# Patient Record
Sex: MALE | Race: WHITE | Employment: UNEMPLOYED | URBAN - METROPOLITAN AREA
[De-identification: names, ages, dates, MRNs, and addresses within clinical notes are randomized per-mention and may not be internally consistent; named-entity substitution may affect disease eponyms.]

---

## 2017-04-24 ENCOUNTER — APPOINTMENT (OUTPATIENT)
Dept: GENERAL RADIOLOGY | Age: 52
End: 2017-04-24
Attending: EMERGENCY MEDICINE
Payer: SELF-PAY

## 2017-04-24 ENCOUNTER — HOSPITAL ENCOUNTER (EMERGENCY)
Age: 52
Discharge: HOME | End: 2017-04-24
Attending: EMERGENCY MEDICINE
Payer: SELF-PAY

## 2017-04-24 VITALS
SYSTOLIC BLOOD PRESSURE: 131 MMHG | RESPIRATION RATE: 16 BRPM | HEIGHT: 70 IN | DIASTOLIC BLOOD PRESSURE: 87 MMHG | HEART RATE: 66 BPM | WEIGHT: 220 LBS | OXYGEN SATURATION: 99 % | TEMPERATURE: 98.3 F | BODY MASS INDEX: 31.5 KG/M2
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
## 2017-04-24 DIAGNOSIS — S43.004A SHOULDER DISLOCATION, RIGHT, INITIAL ENCOUNTER: ICD-10-CM

## 2017-04-24 PROCEDURE — 99282 EMERGENCY DEPT VISIT SF MDM: CPT

## 2017-04-24 PROCEDURE — 96372 THER/PROPH/DIAG INJ SC/IM: CPT

## 2017-04-24 PROCEDURE — 73030 X-RAY EXAM OF SHOULDER: CPT

## 2017-04-24 PROCEDURE — 74011250636 HC RX REV CODE- 250/636: Performed by: EMERGENCY MEDICINE

## 2017-04-24 RX ORDER — KETOROLAC TROMETHAMINE 30 MG/ML
60 INJECTION, SOLUTION INTRAMUSCULAR; INTRAVENOUS
Status: COMPLETED | OUTPATIENT
Start: 2017-04-24 | End: 2017-04-24

## 2017-04-24 RX ORDER — MELOXICAM 7.5 MG/1
7.5 TABLET ORAL DAILY
Qty: 14 TAB | Refills: 0 | Status: SHIPPED | OUTPATIENT
Start: 2017-04-24 | End: 2019-08-08

## 2017-04-24 RX ADMIN — KETOROLAC TROMETHAMINE 60 MG: 30 INJECTION, SOLUTION INTRAMUSCULAR at 10:28
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
## 2017-04-24 NOTE — ED NOTES
PT discharged per MD order, educated PT on discharge instructions, medication and follow up care, verbalized understanding, assisted PT to lobby via WC, PT stable at time of transport

## 2018-11-21 ENCOUNTER — OFFICE VISIT (OUTPATIENT)
Dept: NEUROLOGY | Age: 53
End: 2018-11-21

## 2018-11-21 ENCOUNTER — HOSPITAL ENCOUNTER (OUTPATIENT)
Dept: LAB | Age: 53
Discharge: HOME | End: 2018-11-21
Payer: SELF-PAY

## 2018-11-21 VITALS
WEIGHT: 219 LBS | OXYGEN SATURATION: 98 % | BODY MASS INDEX: 31.35 KG/M2 | HEART RATE: 78 BPM | RESPIRATION RATE: 18 BRPM | DIASTOLIC BLOOD PRESSURE: 80 MMHG | HEIGHT: 70 IN | TEMPERATURE: 98.1 F | SYSTOLIC BLOOD PRESSURE: 130 MMHG

## 2018-11-21 DIAGNOSIS — G57.02 COMMON PERONEAL NEUROPATHY OF LEFT LOWER EXTREMITY: Primary | ICD-10-CM

## 2018-11-21 DIAGNOSIS — G57.02 COMMON PERONEAL NEUROPATHY OF LEFT LOWER EXTREMITY: ICD-10-CM

## 2018-11-21 LAB
HBA1C MFR BLD: 5.3 % (ref 4.2–5.6)
RHEUMATOID FACT SER QL LA: NEGATIVE
T4 FREE SERPL-MCNC: 0.7 NG/DL (ref 0.7–1.5)
TSH SERPL DL<=0.05 MIU/L-ACNC: 0.68 UIU/ML (ref 0.36–3.74)
VIT B12 SERPL-MCNC: 220 PG/ML (ref 211–911)

## 2018-11-21 PROCEDURE — 86235 NUCLEAR ANTIGEN ANTIBODY: CPT

## 2018-11-21 PROCEDURE — 86430 RHEUMATOID FACTOR TEST QUAL: CPT

## 2018-11-21 PROCEDURE — 83520 IMMUNOASSAY QUANT NOS NONAB: CPT

## 2018-11-21 PROCEDURE — 82607 VITAMIN B-12: CPT

## 2018-11-21 PROCEDURE — 36415 COLL VENOUS BLD VENIPUNCTURE: CPT

## 2018-11-21 PROCEDURE — 84439 ASSAY OF FREE THYROXINE: CPT

## 2018-11-21 PROCEDURE — 82784 ASSAY IGA/IGD/IGG/IGM EACH: CPT

## 2018-11-21 PROCEDURE — 83036 HEMOGLOBIN GLYCOSYLATED A1C: CPT

## 2018-11-21 RX ORDER — METHOCARBAMOL 750 MG/1
TABLET, FILM COATED ORAL 4 TIMES DAILY
COMMUNITY
End: 2019-08-08

## 2018-11-21 NOTE — PATIENT INSTRUCTIONS
Thank you for choosing [facility] and [facility] for your  
 
care. You may receive a survey about your visit. We appreciate you taking time  
 
to complete this survey as we use your feedback to improve our services. We  
 
realize we are not perfect, but we strive to provide excellent care.

## 2018-11-21 NOTE — PROGRESS NOTES
Chief Complaint Patient presents with  Foot Pain C/O left foot drop PHQ over the last two weeks 11/21/2018 Little interest or pleasure in doing things Not at all Feeling down, depressed, irritable, or hopeless Not at all Total Score PHQ 2 0

## 2018-11-21 NOTE — PROGRESS NOTES
HPI:  46 y/o male referred by JOHN Merritt for a CC of left foot drop. About 6 weeks ago he woke up with numbness in the anterior tibial area of the left leg and inability to dorsiflex his left foot. This has not improved. He denies trauma or pain. Denies back pain or pain from the back radiating into the left lower extremity. He is not a diabetic. He does drink at least 4 beers a day. Denies any numbness, tingling, or weakness of the right lower extremity. He has been compensated by circling his left leg at the hip Social History Socioeconomic History  Marital status:  Spouse name: Not on file  Number of children: Not on file  Years of education: Not on file  Highest education level: Not on file Social Needs  Financial resource strain: Not on file  Food insecurity - worry: Not on file  Food insecurity - inability: Not on file  Transportation needs - medical: Not on file  Transportation needs - non-medical: Not on file Occupational History  Not on file Tobacco Use  Smoking status: Never Smoker  Smokeless tobacco: Current User  Tobacco comment: chew tobacco  
Substance and Sexual Activity  Alcohol use: Not on file  Drug use: Not on file  Sexual activity: Not on file Other Topics Concern  Not on file Social History Narrative  Not on file No family history on file. Current Outpatient Medications Medication Sig Dispense Refill  methocarbamol (ROBAXIN) 750 mg tablet Take  by mouth four (4) times daily.  meloxicam (MOBIC) 7.5 mg tablet Take 1 Tab by mouth daily. 14 Tab 0 History reviewed. No pertinent past medical history. Past Surgical History:  
Procedure Laterality Date  HX ORTHOPAEDIC Allergies Allergen Reactions  Motrin [Ibuprofen] Hives There is no problem list on file for this patient. Review of Systems:  
Constitutional: no fever or chills Skin denies rash or itching HEENT:  Denies tinnitus, hearing loss, or visual changes Respiratory: denies shortness of breath Cardiovascular: denies chest pain, dyspnea on exertion Gastrointestinal: does not report nausea or vomiting Genitourinary: does not report dysuria or incontinence Musculoskeletal: does not report joint pain or swelling Endocrine: denies weight change Hematology: denies easy bruising or bleeding Neurological: as above in HPI PHYSICAL EXAMINATION:   
 
VITAL SIGNS:   
Visit Vitals /80 Pulse 78 Temp 98.1 °F (36.7 °C) Resp 18 Ht 5' 10\" (1.778 m) Wt 99.3 kg (219 lb) SpO2 98% BMI 31.42 kg/m² GENERAL: Well developed, well nourished, in no apparent distress. HEART: RR, no murmurs heard, no carotid bruits EXTREMITIES: No clubbing, cyanosis, or edema is identified. Pulses 2+    and symmetrical.  No retropopliteal masses felt. HEAD:   Normocephalic, atraumatic. NEUROLOGIC EXAMINATION 
 
MENTAL STATUS: Awake, alert, and oriented x 4. Attention and STM are grossly normal. There is no aphasia. Fund of knowledge is adequate. Mood and affect are appropriate CRANIAL NERVES: Visual fields are full to confrontation. No fundus anomalies observed. Pupils are reactive to light and accommodation. Extraocular movements are intact and there is no nystagmus. Facial sensation is normal 
Face is symmetrical.  
Hearing is present. SCM/TPZ 5/5 Palate rises symmetrically. Tongue is in the midline. MOTOR:   0/5 left toe and foot dorsiflexion, 2/5 left foot eversion, 5 out of 5 everywhere else CEREBELLAR: No tremors or dysmetria SENSORY:  Normal PP, vibration, propioception. Romberg negative DTR's:   +2 throughout, no long tract signs GAIT:   Flapping gait with left circumduction at the hip Impression: Left foot drop, highest in the differential is a left peroneal neuropathy distal to the split of the sciatic into peroneal and tibial nerves, less likely this is an L4-L5 radiculopathy given pattern of weakness and lack of radicular symptoms. Systemic processes such as diabetes, vasculitis, autoimmune disorders resulting in a mononeuritis type of presentation, compression at the level of the knee to fibular area are possibilities. Plan: 1hemoglobin A1c, serum protein electrophoresis, immunoelectrophoresis, B12, TSH, DONTAE, rheumatoid factor, and antinucleocytoplasmic antibodies. 2-EMG and nerve conduction studies of the lower extremities. 3Discussed prognosis, too early to know about chances of recovery. 4-Will discuss on f/u PT, AFO fitting if not improving. PLEASE NOTE:  
Portions of this document may have been produced using voice recognition software. Unrecognized errors in transcription may be present. This note will not be viewable in [address]

## 2018-11-23 LAB
ALBUMIN SERPL ELPH-MCNC: 3.7 G/DL (ref 2.9–4.4)
ALBUMIN/GLOB SERPL: 1.3 {RATIO} (ref 0.7–1.7)
ALPHA1 GLOB SERPL ELPH-MCNC: 0.2 G/DL (ref 0–0.4)
ALPHA2 GLOB SERPL ELPH-MCNC: 0.6 G/DL (ref 0.4–1)
B-GLOBULIN SERPL ELPH-MCNC: 1.2 G/DL (ref 0.7–1.3)
CENTROMERE B AB SER-ACNC: <0.2 AI (ref 0–0.9)
CHROMATIN AB SERPL-ACNC: <0.2 AI (ref 0–0.9)
DSDNA AB SER-ACNC: 1 IU/ML (ref 0–9)
ENA JO1 AB SER-ACNC: <0.2 AI (ref 0–0.9)
ENA RNP AB SER-ACNC: <0.2 AI (ref 0–0.9)
ENA SCL70 AB SER-ACNC: <0.2 AI (ref 0–0.9)
ENA SM AB SER-ACNC: <0.2 AI (ref 0–0.9)
ENA SS-A AB SER-ACNC: <0.2 AI (ref 0–0.9)
ENA SS-B AB SER-ACNC: <0.2 AI (ref 0–0.9)
GAMMA GLOB SERPL ELPH-MCNC: 0.9 G/DL (ref 0.4–1.8)
GLOBULIN SER-MCNC: 2.9 G/DL (ref 2.2–3.9)
IGA SERPL-MCNC: 277 MG/DL (ref 90–386)
IGG SERPL-MCNC: 960 MG/DL (ref 700–1600)
IGM SERPL-MCNC: 120 MG/DL (ref 20–172)
INTERPRETATION SERPL IEP-IMP: NORMAL
M PROTEIN SERPL ELPH-MCNC: NORMAL G/DL
PROT SERPL-MCNC: 6.6 G/DL (ref 6–8.5)
SEE BELOW, 164869: NORMAL

## 2018-11-26 LAB
C-ANCA TITR SER IF: NORMAL TITER
MYELOPEROXIDASE AB SER IA-ACNC: <9 U/ML (ref 0–9)
P-ANCA ATYPICAL TITR SER IF: NORMAL TITER
P-ANCA TITR SER IF: NORMAL TITER
PROTEINASE3 AB SER IA-ACNC: <3.5 U/ML (ref 0–3.5)

## 2018-11-27 ENCOUNTER — OFFICE VISIT (OUTPATIENT)
Dept: NEUROLOGY | Age: 53
End: 2018-11-27

## 2018-11-27 VITALS
BODY MASS INDEX: 30.58 KG/M2 | OXYGEN SATURATION: 97 % | RESPIRATION RATE: 16 BRPM | DIASTOLIC BLOOD PRESSURE: 75 MMHG | HEIGHT: 70 IN | SYSTOLIC BLOOD PRESSURE: 120 MMHG | HEART RATE: 76 BPM | WEIGHT: 213.6 LBS | TEMPERATURE: 97.7 F

## 2018-11-27 DIAGNOSIS — G57.02 COMMON PERONEAL NEUROPATHY OF LEFT LOWER EXTREMITY: Primary | ICD-10-CM

## 2018-11-27 DIAGNOSIS — G62.9 POLYNEUROPATHY: ICD-10-CM

## 2018-11-27 DIAGNOSIS — F10.10 ALCOHOL ABUSE: ICD-10-CM

## 2018-11-27 NOTE — PROGRESS NOTES
11/27/2018 11:04 AM    SSN: [id number]    Subjective:   50-year-old male who comes for follow-up and electrodiagnostic testing of a foot drop of onset 6 to now 7 weeks ago. He reports little ability to dorsiflex the toes of the left foot and the left foot itself. He has numbness on the dorsal aspect of the left foot. He had laboratory works, showing normal hemoglobin A1c, TSH, serum protein electrophoresis, negative and DONTAE, negative antinucleocytoplasmic antibodies, and negative testing for rheumatoid arthritis. B12 level was low normal at 220. He does drink a minimum of 4 beers a day and sometimes would have some moonshine as well. Nerve conduction studies and EMG of the lower extremities showed a clear left peroneal neuropathy at the left fibular head with subpar right peroneal motor and bilateral sural sensory potential amplitudes that raise concern for more diffuse underlying polyneuropathy as well. EMG showed denervation in the left tibialis anterior which was not seen on the right. Social History     Socioeconomic History    Marital status:      Spouse name: Not on file    Number of children: Not on file    Years of education: Not on file    Highest education level: Not on file   Social Needs    Financial resource strain: Not on file    Food insecurity - worry: Not on file    Food insecurity - inability: Not on file    Transportation needs - medical: Not on file   [employer] needs - non-medical: Not on file   Occupational History    Not on file   Tobacco Use    Smoking status: Never Smoker    Smokeless tobacco: Current User    Tobacco comment: chew tobacco   Substance and Sexual Activity    Alcohol use: Not on file    Drug use: Not on file    Sexual activity: Not on file   Other Topics Concern    Not on file   Social History Narrative    Not on file       No family history on file.     Current Outpatient Medications   Medication Sig Dispense Refill    methocarbamol (ROBAXIN) 750 mg tablet Take  by mouth four (4) times daily.  meloxicam (MOBIC) 7.5 mg tablet Take 1 Tab by mouth daily. 14 Tab 0       No past medical history on file. Past Surgical History:   Procedure Laterality Date    HX ORTHOPAEDIC         Allergies   Allergen Reactions    Motrin [Ibuprofen] Hives       Vital signs:    Visit Vitals  /75 (BP 1 Location: Left arm, BP Patient Position: Sitting)   Pulse 76   Temp 97.7 °F (36.5 °C) (Oral)   Resp 16   Ht 5' 10\" (1.778 m)   Wt 96.9 kg (213 lb 9.6 oz)   SpO2 97%   BMI 30.65 kg/m²       Review of Systems:   GENERAL: Denies fever or fatigue  CARDIAC: No CP or SOB  PULMONARY: No cough of SOB  MUSCULOSKELETAL: No new joint pain  NEURO: SEE HPI      EXAM: Alert, in NAD. Heart is regular. Oriented x3, EOM's are full, PERRL, no facial asymmetries. Strength and tone are 0/5 left foot and toe dorsiflexion. .  Left foot drop, slapping gait left leg. 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 He has a low normal B12 and I advised him about oral replacement. I suspect that alcohol is a major reason why he is having a neuropathy. I cannot rule out that there is an entrapment of the left peroneal nerve at the fibular head or in the retropopliteal fossa. Discussed expectations. I will send him to physical therapy for evaluation and AFO while he hopefully heals, which she understands is not guaranteed. This may be permanent and he understands this. He will see me in follow-up in 3 months. PLEASE NOTE:   Portions of this document may have been produced using voice recognition software. Unrecognized errors in transcription may be present. This note will not be viewable in [address]

## 2018-11-27 NOTE — PROGRESS NOTES
[facility] Neuroscience  [facility] [address]  [facility] [phone]    Neurophysiology Report    Patient: Sarita Qureshi     ID: [id number] Physician: Jose Alvarado MD   Gender: Male Ref Phys: Jose Alvarado MD   Handedness: Sarah Williamson     Study Date: November 27, 2018         Patient History:  Left foot drop    Nerve Conduction Studies  Anti Sensory Summary Table     Stim Site NR Peak (ms) Norm Peak (ms) O-P Amp (µV) Norm O-P Amp Dist (cm) Gee (m/s)   Left Sup Peron Anti Sensory (Lower Leg)   Ankle    5.8 <4.4 6.1 >6.0 14.0 30.4   Site 2    6.7  4.1      Right Sup Peron Anti Sensory (Lower Leg)   Ankle    4.4 <4.4 7.7 >6.0 14.0 50.0   Left Sural Anti Sensory (Ankle)   Calf    10.7 <4.4 7.7 >6.0 14.0 26.4   Right Sural Anti Sensory (Ankle)   Calf    4.5 <4.4 11.6 >6.0 14.0 38.9   Site 2    4.4  13.0        Motor Summary Table     Stim Site NR Onset (ms) Norm Onset (ms) O-P Amp (mV) Norm O-P Amp Dist (cm) Gee (m/s) Norm Gee (m/s)   Left Peroneal Motor (EDB)   Ankle NR  <6.5  >2.0 34.0  >44   Fibula (Head) NR     10.0     Pop Fossa    15.2  1.0       Right Peroneal Motor (EDB)   Ankle    5.5 <6.5 3.1 >2.0 34.0 47.2 >44   Fibula (Head)    12.7  2.6  10.0 250.0    Pop Fossa    13.1  2.7       Left Tibial Motor (Abd Latham Brev)   Ankle    5.6 <5.8 4.9 >4.0 30.0 24.0 >41   Knee    18.1  3.3       Right Tibial Motor (Abd Latham Brev)   Ankle    4.8 <5.8 4.1 >4.0 44.0 35.8 >41   Knee    17.1  5.0             EMG     Side Muscle Nerve Root Ins Act Fibs Psw Fasc Amp Dur Poly Recrt Int Paresh Landsman Comment   Left VastusLat Femoral L2-4 Nml Nml Nml None Nml Nml 0 Nml Nml    Left AntTibialis Dp Br Fibular L4-5 Incr 2+ 2+ None Nml Nml 0 Reduced 50%    Left MedGastroc   Nml Nml Nml None Nml Nml 0 Nml Nml    Left GluteusMed SupGluteal L5-S1 Nml Nml Nml None Nml Nml 0 Nml Nml    Left BicepsFemS Sciatic L5-S1 Nml Nml Nml None Nml Nml 0 Nml Nml    Left Ext Dig Brev Dp Br Fibular L5, S1 Incr 1+ Nml None Nml Nml 0 Reduced 25%    Right AntTibialis Dp Br Fibular L4-5 Nml Nml Nml None Nml Nml 0 Nml Nml    Right MedGastroc   Nml Nml Nml None Nml Nml 0 Nml Nml    Right VastusLat Femoral L2-4 Nml Nml Nml None Nml Nml 0 Nml Nml    Right BicepsFemS Sciatic L5-S1 Nml Nml Nml None Nml Nml 0 Nml Nml    Right Ext Dig Brev Dp Br Fibular L5, S1 Nml Nml Nml None Nml Nml 0 Nml Nml      NCS/EMG FINDINGS:     Evaluation of the Left peroneal motor nerve showed no response (Ankle) and no response (Fibula (Head)).  The Right peroneal motor and the Right superficial peroneal sensory nerves were unremarkable.  The Left tibial motor and the Right tibial motor nerves showed decreased conduction velocity (Knee-Ankle, L24.0, R35.8 m/s).  The Left superficial peroneal sensory nerve showed prolonged distal peak latency (5.8 ms) and decreased conduction velocity (Ankle-Lower Leg, 30.4 m/s).  The Left sural sensory and the Right sural sensory nerves showed prolonged distal peak latency (L10.7, R4.5 ms) and decreased conduction velocity (Calf-Ankle, L26.4, R38.9 m/s). INTERPRETATION:   1-Absent left peroneal CMAP at and distal to the fibular head, present proximally. 2-Absent left sural SNAP. 3-Low normal right peroneal motor amplitude.    4-These findings are consistent with a possible mild multifocal polyneuropathy with predominant left peroneal nerve involvement and potential entrapment at the fibular head vs isolated left peroneal neuropathy at the fibular head.     ___________________________  Prince Jimenez MD          Waveforms:                    KADEEM Jesus 57[facility]  OFFICE PROCEDURE PROGRESS NOTE        Chart reviewed for the following:   Joe Vaz MD, have reviewed the History, Physical and updated the Allergic reactions for [facility] performed immediately prior to start of procedure:   Joe Vaz MD, have performed the following reviews on [employer] prior to the start of the procedure:            * Patient was identified by name and date of birth   * Agreement on procedure being performed was verified  * Risks and Benefits explained to the patient  * Procedure site verified and marked as necessary  * Patient was positioned for comfort  * Consent was signed and verified     Time: 11:04 AM    Date of procedure: 11/27/2018    Procedure performed by:  [address] [place] [address]    Provider assisted by: BERKLEY Magana      Patient assisted by: self    How tolerated by patient: tolerated the procedure well with no complications    Post Procedural Pain Scale: 0 - No Hurt    Comments: none

## 2018-11-28 ENCOUNTER — TELEPHONE (OUTPATIENT)
Dept: NEUROLOGY | Age: 53
End: 2018-11-28

## 2018-11-28 NOTE — TELEPHONE ENCOUNTER
Pt calling stating he was ordered for Physical Therapy. Requests to go to [facility].

## 2018-12-04 ENCOUNTER — HOSPITAL ENCOUNTER (OUTPATIENT)
Dept: PHYSICAL THERAPY | Age: 53
Discharge: HOME | End: 2018-12-04
Payer: SELF-PAY

## 2018-12-04 PROCEDURE — 97116 GAIT TRAINING THERAPY: CPT

## 2018-12-04 PROCEDURE — 97530 THERAPEUTIC ACTIVITIES: CPT

## 2018-12-04 PROCEDURE — 97162 PT EVAL MOD COMPLEX 30 MIN: CPT

## 2018-12-04 NOTE — PROGRESS NOTES
PT DAILY TREATMENT NOTE/FOOT AND ANKLE EVAL 10-18    Patient Name: Sanya Tyson  Date:2018  : 1965  [x]  Patient  Verified  Payor: SELF PAY / Plan: [facility] SELF PAY / Product Type: Self Pay /    In time:8:16  Out time:9:00  Total Treatment Time (min): 44  Visit #: 1 of     Treatment Area: Lesion of sciatic nerve, left lower limb [G57.02]  Polyneuropathy, unspecified [G62.9]    SUBJECTIVE  Pain Level (0-10 scale): 7-8/10  []constant []intermittent []improving []worsening []no change since onset    Any medication changes, allergies to medications, adverse drug reactions, diagnosis change, or new procedure performed?: [x] No    [] Yes (see summary sheet for update)  Subjective functional status/changes:       Pt is a 46 yo M who presents with numbness/left foot drop of insidious onset 8 weeks ago that began upon waking one morning after a night of drinking while sleeping with his legs crossed. He has noticed minor increase in ability to move toes, but no change in ability to lift foot since onset, and cause of left foot drop is unknown. Numbness extends from mid-shin distally to lateral and then medial aspect of left foot to great toe. Subjective reports of numbness aggravated with WB.     Barriers: []pain []financial []time []transportation []other  Motivation: high  Substance use: []Alcohol []Tobacco []other:   FABQ Score: []low []elevate  Cognition: A & O x 4    Other:    OBJECTIVE/EXAMINATION      21 min [x]Eval                  []Re-Eval       5 min Therapeutic Exercise:  [] See flow sheet : see HEP   Rationale: increase ROM and increase strength to improve the patients ability to improve ease of ambulation     10 min Therapeutic Activity:  []  See flow sheet :   Rationale: Educated patient regarding findings of evaluation, how alcohol and tobacco can decrease healing, signs/sx of DVT and call MD/go to ER if signs/sx, BP norms, importance of lowering BP, taking BP and keeping log 3-4 times per day, new therex technique and purpose, purpose of therapy, and therapy plan in order to ensure pt understanding/compliance with therapy    8 minutes Gait Training: with AFO  Rationale: to improve ease of household/community negotiation           With   [] TE   [] TA   [] neuro   [] other: Patient Education: [x] Review HEP    [] Progressed/Changed HEP based on:   [] positioning   [] body mechanics   [] transfers   [] heat/ice application    [] other:      Other Objective/Functional Measures:     /96 taken manually prior to therapy     Physical Therapy Evaluation  - Foot and Ankle    Gait: [] Normal    [x] Abnormal    [] Antalgic    [] NWB    Device:  Describe: increased hip/knee flexion on left to compensate for lack of DF, midfoot contact with initial contact on left     ROM/Strength  [] Unable to assess at this time      AROM        PROM            Strength (1-5)   Left Right Left Right Left  Right   Dorsiflexion unable 20 5  0 4+   Plantarflexion 36 44       Inversion 34 30   4 4   Eversion 6 18   4 4   Great Toe Ext         Great Toe Flex           Flexibility: [] Unable to assess at this time  Gastroc:    (L) Tightness [] WNL   [x] Min   [] Mod   [] Severe    (R) Tightness [] WNL   [] Min   [] Mod   [] Severe  Soleus:    (L) Tightness [] WNL   [x] Min   [] Mod   [] Severe    (R) Tightness [] WNL   [] Min   [] Mod   [] Severe  Other:      (L) Tightness [] WNL   [] Min   [] Mod   [] Severe    (R) Tightness [] WNL   [] Min   [] Mod   [] Severe    Swelling:   Left (cm) Right (cm)   7\" distal to patella  39.6 38.4       Other tests/ comments:    Lumbar % AROM:  Flexion 100% - no change in pain/numbness   Extension 50% - no change in pain/numbnmess  Left SB 75% - no change in pain/numbnmess  Right SB - 75% - no change in pain/numbnmess    Lumbar quadrant test (-) B     No redness, increased temp, or pitting edema B calves  Tenderness/allodynia throughout ankle/foot musculature and bony prominences  Well's Clinical Prediction Rules: low risk of DVT    Pt educated on ankle inversion/eversion to substitute for ankle pumps and promote blood flow to reduce swelling    AFO was sliding in slippers per subjective reports, pt instructed to wear supportive shoes     Sensation to light touch grossly intact BLE, monofilament testing not assessed d/t time constraint     Gave pt desensitization kit to address allodynia       Pain Level (0-10 scale) post treatment: 9.5/10    ASSESSMENT/Changes in Function: See POC    Mild swelling is likely d/t decreased muscle activation limiting assist with blood flow. No pitting edema, redness, or increased temp B calves and 1.2 cm girth difference is evident 7\" distal to patella (Well's Clinical Prediction rules indicate >3cm of swelling is significant when assessing for DVT). Signs/sx are not consistent with DVT. Will continue to monitor. Patient will continue to benefit from skilled PT services to modify and progress therapeutic interventions, address functional mobility deficits, address ROM deficits, address strength deficits, analyze and address soft tissue restrictions, analyze and cue movement patterns, assess and modify postural abnormalities and instruct in home and community integration to attain remaining goals.      [x]  See Plan of Care  []  See progress note/recertification  []  See Discharge Summary         Progress towards goals / Updated goals:  See POC    PLAN  [x]  Upgrade activities as tolerated     []  Continue plan of care  [x]  Update interventions per flow sheet       []  Discharge due to:_  []  Other:_      Ping Acevedo, PT 12/4/2018  8:21 AM

## 2018-12-04 NOTE — PROGRESS NOTES
[facility] [employer] [facility]  [address] [place] [address]  [phone] fax    Plan of Care/ Statement of Necessity for Physical Therapy Services    Patient name: Kinza Castle Start of Care: 2018   Referral source: Jerzy Jay MD : 1965    Medical Diagnosis: Lesion of sciatic nerve, left lower limb [G57.02]  Polyneuropathy, unspecified [G62.9]  Payor: SELF PAY / Plan: [facility] SELF PAY / Product Type: Self Pay /  Onset Date:8 weeks ago    Treatment Diagnosis: Left Foot Drop   Prior Hospitalization: see medical history Provider#: [id number]   Medications: Verified on Patient summary List    Comorbidities: alcohol use (12 pack per day), tobacco use (chewing tobacco, a couple cans per week)   Prior Level of Function: lives alone in a one story home, yard work, housework, metal work     [employer] of Care and following information is based on the information from the initial evaluation. Assessment/ key information: Pt is a 46 yo M who presents with numbness/left foot drop of insidious onset 8 weeks ago that began upon waking one morning after a night of drinking while sleeping with his legs crossed. He has noticed minor increase in ability to move toes, but no change in ability to lift foot since onset, and cause of left foot drop is unknown. Numbness extends from mid-shin distally to lateral and then medial aspect of left foot to great toe. Subjective reports of numbness aggravated with WB. EMG from 18 reveals: 1-Absent left peroneal CMAP at and distal to the fibular head, present proximally. 2-Absent left sural SNAP. 3-Low normal right peroneal motor amplitude. 4-These findings are consistent with a possible mild multifocal polyneuropathy with predominant left peroneal nerve involvement and potential entrapment at the fibular head vs isolated left peroneal neuropathy at the fibular head.   Left ankle inversion and PF motion are WFL, and decreased eversion AROM is evident (6 dgrs). Left ankle inversion and eversion strength are WFL. DF strength is absent on left. Findings consistent with peroneal nerve palsy. Pt ambulates with increased hip/knee flexion on left to compensate for decreased DF, and midfoot contact with initial contact on left. Pt was given trial of AFO and demonstrated normalized swing phase on left during ambulation. Pt may benefit from custom AFO to normalize gait pattern and improve ambulatory tolerance. MD please sign if you agree. Pt will benefit from skilled PT to attempt to regain DF strength with trial of e-stim and to address strength/ROM deficits to normalize gait pattern in order to improve ease of prolonged ambulation and QOL.      /96 taken manually prior to therapy. Pt educated on importance of lowering BP. Advised pt to keep a BP log and follow up with MD if BP remained elevated. Evaluation Complexity History MEDIUM  Complexity : 1-2 comorbidities / personal factors will impact the outcome/ POC ; Examination MEDIUM Complexity : 3 Standardized tests and measures addressing body structure, function, activity limitation and / or participation in recreation  ;Presentation MEDIUM Complexity : Evolving with changing characteristics  ; Clinical Decision Making MEDIUM Complexity : FOTO score of 26-74  Overall Complexity Rating: MEDIUM  Problem List: pain affecting function, decrease ROM, decrease strength, edema affecting function, impaired gait/ balance, decrease ADL/ functional abilitiies, decrease activity tolerance and decrease flexibility/ joint mobility   Treatment Plan may include any combination of the following: Therapeutic exercise, Therapeutic activities, Neuromuscular re-education, Physical agent/modality, Gait/balance training, Manual therapy, Patient education, Self Care training, Functional mobility training, Home safety training and Stair training  Patient / Family readiness to learn indicated by: asking questions, trying to perform skills and interest  Persons(s) to be included in education: patient (P)  Barriers to Learning/Limitations: None  Patient Goal (s):  my foot  Patient Self Reported Health Status: excellent  Rehabilitation Potential: fair    Short Term Goals: To be accomplished in 1 weeks:  1. Pt will be compliant with initial HEP to improve therapy outcomes   Long Term Goals: To be accomplished in 8 weeks:  1. Pt will improve FOTO by 29 points in order to demonstrate functional improvement   2. Pt will improve ambulatory tolerance to 2 blocks without difficulty in order to improve ease of community negotiation  3. Pt will improve DF strength to 1/5 in order to progress towards normalized gait pattern in order to improve ease of prolonged ambulation    Frequency / Duration: Patient to be seen 2-3 times per week for 6 weeks. Patient/ CarPatient/ Caregiver education and instruction: Diagnosis, prognosis, activity modification and exercises   [x]  Plan of care has been reviewed with CORBY Oliveros PT 12/4/2018 8:16 AM    ________________________________________________________________________    I certify that the above Therapy Services are being furnished while the patient is under my care. I agree with the treatment plan and certify that this therapy is necessary.     Physician's Signature:____________Date:_________TIME:________    ** Signature, Date and Time must be completed for valid certification **    Please sign and return to [facility] - [employer]  [address] [facility]  [phone] fax

## 2018-12-06 ENCOUNTER — TELEPHONE (OUTPATIENT)
Dept: NEUROLOGY | Age: 53
End: 2018-12-06

## 2018-12-06 NOTE — TELEPHONE ENCOUNTER
Notes received from [facility] [address] Giancarlo Erickson in Dr. Anabel Landry' folder @ [facility]

## 2018-12-07 ENCOUNTER — HOSPITAL ENCOUNTER (OUTPATIENT)
Dept: PHYSICAL THERAPY | Age: 53
Discharge: HOME | End: 2018-12-07
Payer: SELF-PAY

## 2018-12-07 PROCEDURE — 97110 THERAPEUTIC EXERCISES: CPT

## 2018-12-07 PROCEDURE — 97032 APPL MODALITY 1+ESTIM EA 15: CPT

## 2018-12-07 NOTE — PROGRESS NOTES
PT DAILY TREATMENT NOTE 10-18    Patient Name: Micky Granger  Date:2018  : 1965  [x]  Patient  Verified  Payor: SELF PAY / Plan: [facility] SELF PAY / Product Type: Self Pay /    In time:830  Out time:901  Total Treatment Time (min): 31  Visit #: 2 of     Medicare/BCBS Only   Total Timed Codes (min):  31 1:1 Treatment Time:  31       Treatment Area: Lesion of sciatic nerve, left lower limb [G57.02]  Polyneuropathy, unspecified [G62.9]    SUBJECTIVE  Pain Level (0-10 scale): 7/10  Any medication changes, allergies to medications, adverse drug reactions, diagnosis change, or new procedure performed?: [x] No    [] Yes (see summary sheet for update)  Subjective functional status/changes:   [] No changes reported  Pt stated that he is about the same as last visit    OBJECTIVE    Modality rationale: increase muscle contraction/control to improve the patients ability to improve walking   Min Type Additional Details    [] Estim:  []Unatt       []IFC  []Premod                        []Other:  []w/ice   []w/heat  Position:  Location:   8 [x] Estim: [x]Att    []TENS instruct  [x]NMES                    []Other:  []w/US   []w/ice   []w/heat  Position: supine  Location:right DF    []  Traction: [] Cervical       []Lumbar                       [] Prone          []Supine                       []Intermittent   []Continuous Lbs:  [] before manual  [] after manual    []  Ultrasound: []Continuous   [] Pulsed                           []1MHz   []3MHz W/cm2:  Location:    []  Iontophoresis with dexamethasone         Location: [] Take home patch   [] In clinic    []  Ice     []  heat  []  Ice massage  []  Laser   []  Anodyne Position:  Location:    []  Laser with stim  []  Other:  Position:  Location:    []  Vasopneumatic Device Pressure:       [] lo [] med [] hi   Temperature: [] lo [] med [] hi   [x] Skin assessment post-treatment:  [x]intact []redness- no adverse reaction    []redness - adverse reaction:     22 min Neuromuscular Re-education:  [x]  See flow sheet :   Rationale: improve coordination and improve balance  to improve the patients ability to decrease fall risk          With   [x] TE   [] TA   [] neuro   [] other: Patient Education: [x] Review HEP    [] Progressed/Changed HEP based on:   [] positioning   [] body mechanics   [] transfers   [] heat/ice application    [] other:      Other Objective/Functional Measures:   Trial of NMES was performed today. Pt had no contraction of ant tib, pads were repositioned, but cont to have no contraction. Will be discharged for now  No LOB with floor static balance  Was unable to maintain balance with SLS without UE support and had significant pain  Had some swaying with tandem stance  Romberg foam with EC was challenging     Pain Level (0-10 scale) post treatment: 7/10    ASSESSMENT/Changes in Function:   Initiated therex today per flow sheet. Pt reported compliance with HEP. Pt put forth good effort with all exercises    Patient will continue to benefit from skilled PT services to address functional mobility deficits, address ROM deficits, address strength deficits and address imbalance/dizziness to attain remaining goals. [x]  See Plan of Care  []  See progress note/recertification  []  See Discharge Summary         Progress towards goals / Updated goals:  Short Term Goals: To be accomplished in 1 weeks:  1. Pt will be compliant with initial HEP to improve therapy outcomes   Long Term Goals: To be accomplished in 8 weeks:  1. Pt will improve FOTO by 29 points in order to demonstrate functional improvement   2. Pt will improve ambulatory tolerance to 2 blocks without difficulty in order to improve ease of community negotiation  3.  Pt will improve DF strength to 1/5 in order to progress towards normalized gait pattern in order to improve ease of prolonged ambulation    PLAN  []  Upgrade activities as tolerated     [x]  Continue plan of care  []  Update interventions per flow sheet       []  Discharge due to:_  []  Other:_      Bereket Barnett, PTA 12/7/2018  12:10 PM    Future Appointments   Date Time Provider Abdullahi Justina   12/11/2018  7:30 AM Lilgabya Omi, PTA MMCPTPB [facility]   12/13/2018  7:30 AM Lillia Omi, PTA MMCPTPB [facility]   12/18/2018  7:30 AM Momo Curran, PT [id number] [facility]   12/20/2018  7:30 AM Lillia Omi, PTA MMCPTPB [facility]   12/24/2018  8:00 AM Lilgabya Omi, PTA MMCPTPB [facility]   12/27/2018  7:30 AM Momo Curran, PT [id number] [facility]   12/31/2018  8:00 AM Yessia Omi, PTA MMCPTPB [facility]   1/3/2019  7:30 AM Momo Curran, PT MMCPTPB [facility]   1/8/2019  7:30 AM Momo Curran, PT MMCPTPB [facility]   1/10/2019  7:30 AM Yessia Omi, PTA MMCPTPB [facility]   1/15/2019  7:30 AM Momo Curran, PT [id number] [facility]   1/17/2019  7:30 AM Lilgabya Omi, PTA MMCPTPB [facility]   1/28/2019  9:00 AM Rupinder Ortiz MD [id number]

## 2018-12-11 ENCOUNTER — HOSPITAL ENCOUNTER (OUTPATIENT)
Dept: PHYSICAL THERAPY | Age: 53
Discharge: HOME | End: 2018-12-11
Payer: SELF-PAY

## 2018-12-11 PROCEDURE — 97110 THERAPEUTIC EXERCISES: CPT

## 2018-12-11 PROCEDURE — 97112 NEUROMUSCULAR REEDUCATION: CPT

## 2018-12-11 NOTE — PROGRESS NOTES
PT DAILY TREATMENT NOTE 10-18    Patient Name: Jhonatan Hicks  Date:2018  : 1965  [x]  Patient  Verified  Payor: SELF PAY / Plan: [facility] SELF PAY / Product Type: Self Pay /    In time:730  Out time:802  Total Treatment Time (min): 32  Visit #: 3 of     Treatment Area: Lesion of sciatic nerve, left lower limb [G57.02]  Polyneuropathy, unspecified [G62.9]    SUBJECTIVE  Pain Level (0-10 scale): -6/10  Any medication changes, allergies to medications, adverse drug reactions, diagnosis change, or new procedure performed?: [x] No    [] Yes (see summary sheet for update)  Subjective functional status/changes:   [] No changes reported  Pt stated that his pain worsens as he moves throughout the day    OBJECTIVE    22 min Therapeutic Exercise:  [x] See flow sheet :   Rationale: increase ROM and increase strength to improve the patients ability to increase ease with ADLs    10 min Neuromuscular Re-education:  [x]  See flow sheet :   Rationale: improve coordination and improve balance  to improve the patients ability to decrease fall risk    With   [x] TE   [] TA   [] neuro   [] other: Patient Education: [x] Review HEP    [] Progressed/Changed HEP based on:   [] positioning   [] body mechanics   [] transfers   [] heat/ice application    [] other:      Other Objective/Functional Measures:   Had minimal difficulty with static balance exercises  Had some swaying with EC on foam  Was challenged with strengthening exercises     Pain Level (0-10 scale) post treatment: 6/10    ASSESSMENT/Changes in Function:   Pt is making slow progress toward goals. Pt cont with significant left foot drop. Is awaiting to hear from orthotics group for appt to get fitted for AFO. Static balance is improving.  Cont with decreased tolerance of standing on left LE    Patient will continue to benefit from skilled PT services to modify and progress therapeutic interventions, address functional mobility deficits, address ROM deficits and address strength deficits to attain remaining goals. [x]  See Plan of Care  []  See progress note/recertification  []  See Discharge Summary         Progress towards goals / Updated goals:  Short Term Goals: To be accomplished in 1 weeks:  1. Pt will be compliant with initial HEP to improve therapy outcomes   Long Term Goals: To be accomplished in 8 weeks:  1. Pt will improve FOTO by 29 points in order to demonstrate functional improvement   2. Pt will improve ambulatory tolerance to 2 blocks without difficulty in order to improve ease of community negotiation  3.  Pt will improve DF strength to 1/5 in order to progress towards normalized gait pattern in order to improve ease of prolonged ambulation    PLAN  []  Upgrade activities as tolerated     [x]  Continue plan of care  []  Update interventions per flow sheet       []  Discharge due to:_  []  Other:_      Howie Dunlap PTA 12/11/2018  7:36 AM    Future Appointments   Date Time Provider Abdullahi Horn   12/13/2018  7:30 AM Martha Retana PTA [id number] [facility]   12/18/2018  7:30 AM Paige Joseph, PT [id number] [facility]   12/20/2018  7:30 AM Martha Retana PTA [id number] [facility]   12/24/2018  8:00 AM Martha Retana PTA [id number] [facility]   12/27/2018  7:30 AM Paige Joseph, PT [id number] [facility]   12/31/2018  8:00 AM Martha Retana PTA MMCPTPB [facility]   1/3/2019  7:30 AM Paige Joseph, PT [id number] [facility]   1/8/2019  7:30 AM Paige Joseph, PT [id number] [facility]   1/10/2019  7:30 AM Martha Retana PTA [id number] [facility]   1/15/2019  7:30 AM Paige Joseph, PT [id number] [facility]   1/17/2019  7:30 AM Martha Retana PTA [id number] [facility]   1/28/2019  9:00 AM Dinora Ortiz MD [address]

## 2018-12-13 ENCOUNTER — HOSPITAL ENCOUNTER (OUTPATIENT)
Dept: PHYSICAL THERAPY | Age: 53
Discharge: HOME | End: 2018-12-13
Payer: SELF-PAY

## 2018-12-13 PROCEDURE — 97110 THERAPEUTIC EXERCISES: CPT

## 2018-12-13 PROCEDURE — 97112 NEUROMUSCULAR REEDUCATION: CPT

## 2018-12-13 NOTE — PROGRESS NOTES
PT DAILY TREATMENT NOTE 10-18    Patient Name: Rg Rodriges  Date:2018  : 1965  [x]  Patient  Verified  Payor: SELF PAY / Plan: [facility] SELF PAY / Product Type: Self Pay /    In time:735  Out time:800  Total Treatment Time (min): 25  Visit #: 4 of     Treatment Area: Lesion of sciatic nerve, left lower limb [G57.02]  Polyneuropathy, unspecified [G62.9]    SUBJECTIVE  Pain Level (0-10 scale): 0/10  Any medication changes, allergies to medications, adverse drug reactions, diagnosis change, or new procedure performed?: [x] No    [] Yes (see summary sheet for update)  Subjective functional status/changes:   [] No changes reported  Pt stated that he really doesn't have pain, just burning and numbness    OBJECTIVE    10 min Therapeutic Exercise:  [x] See flow sheet :   Rationale: increase ROM and increase strength to improve the patients ability to increase ease with ADls    15 min Neuromuscular Re-education:  [x]  See flow sheet :   Rationale: improve coordination and improve balance  to improve the patients ability to decrease fall risk    With   [x] TE   [] TA   [] neuro   [] other: Patient Education: [x] Review HEP    [] Progressed/Changed HEP based on:   [] positioning   [] body mechanics   [] transfers   [] heat/ice application    [] other:      Other Objective/Functional Measures:   Had no complaint of pain during session  Had slight difficulty with MSR, tandem and EC foam balance     Pain Level (0-10 scale) post treatment: 5/10    ASSESSMENT/Changes in Function:   Pt is making slow progress toward goals. Pt cont with slight decreased in static balance. Cont to have significant foot drop and cont to require AFO. Pt cont with  standing and walking tolerance    Patient will continue to benefit from skilled PT services to modify and progress therapeutic interventions, address functional mobility deficits, address ROM deficits and address strength deficits to attain remaining goals. [x]  See Plan of Care  []  See progress note/recertification  []  See Discharge Summary         Progress towards goals / Updated goals:  Short Term Goals: To be accomplished in 1 weeks:  1. Pt will be compliant with initial HEP to improve therapy outcomes   Long Term Goals: To be accomplished in 8 weeks:  1. Pt will improve FOTO by 29 points in order to demonstrate functional improvement   2. Pt will improve ambulatory tolerance to 2 blocks without difficulty in order to improve ease of community negotiation  3.  Pt will improve DF strength to 1/5 in order to progress towards normalized gait pattern in order to improve ease of prolonged ambulation    PLAN  []  Upgrade activities as tolerated     [x]  Continue plan of care  []  Update interventions per flow sheet       []  Discharge due to:_  []  Other:_      Earnest Manrique, PTA 12/13/2018  7:36 AM    Future Appointments   Date Time Provider Abdullahi Horn   12/18/2018  7:30 AM Maame Yareli, PT [id number] [facility]   12/20/2018  7:30 AM Annabel Riya, PTA [id number] [facility]   12/24/2018  8:00 AM Annabel Riya, PTA [id number] [facility]   12/27/2018  7:30 AM Maame Yareli, PT [id number] [facility]   12/31/2018  8:00 AM Annabel Riay, PTA [id number] [facility]   1/3/2019  7:30 AM Maame Yareli, PT AECAPED [facility]   1/8/2019  7:30 AM Maame Yareli, PT [id number] [facility]   1/10/2019  7:30 AM Annabel Riya, PTA MMCPTPB [facility]   1/15/2019  7:30 AM Maame Yareli, PT [id number] [facility]   1/17/2019  7:30 AM Annabel Riya, PTA MMCPTPB [facility]   1/28/2019  9:00 AM Pam Ortiz MD [address]

## 2018-12-18 ENCOUNTER — APPOINTMENT (OUTPATIENT)
Dept: PHYSICAL THERAPY | Age: 53
End: 2018-12-18
Payer: SELF-PAY

## 2018-12-20 ENCOUNTER — APPOINTMENT (OUTPATIENT)
Dept: PHYSICAL THERAPY | Age: 53
End: 2018-12-20
Payer: SELF-PAY

## 2018-12-27 ENCOUNTER — TELEPHONE (OUTPATIENT)
Dept: NEUROLOGY | Age: 53
End: 2018-12-27

## 2018-12-27 NOTE — TELEPHONE ENCOUNTER
After-hours call from Norma Clubs @ [employer] states: \"Please call - he has not seen attending therapy because of financial restrictions. He is waiting to hear back from financial assistance. I think he would benefit for a custom AFO. \"    Message scanned to chart.

## 2018-12-31 ENCOUNTER — APPOINTMENT (OUTPATIENT)
Dept: PHYSICAL THERAPY | Age: 53
End: 2018-12-31
Payer: SELF-PAY

## 2019-01-03 ENCOUNTER — APPOINTMENT (OUTPATIENT)
Dept: PHYSICAL THERAPY | Age: 54
End: 2019-01-03
Payer: MEDICAID

## 2019-01-08 ENCOUNTER — APPOINTMENT (OUTPATIENT)
Dept: PHYSICAL THERAPY | Age: 54
End: 2019-01-08
Payer: MEDICAID

## 2019-01-10 ENCOUNTER — APPOINTMENT (OUTPATIENT)
Dept: PHYSICAL THERAPY | Age: 54
End: 2019-01-10
Payer: MEDICAID

## 2019-01-15 ENCOUNTER — APPOINTMENT (OUTPATIENT)
Dept: PHYSICAL THERAPY | Age: 54
End: 2019-01-15
Payer: MEDICAID

## 2019-01-17 ENCOUNTER — APPOINTMENT (OUTPATIENT)
Dept: PHYSICAL THERAPY | Age: 54
End: 2019-01-17
Payer: MEDICAID

## 2019-01-28 ENCOUNTER — OFFICE VISIT (OUTPATIENT)
Dept: NEUROLOGY | Age: 54
End: 2019-01-28

## 2019-01-28 VITALS
BODY MASS INDEX: 31.35 KG/M2 | TEMPERATURE: 97.7 F | DIASTOLIC BLOOD PRESSURE: 90 MMHG | HEART RATE: 73 BPM | RESPIRATION RATE: 18 BRPM | WEIGHT: 219 LBS | OXYGEN SATURATION: 97 % | HEIGHT: 70 IN | SYSTOLIC BLOOD PRESSURE: 132 MMHG

## 2019-01-28 DIAGNOSIS — M54.81 BILATERAL OCCIPITAL NEURALGIA: ICD-10-CM

## 2019-01-28 DIAGNOSIS — G57.02 COMMON PERONEAL NEUROPATHY OF LEFT LOWER EXTREMITY: Primary | ICD-10-CM

## 2019-01-28 NOTE — PROGRESS NOTES
1/28/2019 10:55 AM 
 
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 He had onset of left foot weakness in October. Since last here she went to PT for estim, AFO eval. However he is doing much better, still weak, but he is able now to dorsiflex his foot and spread his toes and he could not do anything. He has had to change his gait, so his left hip is hurting. His boots helps keep give him support. Social History Socioeconomic History  Marital status:  Spouse name: Not on file  Number of children: Not on file  Years of education: Not on file  Highest education level: Not on file Social Needs  Financial resource strain: Not on file  Food insecurity - worry: Not on file  Food insecurity - inability: Not on file  Transportation needs - medical: Not on file  Transportation needs - non-medical: Not on file Occupational History  Not on file Tobacco Use  Smoking status: Never Smoker  Smokeless tobacco: Current User  Tobacco comment: chew tobacco  
Substance and Sexual Activity  Alcohol use: Not on file  Drug use: Not on file  Sexual activity: Not on file Other Topics Concern  Not on file Social History Narrative  Not on file No family history on file. Current Outpatient Medications Medication Sig Dispense Refill  methocarbamol (ROBAXIN) 750 mg tablet Take  by mouth four (4) times daily.  meloxicam (MOBIC) 7.5 mg tablet Take 1 Tab by mouth daily. 14 Tab 0 No past medical history on file. Past Surgical History:  
Procedure Laterality Date  HX ORTHOPAEDIC Allergies Allergen Reactions  Motrin [Ibuprofen] Hives Vital signs:   
Visit Vitals /90 (BP 1 Location: Right arm, BP Patient Position: Sitting) Pulse 73 Temp 97.7 °F (36.5 °C) (Oral) Resp 18 Ht 5' 10\" (1.778 m) Wt 99.3 kg (219 lb) SpO2 97% Comment: RA  
BMI 31.42 kg/m² Review of Systems:  
GENERAL: Denies fever or fatigue CARDIAC: No CP or SOB PULMONARY: No cough of SOB MUSCULOSKELETAL: No new joint pain NEURO: SEE HPI 
 
 
EXAM: Alert, in NAD. Heart is regular. Oriented x3, EOM's are full, PERRL, no facial asymmetries. Strength 2/5 left dorsiflexion and big toe flexion, unable to walk on heels, much improved slap on gait, occipital triggers Assessment/Plan: Left peroneal neuropathy at the fibular head, showing encouraging recovery of strength 3 months out. At this time he is significantly better I think an AFO will be much of an aggravation. Given his recovery of strength this is the time to step up with exercise. I will refer him again to PT for this and to help with the hip pain he has likely a secondary biomechanical adjustment issue. Will ask them for help with his occipital neuralgia with headaches. Gave him lit for neck exercises. I will see him in 3 months. 15/25 mins counseling. PLEASE NOTE:  
Portions of this document may have been produced using voice recognition software. Unrecognized errors in transcription may be present. This note will not be viewable in [address]

## 2019-01-28 NOTE — PATIENT INSTRUCTIONS
Stopping Smokeless Tobacco Use: Care Instructions Your Care Instructions Smokeless tobacco comes in many forms, such as snuff and chewing tobacco: · Snuff is finely ground tobacco sold in cans or pouches. Most of the time, snuff is used by putting a \"pinch\" or \"dip\" between the lower lip or cheek and the gum. · Chewing tobacco is sold as loose leaves, plugs, or twists. It is chewed or placed between the cheek and the gum or teeth. There are plenty of reasons to stop using smokeless tobacco. These products are harmful. They are not risk-free alternatives to smoking. Smokeless tobacco contains nicotine, which is addicting. Though using smokeless tobacco is less harmful than smoking cigarettes, it can cause serious health problems, such as: 
· White patches or red sores in your mouth that can turn into mouth cancer involving the lip, tongue, or cheek. · Tooth loss and other dental problems. · Gum disease. Your gums may pull away from your teeth and not grow back. People who use smokeless tobacco crave the nicotine in it. Giving up smokeless tobacco is much harder than simply changing a habit. Your body has to stop craving the nicotine. It is hard to quit, but you can do it. Many tools are available for people who want to quit using smokeless tobacco. You may find that combining tools works best for you. There are several steps to quitting. First you get ready to quit. Then you get support to help you. After that, you learn new skills and behaviors to quit. For many people, a necessary step is getting and using medicine. Your doctor will help you set up the plan that best meets your needs. You may want to attend a tobacco cessation program. When you choose a program, look for one that has proven success. Ask your doctor for ideas.  You will greatly increase your chances of success if you take medicine as well as get counseling or join a cessation program. 
 Some of the changes you feel when you first quit smokeless tobacco are uncomfortable. Your body will miss the nicotine at first, and you may feel short-tempered and grumpy. You may have trouble sleeping or concentrating. Medicine can help you deal with these symptoms. You may struggle with changing your habits and rituals. The last step is the tricky one: Be prepared for the urge to use smokeless tobacco to continue for a time. This is a lot to deal with, but keep at it. You will feel better. Follow-up care is a key part of your treatment and safety. Be sure to make and go to all appointments, and call your doctor if you are having problems. It's also a good idea to know your test results and keep a list of the medicines you take. How can you care for yourself at home? · Ask your family, friends, and coworkers for support. You have a better chance of quitting if you have help and support. · Join a support group for people who are trying to quit using smokeless tobacco. 
· Set a quit date. Pick your date carefully so that it is not right in the middle of a big deadline or stressful time. After you quit, do not use smokeless tobacco even once. Get rid of all spit cups, cans, and pouches after your last use. Clean your house and your clothes so that they do not smell of tobacco. 
· Learn how to be a non-user. Think about ways you can avoid those things that make you reach for tobacco. 
? Learn some ways to deal with cravings, like calling a friend or going for a walk. Cravings often pass. ? Avoid situations that put you at greatest risk for using smokeless tobacco. For some people, it is hard to spend time with friends without dipping or chewing. For others, they might skip a coffee break with coworkers who smoke or use smokeless tobacco. 
? Change your daily routine. Take a different route to work, or eat a meal in a different place. · Cut down on stress.  Calm yourself or release tension by doing an activity you enjoy, such as reading a book, taking a hot bath, or gardening. · Talk to your doctor or pharmacist about nicotine replacement therapy. You still get nicotine, but you do not use tobacco. Nicotine replacement products help you slowly reduce the amount of nicotine you need. Many of these products are available over the counter. They include nicotine patches, gum, lozenges, and inhalers. · Ask your doctor about bupropion (Wellbutrin) or varenicline (Chantix), which are prescription medicines. They do not contain nicotine. They help you by reducing withdrawal symptoms, such as stress and anxiety. · Get regular exercise. Having healthy habits will help your body move past its craving for nicotine. · Be prepared to keep trying. Most people are not successful the first few times they try to quit. Do not get mad at yourself if you use tobacco again. Make a list of things you learned, and think about when you want to try again, such as next week, next month, or next year. Where can you learn more? Go to http://reina-oumar.info/. Enter R856 in the search box to learn more about \"Stopping Smokeless Tobacco Use: Care Instructions. \" Current as of: September 26, 2018 Content Version: 11.9 © 2524-1841 [employer]. Care instructions adapted under license by [employer] (which disclaims liability or warranty for this information). If you have questions about a medical condition or this instruction, always ask your healthcare professional. [place] [address] any warranty or liability for your use of this information. Neck: Exercises Your Care Instructions Here are some examples of typical rehabilitation exercises for your condition. Start each exercise slowly. Ease off the exercise if you start to have pain. Your doctor or physical therapist will tell you when you can start these exercises and which ones will work best for you. How to do the exercises Neck stretch 1. This stretch works best if you keep your shoulder down as you lean away from it. To help you remember to do this, start by relaxing your shoulders and lightly holding on to your thighs or your chair. 2. Tilt your head toward your shoulder and hold for 15 to 30 seconds. Let the weight of your head stretch your muscles. 3. If you would like a little added stretch, use your hand to gently and steadily pull your head toward your shoulder. For example, keeping your right shoulder down, lean your head to the left. 4. Repeat 2 to 4 times toward each shoulder. Diagonal neck stretch 1. Turn your head slightly toward the direction you will be stretching, and tilt your head diagonally toward your chest and hold for 15 to 30 seconds. 2. If you would like a little added stretch, use your hand to gently and steadily pull your head forward on the diagonal. 
3. Repeat 2 to 4 times toward each side. Dorsal glide stretch 1. Sit or stand tall and look straight ahead. 2. Slowly tuck your chin as you glide your head backward over your body 3. Hold for a count of 6, and then relax for up to 10 seconds. 4. Repeat 8 to 12 times. Chest and shoulder stretch 1. Sit or stand tall and glide your head backward as in the dorsal glide stretch. 2. Raise both arms so that your hands are next to your ears. 3. Take a deep breath, and as you breathe out, lower your elbows down and behind your back. You will feel your shoulder blades slide down and together, and at the same time you will feel a stretch across your chest and the front of your shoulders. 4. Hold for about 6 seconds, and then relax for up to 10 seconds. 5. Repeat 8 to 12 times. Strengthening: Hands on head 1. Move your head backward, forward, and side to side against gentle pressure from your hands, holding each position for about 6 seconds. 2. Repeat 8 to 12 times. Follow-up care is a key part of your treatment and safety. Be sure to make and go to all appointments, and call your doctor if you are having problems. It's also a good idea to know your test results and keep a list of the medicines you take. Where can you learn more? Go to http://reina-oumar.info/. Enter P975 in the search box to learn more about \"Neck: Exercises. \" Current as of: September 20, 2018 Content Version: 11.9 © 3433-6767 [employer]. Care instructions adapted under license by [employer] (which disclaims liability or warranty for this information). If you have questions about a medical condition or this instruction, always ask your healthcare professional. [address] any warranty or liability for your use of this information.

## 2019-01-28 NOTE — PROGRESS NOTES
Nursing Notes Patient presents to the office today for follow up appt for his neuropathy. Patient rates his pain at 5/10 on the numerical pain scale. Comments: POC UDS was not performed in office today Any new labs or imaging since last appointment? NO Have you been to an emergency room (ER) or urgent care clinic since your last visit? NO Have you been hospitalized since your last visit? NO If yes, where, when, and reason for visit? Have you seen or consulted any other health care providers outside of the [address] [place] [address]  since your last visit? NO If yes, where, when, and reason for visit? Mr. Zane Jackson has a reminder for a \"due or due soon\" health maintenance. I have asked that he contact his primary care provider for follow-up on this health maintenance. Abuse Screening Questionnaire 1/28/2019 Do you ever feel afraid of your partner? Clearnce Beams Are you in a relationship with someone who physically or mentally threatens you? Clearnce Beams Is it safe for you to go home? Nick Barbosa Fall Risk Assessment, last 12 mths 1/28/2019 Able to walk? Yes Fall in past 12 months? No  
 
The pt does not have an advanced medical directive, POA, or living will. He is not interested in discussing this today.

## 2019-02-05 ENCOUNTER — HOSPITAL ENCOUNTER (OUTPATIENT)
Dept: PHYSICAL THERAPY | Age: 54
Discharge: HOME | End: 2019-02-05
Payer: MEDICAID

## 2019-02-05 PROCEDURE — 97162 PT EVAL MOD COMPLEX 30 MIN: CPT

## 2019-02-05 PROCEDURE — 97110 THERAPEUTIC EXERCISES: CPT

## 2019-02-05 PROCEDURE — 97530 THERAPEUTIC ACTIVITIES: CPT

## 2019-02-05 NOTE — PROGRESS NOTES
PT DAILY TREATMENT NOTE/NEURO EVAL 10-18    Patient Name: Ginger Larkin  Date:2019  : 1965  [x]  Patient  Verified  Payor: SELF PAY / Plan: [facility] SELF PAY / Product Type: Self Pay /    In time: 8:10  Out time: 8:55  Total Treatment Time (min): 45  Visit #: 1 of     Medicare/BCBS Only   Total Timed Codes (min):  23 1:1 Treatment Time:  45     Treatment Area: Occipital neuralgia [M54.81]  Lesion of sciatic nerve, left lower limb [G57.02]    SUBJECTIVE  Pain Level (0-10 scale): 5/10  []constant []intermittent []improving []worsening []no change since onset    Any medication changes, allergies to medications, adverse drug reactions, diagnosis change, or new procedure performed?: [x] No    [] Yes (see summary sheet for update)  Subjective functional status/changes:     PLOF: lives alone in a one story home, yard work, housework, metal work, 3 TAMMI          Limitations to PLOF:   Mechanism of Injury:   Current symptoms/Complaints: Left foot drop (about 4 months ago) and neck pain (about 1 year). Pt reports trying PT for multiple times last year. Pt notices a \"muscle node\" along the right side of neck. Pt reports numbness from elbow to hand, worst with Left. Pt also recalls injury with Left hand (\"cutting his fingers off\"). Pt reports persistent numbness of Left lower leg, from mid-shin distally to lateral and then medial aspect of left foot to great toe. Pt reports near fall due to his ankle.    Previous Treatment/Compliance:   PMHx/Surgical Hx:   Work Hx: Living Situation:   Pt Goals: \"foot motion\"  Barriers: []pain []financial []time []transportation []other  Motivation:   Substance use: []Alcohol []Tobacco []other:   FABQ Score: []low []elevate  Cognition: A & O x    Other:    OBJECTIVE/EXAMINATION  Domestic Life:   Activity/Recreational Limitations:   Mobility: Self Care:     22 min [x]Eval                  []Re-Eval       8 min Therapeutic Exercise:  [] See flow sheet :HEP   Rationale: increase ROM, increase strength, improve coordination, improve balance and increase proprioception to improve the patients ability to amb and perform ADLs with ease and safety    15 min Therapeutic Activity:  []  See flow sheet :Pt edu within scope of practice on prognosis, POC, c/s anatomy, modalities use, massage therapy   Rationale: increase ROM, increase strength, improve coordination, improve balance and increase proprioception  to improve the patients ability to amb and perform ADLs with ease        With   [] TE   [] TA   [] neuro   [] other: Patient Education: [x] Review HEP    [] Progressed/Changed HEP based on:   [] positioning   [] body mechanics   [] transfers   [] heat/ice application    [] other:      Other Objective/Functional Measures:     Physical Therapy Evaluation - Neurologic    Posture: [] Poor    [x] Fair    [] Good    Describe: Mod forwarded head    Gait: [] Normal    [x] Abnormal    Device:      Describe: decreased ankles rockers    ROM:                                WNL with PROM of ankle  Mod decrease (50%) with c/s in all d    Strength (MMT):  Shoulder L (1-5) R (1-5)   Shoulder Flexion 4 4   Shoulder Ext 4+ 4+   Shoulder ABD 4- 4-   Shoulder ADD     Shoulder IR 3+ 4+   Shoulder ER 3 4+                                             Hip L (1-5) R (1-5)   Hip Flexion 4 4   Hip Ext ~3+ ~3+   Hip ABD ~3 ~3   Hip ADD     Hip ER ~3+ ~3+   Hip IR       Knee L (1-5) R (1-5)   Knee Flexion ~4 ~4   Knee Extension 5 5   Ankle PF 5 4   Ankle DF 5 3-   Other       Tone:    Motor Control:    Sensation: decreased sensation/light touch of Left LE inferior of Left knee.     Reflexes: [] Not Tested   Left Right   Biceps (C5)     Brachioradiais (C6)     Triceps (C7)     Knee Jerk (L4)     Ankle Jerk (SI)       Balance/ Equilibrium:              Left            Right  Tracks Across Midline      Reaches Across Midline           Sitting Balance: Static:  [] Good    [] Fair    [] Poor     Dynamic:   [] Good    [] Fair [] Poor        Standing Balance: Static:   [] Good    [] Fair    [] Poor     Dynamic:   [] Good    [] Fair    [] Poor        Protective Extension:  [] Present    [] Delayed    [] Absent        Single Leg Stance:         Eyes Open  Eyes Closed   L  L    R  R        Functional Mobility      Bed Mobility:      Scooting:        Rolling:       Sit-Supine:      Transfers:       Sit-Stand:       Floor-Stand:       Gait:       Tandem:       Backwards:       Braiding:      Elevations:       Curbs:      Ramps:      Stairs:    Behavior: [] Cooperative    [] Impulsive    [] Agitated    [] Perseverative    [] Confused   Oriented x:    Cognition: [] One Step Commands   [] Multiple Commands   [] Displays Neglect [] R  [] L    Other:       Impaired Judgement: [] Y    [] N      Impaired Vision:  [] Y    [] N      Safety Awareness Deficits  [] Y    [] N      Impaired Hearing  [] Y    [] N      Able to Express Needs [] Y    [] N    Optional Tests:       Dynamic Gait Index (24pt scale): Functional Gait Assessment (30pt scale):       Leal Balance Scale (56pt scale): Other test /comments:   BP: 126/92 mmHg; HR: 96 bpm   Min tightness of wrist flexors and extensors   3-/5 with LT, 3+/5 with MT strength   Mod tightness of c/s paraspinal muscles   Mod decreased strength of c/s DNF and Left ER    Pain Level (0-10 scale) post treatment: 5/10    ASSESSMENT/Changes in Function: see POC    Patient will continue to benefit from skilled PT services to modify and progress therapeutic interventions, address functional mobility deficits, address ROM deficits, address strength deficits, analyze and address soft tissue restrictions, analyze and cue movement patterns, analyze and modify body mechanics/ergonomics, assess and modify postural abnormalities, address imbalance/dizziness and instruct in home and community integration to attain remaining goals.      [x]  See Plan of Care  []  See progress note/recertification  []  See Discharge Summary         Progress towards goals / Updated goals:  See POC    PLAN  [x]  Upgrade activities as tolerated     [x]  Continue plan of care  []  Update interventions per flow sheet       []  Discharge due to:_  []  Other:_    Dafne Caban, PT 2/5/2019  8:07 AM

## 2019-02-05 NOTE — PROGRESS NOTES
[facility] - Jacy Henderson  [address] [facility]  [phone] fax    Plan of Care/ Statement of Necessity for Physical Therapy Services    Patient name: Jessica Will Start of Care: 2019   Referral source: Sharron Villarreal MD : 1965    Medical Diagnosis: Occipital neuralgia [M54.81]  Lesion of sciatic nerve, left lower limb [G57.02]  Payor: SELF PAY / Plan: [facility] SELF PAY / Product Type: Self Pay /  Onset Date: 4 months with Left ankle, 1.5 year with neck    Treatment Diagnosis: Left foot drop, neck pain   Prior Hospitalization: see medical history Provider#: [id number]   Medications: Verified on Patient summary List    Comorbidities: alcohol and tobacco use   Prior Level of Function: lives alone in a one story home, yard work, housework, metal work, 3 step to enter house, Ind with all mobility       The Plan of Care and following information is based on the information from the initial evaluation. Assessment/ pacheco information: Mr. Jessica Will is a 45 y/o, M pt with CC of Left foot drop (about 4 months ago) and neck pain (about 1 year). Pt reports trying PT for multiple times last year. Pt notices a \"muscle node\" along the right side of neck. Pt reports numbness from elbow to hand, worst with Left. Pt also recalls injury with Left hand (\"cutting his fingers off\"). Pt reports persistent numbness of Left lower leg, from mid-shin distally to lateral and then medial aspect of left foot to great toe. Pt reports near fall due to his ankle. Prior EMG revealsmultifocal polyneuropathy with predominant left peroneal nerve involvement and potential entrapment at the fibular head vs isolated left peroneal neuropathy at the fibular head. Pt present with mod decreased AROM of c/s, fair strength of MT/LT and Left ER, poor strength of deep neck flexion, mod tightness of B UT, worst with Right and also worst with SCM, and mid-lower c/s paraspinal muscles.  No noticed deficit with dermatomes and myotomes screening for c/s. Pt demonstrates 3-/5 strength with Left ankle in all directions except 4/5 with PF, 1/5 with great toes extension; and mod decreased strength of deep hips. Ambulating with no AD/AFO, gait balance deemed good minus, gait pattern was characterized with wide BRENDA, decreased ankles motion. Pt would benefit from skilled PT to address these deficits above to improve the ability to amb comfortably and safely.     Evaluation Complexity History MEDIUM  Complexity : 1-2 comorbidities / personal factors will impact the outcome/ POC ; Examination MEDIUM Complexity : 3 Standardized tests and measures addressing body structure, function, activity limitation and / or participation in recreation  ;Presentation MEDIUM Complexity : Evolving with changing characteristics  ; Clinical Decision Making MEDIUM Complexity : FOTO score of 26-74  Overall Complexity Rating: MEDIUM  Problem List: pain affecting function, decrease ROM, decrease strength, edema affecting function, impaired gait/ balance, decrease ADL/ functional abilitiies, decrease activity tolerance, decrease flexibility/ joint mobility and decrease transfer abilities   Treatment Plan may include any combination of the following: Therapeutic exercise, Therapeutic activities, Neuromuscular re-education, Physical agent/modality, Gait/balance training, Manual therapy, Patient education, Self Care training, Functional mobility training, Home safety training and Stair training  Patient / Family readiness to learn indicated by: asking questions, trying to perform skills and interest  Persons(s) to be included in education: patient (P)  Barriers to Learning/Limitations: None  Patient Goal (s): foot motion  Patient Self Reported Health Status: good  Rehabilitation Potential: good    Short term goals: To be accomplished within 1 week  1. Pt will be independent with HEP to maintain progression. Long term goals:  To be accomplished within 8 weeks  1. Pt will improve FOTO score by TBD points to TBD/100 to show improvement with functional mobility performance. 2. Pt will report at least 50% improvement with overall neck pain so he can sleep and perform ADLs with ease. 3. Pt will demonstrate at least 4-/5 strength with Left ankle to improve ease an safety for amb. Frequency / Duration: Patient to be seen 2-3 times per week for 8 weeks. Patient/ CarPatient/ Caregiver education and instruction: Diagnosis, prognosis, self care, activity modification, brace/ splint application and exercises   [x]  Plan of care has been reviewed with CORBY Bates, PT 2/5/2019 8:08 AM    ________________________________________________________________________    I certify that the above Therapy Services are being furnished while the patient is under my care. I agree with the treatment plan and certify that this therapy is necessary.     Physician's Signature:____________Date:_________TIME:________    ** Signature, Date and Time must be completed for valid certification **    Please sign and return to [facility] [employer] [facility]AN  [address] [place] [address]  [phone] fax

## 2019-02-08 ENCOUNTER — HOSPITAL ENCOUNTER (OUTPATIENT)
Dept: PHYSICAL THERAPY | Age: 54
Discharge: HOME | End: 2019-02-08
Payer: MEDICAID

## 2019-02-08 PROCEDURE — 97110 THERAPEUTIC EXERCISES: CPT

## 2019-02-08 PROCEDURE — 97140 MANUAL THERAPY 1/> REGIONS: CPT

## 2019-02-08 PROCEDURE — 97032 APPL MODALITY 1+ESTIM EA 15: CPT

## 2019-02-08 NOTE — PROGRESS NOTES
PT DAILY TREATMENT NOTE 10-18    Patient Name: Otto Moise  Date:2019  : 1965  [x]  Patient  Verified  Payor: SELF PAY / Plan: [facility] SELF PAY / Product Type: Self Pay /    In time: 03:00  Out time: 03:52  Total Treatment Time (min): 52  Visit #: 2 of     Treatment Area: Occipital neuralgia [M54.81]  Lesion of sciatic nerve, left lower limb [G57.02]    SUBJECTIVE  Pain Level (0-10 scale): 0/10  Any medication changes, allergies to medications, adverse drug reactions, diagnosis change, or new procedure performed?: [x] No    [] Yes (see summary sheet for update)  Subjective functional status/changes:   [] No changes reported  Still trying to actively get foot up. OBJECTIVE    Modality rationale: increase muscle contraction/control to improve the patients ability to independently dorsiflex in ambulation for toe clearance. Min Type Additional Details   10 [] Estim: [x]Att    []TENS instruct  [x]NMES: [place]. 50/50duty. 5sec ramp. 10on/50off. 95mA intenstiy. []w/US   []w/ice   []w/heat  Position: short sitting   Location: right tib anterior   [x] Skin assessment post-treatment:  [x]intact [x]redness- no adverse reaction    []redness - adverse reaction:     27 min Therapeutic Exercise:  [x] See flow sheet :   Rationale: increase ROM and increase strength to improve the patients ability to clear toe in gait. 15 min Neuromuscular Re-education:  Romberg on floor + foam. Wide BRENDA, MSR & modified tandem  on foam EC. Rationale: improve coordination, improve balance and increase proprioception  to improve the patients ability to ambulate home & community with decreased risk for falls.            With   [x] TE   [] TA   [] neuro   [] other: Patient Education: [x] Review HEP    [x] Progressed/Changed HEP based on: passive achievement of available dorsiflexion range with towel & then to hold actively in this new available range & eccentrically lower to neutral.   [] positioning   [] body mechanics   [] transfers   [] heat/ice application    [x] other: purpose of [place]is[place] stimulation estim. Other Objective/Functional Measures:   - pt able to perform towel scrunch with increased time  - pt able to eccentrically control DF when full range achieved with active assist from SPT  - increased ROM achieved with utilization of NMES     Pain Level (0-10 scale) post treatment: 5/10 -fatigue of tib anterior following NMES     ASSESSMENT/Changes in Function:   Pt performed all TE well with good effort. Pt unable to isolate great toe extension in toe yoga. Pt performs eversion TE well with orange TB; theraband dispensed and added to HEP. Pt able to co-contract tib anterior for full 10 second hold in NMES; fatigue reported at session end. No increase in pain. Also added to HEP: passive achievement of available dorsiflexion range with towel & then to hold actively in this new available range & eccentrically lower to neutral. Patient will continue to benefit from skilled PT services to modify and progress therapeutic interventions, address functional mobility deficits, address ROM deficits, address strength deficits, analyze and address soft tissue restrictions, analyze and cue movement patterns and address imbalance/dizziness to attain remaining goals. Progress towards goals / Updated goals:  Short term goals: To be accomplished within 1 week  1. Pt will be independent with HEP to maintain progression. Met (2/8/19)     Long term goals: To be accomplished within 8 weeks  1. Pt will improve FOTO score by TBD points to TBD/100 to show improvement with functional mobility performance. 2. Pt will report at least 50% improvement with overall neck pain so he can sleep and perform ADLs with ease.   3. Pt will demonstrate at least 4-/5 strength with Left ankle to improve ease an safety for amb    PLAN  [x]  Upgrade activities as tolerated     [x]  Continue plan of care  []  Update interventions per flow sheet []  Discharge due to:_  []  Other:_      Gus Liao, SPT 2/8/2019  4:15 PM    Future Appointments   Date Time Provider Abdullahi Lovelli   2/12/2019  7:30 AM Georges Pennington, PT [facility]   2/14/2019  8:00 AM Orlena Eagletown, PTA [facility]   2/19/2019  7:30 AM Georges Pennington, PT [id number]   2/21/2019  8:00 AM Orlena Eagletown, PTA [facility]   2/25/2019 11:30 AM Orlena Eagletown, PTA [facility]   2/27/2019  8:00 AM Orlena Eagletown, PTA [facility]   3/1/2019  9:30 AM Joe Screen [facility]   3/4/2019  9:30 AM Orlena Eagletown, PTA [facility]   3/6/2019 10:00 AM Orlena E[place]letown, PTA [facility]   3/8/2019 10:30 AM Joe Screen [facility]   3/11/2019 10:30 AM Georges Pennington, PT [facility]   3/13/2019 10:00 AM Orlena E[place]let[place], PTA [facility]   3/15/2019 10:30 AM Joe Screen [facility]   4/29/2019  9:15 AM Migdalia Ortiz MD [id number]

## 2019-02-11 ENCOUNTER — APPOINTMENT (OUTPATIENT)
Dept: PHYSICAL THERAPY | Age: 54
End: 2019-02-11
Payer: MEDICAID

## 2019-02-12 ENCOUNTER — HOSPITAL ENCOUNTER (OUTPATIENT)
Dept: PHYSICAL THERAPY | Age: 54
Discharge: HOME | End: 2019-02-12
Payer: MEDICAID

## 2019-02-12 PROCEDURE — 97032 APPL MODALITY 1+ESTIM EA 15: CPT

## 2019-02-12 PROCEDURE — 97110 THERAPEUTIC EXERCISES: CPT

## 2019-02-12 NOTE — PROGRESS NOTES
Request for use of Dry Needling/Intramuscular Manual Therapy  Patient: Kimmy Hagan     Referral Source: Cheri Chavez MD  Diagnosis: Occipital neuralgia [M54.81]  Lesion of sciatic nerve, left lower limb [G57.02]      : 1965  Date of initial visit: 19   Attended visits: 3  Missed Visits: 0    Based on findings from the physical therapy examination and evaluation, the evaluating therapist believes the patient, Kimmy Hagan  would benefit from including Dry Needling as part of the plan of care. Dry needling is a treatment technique utilized in conjunction with other PT interventions to inactivate myofascial trigger points and the pain and dysfunction they cause. Dry Needling is an advanced procedure that requires additional training including greater than 54 hours of intensive course work. Physical Therapists at [address] [place] [address] are trained and/or certified through [employer] for their education. PROCEDURE:   Solid filament sterile needle (typically 0.3mm/30 gauge) inserted into a trigger point   Repeated movements inactivate the trigger points, taking 30-60 seconds per site   Typically consists of 1 dry needling session per week and a possible second treatment including muscle re-education, flexibility, strengthening and other manual techniques to facilitate the benefits of dry needling     BENEFITS:   Inactivation of trigger points   Decreased pain   Increased muscle length   Improved movement patterns   Restoration of function POTENTIAL RISKS:   Post-needling soreness   Infection   Bruising/bleeding   Penetration of a nerve   Pneumothorax   All treating PTs have been thoroughly educated in avoiding adverse reactions    If you agree with this recommendation, please sign this form and fax it to us at [phone].   If you have questions or concerns regarding dry needling or any other treatment we may be providing, please contact us at [phone]    Thank you for allowing us to assist in the care of your patient. Michael Mackay, PT    2/12/2019 8:07 AM     NOTE TO PHYSICIAN:  PLEASE COMPLETE THE ORDERS BELOW AND   FAX TO [facility]: [phone]  If you are unable to process this request in 24 hours please contact our office:   [phone]    I have read the above request and AGREE to the recommendation of including dry needling as part of the plan of care.     [de-identified] Signature:____________Date:_________TIME:________    [employer], Date and Time must be completed for valid certification **

## 2019-02-12 NOTE — PROGRESS NOTES
PT DAILY TREATMENT NOTE 10-18    Patient Name: Estiven Burton  Date:2019  : 1965  [x]  Patient  Verified  Payor: SELF PAY / Plan: [facility] SELF PAY / Product Type: Self Pay /    In time: 7:31  Out time:8:10  Total Treatment Time (min): 39  Visit #: 3 of       Treatment Area: Occipital neuralgia [M54.81]  Lesion of sciatic nerve, left lower limb [G57.02]    SUBJECTIVE  Pain Level (0-10 scale): 5/10 \"burning/numbness\" left foot, 0/10 neck  Any medication changes, allergies to medications, adverse drug reactions, diagnosis change, or new procedure performed?: [x] No    [] Yes (see summary sheet for update)  Subjective functional status/changes:   [] No changes reported  Pt reports that he was having a hard time driving initially and was driving by moving his knee and hip. He is now able to push down on the pedals with his left ankle. He has a manual car, so he drives with both feet. He uses the right foot for gas and the left foot for brake. He is not able to  his left ankle from the pedals, so he has to move the whole leg. His MD did not tell him not to drive. He is able to drive with his right foot only if needed. He has a knot intermittently on the right side of his neck. It was bothering him yesterday and he had a bit of a headache, but is subsided with stretches. His neck is not hurting him right now. He wants to do acupuncture for his neck.       OBJECTIVE    Modality rationale: increase muscle contraction/control to improve the patients ability to improve foot clearance with ambulation for increased safety/ease with prolonged ambulation    Min Type Additional Details    [] Estim:  []Unatt       []IFC  []Premod                        []Other:  []w/ice   []w/heat  Position:  Location:   10 [x] Estim: [x]Att    []TENS instruct  []NMES                    [x]Other: [place]  []w/US   []w/ice   []w/heat  Position: seated   Location: right tib anterior     []  Traction: [] Cervical []Lumbar                       [] Prone          []Supine                       []Intermittent   []Continuous Lbs:  [] before manual  [] after manual    []  Ultrasound: []Continuous   [] Pulsed                           []1MHz   []3MHz W/cm2:  Location:    []  Iontophoresis with dexamethasone         Location: [] Take home patch   [] In clinic    []  Ice     []  heat  []  Ice massage  [x]  Laser   []  Anodyne Position:  Location:    []  Laser with stim  []  Other:  Position:  Location:    []  Vasopneumatic Device Pressure:       [] lo [] med [] hi   Temperature: [] lo [] med [] hi   [x] Skin assessment post-treatment:  [x]intact []redness- no adverse reaction    []redness - adverse reaction:     29 min Therapeutic Exercise:  [x] See flow sheet :   Rationale: increase ROM, increase strength, improve coordination, improve balance and increase proprioception to improve the patients ability to improve ease of prolonged ambulation and daily tasks         With   [] TE   [] TA   [] neuro   [] other: Patient Education: [x] Review HEP    [] Progressed/Changed HEP based on:   [] positioning   [] body mechanics   [] transfers   [] heat/ice application    [x] other: advised pt that if he cannot safely remove foot from pedal, he should not be driving; discussed dry needling with pt and he really wants to try dry needling if his MD agrees. Other Objective/Functional Measures:     Increased DF ROM noted with [place] e-stim   Cues to relax shoulders and avoid trunk extension with c/s retraction, improved form with cuing    No palpable trigger point but hypertonicity noted over right cervical paraspinals where pt reports \"knot\" occurs (lateral to C4)    Pt very talkative during therapy    Initial FOTO: 34    Pain Level (0-10 scale) post treatment: 4/10 \"burning/numbness\" left foot    ASSESSMENT/Changes in Function:     Pt is making slow, steady progress towards initial goals in therapy. DF ROM is improving with e-stim. Pt presents with hypertonicity in cervical musculature and wishes to trial dry needling to address intermittent \"knots\" reported by pt. Pt may benefit from dry needling to address likely recurring trigger points in cervical musculature. Will continue to address strength, flexibility, balance, and postural deficits for improved ease/safety with ambulation and daily tasks. Patient will continue to benefit from skilled PT services to modify and progress therapeutic interventions, address functional mobility deficits, address ROM deficits, address strength deficits, analyze and address soft tissue restrictions, analyze and cue movement patterns, assess and modify postural abnormalities, address imbalance/dizziness and instruct in home and community integration to attain remaining goals. Progress towards goals / Updated goals:  Short term goals: To be accomplished within 1 week  1. Pt will be independent with HEP to maintain progression. Met (2/8/19)     Long term goals: To be accomplished within 8 weeks  1. Pt will improve FOTO score by TBD points to TBD/100 to show improvement with functional mobility performance. 2. Pt will report at least 50% improvement with overall neck pain so he can sleep and perform ADLs with ease.   3. Pt will demonstrate at least 4-/5 strength with Left ankle to improve ease an safety for amb     PLAN  []  Upgrade activities as tolerated     [x]  Continue plan of care  []  Update interventions per flow sheet       []  Discharge due to:_  []  Other:_      Malcom Ewing PT 2/12/2019  7:32 AM    Future Appointments   Date Time Provider Abdullahi Horn   2/14/2019  8:00 AM Verner Distad, PTA [facility]   2/19/2019  7:30 AM Ilean Baumgarten, PT [facility]   2/21/2019  8:00 AM Verner Distad, PTA [id number] [facility]   2/25/2019 11:30 AM Verner Distad, PTA VVZHEEE [facility]   2/27/2019  8:00 AM Verner Distad, PTA [facility]   3/1/2019  9:30 AM Vania Merrill [facility]   3/4/2019  9:30 AM Kaley Vargas, PTA [facility]   3/6/2019 10:00 AM Kaley Vargas, PTA [facility]   3/8/2019 10:30 AM Bryce Multani [id number] [facility]   3/11/2019 10:30 AM Antonia Daly, PT [facility]   3/13/2019 10:00 AM Kaley Vargas, PTA [facility]   3/15/2019 10:30 AM Bryce Multani [facility]   4/29/2019  9:15 AM Dorys Ortiz MD [address]

## 2019-02-14 ENCOUNTER — HOSPITAL ENCOUNTER (OUTPATIENT)
Dept: PHYSICAL THERAPY | Age: 54
Discharge: HOME | End: 2019-02-14
Payer: MEDICAID

## 2019-02-14 PROCEDURE — 97110 THERAPEUTIC EXERCISES: CPT

## 2019-02-14 PROCEDURE — 97032 APPL MODALITY 1+ESTIM EA 15: CPT

## 2019-02-14 NOTE — PROGRESS NOTES
PT DAILY TREATMENT NOTE 10-18    Patient Name: Calra Moy  Date:2019  : 1965  [x]  Patient  Verified  Payor: SELF PAY / Plan: [facility] SELF PAY / Product Type: Self Pay /    In time:800  Out time:843  Total Treatment Time (min): 43  Visit #: 4 of     Treatment Area: Occipital neuralgia [M54.81]  Lesion of sciatic nerve, left lower limb [G57.02]    SUBJECTIVE  Pain Level (0-10 scale): 4/10  Any medication changes, allergies to medications, adverse drug reactions, diagnosis change, or new procedure performed?: [x] No    [] Yes (see summary sheet for update)  Subjective functional status/changes:   [] No changes reported  Pt stated that he has numbness and pain today    OBJECTIVE    Modality rationale: increase muscle contraction/control to improve the patients ability to improve gait   Type Additional Details   [] Estim:  []Unatt       []IFC  []Premod                        []Other:  []w/ice   []w/heat  Position:  Location:   [] Estim: [x]Att    []TENS instruct  [x]NMES                    []Other:  []w/US   []w/ice   []w/heat  Position: seated  Location:right ant tib   []  Traction: [] Cervical       []Lumbar                       [] Prone          []Supine                       []Intermittent   []Continuous Lbs:  [] before manual  [] after manual   []  Ultrasound: []Continuous   [] Pulsed                           []1MHz   []3MHz W/cm2:  Location:   []  Iontophoresis with dexamethasone         Location: [] Take home patch   [] In clinic   []  Ice     []  heat  []  Ice massage  []  Laser   []  Anodyne Position:  Location:   []  Laser with stim  []  Other:  Position:  Location:   []  Vasopneumatic Device Pressure:       [] lo [] med [] hi   Temperature: [] lo [] med [] hi   [x] Skin assessment post-treatment:  [x]intact []redness- no adverse reaction    []redness - adverse reaction:     33 min Therapeutic Exercise:  [x] See flow sheet :   Rationale: increase ROM and increase strength to improve the patients ability to increase ease with ADLs    With   [x] TE   [] TA   [] neuro   [] other: Patient Education: [x] Review HEP    [] Progressed/Changed HEP based on:   [] positioning   [] body mechanics   [] transfers   [] heat/ice application    [] other:      Other Objective/Functional Measures:   Ant tib fatigued quickly with toe raises  Had increased neck pain with Kirke Marielos stretches  No difficulty with bridges or clams  Had good movement of all toes with toe curls    Pain Level (0-10 scale) post treatment: 4/10    ASSESSMENT/Changes in Function:   Pt is slowly progressing toward goals. Gait is slowly improving as pt get more activation in right ant tib. Cont with moderate pain    Patient will continue to benefit from skilled PT services to modify and progress therapeutic interventions, address functional mobility deficits, address ROM deficits and address strength deficits to attain remaining goals. [x]  See Plan of Care  []  See progress note/recertification  []  See Discharge Summary         Progress towards goals / Updated goals:  Short term goals: To be accomplished within 1 week  1. Pt will be independent with HEP to maintain progression. Met (2/8/19)     Long term goals: To be accomplished within 8 weeks  1. Pt will improve FOTO score by TBD points to TBD/100 to show improvement with functional mobility performance. 2. Pt will report at least 50% improvement with overall neck pain so he can sleep and perform ADLs with ease.   3. Pt will demonstrate at least 4-/5 strength with Left ankle to improve ease an safety for amb    PLAN  []  Upgrade activities as tolerated     [x]  Continue plan of care  []  Update interventions per flow sheet       []  Discharge due to:_  []  Other:_      Ronda Segovia PTA 2/14/2019  7:00 AM    Future Appointments   Date Time Provider Abdullahi Horn   2/14/2019  8:00 AM Alix Ramachandran, PTA [facility]   2/19/2019  7:30 AM Vianey Tripp, PT [facility] 2/21/2019  8:00 AM Verner Distad, PTA [facility]   2/25/2019 11:30 AM Verner Distad, PTA [facility]   2/27/2019  8:00 AM Verner Distad, PTA [facility]   3/1/2019  9:30 AM Vania Merrill [facility]   3/4/2019  9:30 AM Verner Distad, PTA [facility]   3/6/2019 10:00 AM Verner Distad, PTA [facility]   3/8/2019 10:30 AM Vania Merrill [facility]   3/11/2019 10:30 AM Ilean Baumgarten, PT [facility]   3/13/2019 10:00 AM Verner Distad, PTA [facility]   3/15/2019 10:30 AM Vania Merrill [facility]   4/29/2019  9:15 AM Emerson Ortiz MD [address]

## 2019-02-15 ENCOUNTER — APPOINTMENT (OUTPATIENT)
Dept: PHYSICAL THERAPY | Age: 54
End: 2019-02-15
Payer: MEDICAID

## 2019-02-18 ENCOUNTER — APPOINTMENT (OUTPATIENT)
Dept: PHYSICAL THERAPY | Age: 54
End: 2019-02-18
Payer: MEDICAID

## 2019-02-19 ENCOUNTER — HOSPITAL ENCOUNTER (OUTPATIENT)
Dept: PHYSICAL THERAPY | Age: 54
Discharge: HOME | End: 2019-02-19
Payer: MEDICAID

## 2019-02-19 PROCEDURE — 97032 APPL MODALITY 1+ESTIM EA 15: CPT

## 2019-02-19 PROCEDURE — 97110 THERAPEUTIC EXERCISES: CPT

## 2019-02-19 NOTE — PROGRESS NOTES
PT DAILY TREATMENT NOTE 10-18    Patient Name: Haydee Temple  Date:2019  : 1965  [x]  Patient  Verified  Payor: SELF PAY / Plan: [facility] SELF PAY / Product Type: Self Pay /    In time:7:30  Out time: 8:05  Total Treatment Time (min): 35  Visit #: 5 of       Treatment Area: Occipital neuralgia [M54.81]  Lesion of sciatic nerve, left lower limb [G57.02]    SUBJECTIVE  Pain Level (0-10 scale): 4/10  Any medication changes, allergies to medications, adverse drug reactions, diagnosis change, or new procedure performed?: [x] No    [] Yes (see summary sheet for update)  Subjective functional status/changes:   [] No changes reported  Pt reports that he has his normal numbness/buring pain in the front of his shin today. He has noticed that he has more pain in that area (points to tibialis anterior) when he's walking for a long time. Pt reports that his neck pain is getting better. He is doing the cervical retractions with his HEP ~a dozen times per day, and he believes that's really helping. He feels like his ankle is unstable and he could sprain it into inversion when he's walking.      OBJECTIVE    Modality rationale: increase muscle contraction/control to improve the patients ability to improve gait pattern and ambulatory tolerance    Min Type Additional Details    [] Estim:  []Unatt       []IFC  []Premod                        []Other:  []w/ice   []w/heat  Position:  Location:   10 [] Estim: [x]Att    []TENS instruct  []NMES                    [x]Other: [place]  []w/US   []w/ice   []w/heat  Position: seated   Location: right tib anterior     []  Traction: [] Cervical       []Lumbar                       [] Prone          []Supine                       []Intermittent   []Continuous Lbs:  [] before manual  [] after manual    []  Ultrasound: []Continuous   [] Pulsed                           []1MHz   []3MHz W/cm2:  Location:    []  Iontophoresis with dexamethasone         Location: [] Take home patch   [] In clinic    []  Ice     []  heat  []  Ice massage  []  Laser   []  Anodyne Position:  Location:    []  Laser with stim  []  Other:  Position:  Location:    []  Vasopneumatic Device Pressure:       [] lo [] med [] hi   Temperature: [] lo [] med [] hi   [x] Skin assessment post-treatment:  [x]intact []redness- no adverse reaction    []redness - adverse reaction:     25 min Therapeutic Exercise:  [x] See flow sheet :   Rationale: increase ROM and increase strength to improve the patients ability to improve ease of prolonged ambulation and daily tasks         With   [] TE   [] TA   [] neuro   [] other: Patient Education: [x] Review HEP    [] Progressed/Changed HEP based on:   [] positioning   [] body mechanics   [] transfers   [] heat/ice application    [x] other: sensation to light touch intact over extensor digitorum tendon, pt educated on ice use 10 minutes (monitoring skin closely) to address inflammation/sx consistent with tendonitis      Other Objective/Functional Measures:     Increased DF ROM noted with [place] e-stim    Added ankle iso x 4 to address ankle stability deficits  Challenged with seated TR  TTP left extensor digitorum longus/brevis     Slightly increased pain over left tibialis anterior following session     Pain Level (0-10 scale) post treatment: 5/10    ASSESSMENT/Changes in Function:     Pt is making slow, steady progress towards initial goals in therapy. He reports reducing cervical pain through HEP compliance, and left ankle DF AROM continues to improve. Will initiate ankle TB x 4 with HEP next session to continue to increase ankle stability for increased ability with prolonged ambulation. Pain over extensor digitorum longus/brevis is consistent with possible tendonitis, possibly d/t over-use with foot clearance during ambulation when compensating for decreased tibialis anterior activation.   Pt was educated on ice use 10 minutes over extensor digitorum longus/brevis while monitoring skin closely to address inflammation/pain consistent with tendonitis. Will continue to address strength, ROM, flexibility, and balance deficits for improved ease of prolonged ambulation and daily tasks. Patient will continue to benefit from skilled PT services to modify and progress therapeutic interventions, address functional mobility deficits, address ROM deficits, address strength deficits, analyze and address soft tissue restrictions, analyze and cue movement patterns, analyze and modify body mechanics/ergonomics, assess and modify postural abnormalities, address imbalance/dizziness and instruct in home and community integration to attain remaining goals. Progress towards goals / Updated goals:  Short term goals: To be accomplished within 1 week  1. Pt will be independent with HEP to maintain progression. Met (2/8/19)     Long term goals: To be accomplished within 8 weeks  1. Pt will improve FOTO score by TBD points to TBD/100 to show improvement with functional mobility performance. 2. Pt will report at least 50% improvement with overall neck pain so he can sleep and perform ADLs with ease. Goal met. Pt reports 50% improvement with neck pain 2/19/19   3.  Pt will demonstrate at least 4-/5 strength with Left ankle to improve ease an safety for amb     PLAN  []  Upgrade activities as tolerated     [x]  Continue plan of care  []  Update interventions per flow sheet       []  Discharge due to:_  []  Other:_      Rachel Mac, PT 2/19/2019  7:34 AM    Future Appointments   Date Time Provider Abdullahi Horn   2/21/2019  8:00 AM Dia Castillo PTA [facility]   2/25/2019 11:30 AM Ming Simmons PT PMOIKTY [facility]   2/27/2019  8:00 AM Dia Castillo PTA LZAEBIB [facility]   3/1/2019  9:30 AM Garrel Cables LDBTBXS [facility]   3/4/2019  9:30 AM Dia Castillo PTA [facility]   3/6/2019 10:00 AM Dia Castillo PTA [facility]   3/8/2019 10:30 AM Garrel Cables [facility]   3/11/2019 10:30 AM Antonia Daly, PT [facility]   3/13/2019 10:00 AM Kaley Vargas, [facility]   3/15/2019 10:30 AM Bryce Multani [facility]   4/29/2019  9:15 AM Dorys Ortiz MD [facility]

## 2019-02-21 ENCOUNTER — HOSPITAL ENCOUNTER (OUTPATIENT)
Dept: PHYSICAL THERAPY | Age: 54
Discharge: HOME | End: 2019-02-21
Payer: MEDICAID

## 2019-02-21 PROCEDURE — 97032 APPL MODALITY 1+ESTIM EA 15: CPT

## 2019-02-21 PROCEDURE — 97110 THERAPEUTIC EXERCISES: CPT

## 2019-02-21 NOTE — PROGRESS NOTES
PT DAILY TREATMENT NOTE 10-18    Patient Name: Mauricio Mata  Date:2019  : 1965  [x]  Patient  Verified  Payor: SELF PAY / Plan: [facility] SELF PAY / Product Type: Self Pay /    In time:800  Out time:840  Total Treatment Time (min): 40  Visit #: 6 of     Treatment Area: Occipital neuralgia [M54.81]  Lesion of sciatic nerve, left lower limb [G57.02]    SUBJECTIVE  Pain Level (0-10 scale): 10  Any medication changes, allergies to medications, adverse drug reactions, diagnosis change, or new procedure performed?: [x] No    [] Yes (see summary sheet for update)  Subjective functional status/changes:   [] No changes reported  Pt stated that he is getting better every day    OBJECTIVE    Modality rationale: increase muscle contraction/control to improve the patients ability to improve gait   Min Type Additional Details    [] Estim:  []Unatt       []IFC  []Premod                        []Other:  []w/ice   []w/heat  Position:  Location:   10 [x] Estim: [x]Att    []TENS instruct  [x]NMES                    []Other:  []w/US   []w/ice   []w/heat  Position: seated  Location:left ant tib    []  Traction: [] Cervical       []Lumbar                       [] Prone          []Supine                       []Intermittent   []Continuous Lbs:  [] before manual  [] after manual    []  Ultrasound: []Continuous   [] Pulsed                           []1MHz   []3MHz W/cm2:  Location:    []  Iontophoresis with dexamethasone         Location: [] Take home patch   [] In clinic    []  Ice     []  heat  []  Ice massage  []  Laser   []  Anodyne Position:  Location:    []  Laser with stim  []  Other:  Position:  Location:    []  Vasopneumatic Device Pressure:       [] lo [] med [] hi   Temperature: [] lo [] med [] hi   [x] Skin assessment post-treatment:  [x]intact []redness- no adverse reaction    []redness - adverse reaction:     30 min Therapeutic Exercise:  [x] See flow sheet :   Rationale: increase ROM and increase strength to improve the patients ability to increase ease with ADLs    With   [x] TE   [] TA   [] neuro   [] other: Patient Education: [x] Review HEP    [] Progressed/Changed HEP based on:   [] positioning   [] body mechanics   [] transfers   [] heat/ice application    [] other:      Other Objective/Functional Measures:   Had much better DF today  Was able to perform all 4 directions of isometrics with OTB  No complaint of increased pain during session     Pain Level (0-10 scale) post treatment: 4/10    ASSESSMENT/Changes in Function:   Pt is progressing well toward goals. DF has greatly improved. Cont with left ankle weakness, but is improving. Pt cont to report neck pain    Patient will continue to benefit from skilled PT services to modify and progress therapeutic interventions, address functional mobility deficits, address ROM deficits and address strength deficits to attain remaining goals. [x]  See Plan of Care  []  See progress note/recertification  []  See Discharge Summary         Progress towards goals / Updated goals:  Short term goals: To be accomplished within 1 week  1. Pt will be independent with HEP to maintain progression. Met (2/8/19)     Long term goals: To be accomplished within 8 weeks  1. Pt will improve FOTO score by TBD points to TBD/100 to show improvement with functional mobility performance. 2. Pt will report at least 50% improvement with overall neck pain so he can sleep and perform ADLs with ease. Goal met. Pt reports 50% improvement with neck pain 2/19/19   3.  Pt will demonstrate at least 4-/5 strength with Left ankle to improve ease an safety for amb    PLAN  []  Upgrade activities as tolerated     [x]  Continue plan of care  []  Update interventions per flow sheet       []  Discharge due to:_  []  Other:_      Valentino Miranda PTA 2/21/2019  12:25 PM    Future Appointments   Date Time Provider Abdullahi Horn   2/25/2019 11:30 AM Lamont Read, PT [place] [id number] [address]   2/27/2019 8:00 AM Pinky Benson, PTA [facility]   3/1/2019  9:30 AM Janice Christie [facility]   3/4/2019  9:30 AM Pinky Butlere, PTA [facility]   3/6/2019 10:00 AM Pinky Benson, PTA [facility]   3/8/2019 10:30 AM Pinky Benson, PTA [facility]   3/11/2019 10:30 AM Epi Louie, PT [facility]   3/13/2019 10:00 AM Pinky Benson, PTA [facility]   3/15/2019 10:30 AM Brion Spurling, PT [facility]   4/29/2019  9:15 AM Ruthie Denson MD [address]

## 2019-02-22 ENCOUNTER — APPOINTMENT (OUTPATIENT)
Dept: PHYSICAL THERAPY | Age: 54
End: 2019-02-22
Payer: MEDICAID

## 2019-02-25 ENCOUNTER — APPOINTMENT (OUTPATIENT)
Dept: PHYSICAL THERAPY | Age: 54
End: 2019-02-25
Payer: MEDICAID

## 2019-02-26 ENCOUNTER — HOSPITAL ENCOUNTER (OUTPATIENT)
Dept: PHYSICAL THERAPY | Age: 54
Discharge: HOME | End: 2019-02-26
Payer: MEDICAID

## 2019-02-26 PROCEDURE — 97110 THERAPEUTIC EXERCISES: CPT

## 2019-02-26 PROCEDURE — 97032 APPL MODALITY 1+ESTIM EA 15: CPT

## 2019-02-26 NOTE — PROGRESS NOTES
PT DAILY TREATMENT NOTE 10-18    Patient Name: Edi Renae  Date:2019  : 1965  [x]  Patient  Verified  Payor: SELF PAY / Plan: [facility] SELF PAY / Product Type: Self Pay /    In time:800  Out time:838  Total Treatment Time (min): 38  Visit #: 7 of     Treatment Area: Occipital neuralgia [M54.81]  Lesion of sciatic nerve, left lower limb [G57.02]    SUBJECTIVE  Pain Level (0-10 scale): 3/10  Any medication changes, allergies to medications, adverse drug reactions, diagnosis change, or new procedure performed?: [x] No    [] Yes (see summary sheet for update)  Subjective functional status/changes:   [] No changes reported  Pt stated that he is doing well today    OBJECTIVE    Modality rationale: increase muscle contraction/control to improve the patients ability to improve ambulation   Min Type Additional Details    [] Estim:  []Unatt       []IFC  []Premod                        []Other:  []w/ice   []w/heat  Position:  Location:   10 [x] Estim: [x]Att    []TENS instruct  [x]NMES                    []Other:  []w/US   []w/ice   []w/heat  Position: seated  Location:left ant tib    []  Traction: [] Cervical       []Lumbar                       [] Prone          []Supine                       []Intermittent   []Continuous Lbs:  [] before manual  [] after manual    []  Ultrasound: []Continuous   [] Pulsed                           []1MHz   []3MHz W/cm2:  Location:    []  Iontophoresis with dexamethasone         Location: [] Take home patch   [] In clinic    []  Ice     []  heat  []  Ice massage  []  Laser   []  Anodyne Position:  Location:    []  Laser with stim  []  Other:  Position:  Location:    []  Vasopneumatic Device Pressure:       [] lo [] med [] hi   Temperature: [] lo [] med [] hi   [x] Skin assessment post-treatment:  [x]intact []redness- no adverse reaction    []redness - adverse reaction:     28 min Therapeutic Exercise:  [x] See flow sheet :   Rationale: increase ROM and increase strength to improve the patients ability to increase ease with walking and performing ADLs    With   [x] TE   [] TA   [] neuro   [] other: Patient Education: [x] Review HEP    [] Progressed/Changed HEP based on:   [] positioning   [] body mechanics   [] transfers   [] heat/ice application    [] other:      Other Objective/Functional Measures:   Had much better range of motion for DF today  No difficulty with TB ankle x 4  Was issued OTB for home use  Left ankle was fatigued after session     Pain Level (0-10 scale) post treatment: 4/10    ASSESSMENT/Changes in Function:   Pt is slowly progressing toward goals. Pt cont to have mild pain in the neck. DF on left foot is greatly improving. Ambulation is improving as range of motion and strength improve in left ankle    Patient will continue to benefit from skilled PT services to modify and progress therapeutic interventions, address functional mobility deficits, address ROM deficits and address strength deficits to attain remaining goals. [x]  See Plan of Care  []  See progress note/recertification  []  See Discharge Summary         Progress towards goals / Updated goals:  Short term goals: To be accomplished within 1 week  1. Pt will be independent with HEP to maintain progression. Met (2/8/19)     Long term goals: To be accomplished within 8 weeks  1. Pt will improve FOTO score by TBD points to TBD/100 to show improvement with functional mobility performance. 2. Pt will report at least 50% improvement with overall neck pain so he can sleep and perform ADLs with ease.  Goal met.  Pt reports 50% improvement with neck pain 2/19/19   3.  Pt will demonstrate at least 4-/5 strength with Left ankle to improve ease an safety for amb    PLAN  []  Upgrade activities as tolerated     [x]  Continue plan of care  []  Update interventions per flow sheet       []  Discharge due to:_  []  Other:_      Valentino Crabtree, PTA 2/26/2019  7:50 AM    Future Appointments   Date Time Provider Department Center   2/26/2019  8:00 AM Patricia Reis, PTA [facility]   2/28/2019  8:00 AM Freedom Wood, PT [facility]   3/1/2019  9:30 AM Patricia Reis, PTA [facility]   3/4/2019  9:30 AM Patricia Reis, PTA [facility]   3/6/2019 10:00 AM Patricia Reis, PTA [facility]   3/8/2019 10:30 AM Patricia Reis, PTA [facility]   3/11/2019 10:30 AM Freedom Wood, PT [facility]   3/13/2019 10:00 AM Patricia Reis, PTA [facility]   3/15/2019 10:30 AM Aguila Torres, PT [facility]   4/29/2019  9:15 AM Rylie Trevizo MD [address]

## 2019-02-27 ENCOUNTER — APPOINTMENT (OUTPATIENT)
Dept: PHYSICAL THERAPY | Age: 54
End: 2019-02-27
Payer: MEDICAID

## 2019-02-28 ENCOUNTER — HOSPITAL ENCOUNTER (OUTPATIENT)
Dept: PHYSICAL THERAPY | Age: 54
Discharge: HOME | End: 2019-02-28
Payer: MEDICAID

## 2019-02-28 PROCEDURE — 97032 APPL MODALITY 1+ESTIM EA 15: CPT

## 2019-02-28 PROCEDURE — 97110 THERAPEUTIC EXERCISES: CPT

## 2019-02-28 NOTE — PROGRESS NOTES
PT DAILY TREATMENT NOTE 10-18    Patient Name: Kirstin Barth  Date:2019  : 1965  [x]  Patient  Verified  Payor: SELF PAY / Plan: [facility] SELF PAY / Product Type: Self Pay /    In time:800  Out time:838  Total Treatment Time (min): 38  Visit #: 8 of     Treatment Area: Occipital neuralgia [M54.81]  Lesion of sciatic nerve, left lower limb [G57.02]    SUBJECTIVE  Pain Level (0-10 scale): 3/10  Any medication changes, allergies to medications, adverse drug reactions, diagnosis change, or new procedure performed?: [x] No    [] Yes (see summary sheet for update)  Subjective functional status/changes:   [] No changes reported  Pt stated that his ankle is getting stronger every day    OBJECTIVE    Modality rationale: increase muscle contraction/control to improve the patients ability to improve walking   Min Type Additional Details    [] Estim:  []Unatt       []IFC  []Premod                        []Other:  []w/ice   []w/heat  Position:  Location:   10 [x] Estim: []Att    []TENS instruct  [x]NMES                    []Other:  []w/US   []w/ice   []w/heat  Position: seated  Location:left ant tib    []  Traction: [] Cervical       []Lumbar                       [] Prone          []Supine                       []Intermittent   []Continuous Lbs:  [] before manual  [] after manual    []  Ultrasound: []Continuous   [] Pulsed                           []1MHz   []3MHz W/cm2:  Location:    []  Iontophoresis with dexamethasone         Location: [] Take home patch   [] In clinic    []  Ice     []  heat  []  Ice massage  []  Laser   []  Anodyne Position:  Location:    []  Laser with stim  []  Other:  Position:  Location:    []  Vasopneumatic Device Pressure:       [] lo [] med [] hi   Temperature: [] lo [] med [] hi   [x] Skin assessment post-treatment:  [x]intact []redness- no adverse reaction    []redness - adverse reaction:     28 min Therapeutic Exercise:  [x] See flow sheet :   Rationale: increase ROM and increase strength to improve the patients ability to increase ease with ADLs    With   [x] TE   [] TA   [] neuro   [] other: Patient Education: [x] Review HEP    [] Progressed/Changed HEP based on:   [] positioning   [] body mechanics   [] transfers   [] heat/ice application    [] other:      Other Objective/Functional Measures:   Pt was given GTB for home use with ankle x 4  No difficulty with exercises  Pt had better results with NMES performing it before exercises  Had complaint of increased ankle pain with TB exercises  Cont to have pain in the neck     Pain Level (0-10 scale) post treatment: 4/10    ASSESSMENT/Changes in Function:   Pt is progressing well toward goals. DF is improving in the left ankle. Cont with pain in the neck area and has some difficulty with performing ADLs. Overall strength in the left ankle is improving. Patient will continue to benefit from skilled PT services to modify and progress therapeutic interventions, address functional mobility deficits, address ROM deficits and address strength deficits to attain remaining goals. [x]  See Plan of Care  []  See progress note/recertification  []  See Discharge Summary         Progress towards goals / Updated goals:  Short term goals: To be accomplished within 1 week  1. Pt will be independent with HEP to maintain progression. Met (2/8/19)     Long term goals: To be accomplished within 8 weeks  1. Pt will improve FOTO score by TBD points to TBD/100 to show improvement with functional mobility performance. 2. Pt will report at least 50% improvement with overall neck pain so he can sleep and perform ADLs with ease.  Goal met.  Pt reports 50% improvement with neck pain 2/19/19   3. Pt will demonstrate at least 4-/5 strength with Left ankle to improve ease an safety for amb  Progressing.  2/28/19    PLAN  []  Upgrade activities as tolerated     [x]  Continue plan of care  []  Update interventions per flow sheet       []  Discharge due to:_  []  Other:_      Jesús Monreal, PTA 2/28/2019  7:59 AM    Future Appointments   Date Time Provider Abdullahi Horn   2/28/2019  8:00 AM Yoselin Hemming, PTA [facility]   3/1/2019  9:30 AM Yoselin Hemming, PTA [facility]   3/4/2019  9:30 AM Yoselin Hemming, PTA [facility]   3/6/2019 10:00 AM Yoselin Hemming, PTA [facility]   3/8/2019 10:30 AM Yoselin Hemming, PTA [facility]   3/11/2019 10:30 AM Waldemar Gosselin, PT [facility]   3/13/2019 10:00 AM Yoselin Montalvo, PTA [facility]   3/15/2019 10:30 AM Iris Haynes, PT [facility]   4/29/2019  9:15 AM Babita Heredia MD [address]

## 2019-03-01 ENCOUNTER — APPOINTMENT (OUTPATIENT)
Dept: PHYSICAL THERAPY | Age: 54
End: 2019-03-01
Payer: MEDICAID

## 2019-03-01 NOTE — PROGRESS NOTES
[facility] [place] [employer]  [address]  [phone] fax    Physical Therapy Discharge Summary    Patient name: Catherine Fuentes Start of Care: 18   Referral source: Batsheva Segal MD : 1965   Medical/Treatment Diagnosis: Lesion of sciatic nerve, left lower limb [G57.02]  Polyneuropathy, unspecified [G62.9]  Payor: SELF PAY / Plan: [facility] SELF PAY / Product Type: Self Pay /  Onset Date: 8 weeks ago     Prior Hospitalization: see medical history Provider#: [id number]   Medications: Verified on Patient Summary List    Comorbidities: alcohol use (12 pack per day), tobacco use (chewing tobacco, a couple cans per week)   Prior Level of Function: lives alone in a one story home, yard work, housework, metal work         Visits from [facility] Care: 4    Missed Visits: 3    Reporting Period : 18 to 18    Summary of Care:  Short Term Goals: To be accomplished in 1 weeks:  1. Pt will be compliant with initial HEP to improve therapy outcomes. Goal met. Long Term Goals: To be accomplished in 8 weeks:  1. Pt will improve FOTO by 29 points in order to demonstrate functional improvement   2. Pt will improve ambulatory tolerance to 2 blocks without difficulty in order to improve ease of community negotiation  3. Pt will improve DF strength to 1/5 in order to progress towards normalized gait pattern in order to improve ease of prolonged ambulation       Goals not assessed d/t unplanned DC     ASSESSMENT/RECOMMENDATIONS:  Pt missed 3 appointments and then requested hold from therapy while waiting to hear if he qualified for financial assistance. Pt did return therapy calls and is DC at this time d/t noncompliance.      [x]Discontinue therapy: []Patient has reached or is progressing toward set goals      [x]Patient is non-compliant or has abdicated      []Due to lack of appreciable progress towards set goals    Sally Elizabeth, PT 3/1/2019 11:30 AM

## 2019-03-04 ENCOUNTER — APPOINTMENT (OUTPATIENT)
Dept: PHYSICAL THERAPY | Age: 54
End: 2019-03-04
Payer: MEDICAID

## 2019-03-06 ENCOUNTER — APPOINTMENT (OUTPATIENT)
Dept: PHYSICAL THERAPY | Age: 54
End: 2019-03-06
Payer: MEDICAID

## 2019-03-07 ENCOUNTER — APPOINTMENT (OUTPATIENT)
Dept: PHYSICAL THERAPY | Age: 54
End: 2019-03-07
Payer: MEDICAID

## 2019-03-08 ENCOUNTER — APPOINTMENT (OUTPATIENT)
Dept: PHYSICAL THERAPY | Age: 54
End: 2019-03-08
Payer: MEDICAID

## 2019-03-11 ENCOUNTER — APPOINTMENT (OUTPATIENT)
Dept: PHYSICAL THERAPY | Age: 54
End: 2019-03-11
Payer: MEDICAID

## 2019-03-12 ENCOUNTER — HOSPITAL ENCOUNTER (OUTPATIENT)
Dept: PHYSICAL THERAPY | Age: 54
Discharge: HOME | End: 2019-03-12
Payer: MEDICAID

## 2019-03-12 PROCEDURE — 97032 APPL MODALITY 1+ESTIM EA 15: CPT

## 2019-03-12 PROCEDURE — 97110 THERAPEUTIC EXERCISES: CPT

## 2019-03-12 NOTE — PROGRESS NOTES
PT DAILY TREATMENT NOTE 10-18    Patient Name: Ren Jiménez  Date:3/12/2019  : 1965  [x]  Patient  Verified  Payor: Sharron Guaman / Plan: Alan Pair / Product Type: Commerical /    In time:801  Out time:835  Total Treatment Time (min): 34  Visit #: 9 of     Medicare/BCBS Only   Total Timed Codes (min):  34 1:1 Treatment Time:  34       Treatment Area: Occipital neuralgia [M54.81]  Lesion of sciatic nerve, left lower limb [G57.02]    SUBJECTIVE  Pain Level (0-10 scale): 0/10  Any medication changes, allergies to medications, adverse drug reactions, diagnosis change, or new procedure performed?: [x] No    [] Yes (see summary sheet for update)  Subjective functional status/changes:   [] No changes reported  Pt stated that he has burning and numbness    OBJECTIVE    Modality rationale: increase muscle contraction/control to improve the patients ability to improve gait   Min Type Additional Details    [] Estim:  []Unatt       []IFC  []Premod                        []Other:  []w/ice   []w/heat  Position:  Location:   10 [x] Estim: []Att    []TENS instruct  [x]NMES                    []Other:  []w/US   []w/ice   []w/heat  Position:seated  Location:ant tib    []  Traction: [] Cervical       []Lumbar                       [] Prone          []Supine                       []Intermittent   []Continuous Lbs:  [] before manual  [] after manual    []  Ultrasound: []Continuous   [] Pulsed                           []1MHz   []3MHz W/cm2:  Location:    []  Iontophoresis with dexamethasone         Location: [] Take home patch   [] In clinic    []  Ice     []  heat  []  Ice massage  []  Laser   []  Anodyne Position:  Location:    []  Laser with stim  []  Other:  Position:  Location:    []  Vasopneumatic Device Pressure:       [] lo [] med [] hi   Temperature: [] lo [] med [] hi   [x] Skin assessment post-treatment:  [x]intact []redness- no adverse reaction    []redness  adverse reaction:     24 min Therapeutic Exercise:  [x] See flow sheet :   Rationale: increase ROM and increase strength to improve the patients ability to increase ease with ADLs    With   [x] TE   [] TA   [] neuro   [] other: Patient Education: [x] Review HEP    [] Progressed/Changed HEP based on:   [] positioning   [] body mechanics   [] transfers   [] heat/ice application    [] other:      Other Objective/Functional Measures:   DF cont to improve  Reported increased lateral left foot pain with short foots  No difficulty with TB exercises     Pain Level (0-10 scale) post treatment: 5/10 (neck)    ASSESSMENT/Changes in Function:   Pt is progressing well toward goals. Pt cont with neck pain. DF is improving in the left foot. Ambulation form and endurance is improving    Patient will continue to benefit from skilled PT services to modify and progress therapeutic interventions, address functional mobility deficits, address ROM deficits and address strength deficits to attain remaining goals. []  See Plan of Care  [x]  See progress note/recertification  []  See Discharge Summary         Progress towards goals / Updated goals:  Short term goals: To be accomplished within 1 week  1. Pt will be independent with HEP to maintain progression. Met (2/8/19)     Long term goals: To be accomplished within 8 weeks  1. Pt will improve FOTO score by TBD points to TBD/100 to show improvement with functional mobility performance. 2. Pt will report at least 50% improvement with overall neck pain so he can sleep and perform ADLs with ease.  Goal met.  Pt reports 50% improvement with neck pain 2/19/19   3. Pt will demonstrate at least 4-/5 strength with Left ankle to improve ease an safety for amb  Progressing.  2/28/19    PLAN  []  Upgrade activities as tolerated     [x]  Continue plan of care  []  Update interventions per flow sheet       []  Discharge due to:_  []  Other:_      Keerthi Burris PTA 3/12/2019  8:17 AM    Future Appointments   Date Time Provider Department Center   3/14/2019  9:30 AM Michael Denney, PT [facility]   3/18/2019  8:00 AM Lyndall Brunner, PTA [facility]   3/21/2019  9:30 AM Anjali Ugarte, PT [id number] [facility]   3/25/2019 10:30 AM Michael Denney, PT [id number] [facility]   3/28/2019  9:00 AM Anjali Ugarte, PT [id number] [facility]   4/29/2019  9:15 AM Ash Washington MD [address]

## 2019-03-13 ENCOUNTER — APPOINTMENT (OUTPATIENT)
Dept: PHYSICAL THERAPY | Age: 54
End: 2019-03-13
Payer: MEDICAID

## 2019-03-14 ENCOUNTER — HOSPITAL ENCOUNTER (OUTPATIENT)
Dept: PHYSICAL THERAPY | Age: 54
Discharge: HOME | End: 2019-03-14
Payer: MEDICAID

## 2019-03-14 PROCEDURE — 97110 THERAPEUTIC EXERCISES: CPT

## 2019-03-14 PROCEDURE — 97032 APPL MODALITY 1+ESTIM EA 15: CPT

## 2019-03-14 PROCEDURE — 97164 PT RE-EVAL EST PLAN CARE: CPT

## 2019-03-14 NOTE — PROGRESS NOTES
PT DAILY TREATMENT NOTE 10-18    Patient Name: Jessica Will  Date:3/14/2019  : 1965  [x]  Patient  Verified  Payor: Gabbie Castro / Plan: Manuel Robin / Product Type: Commerical /    In time: 9:33  Out time: 10:12  Total Treatment Time (min): 39  Visit #: 10 of     Treatment Area: Occipital neuralgia [M54.81]  Lesion of sciatic nerve, left lower limb [G57.02]    SUBJECTIVE  Pain Level (0-10 scale): 3/10 neck  Any medication changes, allergies to medications, adverse drug reactions, diagnosis change, or new procedure performed?: [x] No    [] Yes (see summary sheet for update)  Subjective functional status/changes:   [] No changes reported  Pt reports doing good with his ankle; he would like to make it stronger. He has more concern with his neck today; reports not much pain but mod-max after lifting heavy objects.     OBJECTIVE  increase muscle contraction/control to improve the patients ability to improve gait    Min Type Additional Details     [] Estim:  []Unatt       []IFC  []Premod                        []Other:  []w/ice   []w/heat  Position:  Location:   10 [x] Estim: []Att    []TENS instruct  [x]NMES                    []Other:  []w/US   []w/ice   []w/heat  Position:seated  Location:ant tib     []  Traction: [] Cervical       []Lumbar                       [] Prone          []Supine                       []Intermittent   []Continuous Lbs:  [] before manual  [] after manual     []  Ultrasound: []Continuous   [] Pulsed                           []1MHz   []3MHz W/cm2:  Location:     []  Iontophoresis with dexamethasone         Location: [] Take home patch   [] In clinic     []  Ice     []  heat  []  Ice massage  []  Laser   []  Anodyne Position:  Location:     []  Laser with stim  []  Other:  Position:  Location:     []  Vasopneumatic Device Pressure:       [] lo [] med [] hi   Temperature: [] lo [] med [] hi   [x] Skin assessment post-treatment:  [x]intact []redness- no adverse reaction    []redness  adverse reaction:    21 min Re-assessment including FOTO    8 min Therapeutic Exercise:  [x] See flow sheet :   Rationale: increase ROM and increase strength to improve the patients ability to increase ease with ADLs        With   [] TE   [] TA   [] neuro   [] other: Patient Education: [x] Review HEP    [] Progressed/Changed HEP based on:   [] positioning   [] body mechanics   [] transfers   [] heat/ice application    [] other:      Other Objective/Functional Measures: FOTO: 60   No pain with new MT/LT strengthening therex   Cont to be challenged with c/s retraction   Good activation of ankle DF    Pain Level (0-10 scale) post treatment: 2/10 c/s and foot    ASSESSMENT/Changes in Function: See progress note/recertification     Patient will continue to benefit from skilled PT services to modify and progress therapeutic interventions, address functional mobility deficits, address ROM deficits and address strength deficits to attain remaining goals. []  See Plan of Care  [x]  See progress note/recertification  []  See Discharge Summary         Progress towards goals / Updated goals:  Short term goals: To be accomplished within 1 week  1. Pt will be independent with HEP to maintain progression. Met (2/8/19)     Long term goals: To be accomplished within 8 weeks  1. Pt will improve FOTO score by 18 points to show improvement with functional mobility performance. Met, increased 26 points 3-14-19  2. Pt will report at least 50% improvement with overall neck pain so he can sleep and perform ADLs with ease.  Goal met.  Pt reports 50% improvement with neck pain 2/19/19   3. Pt will demonstrate at least 4-/5 strength with Left ankle to improve ease an safety for amb. Progressing.  2/28/19; 3+/5 3-14-19     PLAN  []  Upgrade activities as tolerated     [x]  Continue plan of care  []  Update interventions per flow sheet       []  Discharge due to:_  []  Other:_      Sylvain Schultz PT 3/14/2019  7:58 AM    Future Appointments   Date Time Provider Abdullahi Lovelli   3/14/2019  9:30 AM Griffin Walkers [id number]   3/18/2019  8:00 AM Misty Nair, PTA [id number]   3/21/2019  9:30 AM Vinnie Zhao, PT [id number] [facility]   3/25/2019 10:30 AM Lisseth Warner, PT [id number] [facility]   3/28/2019  9:00 AM Vinnie Zhao, PT [id number] [facility]   4/29/2019  9:15 AM Pablo Monique MD [address]

## 2019-03-14 NOTE — PROGRESS NOTES
[facility] [address]  [address] [facility]  [phone] fax    Physical Therapy Progress Note  Patient name: Haydee Cabral Start of Care: 2019   Referral source: Dina Major MD : 1965                Medical Diagnosis: Occipital neuralgia [M54.81]  Lesion of sciatic nerve, left lower limb [G57.02]  Payor: SELF PAY / Plan: [facility] SELF PAY / Product Type: Self Pay /  Onset Date: 4 months with Left ankle, 1.5 year with neck                Treatment Diagnosis: Left foot drop, neck pain   Prior Hospitalization: see medical history Provider#: [id number]   Medications: Verified on Patient summary List    Comorbidities: alcohol and tobacco use   Prior Level of Function: lives alone in a one story home, yard work, housework, metal work, 3 step to enter house, Ind with all mobility     Visits from [facility]: 10    Missed Visits: 2    Established Goals:         Excellent           Good         Limited           None  [] Increased ROM   []  []  []  []  [x] Increased Strength  []  [x]  []  []  [x] Increased Mobility  []  [x]  []  []   [x] Decreased Pain   []  [x]  []  []  [] Decreased Swelling  []  []  []  []    Key Functional Changes: improving pain, improving strength of Left ankle, improving ease and safety with amb    Updated Goals: to be achieved in 5 weeks:  1. Pt will report no more than 1-2/10 pain overall so he can sleep and perform ADLs with ease. 2. Pt will demonstrate at least 4-/5 strength with Left ankle to improve ease an safety for amb  3. Pt will demonstrate good lifting body mechanics to avoid compensation with UT and c/s paraspinal muscles so he can perform heavy house chores with ease and safety. 4. Pt will be independent with HEP to maintain pain free and safety with ADLs and amb. ASSESSMENT/RECOMMENDATIONS: pt making great progress with improving Left ankle strength and ease with amb.  Pt however fluctuates with neck pain due to poor body mechanics, fair flexibility/strength with c/s and parascapular muscles. Pt also present with positive trigger points, worst with right UTs/Lev. Will initiate Dry Needling next visit and progress therex for c/s and shoulder strength/stability for comfortable ADLs. Pt would cont to benefit from PT to address unmet goals and transition to self management for balance/gait/ankle strengthening to focus on c/s pain/mobility. [x]Continue therapy per initial plan/protocol at a frequency of  2 x per week for 5 weeks  []Continue therapy with the following recommended changes:_____________________      _____________________________________________________________________  []Discontinue therapy progressing towards or have reached established goals  []Discontinue therapy due to lack of appreciable progress towards goals  []Discontinue therapy due to lack of attendance or compliance  []Await Physician's recommendations/decisions regarding therapy  []Other:________________________________________________________________    Thank you for this referral.   Bianca Gibbs, PT 3/14/2019 1:07 PM    NOTE TO PHYSICIAN:  [address]   FAX TO [facility]: [phone]  If you are unable to process this request in 24 hours please contact our office: [phone] I have read the above report and request that my patient continue as recommended. ? I have read the above report and request that my patient continue therapy with the following changes/special instructions:____________________________________  ? I have read the above report and request that my patient be discharged from therapy.     Physicians signature: ______________________________Date: ______Time:______

## 2019-03-15 ENCOUNTER — APPOINTMENT (OUTPATIENT)
Dept: PHYSICAL THERAPY | Age: 54
End: 2019-03-15
Payer: MEDICAID

## 2019-03-25 ENCOUNTER — APPOINTMENT (OUTPATIENT)
Dept: PHYSICAL THERAPY | Age: 54
End: 2019-03-25
Payer: MEDICAID

## 2019-03-26 ENCOUNTER — APPOINTMENT (OUTPATIENT)
Dept: PHYSICAL THERAPY | Age: 54
End: 2019-03-26
Payer: MEDICAID

## 2019-04-02 ENCOUNTER — HOSPITAL ENCOUNTER (OUTPATIENT)
Dept: PHYSICAL THERAPY | Age: 54
Discharge: HOME | End: 2019-04-02
Payer: MEDICAID

## 2019-04-02 PROCEDURE — 97110 THERAPEUTIC EXERCISES: CPT

## 2019-04-02 PROCEDURE — 97032 APPL MODALITY 1+ESTIM EA 15: CPT

## 2019-04-02 NOTE — PROGRESS NOTES
PT DAILY TREATMENT NOTE 10-18    Patient Name: Rose Jimenez  Date:2019  : 1965  [x]  Patient  Verified  Payor: SELF PAY / Plan: [facility] SELF PAY / Product Type: Self Pay /    In time:8:53  Out time:9:32  Total Treatment Time (min): 39  Visit #: 11 of     Treatment Area: Occipital neuralgia [M54.81]  Lesion of sciatic nerve, left lower limb [G57.02]    SUBJECTIVE  Pain Level (0-10 scale): 2/10 neck, 4/10 foot   Any medication changes, allergies to medications, adverse drug reactions, diagnosis change, or new procedure performed?: [x] No    [] Yes (see summary sheet for update)  Subjective functional status/changes:   [] No changes reported  Pt reports that therapy is helping. His neck has been feeling a little better. He is able to lift his ankle more. He gets pain on the top of his foot as well as the bottom of his foot with prolonged ambulation. The pain gets worse as he continues to walk. If he leans back to too far, the muscles in the front of his left foot aren't strong enough to catch him. He has to step back to catch himself, and that hurts his foot.       OBJECTIVE    Modality rationale: increase muscle contraction/control to improve the patients ability to improve ability with prolonged ambulation    Min Type Additional Details    [] Estim:  []Unatt       []IFC  []Premod                        []Other:  []w/ice   []w/heat  Position:  Location:   10 [x] Estim: []Att    []TENS instruct  []NMES                    []Other: [place]   []w/US   []w/ice   []w/heat  Position: seated  Location: anterior tibialis     []  Traction: [] Cervical       []Lumbar                       [] Prone          []Supine                       []Intermittent   []Continuous Lbs:  [] before manual  [] after manual    []  Ultrasound: []Continuous   [] Pulsed                           []1MHz   []3MHz W/cm2:  Location:    []  Iontophoresis with dexamethasone         Location: [] Take home patch   [] In clinic []  Ice     []  heat  []  Ice massage  []  Laser   []  Anodyne Position:  Location:    []  Laser with stim  []  Other:  Position:  Location:    []  Vasopneumatic Device Pressure:       [] lo [] med [] hi   Temperature: [] lo [] med [] hi   [x] Skin assessment post-treatment:  [x]intact []redness- no adverse reaction    []redness - adverse reaction:       29 min Therapeutic Exercise:  [x] See flow sheet :   Rationale: increase strength, improve coordination, improve balance and increase proprioception to improve the patients ability to improve ease of prolonged ambulation         With   [] TE   [] TA   [] neuro   [] other: Patient Education: [x] Review HEP    [] Progressed/Changed HEP based on:   [] positioning   [] body mechanics   [] transfers   [] heat/ice application    [x] other: instructed pt to take rest breaks as needed with ambulation to avoid muscle fatigue       Other Objective/Functional Measures:     Improving DF range noted with e-stim  Challenged with Romberg Foam EO, patient declined Romberg Foam EC, reporting \"Oh no, I can't do that\". Educated patient regarding standing wall gastroc/soleus stretch with HEP     Pain Level (0-10 scale) post treatment: 4/10 ankle, 2/10 neck     ASSESSMENT/Changes in Function:     Pt is making steady progress in therapy. He reports reducing cervical pain levels and DF AROM continues to improve. Pain over dorsal surface of foot is consistent with tibialis anterior fatigue, and pt was educated on utilization of rest breaks to avoid muscular fatigue/pain with prolonged ambulation. Will continue to address strength, ROM, flexibility, posture, and static/dynamic balance deficits in order to improve ease/ability with prolonged ambulation and daily tasks.        Patient will continue to benefit from skilled PT services to modify and progress therapeutic interventions, address functional mobility deficits, address ROM deficits, address strength deficits, analyze and address soft tissue restrictions, analyze and cue movement patterns, assess and modify postural abnormalities and instruct in home and community integration to attain remaining goals. Progress towards goals / Updated goals:  1. Pt will report no more than 1-2/10 pain overall so he can sleep and perform ADLs with ease. Progressing. Pt reports reducing cervical pain 4/2/19   2. Pt will demonstrate at least 4-/5 strength with Left ankle to improve ease an safety for amb  3. Pt will demonstrate good lifting body mechanics to avoid compensation with UT and c/s paraspinal muscles so he can perform heavy house chores with ease and safety.   4. Pt will be independent with HEP to maintain pain free and safety with ADLs and amb.      PLAN  []  Upgrade activities as tolerated     [x]  Continue plan of care  []  Update interventions per flow sheet       []  Discharge due to:_  []  Other:_      Mariah Stewart, PT 4/2/2019  9:05 AM    Future Appointments   Date Time Provider Abdullahi Horn   4/2/2019  9:30 AM Pattie Stevens PT [facility]   4/4/2019  7:30 AM Raymond Bain PTA [facility]   4/8/2019  8:30 AM Pattie Stevens PT [facility]   4/11/2019  9:00 AM Raymond Bain PTA [id number]   4/29/2019  9:15 AM Yung Hill MD [address]

## 2019-04-04 ENCOUNTER — HOSPITAL ENCOUNTER (OUTPATIENT)
Dept: PHYSICAL THERAPY | Age: 54
Discharge: HOME | End: 2019-04-04
Payer: MEDICAID

## 2019-04-04 PROCEDURE — 97110 THERAPEUTIC EXERCISES: CPT

## 2019-04-04 PROCEDURE — 97032 APPL MODALITY 1+ESTIM EA 15: CPT

## 2019-04-04 NOTE — PROGRESS NOTES
PT DAILY TREATMENT NOTE 10-18    Patient Name: Jason Los  Date:2019  : 1965  [x]  Patient  Verified  Payor: SELF PAY / [facility] / Product Type: Self Pay /    In time:730  Out time:801  Total Treatment Time (min): 31  Visit #: 12 of     Treatment Area: Occipital neuralgia [M54.81]  Lesion of sciatic nerve, left lower limb [G57.02]    SUBJECTIVE  Pain Level (0-10 scale): 210  Any medication changes, allergies to medications, adverse drug reactions, diagnosis change, or new procedure performed?: [x] No    [] Yes (see summary sheet for update)  Subjective functional status/changes:   [] No changes reported  Pt stated that he walked on some rock yesterday and had increased pain afterward    OBJECTIVE    Modality rationale: increase muscle contraction/control to improve the patients ability to improve gait   Min Type Additional Details    [] Estim:  []Unatt       []IFC  []Premod                        []Other:  []w/ice   []w/heat  Position:  Location:   10 [x] Estim: []Att    []TENS instruct  [x]NMES                    []Other:  []w/US   []w/ice   []w/heat  Position: seated  Location:left ant tib    []  Traction: [] Cervical       []Lumbar                       [] Prone          []Supine                       []Intermittent   []Continuous Lbs:  [] before manual  [] after manual    []  Ultrasound: []Continuous   [] Pulsed                           []1MHz   []3MHz W/cm2:  Location:    []  Iontophoresis with dexamethasone         Location: [] Take home patch   [] In clinic    []  Ice     []  heat  []  Ice massage  []  Laser   []  Anodyne Position:  Location:    []  Laser with stim  []  Other:  Position:  Location:    []  Vasopneumatic Device Pressure:       [] lo [] med [] hi   Temperature: [] lo [] med [] hi   [x] Skin assessment post-treatment:  [x]intact []redness- no adverse reaction    []redness - adverse reaction:     21 min Therapeutic Exercise:  [x] See flow sheet : Rationale: increase ROM and increase strength to improve the patients ability to increase ease with ADLs    With   [x] TE   [] TA   [] neuro   [] other: Patient Education: [x] Review HEP    [] Progressed/Changed HEP based on:   [] positioning   [] body mechanics   [] transfers   [] heat/ice application    [] other:      Other Objective/Functional Measures:   Had no LOB with static balance   Wall gastroc and soleus stretch was challenging  No difficulty with KZ exercises     Pain Level (0-10 scale) post treatment: 2/10    ASSESSMENT/Changes in Function:   Pt is slowly progressing toward goals. DF cont to improve. Pt was able to perform standing TR today. Pt cont with poor balance and decreased walking tolerance    Patient will continue to benefit from skilled PT services to modify and progress therapeutic interventions, address functional mobility deficits, address ROM deficits and address strength deficits to attain remaining goals. []  See Plan of Care  [x]  See progress note/recertification  []  See Discharge Summary         Progress towards goals / Updated goals:  1. Pt will report no more than 1-2/10 pain overall so he can sleep and perform ADLs with ease. Progressing. Pt reports reducing cervical pain 4/2/19   2. Pt will demonstrate at least 4-/5 strength with Left ankle to improve ease an safety for amb  3. Pt will demonstrate good lifting body mechanics to avoid compensation with UT and c/s paraspinal muscles so he can perform heavy house chores with ease and safety. 4. Pt will be independent with HEP to maintain pain free and safety with ADLs and amb.     PLAN  []  Upgrade activities as tolerated     [x]  Continue plan of care  []  Update interventions per flow sheet       []  Discharge due to:_  []  Other:_      Dalia No PTA 4/4/2019  7:40 AM    Future Appointments   Date Time Provider Abdullahi Horn   4/8/2019  8:30 AM Rony Rocha PTA [facility]   4/11/2019  9:00 AM Tyron Nikos Reeves [facility]   4/29/2019  9:15 AM Musa Dolan MD [address]

## 2019-04-08 ENCOUNTER — HOSPITAL ENCOUNTER (OUTPATIENT)
Dept: PHYSICAL THERAPY | Age: 54
Discharge: HOME | End: 2019-04-08
Payer: MEDICAID

## 2019-04-08 PROCEDURE — 97032 APPL MODALITY 1+ESTIM EA 15: CPT

## 2019-04-08 PROCEDURE — 97110 THERAPEUTIC EXERCISES: CPT

## 2019-04-08 NOTE — PROGRESS NOTES
PT DAILY TREATMENT NOTE 10-18    Patient Name: Phoebe Hernandez  Date:2019  : 1965  [x]  Patient  Verified  Payor: SELF PAY / Plan: [facility] SELF PAY / Product Type: Self Pay /    In time:812  Out time:850  Total Treatment Time (min): 38  Visit #: 13 of     Treatment Area: Occipital neuralgia [M54.81]  Lesion of sciatic nerve, left lower limb [G57.02]    SUBJECTIVE  Pain Level (0-10 scale): 0/10  Any medication changes, allergies to medications, adverse drug reactions, diagnosis change, or new procedure performed?: [x] No    [] Yes (see summary sheet for update)  Subjective functional status/changes:   [] No changes reported  Pt stated that he just has numbness    OBJECTIVE    Modality rationale: increase muscle contraction/control to improve the patients ability to improve walking tolerance   Min Type Additional Details    [] Estim:  []Unatt       []IFC  []Premod                        []Other:  []w/ice   []w/heat  Position:  Location:   10 [x] Estim: [x]Att    []TENS instruct  [x]NMES                    []Other:  []w/US   []w/ice   []w/heat  Position: seated  Location:left ant tib    []  Traction: [] Cervical       []Lumbar                       [] Prone          []Supine                       []Intermittent   []Continuous Lbs:  [] before manual  [] after manual    []  Ultrasound: []Continuous   [] Pulsed                           []1MHz   []3MHz W/cm2:  Location:    []  Iontophoresis with dexamethasone         Location: [] Take home patch   [] In clinic    []  Ice     []  heat  []  Ice massage  []  Laser   []  Anodyne Position:  Location:    []  Laser with stim  []  Other:  Position:  Location:    []  Vasopneumatic Device Pressure:       [] lo [] med [] hi   Temperature: [] lo [] med [] hi   [x] Skin assessment post-treatment:  [x]intact []redness- no adverse reaction    []redness - adverse reaction:      28 min Therapeutic Exercise:  [x] See flow sheet :   Rationale: increase ROM and increase strength to improve the patients ability to increase ease with ADLs    With   [x] TE   [] TA   [] neuro   [] other: Patient Education: [x] Review HEP    [] Progressed/Changed HEP based on:   [] positioning   [] body mechanics   [] transfers   [] heat/ice application    [] other:      Other Objective/Functional Measures:   Had no difficulty with exercises  Standing DF is slowly improving  No LOB with static balance on foam     Pain Level (0-10 scale) post treatment: 0/10    ASSESSMENT/Changes in Function:   Pt is progressing fairly well toward goals. DF cont to improve. Pt reports having less difficulty with walking. Gait has much improved. Pt has much more normal gait pattern. Cont with decreased walking tolerance    Patient will continue to benefit from skilled PT services to modify and progress therapeutic interventions, address functional mobility deficits, address ROM deficits and address strength deficits to attain remaining goals. []  See Plan of Care  [x]  See progress note/recertification  []  See Discharge Summary         Progress towards goals / Updated goals:  1. Pt will report no more than 1-2/10 pain overall so he can sleep and perform ADLs with ease. Progressing.  Pt reports reducing cervical pain 4/2/19   2. Pt will demonstrate at least 4-/5 strength with Left ankle to improve ease an safety for amb  3. Pt will demonstrate good lifting body mechanics to avoid compensation with UT and c/s paraspinal muscles so he can perform heavy house chores with ease and safety. 4. Pt will be independent with HEP to maintain pain free and safety with ADLs and amb.     PLAN  []  Upgrade activities as tolerated     [x]  Continue plan of care  []  Update interventions per flow sheet       []  Discharge due to:_  []  Other:_      Cammy Bower PTA 4/8/2019  8:14 AM    Future Appointments   Date Time Provider Abdullahi Horn   4/8/2019  8:30 AM Myesha Mcadams PTA [facility]   4/11/2019  9:00 AM [facility]   4/29/2019  9:15 AM Oren Gallardo MD [address]

## 2019-04-11 ENCOUNTER — APPOINTMENT (OUTPATIENT)
Dept: PHYSICAL THERAPY | Age: 54
End: 2019-04-11
Payer: MEDICAID

## 2019-04-12 ENCOUNTER — HOSPITAL ENCOUNTER (OUTPATIENT)
Dept: PHYSICAL THERAPY | Age: 54
Discharge: HOME | End: 2019-04-12
Payer: MEDICAID

## 2019-04-12 PROCEDURE — 97032 APPL MODALITY 1+ESTIM EA 15: CPT

## 2019-04-12 PROCEDURE — 97110 THERAPEUTIC EXERCISES: CPT

## 2019-04-12 NOTE — PROGRESS NOTES
PT DAILY TREATMENT NOTE 10-18    Patient Name: Luca Sharp  Date:2019  : 1965  [x]  Patient  Verified  Payor: SELF PAY / Plan: [facility] SELF PAY / Product Type: Self Pay /    In time:100  Out time:134  Total Treatment Time (min): 34  Visit #: 14 of     Treatment Area: Occipital neuralgia [M54.81]  Lesion of sciatic nerve, left lower limb [G57.02]    SUBJECTIVE  Pain Level (0-10 scale): 5/10  Any medication changes, allergies to medications, adverse drug reactions, diagnosis change, or new procedure performed?: [x] No    [] Yes (see summary sheet for update)  Subjective functional status/changes:   [] No changes reported  Pt stated that he wore a boot and it really hurt his foot.      OBJECTIVE    Modality rationale: increase muscle contraction/control to improve the patients ability to improve walking   Min Type Additional Details    [] Estim:  []Unatt       []IFC  []Premod                        []Other:  []w/ice   []w/heat  Position:  Location:   10 [x] Estim: [x]Att    []TENS instruct  [x]NMES                    []Other:  []w/US   []w/ice   []w/heat  Position: seated  Location:left ant tib    []  Traction: [] Cervical       []Lumbar                       [] Prone          []Supine                       []Intermittent   []Continuous Lbs:  [] before manual  [] after manual    []  Ultrasound: []Continuous   [] Pulsed                           []1MHz   []3MHz W/cm2:  Location:    []  Iontophoresis with dexamethasone         Location: [] Take home patch   [] In clinic    []  Ice     []  heat  []  Ice massage  []  Laser   []  Anodyne Position:  Location:    []  Laser with stim  []  Other:  Position:  Location:    []  Vasopneumatic Device Pressure:       [] lo [] med [] hi   Temperature: [] lo [] med [] hi   [x] Skin assessment post-treatment:  [x]intact []redness- no adverse reaction    []redness - adverse reaction:     24 min Therapeutic Exercise:  [x] See flow sheet :   Rationale: increase ROM and increase strength to improve the patients ability to increase ease with ADLs    With   [x] TE   [] TA   [] neuro   [] other: Patient Education: [x] Review HEP    [] Progressed/Changed HEP based on:   [] positioning   [] body mechanics   [] transfers   [] heat/ice application    [] other:      Other Objective/Functional Measures:   No difficulty with exercises  Had some pitted edema today, was instructed to elevate his leg over the weekend to reduce edema  No LOB with static balance     Pain Level (0-10 scale) post treatment: 0/10    ASSESSMENT/Changes in Function:   Pt is making good progress toward goals. DF cont to improve. Pt's gait has much improved with increased heel strike and toe off. Strength in left ankle is improving. Patient will continue to benefit from skilled PT services to modify and progress therapeutic interventions, address functional mobility deficits, address ROM deficits and address strength deficits to attain remaining goals. []  See Plan of Care  [x]  See progress note/recertification  []  See Discharge Summary         Progress towards goals / Updated goals:  1. Pt will report no more than 1-2/10 pain overall so he can sleep and perform ADLs with ease. Progressing.  Pt reports reducing cervical pain 4/2/19   2. Pt will demonstrate at least 4-/5 strength with Left ankle to improve ease an safety for amb  3. Pt will demonstrate good lifting body mechanics to avoid compensation with UT and c/s paraspinal muscles so he can perform heavy house chores with ease and safety. 4. Pt will be independent with HEP to maintain pain free and safety with ADLs and amb.     PLAN  []  Upgrade activities as tolerated     [x]  Continue plan of care  []  Update interventions per flow sheet       []  Discharge due to:_  []  Other:_      Eva Peterson PTA 4/12/2019  12:54 PM    Future Appointments   Date Time Provider Abdullahi Horn   4/12/2019  1:00 PM Aicha Perea PTA [facility] 4/29/2019  9:15 AM Ramya Ortiz MD [address]

## 2019-04-16 ENCOUNTER — HOSPITAL ENCOUNTER (OUTPATIENT)
Dept: PHYSICAL THERAPY | Age: 54
Discharge: HOME | End: 2019-04-16
Payer: MEDICAID

## 2019-04-16 PROCEDURE — 97014 ELECTRIC STIMULATION THERAPY: CPT

## 2019-04-16 PROCEDURE — 97110 THERAPEUTIC EXERCISES: CPT

## 2019-04-16 NOTE — PROGRESS NOTES
PT DAILY TREATMENT NOTE     Patient Name: Arpit Couch  Date:2019  : 1965  [x]  Patient  Verified  Payor: SELF PAY / Plan: [facility] SELF PAY / Product Type: Self Pay /    In time: 8:28  Out time:9:26 AM  Total Treatment Time (min): 58  Visit #: 15 of     Treatment Area: Occipital neuralgia [M54.81]  Lesion of sciatic nerve, left lower limb [G57.02]    SUBJECTIVE  Pain Level (0-10 scale): 2  Any medication changes, allergies to medications, adverse drug reactions, diagnosis change, or new procedure performed?: [x] No    [] Yes (see summary sheet for update)  Subjective functional status/changes:   [] No changes reported  Pt states he notices increasing L foot strength. Still notices foot drop towards end of day as leg gets tired. Reports increased numbness to L dorsum foot/ant shin after therapy usually.      OBJECTIVE    Modality rationale: increase muscle contraction/control to improve the patients ability to improve walking   Min Type Additional Details    [] Estim:  []Unatt       []IFC  []Premod                        []Other:  []w/ice   []w/heat  Position:  Location:   10 [x] Estim: []Att    []TENS instruct  [x]NMES                    []Other:  []w/US   []w/ice   []w/heat  Position: seated  Location: L ant tib    []  Traction: [] Cervical       []Lumbar                       [] Prone          []Supine                       []Intermittent   []Continuous Lbs:  [] before manual  [] after manual    []  Ultrasound: []Continuous   [] Pulsed                           []1MHz   []3MHz W/cm2:  Location:    []  Iontophoresis with dexamethasone         Location: [] Take home patch   [] In clinic    []  Ice     []  heat  []  Ice massage  []  Laser   []  Anodyne Position:  Location:    []  Laser with stim  []  Other:  Position:  Location:    []  Vasopneumatic Device Pressure:       [] lo [] med [] hi   Temperature: [] lo [] med [] hi   [] Skin assessment post-treatment:  []intact []redness- no adverse reaction    []redness - adverse reaction:     48 min Therapeutic Exercise:  [x] See flow sheet :   Rationale: increase ROM and increase strength to improve the patients ability to increase ease with ADLs                With   [x] TE   [] TA   [] neuro   [] other: Patient Education: [x] Review HEP    [] Progressed/Changed HEP based on:   [] positioning   [] body mechanics   [] transfers   [] heat/ice application    [] other:      Other Objective/Functional Measures:   -exercises as per flow sheet   -progressed H/T raises to standing; pt able to perform L DF through 25-50% AROM (decreasing with fatigue). Pain Level (0-10 scale) post treatment: 2    ASSESSMENT/Changes in Function: Pt with normal ankle strategy noted EO, foam romberg balance; increased ankle strategy with EC. Pt reporting fatigue towards end of reps with ankle exercises indicating appropriate intensity. Patient will continue to benefit from skilled PT services to modify and progress therapeutic interventions, address functional mobility deficits, address ROM deficits and address strength deficits to attain remaining goals. []  See Plan of Care  [x]  See progress note/recertification  []  See Discharge Summary         Progress towards goals / Updated goals:    1. Pt will report no more than 1-2/10 pain overall so he can sleep and perform ADLs with ease. Progressing.  Pt reports reducing cervical pain 4/2/19   2. Pt will demonstrate at least 4-/5 strength with Left ankle to improve ease an safety for amb  3. Pt will demonstrate good lifting body mechanics to avoid compensation with UT and c/s paraspinal muscles so he can perform heavy house chores with ease and safety.   4. Pt will be independent with HEP to maintain pain free and safety with ADLs and amb.           PLAN  [x]  Upgrade activities as tolerated     [x]  Continue plan of care  []  Update interventions per flow sheet       []  Discharge due to:_  []  Other:_      Talat CHINCHILLA Rashi, PT 4/16/2019  8:03 AM    Future Appointments   Date Time Provider Abdullahi Lovelli   4/16/2019  8:30 AM [facility] PT [address] [facility]   4/18/2019  8:30 AM Raymond Bain PTA [facility]   4/29/2019  9:15 AM Yung Hill MD [address]

## 2019-04-18 ENCOUNTER — HOSPITAL ENCOUNTER (OUTPATIENT)
Dept: PHYSICAL THERAPY | Age: 54
Discharge: HOME | End: 2019-04-18
Payer: MEDICAID

## 2019-04-18 PROCEDURE — 97110 THERAPEUTIC EXERCISES: CPT

## 2019-04-18 PROCEDURE — 97164 PT RE-EVAL EST PLAN CARE: CPT

## 2019-04-18 NOTE — PROGRESS NOTES
PT DAILY TREATMENT NOTE 10-18    Patient Name: Bethany Aguilera  Date:2019  : 1965  [x]  Patient  Verified  Payor: SELF PAY / Plan: [facility] SELF PAY / Product Type: Self Pay /    In time:8:30  Out time:9:04  Total Treatment Time (min): 34  Visit #: 16 of     Treatment Area: Occipital neuralgia [M54.81]  Lesion of sciatic nerve, left lower limb [G57.02]    SUBJECTIVE  Pain Level (0-10 scale): 2/10  Any medication changes, allergies to medications, adverse drug reactions, diagnosis change, or new procedure performed?: [x] No    [] Yes (see summary sheet for update)  Subjective functional status/changes:   [] No changes reported  Pt reports that his foot was really bothering him yesterday. He was stepping on a step ladder. Pt reports 70% improvement with his foot and 60% improvement with his neck. The \"knot\" in his neck went away with the combo e-stim in mid-March. The \"knot\" has returned slightly and really only bothers him with lifting. The pain from the \"knot\" is a lot better. He is able to walk for 1-2 hours before his foot really starts to bother him and he starts to limp. He is considering finishing up with therapy within the next 2 weeks before he leaves for vacation. His left calf/ankle was really swollen yesterday after a lot of walking. The swelling is better now. Denies pain in his calf.       OBJECTIVE    Modality rationale: increase muscle contraction/control to improve the patients ability to improve foot clearance with ambulation    Min Type Additional Details    [] Estim:  []Unatt       []IFC  []Premod                        []Other:  []w/ice   []w/heat  Position:  Location:   10 [x] Estim: [x]Att    []TENS instruct  []NMES                    [x]Other:  [place]  []w/US   []w/ice   []w/heat  Position: seated   Location: Left tibialis anterior     []  Traction: [] Cervical       []Lumbar                       [] Prone          []Supine                       []Intermittent []Continuous Lbs:  [] before manual  [] after manual    []  Ultrasound: []Continuous   [] Pulsed                           []1MHz   []3MHz W/cm2:  Location:    []  Iontophoresis with dexamethasone         Location: [] Take home patch   [] In clinic    []  Ice     []  heat  []  Ice massage  []  Laser   []  Anodyne Position:  Location:    []  Laser with stim  []  Other:  Position:  Location:    []  Vasopneumatic Device Pressure:       [] lo [] med [] hi   Temperature: [] lo [] med [] hi   [x] Skin assessment post-treatment:  [x]intact []redness- no adverse reaction    []redness - adverse reaction:     8 min []Eval                  [x]Re-Eval       26 min Therapeutic Exercise:  [x] See flow sheet :   Rationale: increase ROM and increase strength to improve the patients ability to improve ease of ambulation and daily tasks          With   [] TE   [] TA   [] neuro   [] other: Patient Education: [x] Review HEP    [] Progressed/Changed HEP based on:   [] positioning   [] body mechanics   [] transfers   [] heat/ice application    [] other:      Other Objective/Functional Measures:     Girth 5\" distal to patella: Left 39.4cm, Right 40.0cm   No redness, tenderness, increased temp, or pitting edema B calves  Educated pt regarding signs/sx of DVT, instructed pt to go to emergency room immediately if signs/sx     DF AROM: Left 11 dgrs, Right 18 dgrs    Right Ankle MMT  DF 4/5 (in available range)  Inversion 4/5  Eversion 4-/5      Pain Level (0-10 scale) post treatment: 1-2/10    ASSESSMENT/Changes in Function: See Progress Note    Patient will continue to benefit from skilled PT services to modify and progress therapeutic interventions, address functional mobility deficits, address ROM deficits, address strength deficits, analyze and address soft tissue restrictions, analyze and cue movement patterns, analyze and modify body mechanics/ergonomics, assess and modify postural abnormalities, address imbalance/dizziness and instruct in home and community integration to attain remaining goals. []  See Plan of Care  [x]  See progress note/recertification  []  See Discharge Summary         Progress towards goals / Updated goals:  1. Pt will report no more than 1-2/10 pain overall so he can sleep and perform ADLs with ease. Not met. Max pain 5/10, average pain 2-3/10, least 2/10 in left ankle. Max pain 6/10, average pain 3/10, least pain 2/10 in neck over the past week 4/18/19   2. Pt will demonstrate at least 4-/5 strength with Left ankle to improve ease an safety for amb Goal met. With DF/Inversion/Eversion 4/18/19. 3. Pt will demonstrate good lifting body mechanics to avoid compensation with UT and c/s paraspinal muscles so he can perform heavy house chores with ease and safety. Not assessed d/t time constraint 4/18/19   4. Pt will be independent with HEP to maintain pain free and safety with ADLs and amb.  Not initiated 4/18/19       PLAN  []  Upgrade activities as tolerated     [x]  Continue plan of care  []  Update interventions per flow sheet       []  Discharge due to:_  []  Other:_      Edie Gutiérrez, PT 4/18/2019  8:44 AM    Future Appointments   Date Time Provider Abdullahi Horn   4/29/2019  9:15 AM Jossie Bledsoe MD [address]

## 2019-04-18 NOTE — PROGRESS NOTES
[facility] - [address]  [phone] fax    Physical Therapy Progress Note  Patient name: Jason Marte Start of Care: 19    Referral source: Audie Murcia MD : 1965   Medical/Treatment Diagnosis: Occipital neuralgia [M54.81]  Lesion of sciatic nerve, left lower limb [G57.02]  Payor: SELF PAY / Plan: [facility] SELF PAY / Product Type: Self Pay /  Onset Date:4 months with left ankle, 1.5 year with neck      Prior Hospitalization: see medical history Provider#: [id number]   Medications: Verified on Patient Summary List     Comorbidities: alcohol and tobacco use   Prior Level of Function: lives alone in a one story home, yard work, housework, metal work, 3 step to enter house, Ind with all mobility      Visits from [facility]: 16    Missed Visits: 7     Established Goals:         Excellent           Good         Limited           None  [x] Increased ROM   []  [x]  []  []  [x] Increased Strength  []  [x]  []  []  [] Increased Mobility  []  []  []  []   [] Decreased Pain   []  []  []  []  [] Decreased Swelling  []  []  []  []    Key Functional Changes: improving strength, improving ROM, decreasing pain levels     Updated Goals: to be achieved in 4 weeks:  1. Pt will improve FOTO by 18 points in order to demonstrate functional improvement. 2. Pt will demonstrate good lifting body mechanics to avoid compensation with UT and c/s paraspinal muscles so he can perform heavy house chores with ease and safety. 3. Pt will report 70% improvement in cervicalgia in order to improve QOL. 4. Pt will be independent with HEP to maintain pain free and safety with ADLs and amb. ASSESSMENT/RECOMMENDATIONS:  Pt is making slow, steady progress in therapy, meeting 2/4 updated goals and progressing towards pain goal.  Cervicalgia goal was not met as therapy focus has been on left foot impairments rather than cervicalgia.   Further progress has been limited d/t inconsistent therapy attendance; pt has missed 7 appointments since start of care. Left ankle strength has steadily improved, and left ankle DF AROM is now [facility] (Left 11 dgrs, Right 18 dgrs). Pt will be given updated HEP to address remaining ankle deficits independently. Therapy focus will now address cervicalgia during sessions. He reported significant pain reduction with combo e-stim. Pain is consistent with scapular dyskinesis and postural deficits. Pt will benefit from continued skilled PT to address strength, ROM, flexibility, and postural deficits in order to reduce cervicalgia and improve ease of daily tasks. [x]Continue therapy per initial plan/protocol at a frequency of  2-3 x per week for 4 weeks  []Continue therapy with the following recommended changes:_____________________      _____________________________________________________________________  []Discontinue therapy progressing towards or have reached established goals  []Discontinue therapy due to lack of appreciable progress towards goals  []Discontinue therapy due to lack of attendance or compliance  []Await Physician's recommendations/decisions regarding therapy  []Other:________________________________________________________________    Thank you for this referral.   Malinda Antonio, PT 4/18/2019 8:55 AM    NOTE TO PHYSICIAN:  [address]   [facility] [phone]  If you are unable to process this request in 24 hours please contact our office: [phone] I have read the above report and request that my patient continue as recommended. ? I have read the above report and request that my patient continue therapy with the following changes/special instructions:____________________________________  ? I have read the above report and request that my patient be discharged from therapy.     Physicians signature: ______________________________Date: ______Time:______

## 2019-04-23 ENCOUNTER — HOSPITAL ENCOUNTER (OUTPATIENT)
Dept: PHYSICAL THERAPY | Age: 54
Discharge: HOME | End: 2019-04-23
Payer: MEDICAID

## 2019-04-23 PROCEDURE — 97110 THERAPEUTIC EXERCISES: CPT

## 2019-04-23 PROCEDURE — 97014 ELECTRIC STIMULATION THERAPY: CPT

## 2019-04-23 NOTE — PROGRESS NOTES
PT DAILY TREATMENT NOTE 10-18    Patient Name: Natacha Pacheco  Date:2019  : 1965  [x]  Patient  Verified  Payor: SELF PAY / Plan: [facility] SELF PAY / Product Type: Self Pay /    In time:830  Out time:920  Total Treatment Time (min): 40  Visit #: 17 of     Treatment Area: Occipital neuralgia [M54.81]  Lesion of sciatic nerve, left lower limb [G57.02]    SUBJECTIVE  Pain Level (0-10 scale): 310  Any medication changes, allergies to medications, adverse drug reactions, diagnosis change, or new procedure performed?: [x] No    [] Yes (see summary sheet for update)  Subjective functional status/changes:   [] No changes reported  Pt stated that he is having some pain today    OBJECTIVE    Modality rationale: decrease pain and increase tissue extensibility to improve the patients ability to increase ease with ADLs   Min Type Additional Details   15 [x] Estim:  [x]Unatt       [x]IFC  []Premod                        []Other:  []w/ice   [x]w/heat  Position:seated  Location:neck    [] Estim: []Att    []TENS instruct  []NMES                    []Other:  []w/US   []w/ice   []w/heat  Position:  Location:    []  Traction: [] Cervical       []Lumbar                       [] Prone          []Supine                       []Intermittent   []Continuous Lbs:  [] before manual  [] after manual    []  Ultrasound: []Continuous   [] Pulsed                           []1MHz   []3MHz W/cm2:  Location:    []  Iontophoresis with dexamethasone         Location: [] Take home patch   [] In clinic    []  Ice     []  heat  []  Ice massage  []  Laser   []  Anodyne Position:  Location:    []  Laser with stim  []  Other:  Position:  Location:    []  Vasopneumatic Device Pressure:       [] lo [] med [] hi   Temperature: [] lo [] med [] hi   [x] Skin assessment post-treatment:  [x]intact []redness- no adverse reaction    []redness - adverse reaction:     25 min Therapeutic Exercise:  [x] See flow sheet :   Rationale: increase ROM and increase strength to improve the patients ability to increase ease with ADLs    With   [x] TE   [] TA   [] neuro   [] other: Patient Education: [x] Review HEP    [] Progressed/Changed HEP based on:   [] positioning   [] body mechanics   [] transfers   [] heat/ice application    [] other:      Other Objective/Functional Measures:   Had no difficulty with new program  Had increased pain with DNF  Was pleased with estim     Pain Level (0-10 scale) post treatment: 0/10    ASSESSMENT/Changes in Function:   Pt is making slow progress toward goals. Pt cont with neck pain. Foot drop is improving. Pt cont to report difficulty with walking and performing ADLs    Patient will continue to benefit from skilled PT services to modify and progress therapeutic interventions, address functional mobility deficits, address ROM deficits and address strength deficits to attain remaining goals. []  See Plan of Care  [x]  See progress note/recertification  []  See Discharge Summary         Progress towards goals / Updated goals:  1. Pt will improve FOTO by 18 points in order to demonstrate functional improvement. 2. Pt will demonstrate good lifting body mechanics to avoid compensation with UT and c/s paraspinal muscles so he can perform heavy house chores with ease and safety.   3. Pt will report 70% improvement in cervicalgia in order to improve QOL.    4. Pt will be independent with HEP to maintain pain free and safety with ADLs and amb.     PLAN  []  Upgrade activities as tolerated     [x]  Continue plan of care  []  Update interventions per flow sheet       []  Discharge due to:_  []  Other:_      Criselda Arias PTA 4/23/2019  8:21 AM    Future Appointments   Date Time Provider Abdullahi Horn   4/23/2019  8:30 AM Rossi Carson PTA [facility]   4/25/2019  8:00 AM Isrrael Andrews PT [facility]   4/29/2019  9:15 AM Mark Higginbotham MD [address]   4/30/2019  8:00 AM Rossi Carson PTA [facility] 5/14/2019  7:30 AM Buddy Gutierrez, PTA [facility]

## 2019-04-25 ENCOUNTER — HOSPITAL ENCOUNTER (OUTPATIENT)
Dept: PHYSICAL THERAPY | Age: 54
Discharge: HOME | End: 2019-04-25
Payer: MEDICAID

## 2019-04-25 PROCEDURE — 97110 THERAPEUTIC EXERCISES: CPT

## 2019-04-25 PROCEDURE — 97014 ELECTRIC STIMULATION THERAPY: CPT

## 2019-04-25 NOTE — PROGRESS NOTES
PT DAILY TREATMENT NOTE 10-18    Patient Name: Vincent Hayward  Date:2019  : 1965  [x]  Patient  Verified  Payor: SELF PAY / Plan: [facility] SELF PAY / Product Type: Self Pay /    In time:800  Out time:  Total Treatment Time (min): 40  Visit #: 2 of     Treatment Area: Occipital neuralgia [M54.81]  Lesion of sciatic nerve, left lower limb [G57.02]    SUBJECTIVE  Pain Level (0-10 scale): 3/10  Any medication changes, allergies to medications, adverse drug reactions, diagnosis change, or new procedure performed?: [x] No    [] Yes (see summary sheet for update)  Subjective functional status/changes:   [] No changes reported  The neck is burning. He helped an elderly gentleman lift sand bag.      OBJECTIVE    Modality rationale: decrease pain and increase tissue extensibility to improve the patients ability to increase ease with ADLs   Min Type Additional Details   15 [x] Estim:  [x]Unatt       [x]IFC  []Premod                        []Other:  []w/ice   [x]w/heat  Position:seated  Location:neck    [] Estim: []Att    []TENS instruct  []NMES                    []Other:  []w/US   []w/ice   []w/heat  Position:  Location:    []  Traction: [] Cervical       []Lumbar                       [] Prone          []Supine                       []Intermittent   []Continuous Lbs:  [] before manual  [] after manual    []  Ultrasound: []Continuous   [] Pulsed                           []1MHz   []3MHz W/cm2:  Location:    []  Iontophoresis with dexamethasone         Location: [] Take home patch   [] In clinic    []  Ice     []  heat  []  Ice massage  []  Laser   []  Anodyne Position:  Location:    []  Laser with stim  []  Other:  Position:  Location:    []  Vasopneumatic Device Pressure:       [] lo [] med [] hi   Temperature: [] lo [] med [] hi   [x] Skin assessment post-treatment:  [x]intact []redness- no adverse reaction    []redness - adverse reaction:     25 min Therapeutic Exercise:  [x] See flow sheet : Rationale: increase ROM and increase strength to improve the patients ability to increase ease with ADLs    With   [x] TE   [] TA   [] neuro   [] other: Patient Education: [x] Review HEP    [] Progressed/Changed HEP based on:   [] positioning   [] body mechanics   [] transfers   [] heat/ice application    [] other:      Other Objective/Functional Measures:   Pain with left SA punch due to hx of rotator cuff  50% improvement in neck symptoms  Has MD follow up on 29th     Pain Level (0-10 scale) post treatment: 0/10    ASSESSMENT/Changes in Function: Pt progressing towards goals. Pt reports 50% improvement in neck symptoms and 100% improvement in foot symptoms. Will continue to address ROM and strength to restore PLOF. Patient will continue to benefit from skilled PT services to modify and progress therapeutic interventions, address functional mobility deficits, address ROM deficits and address strength deficits to attain remaining goals. []  See Plan of Care  [x]  See progress note/recertification  []  See Discharge Summary         Progress towards goals / Updated goals:  1. Pt will improve FOTO by 18 points in order to demonstrate functional improvement. 2. Pt will demonstrate good lifting body mechanics to avoid compensation with UT and c/s paraspinal muscles so he can perform heavy house chores with ease and safety.   3. Pt will report 70% improvement in cervicalgia in order to improve QOL. 50% improvement 4/25   4.  Pt will be independent with HEP to maintain pain free and safety with ADLs and amb.     PLAN  []  Upgrade activities as tolerated     [x]  Continue plan of care  []  Update interventions per flow sheet       []  Discharge due to:_  []  Other:_      Lazaro Wilson, PT 4/25/2019  8:21 AM    Future Appointments   Date Time Provider Abdullahi Horn   4/25/2019  8:00 AM Haris Hernandez, PT [facility]   4/29/2019  9:15 AM Elaine Horton  [address] [place]    4/30/2019  8:00 AM CORBY Rodríguez [facility]   5/14/2019  7:30 AM CORBY Rodríguez [facility]

## 2019-04-29 ENCOUNTER — OFFICE VISIT (OUTPATIENT)
Dept: NEUROLOGY | Age: 54
End: 2019-04-29

## 2019-04-29 VITALS
SYSTOLIC BLOOD PRESSURE: 122 MMHG | RESPIRATION RATE: 16 BRPM | TEMPERATURE: 98 F | OXYGEN SATURATION: 97 % | WEIGHT: 215 LBS | HEIGHT: 70 IN | HEART RATE: 66 BPM | BODY MASS INDEX: 30.78 KG/M2 | DIASTOLIC BLOOD PRESSURE: 80 MMHG

## 2019-04-29 DIAGNOSIS — G62.9 POLYNEUROPATHY: ICD-10-CM

## 2019-04-29 DIAGNOSIS — G57.02 COMMON PERONEAL NEUROPATHY OF LEFT LOWER EXTREMITY: Primary | ICD-10-CM

## 2019-04-29 DIAGNOSIS — F10.10 ALCOHOL ABUSE: ICD-10-CM

## 2019-04-29 NOTE — PROGRESS NOTES
4/29/2019 9:38 AM    SSN: [id number]    Subjective:   27-year-old male who I initially saw in November for a left foot drop. Evaluation revealed the presence of a left peroneal neuropathy at the fibular head corroborated by electrodiagnostic testing which suggested the possibility of a very early, more diffuse sensory polyneuropathy. He had normal work-up including hemoglobin A1c, TSH, serum electrophoresis, negative DONTAE, antinuclear cytoplasmic antibodies, and testing for rheumatoid arthritis. B12 was in the low normal range of 220. We also discussed the fact that at the time of presentation he reported drinking a minimum of 4 beers every day, sometimes would have moonshine with this as well. He reports he continues to improve, he has recovered 80% of the strength. He has minimal pain. HE does admit he may sometimes have 30 beers in a 24 hr period.      Social History     Socioeconomic History    Marital status:      Spouse name: Not on file    Number of children: Not on file    Years of education: Not on file    Highest education level: Not on file   Occupational History    Not on file   Social Needs    Financial resource strain: Not on file    Food insecurity:     Worry: Not on file     Inability: Not on file    Transportation needs:     Medical: Not on file     Non-medical: Not on file   Tobacco Use    Smoking status: Never Smoker    Smokeless tobacco: Current User    Tobacco comment: chew tobacco   Substance and Sexual Activity    Alcohol use: Not on file    Drug use: Not on file    Sexual activity: Not on file   Lifestyle    Physical activity:     Days per week: Not on file     Minutes per session: Not on file    Stress: Not on file   Relationships    Social connections:     Talks on phone: Not on file     Gets together: Not on file     Attends Protestant service: Not on file     Active member of club or organization: Not on file     Attends meetings of clubs or organizations: Not on file     Relationship status: Not on file    Intimate partner violence:     Fear of current or ex partner: Not on file     Emotionally abused: Not on file     Physically abused: Not on file     Forced sexual activity: Not on file   Other Topics Concern    Not on file   Social History Narrative    Not on file       History reviewed. No pertinent family history. Current Outpatient Medications   Medication Sig Dispense Refill    methocarbamol (ROBAXIN) 750 mg tablet Take  by mouth four (4) times daily.  meloxicam (MOBIC) 7.5 mg tablet Take 1 Tab by mouth daily. 14 Tab 0       History reviewed. No pertinent past medical history. Past Surgical History:   Procedure Laterality Date    HX ORTHOPAEDIC         Allergies   Allergen Reactions    Motrin [Ibuprofen] Hives       Vital signs:    Visit Vitals  /80 (BP 1 Location: Left arm, BP Patient Position: Sitting)   Pulse 66   Temp 98 °F (36.7 °C) (Oral)   Resp 16   Ht 5' 10\" (1.778 m)   Wt 97.5 kg (215 lb)   SpO2 97%   BMI 30.85 kg/m²       Review of Systems:   GENERAL: Denies fever or fatigue  CARDIAC: No CP or SOB  PULMONARY: No cough of SOB  MUSCULOSKELETAL: No new joint pain  NEURO: SEE HPI      EXAM: Alert, in NAD. Heart is regular. Oriented x3, EOM's are full, PERRL, no facial asymmetries. Strength 5-/5 foot dorsiflexionl. 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 We had an honest discussion about his massive alcohol abuse, some days reporting 5 6-packs every 24 hrs. Discussed neurological consequences of ETOH  Abuse Recommended complete abstinence. I recommended oral B12 supplementation continuation. He will return prn for neuro needs. 15/25 mins counseling. PLEASE NOTE:   Portions of this document may have been produced using voice recognition software. Unrecognized errors in transcription may be present. This note will not be viewable in [address]

## 2019-04-29 NOTE — PROGRESS NOTES
ROOM # 4    Lisseth Ortiz presents today for   Chief Complaint   Patient presents with    Follow-up    Other     Lower extremity pain        Lisseth rOtiz preferred language for health care discussion is english/other. Depression Screening:  3 most recent PHQ Screens 11/21/2018   Little interest or pleasure in doing things Not at all   Feeling down, depressed, irritable, or hopeless Not at all   Total Score PHQ 2 0       Learning Assessment:  No flowsheet data found. Abuse Screening:  Abuse Screening Questionnaire 1/28/2019   Do you ever feel afraid of your partner? N   Are you in a relationship with someone who physically or mentally threatens you? N   Is it safe for you to go home? Y       Fall Risk  Fall Risk Assessment, last 12 mths 1/28/2019   Able to walk? Yes   Fall in past 12 months? No       Visit Vitals  /80 (BP 1 Location: Left arm, BP Patient Position: Sitting)   Pulse 66   Temp 98 °F (36.7 °C) (Oral)   Resp 16   Ht 5' 10\" (1.778 m)   Wt 215 lb (97.5 kg)   SpO2 97%   BMI 30.85 kg/m²       Health Maintenance reviewed and discussed per provider. Yes    Lisseth Ortiz is due for   Health Maintenance Due   Topic Date Due    Hepatitis C Screening  1965    Pneumococcal 0-64 years (1 of 1 - PPSV23) 12/06/1970    Shingrix Vaccine Age 50> (1 of 2) 12/06/2014    FOBT Q 1 YEAR AGE 50-75  12/06/2014     Please order/place referral if appropriate. Advance Directive:  1. Do you have an advance directive in place? Patient Reply: No    2. If not, would you like material regarding how to put one in place? Patient Reply: NO    Coordination of Care:  1. Have you been to the ER, urgent care clinic since your last visit? Hospitalized since your last visit?  No      ]

## 2019-04-30 ENCOUNTER — APPOINTMENT (OUTPATIENT)
Dept: PHYSICAL THERAPY | Age: 54
End: 2019-04-30
Payer: MEDICAID

## 2019-05-14 ENCOUNTER — APPOINTMENT (OUTPATIENT)
Dept: PHYSICAL THERAPY | Age: 54
End: 2019-05-14

## 2019-05-16 ENCOUNTER — APPOINTMENT (OUTPATIENT)
Dept: PHYSICAL THERAPY | Age: 54
End: 2019-05-16

## 2019-05-17 NOTE — PROGRESS NOTES
[facility] - [facility]  [address] [facility]  [phone] fax    Physical Therapy Discharge Summary    Patient name: Raheel Khoury Start of Care: 19   Referral source: Jovita Cintron MD : 1965   Medical/Treatment Diagnosis: Occipital neuralgia [M54.81]  Lesion of sciatic nerve, left lower limb [G57.02]  Payor: MEDICAID [facility] / Plan: [employer] / Product Type: Medicaid /  Onset Date:4 months with left ankle, 1.5 years with neck      Prior Hospitalization: see medical history Provider#: [id number]   Medications: Verified on Patient Summary List    Comorbidities: alcohol and tobacco use   Prior Level of Function: lives alone in a one story home, yard work, housework, metal work, 3 step to enter house, Ind with all mobility      Visits from [place] of Care: 18    Missed Visits: 8    Reporting Period : 19 to 19    Summary of Care:  1. Pt will improve FOTO by 18 points in order to demonstrate functional improvement.             Not assessed   2. Pt will demonstrate good lifting body mechanics to avoid compensation with UT and c/s paraspinal muscles so he can perform heavy house chores with ease and safety.             Not assessed   3. Pt will report 70% improvement in cervicalgia in order to improve QOL. Progressing. Reports 50% improvement   4. Pt will be independent with HEP to maintain pain free and safety with ADLs and amb.             Not initiated      Majority of updated goals not assessed d/t unplanned DC    ASSESSMENT/RECOMMENDATIONS:  Pt was making steady progress in therapy. DF AROM was steadily improving. Pt reported 50% improvement in cervical sx and 100% improvement in foot sx. He has missed 8 appointments and is DC at this time d/t noncompliance and clinic's CX/NS policy.     [x]Discontinue therapy: []Patient has reached or is progressing toward set goals      [x]Patient is non-compliant or has abdicated      []Due to lack of appreciable progress towards set goals    Malinda Antonio, PT 5/17/2019 2:33 PM

## 2019-06-26 ENCOUNTER — DOCUMENTATION ONLY (OUTPATIENT)
Dept: FAMILY MEDICINE CLINIC | Facility: CLINIC | Age: 54
End: 2019-06-26

## 2019-06-26 NOTE — PROGRESS NOTES
Pharmacist Note: Immunization Update    Patient: Jovita Whitehead (17 y.o., 1965)     Patient's immunization history was updated to reflect information contained in the [place] and/or outside immunization/pharmacy records were reconciled within [facility]. Health Maintenance schedule updated when appropriate.     Current immunizations now reflect:       Immunization History   Administered Date(s) Administered    Tdap 08/27/2016       Rashad Rose, PharmD, BCACP

## 2019-06-27 ENCOUNTER — HOSPITAL ENCOUNTER (OUTPATIENT)
Dept: GENERAL RADIOLOGY | Age: 54
Discharge: HOME | End: 2019-06-27
Payer: MEDICAID

## 2019-06-27 ENCOUNTER — OFFICE VISIT (OUTPATIENT)
Dept: FAMILY MEDICINE CLINIC | Facility: CLINIC | Age: 54
End: 2019-06-27

## 2019-06-27 VITALS
BODY MASS INDEX: 30.35 KG/M2 | HEIGHT: 70 IN | SYSTOLIC BLOOD PRESSURE: 118 MMHG | TEMPERATURE: 97.2 F | WEIGHT: 212 LBS | HEART RATE: 80 BPM | DIASTOLIC BLOOD PRESSURE: 80 MMHG | OXYGEN SATURATION: 97 %

## 2019-06-27 DIAGNOSIS — Z13.21 ENCOUNTER FOR VITAMIN DEFICIENCY SCREENING: ICD-10-CM

## 2019-06-27 DIAGNOSIS — M79.642 BILATERAL HAND PAIN: Primary | ICD-10-CM

## 2019-06-27 DIAGNOSIS — M54.2 CERVICAL PAIN (NECK): ICD-10-CM

## 2019-06-27 DIAGNOSIS — F41.8 DEPRESSION WITH ANXIETY: ICD-10-CM

## 2019-06-27 DIAGNOSIS — Z13.29 SCREENING FOR THYROID DISORDER: ICD-10-CM

## 2019-06-27 DIAGNOSIS — Z13.6 ENCOUNTER FOR LIPID SCREENING FOR CARDIOVASCULAR DISEASE: ICD-10-CM

## 2019-06-27 DIAGNOSIS — M79.641 BILATERAL HAND PAIN: Primary | ICD-10-CM

## 2019-06-27 DIAGNOSIS — Z12.5 SCREENING PSA (PROSTATE SPECIFIC ANTIGEN): ICD-10-CM

## 2019-06-27 DIAGNOSIS — Z13.220 ENCOUNTER FOR LIPID SCREENING FOR CARDIOVASCULAR DISEASE: ICD-10-CM

## 2019-06-27 PROCEDURE — 72050 X-RAY EXAM NECK SPINE 4/5VWS: CPT

## 2019-06-27 RX ORDER — ESCITALOPRAM OXALATE 10 MG/1
10 TABLET ORAL DAILY
Qty: 30 TAB | Refills: 0 | Status: SHIPPED | OUTPATIENT
Start: 2019-06-27 | End: 2019-08-08

## 2019-06-27 NOTE — PROGRESS NOTES
aNtacha Pacheco presents today for   Chief Complaint   Patient presents with    New Patient    Hand Swelling     no  going on for  about a week now       Natacha Pacheco preferred language for health care discussion is [address] Is someone accompanying this pt? no    Is the patient using any DME equipment during [address]? no    Depression Screening:  3 most recent PHQ Screens 6/27/2019   Little interest or pleasure in doing things Not at all   Feeling down, depressed, irritable, or hopeless Nearly every day   Total Score PHQ 2 3   Trouble falling or staying asleep, or sleeping too much Nearly every day   Feeling tired or having little energy Nearly every day   Poor appetite, weight loss, or overeating Nearly every day   Feeling bad about yourself - or that you are a failure or have let yourself or your family down More than half the days   Trouble concentrating on things such as school, work, reading, or watching TV Nearly every day   Moving or speaking so slowly that other people could have noticed; or the opposite being so fidgety that others notice Not at all   Thoughts of being better off dead, or hurting yourself in some way Nearly every day   PHQ 9 Score 20   How difficult have these problems made it for you to do your work, take care of your home and get along with others Extremely difficult       Learning Assessment:  No flowsheet data found. Abuse Screening:  Abuse Screening Questionnaire 1/28/2019   Do you ever feel afraid of your partner? N   Are you in a relationship with someone who physically or mentally threatens you? N   Is it safe for you to go home? Y       Health Maintenance reviewed and discussed and ordered per Provider. Health Maintenance Due   Topic Date Due    Hepatitis C Screening  1965    Pneumococcal 0-64 years (1 of 1 - PPSV23) 12/06/1970    Shingrix Vaccine Age 50> (1 of 2) 12/06/2014    FOBT Q 1 YEAR AGE 50-75  12/06/2014   .       Natacha Pacheco is updated on all     Pt currently taking Antiplatelet therapy? no    Coordination of Care:  1. Have you been to the ER, urgent care clinic since your last visit? no  Hospitalized since your last visit? no    2. Have you seen or consulted any other health care providers outside of the [address] [place] [address] since your last visit? no Include any pap smears or colon screening.  no
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
## 2019-07-02 DIAGNOSIS — M54.50 LUMBAR BACK PAIN: Primary | ICD-10-CM

## 2019-07-02 NOTE — PROGRESS NOTES
Please let patient know the xray results showed degenerative changes and decreased height. Referral placed for Ortho.

## 2019-08-08 ENCOUNTER — OFFICE VISIT (OUTPATIENT)
Dept: FAMILY MEDICINE CLINIC | Facility: CLINIC | Age: 54
End: 2019-08-08

## 2019-08-08 VITALS
HEART RATE: 83 BPM | SYSTOLIC BLOOD PRESSURE: 120 MMHG | HEIGHT: 70 IN | BODY MASS INDEX: 30.06 KG/M2 | DIASTOLIC BLOOD PRESSURE: 84 MMHG | OXYGEN SATURATION: 97 % | RESPIRATION RATE: 22 BRPM | WEIGHT: 210 LBS

## 2019-08-08 DIAGNOSIS — F41.8 DEPRESSION WITH ANXIETY: ICD-10-CM

## 2019-08-08 DIAGNOSIS — F10.10 ALCOHOL ABUSE: ICD-10-CM

## 2019-08-08 DIAGNOSIS — M54.2 CERVICAL PAIN (NECK): Primary | ICD-10-CM

## 2019-08-08 RX ORDER — NALTREXONE HYDROCHLORIDE 50 MG/1
50 TABLET, FILM COATED ORAL DAILY
COMMUNITY
End: 2019-10-24

## 2019-08-08 RX ORDER — ACAMPROSATE CALCIUM 333 MG/1
666 TABLET, DELAYED RELEASE ORAL 3 TIMES DAILY
COMMUNITY
End: 2019-10-24

## 2019-08-08 RX ORDER — SERTRALINE HYDROCHLORIDE 50 MG/1
TABLET, FILM COATED ORAL DAILY
COMMUNITY
End: 2019-10-24

## 2019-08-08 NOTE — PROGRESS NOTES
Kylah Rowland is a 46 y.o. male presenting today for Neck Pain (x-ray results)    HPI:  Kylah Rowland presents to the office today for cervical neck pain. Patient notes over the last several weeks any position he lays in his hands goes numb. Patient was previously seen for cervical neck pain and had a x-ray of his cervical spine. Per the cervical spine result patient has degenerative changes of the cervical spine with decreased disc height, endplate osteophytes, anterior vertebral osteophytes and neuroforaminal narrowing at multiple levels. Patient x-ray was completed on June 27, 2019 and a referral was placed for orthopedic services. Patient presents today requesting something for pain. Patient notes that he continues to drink alcohol daily and his last alcohol beverage was this morning at 1 AM.  He notes he drank, [place] couple of beers to go to sleep\". Patient notes he is unable to sleep without drinking alcohol. Patient has been seen by his psychiatrist  Who he disclosed was given him Zoloft, naltrexone, and and Campral to help with his alcohol abuse. Review of Systems   Respiratory: Negative for cough. Cardiovascular: Negative for chest pain and palpitations. Musculoskeletal: Positive for myalgias and neck pain. Neurological: Positive for tingling (bilateral hands) and sensory change (bilateral hand). Allergies   Allergen Reactions    Motrin [Ibuprofen] Hives       Current Outpatient Medications   Medication Sig Dispense Refill    sertraline (ZOLOFT) 50 mg tablet Take  by mouth daily.  naltrexone (DEPADE) 50 mg tablet Take 50 mg by mouth daily.  acamprosate (CAMPRAL) 333 mg tablet Take 666 mg by mouth three (3) times daily. Past Medical History:   Diagnosis Date    Depression     Foot drop, left foot     October, 2018.   Seen by Podiatry in 2019    Headache     Psychotic disorder ([facility] [address]     anxiety       Past Surgical History:   Procedure Laterality Date    HX ORTHOPAEDIC  2017    lacerations to fingers and had pins       Social History     Socioeconomic History    Marital status:      Spouse name: Not on file    Number of children: Not on file    Years of education: Not on file    Highest education level: Not on file   Occupational History    Not on file   Social Needs    Financial resource strain: Not on file    Food insecurity:     Worry: Not on file     Inability: Not on file    Transportation needs:     Medical: Not on file     Non-medical: Not on file   Tobacco Use    Smoking status: Never Smoker    Smokeless tobacco: Current User    Tobacco comment: chew tobacco   Substance and Sexual Activity    Alcohol use: Yes     Comment: 30 packs x 2 a day    Drug use: Yes     Types: Marijuana    Sexual activity: Yes   Lifestyle    Physical activity:     Days per week: Not on file     Minutes per session: Not on file    Stress: Not on file   Relationships    Social connections:     Talks on phone: Not on file     Gets together: Not on file     Attends Confucianism service: Not on file     Active member of club or organization: Not on file     Attends meetings of clubs or organizations: Not on file     Relationship status: Not on file    Intimate partner violence:     Fear of current or ex partner: Not on file     Emotionally abused: Not on file     Physically abused: Not on file     Forced sexual activity: Not on file   Other Topics Concern    Not on file   Social History Narrative    Not on file       Patient does not have an advanced directive on file    Vitals:    08/08/19 1056   BP: 120/84   Pulse: 83   Resp: 22   SpO2: 97%   Weight: 210 lb (95.3 kg)   Height: 5' 10\" (1.778 m)   PainSc:   6   PainLoc: Neck       Physical Exam   Cardiovascular: Normal rate, regular rhythm and normal heart sounds. Pulmonary/Chest: Effort normal and breath sounds normal.   Musculoskeletal: He exhibits tenderness. He exhibits no edema. Cervical back: He exhibits decreased range of motion and pain. He exhibits no swelling and no spasm. Neurological: He is alert. Nursing note and vitals reviewed. No visits with results within 3 Month(s) from this visit. Latest known visit with results is:   Hospital Outpatient Visit on 11/21/2018   Component Date Value Ref Range Status    Hemoglobin A1c 11/21/2018 5.3  4.2 - 5.6 % Final    Comment: (NOTE)  HbA1C Interpretive Ranges  <5.7              Normal  5.7 - 6.4         Consider Prediabetes  >6.5              Consider Diabetes      Vitamin B12 11/21/2018 220  211 - 911 pg/mL Final    Immunoglobulin G, Qt. 11/21/2018 960  700 - 1,600 mg/dL Final    Immunoglobulin A, Qt. 11/21/2018 277  90 - 386 mg/dL Final    Immunoglobulin M, Qt. 11/21/2018 120  20 - 172 mg/dL Final    Protein, total 11/21/2018 6.6  6.0 - 8.5 g/dL Final    Albumin 11/21/2018 3.7  2.9 - 4.4 g/dL Final    Alpha-1-Globulin 11/21/2018 0.2  0.0 - 0.4 g/dL Final    Alpha-2-Globulin 11/21/2018 0.6  0.4 - 1.0 g/dL Final    Beta Globulin 11/21/2018 1.2  0.7 - 1.3 g/dL Final    Gamma Globulin 11/21/2018 0.9  0.4 - 1.8 g/dL Final    M-Isac 11/21/2018 Not Observed  Not Observed g/dL Final    Globulin, total 11/21/2018 2.9  2.2 - 3.9 g/dL Final    A/G ratio 11/21/2018 1.3  0.7 - 1.7   Final    Immunofixation Result 11/21/2018 Comment    Final    Comment: (NOTE)  An apparent normal immunofixation pattern.       Anti-DNA (DS) Ab, QT 11/21/2018 1  0 - 9 IU/mL Final    Comment: (NOTE)                                    Negative      <5                                    Equivocal  5 - 9                                    Positive      >9      RNP Abs 11/21/2018 <0.2  0.0 - 0.9 AI Final    Smith Abs 11/21/2018 <0.2  0.0 - 0.9 AI Final    Scleroderma-70 Ab 11/21/2018 <0.2  0.0 - 0.9 AI Final    Sjogren's Anti-SS-A 11/21/2018 <0.2  0.0 - 0.9 AI Final    Sjogren's Anti-SS-B 11/21/2018 <0.2  0.0 - 0.9 AI Final    Antichromatin Ab 2018 <0.2  0.0 - 0.9 AI Final    Anti-Farzana-1 2018 <0.2  0.0 - 0.9 AI Final    Centromere B Ab 2018 <0.2  0.0 - 0.9 AI Final    Comment: (NOTE)  Performed At: [address] [id number]  Ronaldo Mendieta MD [id number]:[id number]      See below 2018 Comment    Final    Comment: (NOTE)  Autoantibody                       Disease Association  ------------------------------------------------------------                         Condition                  Frequency  ---------------------   ------------------------   ---------  Antinuclear Antibody,    SLE, mixed connective  Direct (DONTAE-D)           tissue diseases  ---------------------   ------------------------   ---------  dsDNA                    SLE                        40 - 60%  ---------------------   ------------------------   ---------  Chromatin                Drug induced SLE                90%                          SLE                        48 - 97%  ---------------------   ------------------------   ---------  SSA (Ro)                 SLE                        25 - 35%                          Sjogren's Syndrome         40 - 70%                           Lupus                 100%  ---------------------   ------------------------   ---------  SSB (La)                 SLE                                                        10%                          Sjogren's Syndrome              30%  ---------------------   -----------------------    ---------  Sm (anti-Smith)          SLE                        15 - 30%  ---------------------   -----------------------    ---------  RNP                      Mixed Connective Tissue                          Disease                         95%  (U1 nRNP,                SLE                        30 - 50%  anti-ribonucleoprotein)  Polymyositis and/or                          Dermatomyositis                 20%  --------------------- ------------------------   ---------  Scl-70 (antiDNA          Scleroderma (diffuse)      20 - 35%  topoisomerase)           Crest                           13%  ---------------------   ------------------------   ---------  Farzana-1                     Polymyositis and/or                          Dermatomyositis            20 - 40%  ---------------------   ------------------------   ---------  Centromere B             Scleroderma -                            Crest                          variant                         80%  Performed At: [address] [id number]  Mika Bailey MD       Myeloperoxidase (MPO) Ab 2018 <9.0  0.0 - 9.0 U/mL Final    Proteinase 3 (NH-3) Ab 2018 <3.5  0.0 - 3.5 U/mL Final    Comment: (NOTE)  Performed At: [address] [id number]  Mika Bailey MD [id number]:[id number]      Cytoplasmic (C-ANCA) Ab 2018 <1:20  Neg:<1:20 titer Final    Perinuclear (P-ANCA) 2018 <1:20  Neg:<1:20 titer Final    Comment: (NOTE)  The presence of positive fluorescence exhibiting P-ANCA or C-ANCA  patterns alone is not specific for the diagnosis of Wegener's  Granulomatosis (WG) or microscopic polyangiitis. Decisions about  treatment should not be based solely on ANCA IFA results. The  International ANCA Group Consensus recommends follow up testing of  positive sera with both NH-3 and MPO-ANCA enzyme immunoassays. As  many as 5% serum samples are positive only by EIA. Ref. AM J Clin Pathol 1999;111:507-513.  Atypical pANCA 2018 <1:20  Neg:<1:20 titer Final    Comment: (NOTE)  The atypical pANCA pattern has been observed in a significant  percentage of patients with ulcerative colitis, primary sclerosing  cholangitis and autoimmune hepatitis.   Performed At: [address] [id number]  Mika Bailey MD [id number]      TSH 2018 0.68  0.36 - 3.74 uIU/mL Final    T4, Free 11/21/2018 0.7  0.7 - 1.5 NG/DL Final    Rheumatoid factor, QL 11/21/2018 NEGATIVE   NEG   Final       .No results found for any visits on 08/08/19. Assessment / Plan:      ICD-10-CM ICD-9-CM    1. Cervical pain (neck) M54.2 723.1    2. Depression with anxiety F41.8 300.4    3. Alcohol abuse F10.10 305.00      Office visit in 6 weeks  Fasting labs prior to visit  Keep scheduled appointment with orthopedic physician      Follow-up and Dispositions    · Return in about 6 weeks (around 9/19/2019). I asked the patient if he  had any questions and answered his  questions. The patient stated that he understands the treatment plan and agrees with the treatment plan    This document was created with a voice activated dictation system and may contain transcription errors.

## 2019-08-21 ENCOUNTER — OFFICE VISIT (OUTPATIENT)
Dept: ORTHOPEDIC SURGERY | Facility: CLINIC | Age: 54
End: 2019-08-21

## 2019-08-21 VITALS
RESPIRATION RATE: 16 BRPM | DIASTOLIC BLOOD PRESSURE: 96 MMHG | WEIGHT: 212 LBS | SYSTOLIC BLOOD PRESSURE: 145 MMHG | TEMPERATURE: 98.3 F | BODY MASS INDEX: 30.35 KG/M2 | HEART RATE: 71 BPM | OXYGEN SATURATION: 97 % | HEIGHT: 70 IN

## 2019-08-21 DIAGNOSIS — G56.03 BILATERAL CARPAL TUNNEL SYNDROME: ICD-10-CM

## 2019-08-21 DIAGNOSIS — M67.431 GANGLION CYST OF VOLAR ASPECT OF RIGHT WRIST: ICD-10-CM

## 2019-08-21 DIAGNOSIS — M25.531 BILATERAL WRIST PAIN: Primary | ICD-10-CM

## 2019-08-21 DIAGNOSIS — M54.12 CERVICAL RADICULOPATHY: ICD-10-CM

## 2019-08-21 DIAGNOSIS — M25.532 BILATERAL WRIST PAIN: Primary | ICD-10-CM

## 2019-08-21 NOTE — PROGRESS NOTES
1. Have you been to the ER, urgent care clinic since your last visit? Hospitalized since your last visit? NO    2. Have you seen or consulted any other health care providers outside of the [address] [place] [address] since your last visit? Include any pap smears or colon screening.  NO

## 2019-08-21 NOTE — PROGRESS NOTES
Meri Talley is a 46 y.o. male right handed unknown employment. Worker's Compensation and legal considerations: none filed. Vitals:    08/21/19 0922   BP: (!) 145/96   Pulse: 71   Resp: 16   Temp: 98.3 °F (36.8 °C)   TempSrc: Oral   SpO2: 97%   Weight: 212 lb (96.2 kg)   Height: 5' 10\" (1.778 m)   PainSc:   6           Chief Complaint   Patient presents with    Hand Pain     artur hand pain         HPI: Patient comes in today with complaints of bilateral hand numbness and tingling. He reports that he had a left carpal tunnel diagnosis after an EMG several years ago. He has not had any treatment for it. He also has a cyst on the right wrist that was injected several months ago but did not help. He also reports a history of having digits amputated that were placed back on many years ago on the left hand. He has had chronic pain from scar tissue in his hand. Date of onset: Indeterminate    Injury: Yes: Comment: Old injury to left hand pain involving digital amputations    Prior Treatment:  No    Numbness/ Tingling: Yes: Comment: Bilateral hands left worse than right    ROS: Review of Systems - General ROS: negative  Respiratory ROS: no cough, shortness of breath, or wheezing  Cardiovascular ROS: no chest pain or dyspnea on exertion  Musculoskeletal ROS: positive for - pain in hand - bilateral  Neurological ROS: positive for - numbness/tingling  Dermatological ROS: negative    Past Medical History:   Diagnosis Date    Depression     Foot drop, left foot     October, 2018. Seen by Podiatry in 2019    Headache     Psychotic disorder ([facility] [address]     anxiety       Past Surgical History:   Procedure Laterality Date    HX ORTHOPAEDIC  2017    lacerations to fingers and had pins       Current Outpatient Medications   Medication Sig Dispense Refill    triamcinolone acetonide (KENALOG) 10 mg/mL injection 1 mL by Intra artICUlar route once for 1 dose.  1 Vial 0    sertraline (ZOLOFT) 50 mg tablet Take  by mouth daily.  naltrexone (DEPADE) 50 mg tablet Take 50 mg by mouth daily.  acamprosate (CAMPRAL) 333 mg tablet Take 666 mg by mouth three (3) times daily. Allergies   Allergen Reactions    Motrin [Ibuprofen] Hives         PE:   There is decreased range of motion in bilateral hands. There is some scar tissue noted in the left hand in the palm as well as multiple digits. Sensation is diminished in all digits bilaterally. There is also a cystic mass noted about the right volar wrist.    NEUROVASCULAR    Examination L R Examination L R   Carpal Comp. + + Pronator Comp. - -   Carpal Tinel + + Pronator Tinel - -   Phalen's + + Pronator Stress - -   Cubital Comp. - - Guyon Comp. - -   Cubital Tinel - - Guyon Tinel - -   Elbow Hyperflexion - - Adson's - -   Spurling's - - SC Comp. - -   PCB Median abn - - SC Tinel - -   Radial Tinel - - IC Comp. - -   Digital Tinel - - IC Tinel - -   Radial 2-Pt WNL WNL Ulnar 2-Pt WNL WNL     Radial Pulse: 2+  Capillary Refill: < 2 sec  Angelito: Not Performed  Digital Angelito: Not Performed      Imaging:    Fracture dislocation or any other osseous abnormalities. Plain films of bilateral wrist does not show substantial degenerative changes        ICD-10-CM ICD-9-CM    1. Bilateral wrist pain M25.531 719.43 AMB POC XRAY, WRIST; COMPLETE, 3+ VIE    M25.532  AMB POC XRAY, WRIST; COMPLETE, 3+ VIE   2.  Bilateral carpal tunnel syndrome G56.03 354.0 EMG TWO EXTREMITIES UPPER      NCV/LAT MOTOR PER NERVE UP/LT      NCV/LAT MOTOR PER NERVE UP/RT      AMB SUPPLY ORDER      TRIAMCINOLONE ACETONIDE INJ      triamcinolone acetonide (KENALOG) 10 mg/mL injection      INJECT CARPAL TUNNEL   3. Cervical radiculopathy M54.12 723.4 REFERRAL TO SPINE SURGERY   4. Ganglion cyst of volar aspect of right wrist M67.431 727.41        Plan:     Bilateral upper extremity EMG and nerve conduction studies    Bilateral carpal tunnel braces for nighttime wear    Bilateral carpal tunnel injections    Referral to spine for neck pain and history of cervical radiculopathy referral to Dr. Cornelio Ocampo for left shoulder pain    Follow-up after EMG    Plan was reviewed with patient, who verbalized agreement and understanding of the plan    [address] [place] [address] NOTE        Chart reviewed for the following:   Fuentes BAIRD DO, have reviewed the History, Physical and updated the Allergic reactions for [address] [facility] performed immediately prior to start of procedure:   Fuentes BAIRD DO, have performed the following reviews on Yue [place] prior to the start of the procedure:            * Patient was identified by name and date of birth   * Agreement on procedure being performed was verified  * Risks and Benefits explained to the patient  * Procedure site verified and marked as necessary  * Patient was positioned for comfort  * Consent was signed and verified     Time: 09:55 AM      Date of procedure: 8/21/2019    Procedure performed by: Fuentes Pineda DO    Provider assisted by: Lucia Ac LPN    Patient assisted by: self    How tolerated by patient: tolerated the procedure well with no complications    Post Procedural Pain Scale: 0 - No Hurt    Comments: none    Procedure:  After consent was obtained, using sterile technique the carpal tunnel was prepped. Local anesthetic used: 1% lidocaine. Kenalog 5 mg X2 and was then injected and the needle withdrawn. The procedure was well tolerated. The patient is asked to continue to rest the area for a few more days before resuming regular activities. It may be more painful for the first 1-2 days. Watch for fever, or increased swelling or persistent pain in the joint. Call or return to clinic prn if such symptoms occur or there is failure to improve as anticipated.

## 2019-08-28 ENCOUNTER — OFFICE VISIT (OUTPATIENT)
Dept: ORTHOPEDIC SURGERY | Age: 54
End: 2019-08-28

## 2019-08-28 VITALS
TEMPERATURE: 98.9 F | HEIGHT: 70 IN | HEART RATE: 80 BPM | WEIGHT: 212 LBS | RESPIRATION RATE: 20 BRPM | DIASTOLIC BLOOD PRESSURE: 96 MMHG | BODY MASS INDEX: 30.35 KG/M2 | SYSTOLIC BLOOD PRESSURE: 139 MMHG

## 2019-08-28 DIAGNOSIS — M25.512 CHRONIC LEFT SHOULDER PAIN: ICD-10-CM

## 2019-08-28 DIAGNOSIS — M54.2 CERVICAL PAIN: Primary | ICD-10-CM

## 2019-08-28 DIAGNOSIS — M54.12 CERVICAL RADICULOPATHY: ICD-10-CM

## 2019-08-28 DIAGNOSIS — G89.29 CHRONIC LEFT SHOULDER PAIN: ICD-10-CM

## 2019-08-28 NOTE — PATIENT INSTRUCTIONS
Neck Arthritis: Exercises  Introduction  Here are some examples of exercises for you to try. The exercises may be suggested for a condition or for rehabilitation. Start each exercise slowly. Ease off the exercises if you start to have pain. You will be told when to start these exercises and which ones will work best for you. How to do the exercises  Neck stretches to the side    1. This stretch works best if you keep your shoulder down as you lean away from it. To help you remember to do this, start by relaxing your shoulders and lightly holding on to your thighs or your chair. 2. Tilt your head toward your shoulder and hold for 15 to 30 seconds. Let the weight of your head stretch your muscles. 3. Repeat 2 to 4 times toward each shoulder. Chin tuck    1. Lie on the floor with a rolled-up towel under your neck. Your head should be touching the floor. 2. Slowly bring your chin toward your chest.  3. Hold for a count of 6, and then relax for up to 10 seconds. 4. Repeat 8 to 12 times. Active cervical rotation    1. Sit in a firm chair, or stand up straight. 2. Keeping your chin level, turn your head to the right, and hold for 15 to 30 seconds. 3. Turn your head to the left and hold for 15 to 30 seconds. 4. Repeat 2 to 4 times to each side. Shoulder blade squeeze    1. While standing, squeeze your shoulder blades together. 2. Do not raise your shoulders up as you are squeezing. 3. Hold for 6 seconds. 4. Repeat 8 to 12 times. Shoulder rolls    1. Sit comfortably with your feet shoulder-width apart. You can also do this exercise standing up. 2. Roll your shoulders up, then back, and then down in a smooth, circular motion. 3. Repeat 2 to 4 times. Follow-up care is a key part of your treatment and safety. Be sure to make and go to all appointments, and call your doctor if you are having problems.  It's also a good idea to know your test results and keep a list of the medicines you take.  Where can you learn more? Go to http://reina-oumar.info/. Enter Y873 in the search box to learn more about \"Neck Arthritis: Exercises. \"  Current as of: September 20, 2018  Content Version: 12.1  © 0399-6718 [employer]. Care instructions adapted under license by [employer] (which disclaims liability or warranty for this information). If you have questions about a medical condition or this instruction, always ask your healthcare professional. [address] any warranty or liability for your use of this information. Shoulder Pain: Care Instructions  Your Care Instructions    You can hurt your shoulder by using it too much during an activity, such as fishing or baseball. It can also happen as part of the everyday wear and tear of getting older. Shoulder injuries can be slow to heal, but your shoulder should get better with time. Your doctor may recommend a sling to rest your shoulder. If you have injured your shoulder, you may need testing and treatment. Follow-up care is a key part of your treatment and safety. Be sure to make and go to all appointments, and call your doctor if you are having problems. It's also a good idea to know your test results and keep a list of the medicines you take. How can you care for yourself at home? · Take pain medicines exactly as directed. ? If the doctor gave you a prescription medicine for pain, take it as prescribed. ? If you are not taking a prescription pain medicine, ask your doctor if you can take an over-the-counter medicine. ? Do not take two or more pain medicines at the same time unless the doctor told you to. Many pain medicines contain acetaminophen, which is Tylenol. Too much acetaminophen (Tylenol) can be harmful. · If your doctor recommends that you wear a sling, use it as directed. Do not take it off before your doctor tells you to.   · Put ice or a cold pack on the sore area for 10 to 20 minutes at a time. Put a thin cloth between the ice and your skin. · If there is no swelling, you can put moist heat, a heating pad, or a warm cloth on your shoulder. Some doctors suggest alternating between hot and cold. · Rest your shoulder for a few days. If your doctor recommends it, you can then begin gentle exercise of the shoulder, but do not lift anything heavy. When should you call for help? Call 911 anytime you think you may need emergency care. For example, call if:    · You have chest pain or pressure. This may occur with:  ? Sweating. ? Shortness of breath. ? Nausea or vomiting. ? Pain that spreads from the chest to the neck, jaw, or one or both shoulders or arms. ? Dizziness or lightheadedness. ? A fast or uneven pulse. After calling 911, chew 1 adult-strength aspirin. Wait for an ambulance. Do not try to drive yourself.     · Your arm or hand is cool or pale or changes color.    Call your doctor now or seek immediate medical care if:    · You have signs of infection, such as:  ? Increased pain, swelling, warmth, or redness in your shoulder. ? Red streaks leading from a place on your shoulder. ? Pus draining from an area of your shoulder. ? Swollen lymph nodes in your neck, armpits, or groin. ? A fever.    Watch closely for changes in your health, and be sure to contact your doctor if:    · You cannot use your shoulder.     · Your shoulder does not get better as expected. Where can you learn more? Go to http://reina-oumar.info/. Enter W122 in the search box to learn more about \"Shoulder Pain: Care Instructions. \"  Current as of: September 20, 2018  Content Version: 12.1  © 7189-7182 [employer]. Care instructions adapted under license by [employer] (which disclaims liability or warranty for this information).  If you have questions about a medical condition or this instruction, always ask your healthcare professional. [employer] disclaims any warranty or liability for your use of this information.

## 2019-09-03 ENCOUNTER — OFFICE VISIT (OUTPATIENT)
Dept: ORTHOPEDIC SURGERY | Facility: CLINIC | Age: 54
End: 2019-09-03

## 2019-09-03 VITALS
RESPIRATION RATE: 18 BRPM | SYSTOLIC BLOOD PRESSURE: 133 MMHG | DIASTOLIC BLOOD PRESSURE: 85 MMHG | WEIGHT: 209 LBS | HEART RATE: 67 BPM | TEMPERATURE: 98.1 F | HEIGHT: 70 IN | BODY MASS INDEX: 29.92 KG/M2 | OXYGEN SATURATION: 98 %

## 2019-09-03 DIAGNOSIS — G89.29 CHRONIC LEFT SHOULDER PAIN: ICD-10-CM

## 2019-09-03 DIAGNOSIS — M25.512 CHRONIC LEFT SHOULDER PAIN: ICD-10-CM

## 2019-09-03 DIAGNOSIS — M75.102 ROTATOR CUFF SYNDROME, LEFT: Primary | ICD-10-CM

## 2019-09-03 NOTE — PROGRESS NOTES
Patient: Monae Damon                MRN: [id number]       SSN: [id number]  YOB: 1965        AGE: 46 y.o. SEX: male  Body mass index is 29.99 kg/m². PCP: Farhana Pond NP  09/03/19    Chief Complaint: Left shoulder pain    HISTORY OF PRESENT ILLNESS:  Bill Cheatham is a 46year-old male who comes in the office today with left shoulder pain. He has had pain for several years. He has pain at night. Initially, the pain began after a dog pulled on his arm. He had difficulty with raising his arm for about six months. He continues to have difficulty with raising his arm and pain in his shoulders, especially when sleeping or using it for overhead activities. He has not had any imaging. Past Medical History:   Diagnosis Date    Depression     Foot drop, left foot     October, 2018. Seen by Podiatry in 2019    Headache     Psychotic disorder ([address]     anxiety       Family History   Problem Relation Age of Onset    COPD Mother        Current Outpatient Medications   Medication Sig Dispense Refill    sertraline (ZOLOFT) 50 mg tablet Take  by mouth daily.  naltrexone (DEPADE) 50 mg tablet Take 50 mg by mouth daily.  acamprosate (CAMPRAL) 333 mg tablet Take 666 mg by mouth three (3) times daily.          Allergies   Allergen Reactions    Motrin [Ibuprofen] Hives       Past Surgical History:   Procedure Laterality Date    HX ORTHOPAEDIC  2017    lacerations to fingers and had pins       Social History     Socioeconomic History    Marital status:      Spouse name: Not on file    Number of children: Not on file    Years of education: Not on file    Highest education level: Not on file   Occupational History    Not on file   Social Needs    Financial resource strain: Not on file    Food insecurity:     Worry: Not on file     Inability: Not on file    Transportation needs:     Medical: Not on file     Non-medical: Not on file   Tobacco Use    Smoking status: Never Smoker    Smokeless tobacco: Current User    Tobacco comment: chew tobacco   Substance and Sexual Activity    Alcohol use: Yes     Comment: 30 packs x 2 a day    Drug use: Yes     Types: Marijuana    Sexual activity: Yes   Lifestyle    Physical activity:     Days per week: Not on file     Minutes per session: Not on file    Stress: Not on file   Relationships    Social connections:     Talks on phone: Not on file     Gets together: Not on file     Attends [employer] service: Not on file     Active member of club or organization: Not on file     Attends meetings of clubs or organizations: Not on file     Relationship status: Not on file    Intimate partner violence:     Fear of current or ex partner: Not on file     Emotionally abused: Not on file     Physically abused: Not on file     Forced sexual activity: Not on file   Other Topics Concern    Not on file   Social History Narrative    Not on file       REVIEW OF SYSTEMS:      CON: negative for recent weight loss/gain, fever, or chills  EYE: negative for double or blurry vision  ENT: negative for hoarseness  RS:   negative for cough, URI, SOB  CV:  negative for chest pain, palpitations  GI:    negative for blood in stool, nausea/vomiting  :  negative for blood in urine  MS: As per HPI  Other systems reviewed and noted below. PHYSICAL EXAMINATION:  Visit Vitals  /85   Pulse 67   Temp 98.1 °F (36.7 °C) (Oral)   Resp 18   Ht 5' 10\" (1.778 m)   Wt 209 lb (94.8 kg)   SpO2 98%   BMI 29.99 kg/m²     Body mass index is 29.99 kg/m². GENERAL: Alert and oriented x3, in no acute distress, well-developed, well-nourished. HEENT: Normocephalic, atraumatic. RESP: Non labored breathing with equal chest rise on inspiration. CV: Well perfused extremities. No cyanosis or clubbing noted. ABDOMEN: Soft, non-tender, non-distended. PHYSICAL EXAM:  Physical exam of the left shoulder with decreased range of motion due to pain. Pain and weakness with supraspinatus and infraspinatus testing. No pain with impingement testing. No pain or weakness with belly press testing. Neurovascularly intact otherwise. IMAGING:  X-rays of the left shoulder were taken in the office today. This shows sclerosis of the undersurface of the acromion, but I do not appreciate any significant arthrosis. ASSESSMENT AND PLAN:   Stu Gupta is a 46year-old male with what I believe to be a left rotator cuff injury. We will get an MRI to further evaluate it. We will see him back after that is done.               Electronically signed by: Darius Hoff MD

## 2019-09-13 ENCOUNTER — HOSPITAL ENCOUNTER (OUTPATIENT)
Dept: MRI IMAGING | Age: 54
Discharge: HOME | End: 2019-09-13
Attending: ORTHOPAEDIC SURGERY
Payer: MEDICAID

## 2019-09-13 ENCOUNTER — HOSPITAL ENCOUNTER (OUTPATIENT)
Dept: GENERAL RADIOLOGY | Age: 54
Discharge: HOME | End: 2019-09-13
Attending: ORTHOPAEDIC SURGERY
Payer: MEDICAID

## 2019-09-13 DIAGNOSIS — M75.102 ROTATOR CUFF SYNDROME, LEFT: ICD-10-CM

## 2019-09-13 PROCEDURE — 73221 MRI JOINT UPR EXTREM W/O DYE: CPT

## 2019-09-13 PROCEDURE — 73030 X-RAY EXAM OF SHOULDER: CPT

## 2019-09-19 ENCOUNTER — OFFICE VISIT (OUTPATIENT)
Dept: FAMILY MEDICINE CLINIC | Facility: CLINIC | Age: 54
End: 2019-09-19

## 2019-09-19 VITALS
OXYGEN SATURATION: 98 % | DIASTOLIC BLOOD PRESSURE: 96 MMHG | RESPIRATION RATE: 12 BRPM | TEMPERATURE: 98.1 F | HEART RATE: 61 BPM | HEIGHT: 70 IN | BODY MASS INDEX: 30.18 KG/M2 | WEIGHT: 210.8 LBS | SYSTOLIC BLOOD PRESSURE: 134 MMHG

## 2019-09-19 DIAGNOSIS — R74.8 ELEVATED LIVER ENZYMES: ICD-10-CM

## 2019-09-19 DIAGNOSIS — R10.11 RIGHT UPPER QUADRANT PAIN: ICD-10-CM

## 2019-09-19 DIAGNOSIS — R10.9 RIGHT FLANK PAIN: ICD-10-CM

## 2019-09-19 DIAGNOSIS — L29.8 PRURITIC ERYTHEMATOUS RASH: ICD-10-CM

## 2019-09-19 DIAGNOSIS — R74.8 ELEVATED LIVER ENZYMES: Primary | ICD-10-CM

## 2019-09-19 LAB
25(OH)D3 SERPL-MCNC: 23.5 NG/ML (ref 32–100)
A-G RATIO,AGRAT: 1.7 RATIO (ref 1.1–2.6)
ABSOLUTE LYMPHOCYTE COUNT, 10803: 1.5 K/UL (ref 1–4.8)
ALBUMIN SERPL-MCNC: 4.5 G/DL (ref 3.5–5)
ALP SERPL-CCNC: 76 U/L (ref 25–115)
ALT SERPL-CCNC: 111 U/L (ref 5–40)
ANION GAP SERPL CALC-SCNC: 18 MMOL/L
AST SERPL W P-5'-P-CCNC: 100 U/L (ref 10–37)
BASOPHILS # BLD: 0 K/UL (ref 0–0.2)
BASOPHILS NFR BLD: 1 % (ref 0–2)
BILIRUB SERPL-MCNC: 0.4 MG/DL (ref 0.2–1.2)
BILIRUB UR QL STRIP: NEGATIVE
BUN SERPL-MCNC: 11 MG/DL (ref 6–22)
CALCIUM SERPL-MCNC: 9.2 MG/DL (ref 8.4–10.4)
CHLORIDE SERPL-SCNC: 102 MMOL/L (ref 98–110)
CHOLEST SERPL-MCNC: 205 MG/DL (ref 110–200)
CO2 SERPL-SCNC: 21 MMOL/L (ref 20–32)
CREAT SERPL-MCNC: 0.8 MG/DL (ref 0.5–1.2)
EOSINOPHIL # BLD: 0.1 K/UL (ref 0–0.5)
EOSINOPHIL NFR BLD: 1 % (ref 0–6)
ERYTHROCYTE [DISTWIDTH] IN BLOOD BY AUTOMATED COUNT: 12 % (ref 10–15.5)
GFRAA, 66117: >60
GFRNA, 66118: >60
GLOBULIN,GLOB: 2.6 G/DL (ref 2–4)
GLUCOSE SERPL-MCNC: 98 MG/DL (ref 70–99)
GLUCOSE UR-MCNC: NEGATIVE MG/DL
GRANULOCYTES,GRANS: 64 % (ref 40–75)
HCT VFR BLD AUTO: 45.9 % (ref 39.3–51.6)
HDLC SERPL-MCNC: 1.8 MG/DL (ref 0–5)
HDLC SERPL-MCNC: 115 MG/DL (ref 40–59)
HGB BLD-MCNC: 15.2 G/DL (ref 13.1–17.2)
KETONES P FAST UR STRIP-MCNC: NEGATIVE MG/DL
LDLC SERPL CALC-MCNC: 76 MG/DL (ref 50–99)
LYMPHOCYTES, LYMLT: 23 % (ref 20–45)
MCH RBC QN AUTO: 33 PG (ref 26–34)
MCHC RBC AUTO-ENTMCNC: 33 G/DL (ref 31–36)
MCV RBC AUTO: 98 FL (ref 80–95)
MONOCYTES # BLD: 0.8 K/UL (ref 0.1–1)
MONOCYTES NFR BLD: 12 % (ref 3–12)
NEUTROPHILS # BLD AUTO: 4.2 K/UL (ref 1.8–7.7)
PH UR STRIP: 6 [PH] (ref 4.6–8)
PLATELET # BLD AUTO: 204 K/UL (ref 140–440)
PMV BLD AUTO: 10.5 FL (ref 9–13)
POTASSIUM SERPL-SCNC: 4.5 MMOL/L (ref 3.5–5.5)
PROT SERPL-MCNC: 7.1 G/DL (ref 6.4–8.3)
PROT UR QL STRIP: NEGATIVE
PSA SERPL-MCNC: 0.69 NG/ML
RBC # BLD AUTO: 4.67 M/UL (ref 3.8–5.8)
SODIUM SERPL-SCNC: 141 MMOL/L (ref 133–145)
SP GR UR STRIP: 1.02 (ref 1–1.03)
TRIGL SERPL-MCNC: 71 MG/DL (ref 40–149)
TSH SERPL DL<=0.005 MIU/L-ACNC: 0.88 MCU/ML (ref 0.27–4.2)
UA UROBILINOGEN AMB POC: NORMAL (ref 0.2–1)
URINALYSIS CLARITY POC: CLEAR
URINALYSIS COLOR POC: YELLOW
URINE BLOOD POC: NORMAL
URINE LEUKOCYTES POC: NEGATIVE
URINE NITRITES POC: NEGATIVE
VLDLC SERPL CALC-MCNC: 14 MG/DL (ref 8–30)
WBC # BLD AUTO: 6.6 K/UL (ref 4–11)

## 2019-09-19 RX ORDER — ERGOCALCIFEROL 1.25 MG/1
50000 CAPSULE ORAL
COMMUNITY
Start: 2017-02-07 | End: 2020-07-23 | Stop reason: ALTCHOICE

## 2019-09-19 RX ORDER — ESCITALOPRAM OXALATE 10 MG/1
10 TABLET ORAL DAILY
Refills: 0 | COMMUNITY
Start: 2019-06-27 | End: 2020-09-24

## 2019-09-19 RX ORDER — PREDNISONE 20 MG/1
TABLET ORAL
Qty: 8 TAB | Refills: 0 | Status: SHIPPED | OUTPATIENT
Start: 2019-09-19 | End: 2019-10-01 | Stop reason: ALTCHOICE

## 2019-09-19 NOTE — PROGRESS NOTES
ROOM # 2    Kimberly Fajardo presents today for   Chief Complaint   Patient presents with    Results     lab    Rash     Pt complains of itchy rash on chest that occured last week Friday        Kimberly Fajardo preferred language for health care discussion is english/other. Is someone accompanying this pt? no    Is the patient using any DME equipment during [address]? no    Depression Screening:  3 most recent [facility] Screens 9/3/2019 8/21/2019 8/8/2019 6/27/2019 11/21/2018   PHQ Not Done Active Diagnosis of Depression or Bipolar Disorder - Active Diagnosis of Depression or Bipolar Disorder - -   Little interest or pleasure in doing things Not at all Nearly every day Not at all Not at all Not at all   Feeling down, depressed, irritable, or hopeless Not at all Nearly every day Not at all Nearly every day Not at all   Total Score PHQ 2 0 6 0 3 0   Trouble falling or staying asleep, or sleeping too much - Nearly every day - Nearly every day -   Feeling tired or having little energy - Nearly every day - Nearly every day -   Poor appetite, weight loss, or overeating - Not at all - Nearly every day -   Feeling bad about yourself - or that you are a failure or have let yourself or your family down - Several days - More than half the days -   Trouble concentrating on things such as school, work, reading, or watching TV - Nearly every day - Nearly every day -   Moving or speaking so slowly that other people could have noticed; or the opposite being so fidgety that others notice - Not at all - Not at all -   Thoughts of being better off dead, or hurting yourself in some way - More than half the days - Nearly every day -   PHQ 9 Score - 18 - 20 -   How difficult have these problems made it for you to do your work, take care of your home and get along with others - Very difficult - Extremely difficult -       Learning Assessment:  No flowsheet data found.     Abuse Screening:  Abuse Screening Questionnaire 1/28/2019 Do you ever feel afraid of your partner? N   Are you in a relationship with someone who physically or mentally threatens you? N   Is it safe for you to go home? Y       Fall Risk  Fall Risk Assessment, last 12 mths 1/28/2019   Able to walk? Yes   Fall in past 12 months? No       Health Maintenance reviewed and discussed per provider. Yes    Satish Real is due for   Health Maintenance Due   Topic Date Due    Hepatitis C Screening  1965    Pneumococcal 0-64 years (1 of 1 - PPSV23) 12/06/1970    Shingrix Vaccine Age 50> (1 of 2) 12/06/2014    FOBT Q 1 YEAR AGE 50-75  12/06/2014    Influenza Age 9 to Adult  08/01/2019         Please order/place referral if appropriate. Advance Directive:  1. Do you have an advance directive in place? Patient Reply: no    2. If not, would you like material regarding how to put one in place? Patient Reply: no    Coordination of Care:  1. Have you been to the ER, urgent care clinic since your last visit? Hospitalized since your last visit? no    2. Have you seen or consulted any other health care providers outside of the [address] [place] [address] since your last visit? Include any pap smears or colon screening.  no

## 2019-09-19 NOTE — PROGRESS NOTES
Silvio Murphy is a 46 y.o. male presenting today for Results (lab) and Rash (Pt complains of itchy rash on chest that occured last week Friday )    HPI:  Silvio Murphy presents to the office today for pruritic anterior chest rash x 6 days. Patient notes he was at a race in [address] [place] [address] when he noticed a pruritic, erythematous, maculopapular rash. He has been using over-the-counter medication but the medications have been ineffective. He has been taking   Patient also presents to the office complaining of right flank and right lower quadrant pain x 2 weeks. Associated symptoms include nausea and vomiting. Patient had fasting labs prior to today's office visit and lab results reviewed with patient. Per patient lab results patient has elevated liver enzymes. Patient admits to drinking alcohol daily. He notes he wakes up in the morning and drinks alcohol. Patient instructed to sustain from alcohol and will follow-up in 3 weeks for repeat liver enzymes. Review of Systems   Respiratory: Negative for cough and sputum production. Cardiovascular: Negative for chest pain and palpitations. Skin: Positive for itching and rash. Allergies   Allergen Reactions    Motrin [Ibuprofen] Hives       Current Outpatient Medications   Medication Sig Dispense Refill    predniSONE (DELTASONE) 20 mg tablet 2 tabs daily x 2 days, 1 tab daily x 2 days, 1/2 tab daily x 4 days 8 Tab 0    sertraline (ZOLOFT) 50 mg tablet Take  by mouth daily.  naltrexone (DEPADE) 50 mg tablet Take 50 mg by mouth daily.  acamprosate (CAMPRAL) 333 mg tablet Take 666 mg by mouth three (3) times daily.  ergocalciferol (ERGOCALCIFEROL) 50,000 unit capsule TK 1 C PO WEEKLY      escitalopram oxalate (LEXAPRO) 10 mg tablet Indications: not taking  0       Past Medical History:   Diagnosis Date    Depression     Foot drop, left foot     October, 2018.   Seen by Podiatry in 2019    Headache     [facility]     anxiety       Past Surgical History:   Procedure Laterality Date    HX ORTHOPAEDIC  2017    lacerations to fingers and had pins       Social History     Socioeconomic History    Marital status:      Spouse name: Not on file    Number of children: Not on file    Years of education: Not on file    Highest education level: Not on file   Occupational History    Not on file   Social Needs    Financial resource strain: Not on file    Food insecurity:     Worry: Not on file     Inability: Not on file    Transportation needs:     Medical: Not on file     Non-medical: Not on file   Tobacco Use    Smoking status: Never Smoker    Smokeless tobacco: Current User    Tobacco comment: chew tobacco   Substance and Sexual Activity    Alcohol use: Yes     Comment: 30 packs x 2 a day    Drug use: Yes     Types: Marijuana    Sexual activity: Yes   Lifestyle    Physical activity:     Days per week: Not on file     Minutes per session: Not on file    Stress: Not on file   Relationships    Social connections:     Talks on phone: Not on file     Gets together: Not on file     Attends Zoroastrianism service: Not on file     Active member of club or organization: Not on file     Attends meetings of clubs or organizations: Not on file     Relationship status: Not on file    Intimate partner violence:     Fear of current or ex partner: Not on file     Emotionally abused: Not on file     Physically abused: Not on file     Forced sexual activity: Not on file   Other Topics Concern    Not on file   Social History Narrative    Not on file       Patient does not have an advanced directive on file    Vitals:    09/19/19 1028   BP: (!) 134/96   Pulse: 61   Resp: 12   Temp: 98.1 °F (36.7 °C)   TempSrc: Oral   SpO2: 98%   Weight: 210 lb 12.8 oz (95.6 kg)   Height: 5' 10\" (1.778 m)   PainSc:   8   PainLoc: Generalized       Physical Exam   Cardiovascular: Normal rate, regular rhythm and normal heart sounds. Pulmonary/Chest: Effort normal and breath sounds normal.   Skin: Skin is warm. Rash noted. There is erythema. Nursing note and vitals reviewed. Office Visit on 09/19/2019   Component Date Value Ref Range Status    Color (UA POC) 09/19/2019 Yellow   Final    Clarity (UA POC) 09/19/2019 Clear   Final    Glucose (UA POC) 09/19/2019 Negative  Negative Final    Bilirubin (UA POC) 09/19/2019 Negative  Negative Final    Ketones (UA POC) 09/19/2019 Negative  Negative Final    Specific gravity (UA POC) 09/19/2019 1.020  1.001 - 1.035 Final    Blood (UA POC) 09/19/2019 Trace  Negative Final    pH (UA POC) 09/19/2019 6.0  4.6 - 8.0 Final    Protein (UA POC) 09/19/2019 Negative  Negative Final    Urobilinogen (UA POC) 09/19/2019 0.2 mg/dL  0.2 - 1 Final    Nitrites (UA POC) 09/19/2019 Negative  Negative Final    Leukocyte esterase (UA POC) 09/19/2019 Negative  Negative Final   Orders Only on 09/18/2019   Component Date Value Ref Range Status    WBC 09/18/2019 6.6  4.0 - 11.0 K/uL Final    RBC 09/18/2019 4.67  3.80 - 5.80 M/uL Final    HGB 09/18/2019 15.2  13.1 - 17.2 g/dL Final    HCT 09/18/2019 45.9  39.3 - 51.6 % Final    MCV 09/18/2019 98* 80 - 95 fL Final    MCH 09/18/2019 33  26 - 34 pg Final    MCHC 09/18/2019 33  31 - 36 g/dL Final    RDW 09/18/2019 12.0  10.0 - 15.5 % Final    PLATELET 84/62/0343 540  140 - 440 K/uL Final    MPV 09/18/2019 10.5  9.0 - 13.0 fL Final    NEUTROPHILS 09/18/2019 64  40 - 75 % Final    Lymphocytes 09/18/2019 23  20 - 45 % Final    MONOCYTES 09/18/2019 12  3 - 12 % Final    EOSINOPHILS 09/18/2019 1  0 - 6 % Final    BASOPHILS 09/18/2019 1  0 - 2 % Final    ABS. NEUTROPHILS 09/18/2019 4.2  1.8 - 7.7 K/uL Final    ABSOLUTE LYMPHOCYTE COUNT 09/18/2019 1.5  1.0 - 4.8 K/uL Final    ABS. MONOCYTES 09/18/2019 0.8  0.1 - 1.0 K/uL Final    ABS. EOSINOPHILS 09/18/2019 0.1  0.0 - 0.5 K/uL Final    ABS.  BASOPHILS 09/18/2019 0.0  0.0 - 0.2 K/uL Final    TSH 09/18/2019 0.88  0.27 - 4.20 mcU/mL Final    Prostate Specific Ag 09/18/2019 0.686  <=3.100 ng/mL Final    VITAMIN D, 25-HYDROXY 09/18/2019 23.5* 32.0 - 100.0 ng/mL Final    Glucose 09/18/2019 98  70 - 99 mg/dL Final    BUN 09/18/2019 11  6 - 22 mg/dL Final    Creatinine 09/18/2019 0.8  0.5 - 1.2 mg/dL Final    Sodium 09/18/2019 141  133 - 145 mmol/L Final    Potassium 09/18/2019 4.5  3.5 - 5.5 mmol/L Final    Chloride 09/18/2019 102  98 - 110 mmol/L Final    CO2 09/18/2019 21  20 - 32 mmol/L Final    AST (SGOT) 09/18/2019 100* 10 - 37 U/L Final    ALT (SGPT) 09/18/2019 111* 5 - 40 U/L Final    Alk. phosphatase 09/18/2019 76  25 - 115 U/L Final    Bilirubin, total 09/18/2019 0.4  0.2 - 1.2 mg/dL Final    Calcium 09/18/2019 9.2  8.4 - 10.4 mg/dL Final    Protein, total 09/18/2019 7.1  6.4 - 8.3 g/dL Final    Albumin 09/18/2019 4.5  3.5 - 5.0 g/dL Final    A-G Ratio 09/18/2019 1.7  1.1 - 2.6 ratio Final    Globulin 09/18/2019 2.6  2.0 - 4.0 g/dL Final    Anion gap 09/18/2019 18.0  mmol/L Final    Comment: Estimated GFR results are reported in mL/min/1.73 sq.m. by the MDRD equation. This eGFR is validated for stable chronic renal failure patients. This   equation  is unreliable in acute illness or patients with normal renal function.  GFRAA 09/18/2019 >60.0  >60.0 Final    GFRNA 09/18/2019 >60.0  >60.0 Final    Triglyceride 09/18/2019 71  40 - 149 mg/dL Final    HDL Cholesterol 09/18/2019 115* 40 - 59 mg/dL Final    Cholesterol, total 09/18/2019 205* 110 - 200 mg/dL Final    CHOLESTEROL/HDL 09/18/2019 1.8  0.0 - 5.0 Final    LDL, calculated 09/18/2019 76  50 - 99 mg/dL Final    VLDL, calculated 09/18/2019 14  8 - 30 mg/dL Final    Comment: Cholesterol Recommended NCEP guidelines in mg/dL:  Less than 200            Desirable  200 - 239                Borderline High  Greater than or  = 240   High  Please Note:  Total Chol/HDL Ratio                   Men     Women  1/2 Avg.  Risk    3.4 3.3      Avg. Risk    5.0     4.4  2X  Avg. Risk    9.6     7.1  3X  Avg. Risk   23.4    11.0         . Results for orders placed or performed in visit on 09/19/19   AMB POC URINALYSIS DIP STICK AUTO W/O MICRO   Result Value Ref Range    Color (UA POC) Yellow     Clarity (UA POC) Clear     Glucose (UA POC) Negative Negative    Bilirubin (UA POC) Negative Negative    Ketones (UA POC) Negative Negative    Specific gravity (UA POC) 1.020 1.001 - 1.035    Blood (UA POC) Trace Negative    pH (UA POC) 6.0 4.6 - 8.0    Protein (UA POC) Negative Negative    Urobilinogen (UA POC) 0.2 mg/dL 0.2 - 1    Nitrites (UA POC) Negative Negative    Leukocyte esterase (UA POC) Negative Negative       Assessment / Plan:      ICD-10-CM ICD-9-CM    1. Elevated liver enzymes T83.5 077.6 METABOLIC PANEL, COMPREHENSIVE      US ABD COMP   2. Right flank pain R10.9 789.09 AMB POC URINALYSIS DIP STICK AUTO W/O MICRO   3. Pruritic erythematous rash L29.8 698.8 predniSONE (DELTASONE) 20 mg tablet   4. Right upper quadrant pain R10.11 789.01 US ABD COMP     We repeat metabolic panel in 3 weeks  Patient is complaining of right flank pain. Negative urine analysis exam.  Ultrasound of the abdomen ordered. Pruritic rash-prednisone taper dose given  Follow-up in 3 weeks  Follow-up and Dispositions    · Return in about 4 weeks (around 10/17/2019). I asked the patient if he  had any questions and answered his  questions. The patient stated that he understands the treatment plan and agrees with the treatment plan    This document was created with a voice activated dictation system and may contain transcription errors.

## 2019-09-27 ENCOUNTER — HOSPITAL ENCOUNTER (OUTPATIENT)
Dept: MRI IMAGING | Age: 54
Discharge: HOME | End: 2019-09-27
Attending: NURSE PRACTITIONER
Payer: MEDICAID

## 2019-09-27 DIAGNOSIS — M54.2 CERVICAL PAIN: ICD-10-CM

## 2019-09-27 DIAGNOSIS — M54.12 CERVICAL RADICULOPATHY: ICD-10-CM

## 2019-09-27 PROCEDURE — 72141 MRI NECK SPINE W/O DYE: CPT

## 2019-09-30 ENCOUNTER — HOSPITAL ENCOUNTER (OUTPATIENT)
Dept: ULTRASOUND IMAGING | Age: 54
Discharge: HOME | End: 2019-09-30
Attending: NURSE PRACTITIONER
Payer: MEDICAID

## 2019-09-30 DIAGNOSIS — R74.8 ELEVATED LIVER ENZYMES: ICD-10-CM

## 2019-09-30 DIAGNOSIS — R10.11 RIGHT UPPER QUADRANT PAIN: ICD-10-CM

## 2019-09-30 PROCEDURE — 76700 US EXAM ABDOM COMPLETE: CPT

## 2019-10-01 ENCOUNTER — OFFICE VISIT (OUTPATIENT)
Dept: ORTHOPEDIC SURGERY | Facility: CLINIC | Age: 54
End: 2019-10-01

## 2019-10-01 VITALS
HEIGHT: 70 IN | HEART RATE: 78 BPM | BODY MASS INDEX: 29.58 KG/M2 | OXYGEN SATURATION: 99 % | TEMPERATURE: 97 F | SYSTOLIC BLOOD PRESSURE: 113 MMHG | RESPIRATION RATE: 16 BRPM | DIASTOLIC BLOOD PRESSURE: 76 MMHG | WEIGHT: 206.6 LBS

## 2019-10-01 DIAGNOSIS — S40.252A: ICD-10-CM

## 2019-10-01 DIAGNOSIS — S46.012A TRAUMATIC COMPLETE TEAR OF LEFT ROTATOR CUFF, INITIAL ENCOUNTER: Primary | ICD-10-CM

## 2019-10-01 DIAGNOSIS — M75.42 IMPINGEMENT SYNDROME OF LEFT SHOULDER: ICD-10-CM

## 2019-10-01 NOTE — PROGRESS NOTES
Patient: Nhung Ross                MRN: [id number]       SSN: [id number]  YOB: 1965        AGE: 46 y.o. SEX: male  Body mass index is 29.64 kg/m². PCP: Tonja Ravi NP  10/01/19    Chief Complaint: Left shoulder follow up    Pain 6/10    HISTORY OF PRESENT ILLNESS:  Susan Davenport returns to the office today for his left shoulder. He had his MRI done. He continues to complain of daily shoulder pain and difficulty with range of motion and use of the arm. He has not had any surgeries. He is here today to discuss further treatment. Past Medical History:   Diagnosis Date    Depression     Foot drop, left foot     October, 2018. Seen by Podiatry in 2019    Headache     Psychotic disorder ([address]     anxiety       Family History   Problem Relation Age of Onset    COPD Mother        Current Outpatient Medications   Medication Sig Dispense Refill    ergocalciferol (ERGOCALCIFEROL) 50,000 unit capsule TK 1 C PO WEEKLY      escitalopram oxalate (LEXAPRO) 10 mg tablet Indications: not taking  0    sertraline (ZOLOFT) 50 mg tablet Take  by mouth daily.  naltrexone (DEPADE) 50 mg tablet Take 50 mg by mouth daily.  acamprosate (CAMPRAL) 333 mg tablet Take 666 mg by mouth three (3) times daily.          Allergies   Allergen Reactions    Motrin [Ibuprofen] Hives       Past Surgical History:   Procedure Laterality Date    HX ORTHOPAEDIC  2017    lacerations to fingers and had pins       Social History     Socioeconomic History    Marital status:      Spouse name: Not on file    Number of children: Not on file    Years of education: Not on file    Highest education level: Not on file   Occupational History    Not on file   Social Needs    Financial resource strain: Not on file    Food insecurity:     Worry: Not on file     Inability: Not on file    Transportation needs:     Medical: Not on file     Non-medical: Not on file   Tobacco Use    Smoking status: Never Smoker    Smokeless tobacco: Current User    Tobacco comment: chew tobacco   Substance and Sexual Activity    Alcohol use: Yes     Comment: 30 packs x 2 a day    Drug use: Yes     Types: Marijuana    Sexual activity: Yes   Lifestyle    Physical activity:     Days per week: Not on file     Minutes per session: Not on file    Stress: Not on file   Relationships    Social connections:     Talks on phone: Not on file     Gets together: Not on file     Attends Scientology service: Not on file     Active member of club or organization: Not on file     Attends meetings of clubs or organizations: Not on file     Relationship status: Not on file    Intimate partner violence:     Fear of current or ex partner: Not on file     Emotionally abused: Not on file     Physically abused: Not on file     Forced sexual activity: Not on file   Other Topics Concern    Not on file   Social History Narrative    Not on file       REVIEW OF SYSTEMS:      No changes from previous review of systems unless noted. PHYSICAL EXAMINATION:  Visit Vitals  /76 (BP 1 Location: Left arm, BP Patient Position: Sitting)   Pulse 78   Temp 97 °F (36.1 °C) (Oral)   Resp 16   Ht 5' 10\" (1.778 m)   Wt 206 lb 9.6 oz (93.7 kg)   SpO2 99% Comment: RA   BMI 29.64 kg/m²     Body mass index is 29.64 kg/m². GENERAL: Alert and oriented x3, in no acute distress. HEENT: Normocephalic, atraumatic. RESP: Non labored breathing. SKIN: No rashes or lesions noted. PHYSICAL EXAM:  Physical exam of the left shoulder with decreased range of motion due to pain. Pain and weakness with supraspinatus and infraspinatus testing. No obvious pain with belly press testing. Pain with impingement. He is tender to palpation over the posterolateral aspect of the shoulder. IMAGING:  An MRI was reviewed of the left shoulder. This shows a full thickness rotator cuff tear of the supraspinatus.   He also has a foreign body in the proximal deltoid. ASSESSMENT AND PLAN:   Puma Maldonado is a 46year-old male with a left shoulder rotator cuff tear and left shoulder retained foreign body. After discussing the treatment options at length, he would like to move forward with surgery in the form of an arthroscopic rotator cuff repair, as well as foreign body removal.  I did discuss with him the possibility that this may be a chronic tear, although I do not appreciate much atrophy and it may be irreparable, but he would like to give rotator cuff repair a shot. We will start the process of setting that up.               Electronically signed by: Kenrick West MD

## 2019-10-01 NOTE — PROGRESS NOTES
1. Have you been to the ER, urgent care clinic since your last visit? Hospitalized since your last visit? No    2. Have you seen or consulted any other health care providers outside of the [address] [place] [address] since your last visit? Include any pap smears or colon screening.  No

## 2019-10-01 NOTE — H&P (VIEW-ONLY)
Patient: Vijaya Cabrales                MRN: [id number]       SSN: [id number] YOB: 1965        AGE: 46 y.o. SEX: male Body mass index is 29.64 kg/m². PCP: Olivia Canchola NP 
10/01/19 Chief Complaint: Left shoulder follow up Pain 6/10 HISTORY OF PRESENT ILLNESS:  Jessica Wynne returns to the office today for his left shoulder. He had his MRI done. He continues to complain of daily shoulder pain and difficulty with range of motion and use of the arm. He has not had any surgeries. He is here today to discuss further treatment. Past Medical History:  
Diagnosis Date  Depression  Foot drop, left foot October, 2018. Seen by Podiatry in 2019  
 Headache  Psychotic disorder ([facility] [address]   
 anxiety Family History Problem Relation Age of Onset  COPD Mother Current Outpatient Medications Medication Sig Dispense Refill  ergocalciferol (ERGOCALCIFEROL) 50,000 unit capsule TK 1 C PO WEEKLY  escitalopram oxalate (LEXAPRO) 10 mg tablet Indications: not taking  0  
 sertraline (ZOLOFT) 50 mg tablet Take  by mouth daily.  naltrexone (DEPADE) 50 mg tablet Take 50 mg by mouth daily.  acamprosate (CAMPRAL) 333 mg tablet Take 666 mg by mouth three (3) times daily. Allergies Allergen Reactions  Motrin [Ibuprofen] Hives Past Surgical History:  
Procedure Laterality Date  HX ORTHOPAEDIC  2017  
 lacerations to fingers and had pins Social History Socioeconomic History  Marital status:  Spouse name: Not on file  Number of children: Not on file  Years of education: Not on file  Highest education level: Not on file Occupational History  Not on file Social Needs  Financial resource strain: Not on file  Food insecurity:  
  Worry: Not on file Inability: Not on file  Transportation needs:  
  Medical: Not on file Non-medical: Not on file Tobacco Use  Smoking status: Never Smoker  Smokeless tobacco: Current User  Tobacco comment: chew tobacco  
Substance and Sexual Activity  Alcohol use: Yes Comment: 30 packs x 2 a day  Drug use: Yes Types: Marijuana  Sexual activity: Yes Lifestyle  Physical activity:  
  Days per week: Not on file Minutes per session: Not on file  Stress: Not on file Relationships  Social connections:  
  Talks on phone: Not on file Gets together: Not on file Attends Restorationist service: Not on file Active member of club or organization: Not on file Attends meetings of clubs or organizations: Not on file Relationship status: Not on file  Intimate partner violence:  
  Fear of current or ex partner: Not on file Emotionally abused: Not on file Physically abused: Not on file Forced sexual activity: Not on file Other Topics Concern  Not on file Social History Narrative  Not on file REVIEW OF SYSTEMS:   
 
No changes from previous review of systems unless noted. PHYSICAL EXAMINATION: 
Visit Vitals /76 (BP 1 Location: Left arm, BP Patient Position: Sitting) Pulse 78 Temp 97 °F (36.1 °C) (Oral) Resp 16 Ht 5' 10\" (1.778 m) Wt 206 lb 9.6 oz (93.7 kg) SpO2 99% Comment: RA  
BMI 29.64 kg/m² Body mass index is 29.64 kg/m². GENERAL: Alert and oriented x3, in no acute distress. HEENT: Normocephalic, atraumatic. RESP: Non labored breathing. SKIN: No rashes or lesions noted. PHYSICAL EXAM:  Physical exam of the left shoulder with decreased range of motion due to pain. Pain and weakness with supraspinatus and infraspinatus testing. No obvious pain with belly press testing. Pain with impingement. He is tender to palpation over the posterolateral aspect of the shoulder. IMAGING:  An MRI was reviewed of the left shoulder. This shows a full thickness rotator cuff tear of the supraspinatus.   He also has a foreign body in the proximal deltoid. ASSESSMENT AND PLAN:   Marta Diaz is a 46year-old male with a left shoulder rotator cuff tear and left shoulder retained foreign body. After discussing the treatment options at length, he would like to move forward with surgery in the form of an arthroscopic rotator cuff repair, as well as foreign body removal.  I did discuss with him the possibility that this may be a chronic tear, although I do not appreciate much atrophy and it may be irreparable, but he would like to give rotator cuff repair a shot. We will start the process of setting that up.    
 
 
 
 
 
Electronically signed by: Manuelita Krabbe, MD

## 2019-10-07 DIAGNOSIS — M75.42 IMPINGEMENT SYNDROME OF LEFT SHOULDER: ICD-10-CM

## 2019-10-07 DIAGNOSIS — Z01.812 PRE-OPERATIVE LABORATORY EXAMINATION: ICD-10-CM

## 2019-10-07 DIAGNOSIS — Z01.810 PREOP CARDIOVASCULAR EXAM: ICD-10-CM

## 2019-10-07 DIAGNOSIS — S40.252A: ICD-10-CM

## 2019-10-07 DIAGNOSIS — S46.012A TRAUMATIC COMPLETE TEAR OF LEFT ROTATOR CUFF, INITIAL ENCOUNTER: Primary | ICD-10-CM

## 2019-10-09 LAB
A-G RATIO,AGRAT: 2 RATIO (ref 1.1–2.6)
ALBUMIN SERPL-MCNC: 4.5 G/DL (ref 3.5–5)
ALP SERPL-CCNC: 67 U/L (ref 25–115)
ALT SERPL-CCNC: 41 U/L (ref 5–40)
ANION GAP SERPL CALC-SCNC: 13 MMOL/L
AST SERPL W P-5'-P-CCNC: 29 U/L (ref 10–37)
BILIRUB SERPL-MCNC: 0.4 MG/DL (ref 0.2–1.2)
BUN SERPL-MCNC: 8 MG/DL (ref 6–22)
CALCIUM SERPL-MCNC: 9.2 MG/DL (ref 8.4–10.4)
CHLORIDE SERPL-SCNC: 103 MMOL/L (ref 98–110)
CO2 SERPL-SCNC: 27 MMOL/L (ref 20–32)
CREAT SERPL-MCNC: 0.8 MG/DL (ref 0.5–1.2)
GFRAA, 66117: >60
GFRNA, 66118: >60
GLOBULIN,GLOB: 2.2 G/DL (ref 2–4)
GLUCOSE SERPL-MCNC: 82 MG/DL (ref 70–99)
POTASSIUM SERPL-SCNC: 5 MMOL/L (ref 3.5–5.5)
PROT SERPL-MCNC: 6.7 G/DL (ref 6.4–8.3)
SODIUM SERPL-SCNC: 143 MMOL/L (ref 133–145)

## 2019-10-10 ENCOUNTER — OFFICE VISIT (OUTPATIENT)
Dept: FAMILY MEDICINE CLINIC | Facility: CLINIC | Age: 54
End: 2019-10-10

## 2019-10-10 VITALS
HEIGHT: 70 IN | DIASTOLIC BLOOD PRESSURE: 84 MMHG | TEMPERATURE: 98 F | RESPIRATION RATE: 12 BRPM | SYSTOLIC BLOOD PRESSURE: 131 MMHG | BODY MASS INDEX: 29.35 KG/M2 | OXYGEN SATURATION: 98 % | WEIGHT: 205 LBS | HEART RATE: 53 BPM

## 2019-10-10 DIAGNOSIS — R35.0 URINARY FREQUENCY: ICD-10-CM

## 2019-10-10 DIAGNOSIS — M48.02 FORAMINAL STENOSIS OF CERVICAL REGION: ICD-10-CM

## 2019-10-10 DIAGNOSIS — K76.0 FATTY LIVER: Primary | ICD-10-CM

## 2019-10-10 DIAGNOSIS — R31.9 HEMATURIA, UNSPECIFIED TYPE: ICD-10-CM

## 2019-10-10 LAB
BILIRUB UR QL STRIP: NEGATIVE
GLUCOSE UR-MCNC: NEGATIVE MG/DL
KETONES P FAST UR STRIP-MCNC: NEGATIVE MG/DL
PH UR STRIP: 7.5 [PH] (ref 4.6–8)
PROT UR QL STRIP: NEGATIVE
SP GR UR STRIP: 1.01 (ref 1–1.03)
UA UROBILINOGEN AMB POC: NORMAL (ref 0.2–1)
URINALYSIS CLARITY POC: CLEAR
URINALYSIS COLOR POC: YELLOW
URINE BLOOD POC: NORMAL
URINE LEUKOCYTES POC: NEGATIVE
URINE NITRITES POC: NEGATIVE

## 2019-10-10 NOTE — PROGRESS NOTES
Ivy Regan is a 46 y.o. male presenting today for Rash (follow-up)    HPI:  Ivy Regan presents to the office today for rash and elevated liver enzymes follow-up care. Patient presents today noting his rash has resolved. He also notes he had repeated labs done for his previous elevated liver enzymes. Patient notes since his last office visit he has had only 1 beer. His liver enzymes were  and . He was instructed to stop drinking alcohol and tylenol products. He is also complaining of left shoulder pain. He notes he his planning to have left shoulder surgery with Dr. Mery Dickson. He is also complaining of cervical neck pain. He had an MRI which showed, No significant central spinal canal stenosis however there are multilevel degenerative foraminal stenoses some of which appear severe. .    Review of Systems   Respiratory: Negative for cough, sputum production and shortness of breath. Cardiovascular: Negative for chest pain and palpitations. Musculoskeletal: Positive for joint pain and neck pain. Neurological: Negative for dizziness and headaches. Allergies   Allergen Reactions    Motrin [Ibuprofen] Hives       Current Outpatient Medications   Medication Sig Dispense Refill    oxyCODONE-acetaminophen (PERCOCET) 5-325 mg per tablet Take 1-2 Tabs by mouth every four (4) hours as needed for Pain for up to 14 days. Max Daily Amount: 12 Tabs. 40 Tab 0    gabapentin (NEURONTIN) 100 mg capsule Take 2 Tab at night and 1 Tab in the morning 90 Cap 1    ergocalciferol (ERGOCALCIFEROL) 50,000 unit capsule TK 1 C PO WEEKLY      escitalopram oxalate (LEXAPRO) 10 mg tablet Indications: not taking  0    sertraline (ZOLOFT) 50 mg tablet Take  by mouth daily.  naltrexone (DEPADE) 50 mg tablet Take 50 mg by mouth daily.  acamprosate (CAMPRAL) 333 mg tablet Take 666 mg by mouth three (3) times daily.          Past Medical History:   Diagnosis Date    Depression  Foot drop, left foot     October, 2018.   Seen by Podiatry in 2019    Headache     Psychotic disorder ([facility] [address]     anxiety       Past Surgical History:   Procedure Laterality Date    HX HERNIA REPAIR      HX KNEE ARTHROSCOPY Right     HX ORTHOPAEDIC Left 2017    lacerations to fingers and had pins, left thumb    HX ROTATOR CUFF REPAIR Right        Social History     Socioeconomic History    Marital status:      Spouse name: Not on file    Number of children: Not on file    Years of education: Not on file    Highest education level: Not on file   Occupational History    Not on file   Social Needs    Financial resource strain: Not on file    Food insecurity:     Worry: Not on file     Inability: Not on file    Transportation needs:     Medical: Not on file     Non-medical: Not on file   Tobacco Use    Smoking status: Never Smoker    Smokeless tobacco: Current User    Tobacco comment: chew tobacco   Substance and Sexual Activity    Alcohol use: Yes     Comment: 30 packs x 2 a day    Drug use: Yes     Types: Marijuana    Sexual activity: Yes   Lifestyle    Physical activity:     Days per week: Not on file     Minutes per session: Not on file    Stress: Not on file   Relationships    Social connections:     Talks on phone: Not on file     Gets together: Not on file     Attends Orthodoxy service: Not on file     Active member of club or organization: Not on file     Attends meetings of clubs or organizations: Not on file     Relationship status: Not on file    Intimate partner violence:     Fear of current or ex partner: Not on file     Emotionally abused: Not on file     Physically abused: Not on file     Forced sexual activity: Not on file   Other Topics Concern    Not on file   Social History Narrative    Not on file       Patient does not have an advanced directive on file    Vitals:    10/10/19 0821   BP: 131/84   Pulse: (!) 53   Resp: 12   Temp: 98 °F (36.7 °C)   TempSrc: Oral SpO2: 98%   Weight: 205 lb (93 kg)   Height: 5' 10\" (1.778 m)   PainSc:   6       Physical Exam   Constitutional: He is oriented to person, place, and time. No distress. Cardiovascular: Normal rate, regular rhythm and normal heart sounds. Pulmonary/Chest: Effort normal and breath sounds normal.   Musculoskeletal:        Left shoulder: He exhibits decreased range of motion, tenderness and pain. He exhibits no swelling and no deformity. Cervical back: He exhibits decreased range of motion, tenderness and pain. Neurological: He is alert and oriented to person, place, and time. Skin: Skin is warm and dry. Nursing note and vitals reviewed.       Office Visit on 10/10/2019   Component Date Value Ref Range Status    Color (UA POC) 10/10/2019 Yellow   Final    Clarity (UA POC) 10/10/2019 Clear   Final    Glucose (UA POC) 10/10/2019 Negative  Negative Final    Bilirubin (UA POC) 10/10/2019 Negative  Negative Final    Ketones (UA POC) 10/10/2019 Negative  Negative Final    Specific gravity (UA POC) 10/10/2019 1.015  1.001 - 1.035 Final    Blood (UA POC) 10/10/2019 Trace  Negative Final    pH (UA POC) 10/10/2019 7.5  4.6 - 8.0 Final    Protein (UA POC) 10/10/2019 Negative  Negative Final    Urobilinogen (UA POC) 10/10/2019 0.2 mg/dL  0.2 - 1 Final    Nitrites (UA POC) 10/10/2019 Negative  Negative Final    Leukocyte esterase (UA POC) 10/10/2019 Negative  Negative Final    Hep C Virus Ab 10/10/2019 None Detected  None Detec Final    Specific Gravity 10/10/2019 1.006  1.005 - 1.03 Final    pH (UA) 10/10/2019 8.0  5.0 - 8.0 pH Final    Protein 10/10/2019 Negative  Negative, mg/dL Final    Glucose 10/10/2019 Negative  Negative mg/dL Final    Ketone 10/10/2019 Negative  Negative, mg/dL Final    Bilirubin 10/10/2019 Negative  Negative Final    Blood 10/10/2019 Negative  Negative Final    Nitrites 10/10/2019 Negative  Negative Final    Leukocyte Esterase 10/10/2019 Negative  Negative Final    Urobilinogen 10/10/2019 <2.0  <2.0 mg/dL Final    WBC 10/10/2019 0-2  0 - 2 /hpf Final    RBC 10/10/2019 Negative  Negative, /hpf Final   Orders Only on 09/19/2019   Component Date Value Ref Range Status    Glucose 10/08/2019 82  70 - 99 mg/dL Final    BUN 10/08/2019 8  6 - 22 mg/dL Final    Creatinine 10/08/2019 0.8  0.5 - 1.2 mg/dL Final    Sodium 10/08/2019 143  133 - 145 mmol/L Final    Potassium 10/08/2019 5.0  3.5 - 5.5 mmol/L Final    Chloride 10/08/2019 103  98 - 110 mmol/L Final    CO2 10/08/2019 27  20 - 32 mmol/L Final    AST (SGOT) 10/08/2019 29  10 - 37 U/L Final    ALT (SGPT) 10/08/2019 41* 5 - 40 U/L Final    Alk. phosphatase 10/08/2019 67  25 - 115 U/L Final    Bilirubin, total 10/08/2019 0.4  0.2 - 1.2 mg/dL Final    Calcium 10/08/2019 9.2  8.4 - 10.4 mg/dL Final    Protein, total 10/08/2019 6.7  6.4 - 8.3 g/dL Final    Albumin 10/08/2019 4.5  3.5 - 5.0 g/dL Final    A-G Ratio 10/08/2019 2.0  1.1 - 2.6 ratio Final    Globulin 10/08/2019 2.2  2.0 - 4.0 g/dL Final    Anion gap 10/08/2019 13.0  mmol/L Final    Comment: Estimated GFR results are reported in mL/min/1.73 sq.m. by the MDRD equation. This eGFR is validated for stable chronic renal failure patients. This   equation  is unreliable in acute illness or patients with normal renal function.       GFRAA 10/08/2019 >60.0  >60.0 Final    GFRNA 10/08/2019 >60.0  >60.0 Final   Office Visit on 09/19/2019   Component Date Value Ref Range Status    Color (UA POC) 09/19/2019 Yellow   Final    Clarity (UA POC) 09/19/2019 Clear   Final    Glucose (UA POC) 09/19/2019 Negative  Negative Final    Bilirubin (UA POC) 09/19/2019 Negative  Negative Final    Ketones (UA POC) 09/19/2019 Negative  Negative Final    Specific gravity (UA POC) 09/19/2019 1.020  1.001 - 1.035 Final    Blood (UA POC) 09/19/2019 Trace  Negative Final    pH (UA POC) 09/19/2019 6.0  4.6 - 8.0 Final    Protein (UA POC) 09/19/2019 Negative  Negative Final    Urobilinogen (UA POC) 09/19/2019 0.2 mg/dL  0.2 - 1 Final    Nitrites (UA POC) 09/19/2019 Negative  Negative Final    Leukocyte esterase (UA POC) 09/19/2019 Negative  Negative Final   Orders Only on 09/18/2019   Component Date Value Ref Range Status    WBC 09/18/2019 6.6  4.0 - 11.0 K/uL Final    RBC 09/18/2019 4.67  3.80 - 5.80 M/uL Final    HGB 09/18/2019 15.2  13.1 - 17.2 g/dL Final    HCT 09/18/2019 45.9  39.3 - 51.6 % Final    MCV 09/18/2019 98* 80 - 95 fL Final    MCH 09/18/2019 33  26 - 34 pg Final    MCHC 09/18/2019 33  31 - 36 g/dL Final    RDW 09/18/2019 12.0  10.0 - 15.5 % Final    PLATELET 37/66/9137 756  140 - 440 K/uL Final    MPV 09/18/2019 10.5  9.0 - 13.0 fL Final    NEUTROPHILS 09/18/2019 64  40 - 75 % Final    Lymphocytes 09/18/2019 23  20 - 45 % Final    MONOCYTES 09/18/2019 12  3 - 12 % Final    EOSINOPHILS 09/18/2019 1  0 - 6 % Final    BASOPHILS 09/18/2019 1  0 - 2 % Final    ABS. NEUTROPHILS 09/18/2019 4.2  1.8 - 7.7 K/uL Final    ABSOLUTE LYMPHOCYTE COUNT 09/18/2019 1.5  1.0 - 4.8 K/uL Final    ABS. MONOCYTES 09/18/2019 0.8  0.1 - 1.0 K/uL Final    ABS. EOSINOPHILS 09/18/2019 0.1  0.0 - 0.5 K/uL Final    ABS. BASOPHILS 09/18/2019 0.0  0.0 - 0.2 K/uL Final    TSH 09/18/2019 0.88  0.27 - 4.20 mcU/mL Final    Prostate Specific Ag 09/18/2019 0.686  <=3.100 ng/mL Final    VITAMIN D, 25-HYDROXY 09/18/2019 23.5* 32.0 - 100.0 ng/mL Final    Glucose 09/18/2019 98  70 - 99 mg/dL Final    BUN 09/18/2019 11  6 - 22 mg/dL Final    Creatinine 09/18/2019 0.8  0.5 - 1.2 mg/dL Final    Sodium 09/18/2019 141  133 - 145 mmol/L Final    Potassium 09/18/2019 4.5  3.5 - 5.5 mmol/L Final    Chloride 09/18/2019 102  98 - 110 mmol/L Final    CO2 09/18/2019 21  20 - 32 mmol/L Final    AST (SGOT) 09/18/2019 100* 10 - 37 U/L Final    ALT (SGPT) 09/18/2019 111* 5 - 40 U/L Final    Alk.  phosphatase 09/18/2019 76  25 - 115 U/L Final    Bilirubin, total 09/18/2019 0.4  0.2 - 1.2 mg/dL Final    Calcium 09/18/2019 9.2  8.4 - 10.4 mg/dL Final    Protein, total 09/18/2019 7.1  6.4 - 8.3 g/dL Final    Albumin 09/18/2019 4.5  3.5 - 5.0 g/dL Final    A-G Ratio 09/18/2019 1.7  1.1 - 2.6 ratio Final    Globulin 09/18/2019 2.6  2.0 - 4.0 g/dL Final    Anion gap 09/18/2019 18.0  mmol/L Final    Comment: Estimated GFR results are reported in mL/min/1.73 sq.m. by the MDRD equation. This eGFR is validated for stable chronic renal failure patients. This   equation  is unreliable in acute illness or patients with normal renal function.  GFRAA 09/18/2019 >60.0  >60.0 Final    GFRNA 09/18/2019 >60.0  >60.0 Final    Triglyceride 09/18/2019 71  40 - 149 mg/dL Final    HDL Cholesterol 09/18/2019 115* 40 - 59 mg/dL Final    Cholesterol, total 09/18/2019 205* 110 - 200 mg/dL Final    CHOLESTEROL/HDL 09/18/2019 1.8  0.0 - 5.0 Final    LDL, calculated 09/18/2019 76  50 - 99 mg/dL Final    VLDL, calculated 09/18/2019 14  8 - 30 mg/dL Final    Comment: Cholesterol Recommended NCEP guidelines in mg/dL:  Less than 200            Desirable  200 - 239                Borderline High  Greater than or  = 240   High  Please Note:  Total Chol/HDL Ratio                   Men     Women  1/2 Avg. Risk    3.4     3.3      Avg. Risk    5.0     4.4  2X  Avg. Risk    9.6     7.1  3X  Avg. Risk   23.4    11.0         . Results for orders placed or performed in visit on 10/10/19   HEPATITIS C AB   Result Value Ref Range    Hep C Virus Ab None Detected None Detec    Narrative    Unless additionally indicated, test performed at: [facility], [address] [place] [address], [facility]. PH: [phone].    URINALYSIS W/MICROSCOPIC   Result Value Ref Range    Specific Gravity 1.006 1.005 - 1.03    pH (UA) 8.0 5.0 - 8.0 pH    Protein Negative Negative, mg/dL    Glucose Negative Negative mg/dL    Ketone Negative Negative, mg/dL    Bilirubin Negative Negative    Blood Negative Negative    Nitrites Negative Negative Leukocyte Esterase Negative Negative    Urobilinogen <2.0 <2.0 mg/dL    WBC 0-2 0 - 2 /hpf    RBC Negative Negative, /hpf    Narrative    Unless additionally indicated, test performed at: [facility], [address] [place] [address]. PH: [phone]. AMB POC URINALYSIS DIP STICK AUTO W/O MICRO   Result Value Ref Range    Color (UA POC) Yellow     Clarity (UA POC) Clear     Glucose (UA POC) Negative Negative    Bilirubin (UA POC) Negative Negative    Ketones (UA POC) Negative Negative    Specific gravity (UA POC) 1.015 1.001 - 1.035    Blood (UA POC) Trace Negative    pH (UA POC) 7.5 4.6 - 8.0    Protein (UA POC) Negative Negative    Urobilinogen (UA POC) 0.2 mg/dL 0.2 - 1    Nitrites (UA POC) Negative Negative    Leukocyte esterase (UA POC) Negative Negative       Assessment / Plan:      ICD-10-CM ICD-9-CM    1. Fatty liver K76.0 571.8 HEPATITIS C AB      HEPATITIS C AB   2. Foraminal stenosis of cervical region M48.02 723.0 REFERRAL TO ORTHOPEDICS   3. Urinary frequency R35.0 788.41 AMB POC URINALYSIS DIP STICK AUTO W/O MICRO   4. Hematuria, unspecified type R31.9 599.70 URINALYSIS W/MICROSCOPIC      URINALYSIS W/MICROSCOPIC     Negative POC urine  Fatty liver-dictated C ordered  Referral to orthopedic for cervical neck pain and shoulder pain        Follow-up and Dispositions    · Return if symptoms worsen or fail to improve. I asked the patient if he  had any questions and answered his  questions. The patient stated that he understands the treatment plan and agrees with the treatment plan    This document was created with a voice activated dictation system and may contain transcription errors.

## 2019-10-10 NOTE — PROGRESS NOTES
Visit Vitals  /84   Pulse (!) 53   Temp 98 °F (36.7 °C) (Oral)   Resp 12   Ht 5' 10\" (1.778 m)   Wt 205 lb (93 kg)   SpO2 98%   BMI 29.41 kg/m²     Kenroy Bañuelos presents today for   Chief Complaint   Patient presents with    Rash     follow-up       Is someone accompanying this pt? no    Is the patient using any DME equipment during OV? no    Depression Screening:  3 most recent PHQ Screens 10/10/2019   PHQ Not Done -   Little interest or pleasure in doing things Not at all   Feeling down, depressed, irritable, or hopeless Not at all   Total Score PHQ 2 0   Trouble falling or staying asleep, or sleeping too much -   Feeling tired or having little energy -   Poor appetite, weight loss, or overeating -   Feeling bad about yourself - or that you are a failure or have let yourself or your family down -   Trouble concentrating on things such as school, work, reading, or watching TV -   Moving or speaking so slowly that other people could have noticed; or the opposite being so fidgety that others notice -   Thoughts of being better off dead, or hurting yourself in some way -   PHQ 9 Score -   How difficult have these problems made it for you to do your work, take care of your home and get along with others -       Learning Assessment:  No flowsheet data found. Abuse Screening:  Abuse Screening Questionnaire 1/28/2019   Do you ever feel afraid of your partner? N   Are you in a relationship with someone who physically or mentally threatens you? N   Is it safe for you to go home? Y       Fall Risk  Fall Risk Assessment, last 12 mths 1/28/2019   Able to walk? Yes   Fall in past 12 months? No       Health Maintenance reviewed and discussed and ordered per Provider.     Health Maintenance Due   Topic Date Due    Hepatitis C Screening  1965    Pneumococcal 0-64 years (1 of 1 - PPSV23) 12/06/1970    Shingrix Vaccine Age 50> (1 of 2) 12/06/2014    FOBT Q 1 YEAR AGE 50-75  12/06/2014    Influenza Age 5 to Adult  08/01/2019           Coordination of Care:  1. Have you been to the ER, urgent care clinic since your last visit? Hospitalized since your last visit? no    2. Have you seen or consulted any other health care providers outside of the [address] [place] [address] since your last visit? Include any pap smears or colon screening.  no

## 2019-10-11 ENCOUNTER — OFFICE VISIT (OUTPATIENT)
Dept: ORTHOPEDIC SURGERY | Age: 54
End: 2019-10-11

## 2019-10-11 VITALS
WEIGHT: 204 LBS | DIASTOLIC BLOOD PRESSURE: 75 MMHG | BODY MASS INDEX: 29.2 KG/M2 | OXYGEN SATURATION: 98 % | HEIGHT: 70 IN | RESPIRATION RATE: 16 BRPM | HEART RATE: 60 BPM | SYSTOLIC BLOOD PRESSURE: 106 MMHG

## 2019-10-11 DIAGNOSIS — G56.03 BILATERAL CARPAL TUNNEL SYNDROME: Primary | ICD-10-CM

## 2019-10-11 DIAGNOSIS — M19.042 ARTHRITIS OF FINGER OF BOTH HANDS: ICD-10-CM

## 2019-10-11 DIAGNOSIS — M19.049 HAND ARTHRITIS: ICD-10-CM

## 2019-10-11 DIAGNOSIS — M19.041 ARTHRITIS OF FINGER OF BOTH HANDS: ICD-10-CM

## 2019-10-11 LAB
BILIRUB UR QL: NEGATIVE
GLUCOSE UR QL: NEGATIVE MG/DL
HCV AB SER IA-ACNC: NORMAL
HGB UR QL STRIP: NEGATIVE
KETONES UR QL STRIP.AUTO: NEGATIVE MG/DL
LEUKOCYTE ESTERASE: NEGATIVE
NITRITE UR QL STRIP.AUTO: NEGATIVE
PH UR STRIP: 8 PH (ref 5–8)
PROT UR QL STRIP: NEGATIVE MG/DL
RBC #/AREA URNS HPF: NEGATIVE /HPF
SP GR UR: 1.01 (ref 1–1.03)
UROBILINOGEN UR STRIP-MCNC: <2 MG/DL
WBC URNS QL MICRO: NORMAL /HPF (ref 0–2)

## 2019-10-11 RX ORDER — TRIAMCINOLONE ACETONIDE 40 MG/ML
40 INJECTION, SUSPENSION INTRA-ARTICULAR; INTRAMUSCULAR ONCE
Qty: 1 ML | Refills: 0
Start: 2019-10-11 | End: 2019-10-11

## 2019-10-11 NOTE — PROGRESS NOTES
Colonel Velazquez is a 46 y.o. male right handed unknown employment. Worker's Compensation and legal considerations: none filed. Vitals:    10/11/19 1327   BP: 106/75   Pulse: 60   Resp: 16   SpO2: 98%   Weight: 204 lb (92.5 kg)   Height: 5' 10\" (1.778 m)   PainSc:   8           Chief Complaint   Patient presents with    Hand Pain     bilateral hand pain       HPI: Patient comes in today for follow-up after getting bilateral hand carpal tunnel injections approximately 6 to 7 weeks ago. He was supposed to have an EMG and nerve conduction study set up for both hands but he said he was never called. He reports also that the braces he was given last time did help with the injections only helped minimally. He also reports bilateral index and middle finger MCP pain that is worse on the right than the left. Initial HPI: Patient comes in today with complaints of bilateral hand numbness and tingling. He reports that he had a left carpal tunnel diagnosis after an EMG several years ago. He has not had any treatment for it. He also has a cyst on the right wrist that was injected several months ago but did not help. He also reports a history of having digits amputated that were placed back on many years ago on the left hand. He has had chronic pain from scar tissue in his hand. Date of onset: Indeterminate    Injury: Yes: Comment: Old injury to left hand pain involving digital amputations    Prior Treatment:  No    Numbness/ Tingling: Yes: Comment: Bilateral hands left worse than right    ROS: Review of Systems - General ROS: negative  Respiratory ROS: no cough, shortness of breath, or wheezing  Cardiovascular ROS: no chest pain or dyspnea on exertion  Musculoskeletal ROS: positive for - pain in hand - bilateral  Neurological ROS: positive for - numbness/tingling  Dermatological ROS: negative    Past Medical History:   Diagnosis Date    Depression     Foot drop, left foot     October, 2018.   Seen by Podiatry in 2019    Headache     Psychotic disorder ([facility] [address]     anxiety       Past Surgical History:   Procedure Laterality Date    HX ORTHOPAEDIC  2017    lacerations to fingers and had pins       Current Outpatient Medications   Medication Sig Dispense Refill    triamcinolone acetonide (KENALOG) 40 mg/mL injection 1 mL by Intra artICUlar route once for 1 dose. 1 mL 0    ergocalciferol (ERGOCALCIFEROL) 50,000 unit capsule TK 1 C PO WEEKLY      escitalopram oxalate (LEXAPRO) 10 mg tablet Indications: not taking  0    sertraline (ZOLOFT) 50 mg tablet Take  by mouth daily.  naltrexone (DEPADE) 50 mg tablet Take 50 mg by mouth daily.  acamprosate (CAMPRAL) 333 mg tablet Take 666 mg by mouth three (3) times daily. Allergies   Allergen Reactions    Motrin [Ibuprofen] Hives         PE:       Right hand: There is tenderness to palpation about the MCP joints of the index finger and middle finger bilaterally significantly worse on the right. There is decreased range of motion in bilateral hands. There is some scar tissue noted in the left hand in the palm as well as multiple digits. Sensation is diminished in all digits bilaterally. There is also a cystic mass noted about the right volar wrist.    NEUROVASCULAR    Examination L R Examination L R   Carpal Comp. + + Pronator Comp. - -   Carpal Tinel + + Pronator Tinel - -   Phalen's + + Pronator Stress - -   Cubital Comp. - - Guyon Comp. - -   Cubital Tinel - - Guyon Tinel - -   Elbow Hyperflexion - - Adson's - -   Spurling's - - SC Comp. - -   PCB Median abn - - SC Tinel - -   Radial Tinel - - IC Comp. - -   Digital Tinel - - IC Tinel - -   Radial 2-Pt WNL WNL Ulnar 2-Pt WNL WNL     Radial Pulse: 2+  Capillary Refill: < 2 sec  [employer]: Not Performed  [employer]: Not Performed      Imaging:    Plain films of bilateral wrist does not show substantial degenerative changes  Fracture dislocation or any other osseous abnormalities.         ICD-10-CM ICD-9-CM    1. Bilateral carpal tunnel syndrome G56.03 354.0 REFERRAL TO OCCUPATIONAL THERAPY   2. Arthritis of finger of both hands M19.041 716.94 DRAIN/INJECT SMALL JOINT/BURSA    M19.042  DRAIN/INJECT SMALL JOINT/BURSA      TRIAMCINOLONE ACETONIDE INJ      triamcinolone acetonide (KENALOG) 40 mg/mL injection   3. Hand arthritis M19.049 716.94        Plan:     Bilateral IF/LF MCP Injections    Will Set up for EMG and OT    F/U after EMG    Plan was reviewed with patient, who verbalized agreement and understanding of the plan    Anh 36 NOTE        Chart reviewed for the following:   Rodger BAIRD DO, have reviewed the History, Physical and updated the Allergic reactions for [address] [place] [address] performed immediately prior to start of procedure:   Jocelynn BAIRD DO, have performed the following reviews on Juidth Figueroa prior to the start of the procedure:            * Patient was identified by name and date of birth   * Agreement on procedure being performed was verified  * Risks and Benefits explained to the patient  * Procedure site verified and marked as necessary  * Patient was positioned for comfort  * Consent was signed and verified     Time: 13:55      Date of procedure: 10/11/2019    Procedure performed by: Jocelynn Bell DO    Provider assisted by: Nery Rossi LPN    Patient assisted by: self    How tolerated by patient: tolerated the procedure well with no complications    Post Procedural Pain Scale: 0 - No Hurt    Comments: none    Procedure:  After consent was obtained, using sterile technique the MCP Joint was prepped. Local anesthetic used: 1% lidocaine. Kenalog 5 mg X4 and was then injected and the needle withdrawn. The procedure was well tolerated. The patient is asked to continue to rest the area for a few more days before resuming regular activities.   It may be more painful for the first 1-2 days. Watch for fever, or increased swelling or persistent pain in the joint. Call or return to clinic prn if such symptoms occur or there is failure to improve as anticipated.

## 2019-10-15 ENCOUNTER — HOSPITAL ENCOUNTER (OUTPATIENT)
Dept: PHYSICAL THERAPY | Age: 54
Discharge: HOME | End: 2019-10-15
Payer: MEDICAID

## 2019-10-15 ENCOUNTER — OFFICE VISIT (OUTPATIENT)
Dept: ORTHOPEDIC SURGERY | Age: 54
End: 2019-10-15

## 2019-10-15 VITALS
BODY MASS INDEX: 29.06 KG/M2 | OXYGEN SATURATION: 100 % | SYSTOLIC BLOOD PRESSURE: 120 MMHG | TEMPERATURE: 98.3 F | HEIGHT: 70 IN | DIASTOLIC BLOOD PRESSURE: 80 MMHG | WEIGHT: 203 LBS | HEART RATE: 73 BPM

## 2019-10-15 DIAGNOSIS — M47.812 FACET ARTHRITIS, DEGENERATIVE, CERVICAL SPINE: ICD-10-CM

## 2019-10-15 DIAGNOSIS — G56.03 BILATERAL CARPAL TUNNEL SYNDROME: Primary | ICD-10-CM

## 2019-10-15 PROCEDURE — 97110 THERAPEUTIC EXERCISES: CPT

## 2019-10-15 PROCEDURE — 97166 OT EVAL MOD COMPLEX 45 MIN: CPT

## 2019-10-15 PROCEDURE — 97535 SELF CARE MNGMENT TRAINING: CPT

## 2019-10-15 RX ORDER — GABAPENTIN 100 MG/1
CAPSULE ORAL
Qty: 90 CAP | Refills: 1 | Status: SHIPPED | OUTPATIENT
Start: 2019-10-15 | End: 2019-10-23 | Stop reason: DRUGHIGH

## 2019-10-15 NOTE — PROGRESS NOTES
[facility]  [address]  [place], [address]  [phone] fax    Plan of Care/Statement of Necessity for Occupational Therapy Services    Patient name: Satya Jack Start of Care: 10/15/2019   Referral source: Jessika Jean DO : 1965    Medical Diagnosis: Carpal tunnel syndrome, bilateral upper limbs [G56.03]  Payor: Lori Ceron / Plan: Cody Hollis / Product Type: Managed Care Medicaid /  Onset Date:    Treatment Diagnosis: bilateral hand pain   Prior Hospitalization: see medical history Provider#: [id number]   Medications: Verified on Patient summary List    Comorbidities: Alcohol use, h/o left hand surgical intervention secondary to multiple lacerations, tobacco use, back pain, h/o right RTC repair, left RTC tear, right wrist cyst   Prior Level of Function: driving, carrying groceries          The Plan of Care and following information is based on the information from the initial evaluation. Assessment/ key information: Patient is a right hand dominant 46 y.o. male with a chief complaint  of bilateral hand pain and paresthesias of all digits. Pt has extensive h/o hand and wrist injuries. In 2017, pt reports left hand lacerations on index and middle fingers in which he underwent surgery with external fixators and multiple stitches on remaining fingers. He underwent a second surgery in which tendons had to be lengthened and a third surgery to remove all hardware from left hand. Pt reports an extensive course of skilled OT treatment thereafter. He recently received bilateral MP injections at the index and middle digits and also received injections in bilateral wrists. Pt is undergoing left RTC repair surgery on 10/21/2019. 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  Pt reported that referring physician gave him bilateral wrist immobilization splints to be worn at night and he reports improvement in ability to sleep since beginning wear. He reports pain with palpation to bilateral index and middle digit MCP jts with decreased digit ROM. Left hand digit ROM deficits due to previous injury. He demonstrated a positive Phalen's test which indicates a possible median nerve compression in bilateral wrists. He reports decreased ability to perform ADLs/IADLS without increased pain. He has an EMG scheduled for 10/16. Patient received an initial evaluation today followed by education as to diagnosis, precautions and treatment plan. Patient was provided with a basic home exercise program including tendon gliding, DIP and PIP blocking, and median nerve gliding. Skilled OT services are necessary to address deficits to improve quality of life. Evaluation Complexity: History MEDIUM Complexity : Expanded review of history including physical, cognitive and psychosocial  history  Examination MEDIUM Complexity : 3-5 performance deficits relating to physical, cognitive , or psychosocial skils that result in activity limitations and / or participation restrictions Clinical Decision Making MEDIUM Complexity : Patient may present with comorbidities that affect occupational performnce.  Miniml to moderate modification of tasks or assistance (eg, physical or verbal ) with assesment(s) is necessary to enable patient to complete evaluation   Overall Complexity Rating: MEDIUM  Problem List: Pain effecting function, Decreased range of motion, Decreased strength, Edema effecting function, Decreased coordination/prehension, Decreased ADL/functional abilities , Decreased activity tolerance, Decreased flexibility/joint mobility and Sensability   Treatment Plan may include any combination of the following: Therapeutic exercise, Therapeutic activities, Physical agent/modality, Manual therapy, Splinting/orthoses, Patient education and ADLs/IADLs  Patient / Family readiness to learn indicated by: interest  Persons(s) to be included in education:   patient (P)  Barriers to Learning/Limitations: None  Patient Goal (s): to make a fist without pain.   Patient Self Reported Health Status: good  Rehabilitation Potential: good  Short Term Goals: To be accomplished in 2  weeks:  1. Patient will be compliant with initial home exercise program to take an active role in their rehabilitation process. Status at Eval: pt provided and instructed in initial HEP  2. Patient will demonstrate a good understanding of their condition and strategies for self-management. Status at Eval: Pt educated on wrist immobilization splint wear and activity modifications to reduce paresthesias   Long Term Goals: To be accomplished in 4 weeks:              1. Patient will report 75% reduced symptoms in bilateral wrists in order to increase use of hands for daily tasks. Status at eval: 100% of the time. 2. Patient will report reduced pain in hands to 5/10 in order to carry shopping bags witout increased pain. Status at eval: 9/10  3. Patient will improve FOTO outcome measure score by 15 points in order to demonstrate improved functional performance using bilateral hands. Status at eval: 43   Frequency / Duration: Patient to be seen 2 times per week for 4 weeks:   Patient/ Caregiver education and instruction: Diagnosis, prognosis, self care, activity modification, brace/ splint application and exercises  [x]  Plan of care has been reviewed with Morgan Gross OT 10/15/2019 5:03 PM  ________________________________________________________________________    I certify that the above Therapy Services are being furnished while the patient is under my care. I agree with the treatment plan and certify that this therapy is necessary.     Physician's Signature:____________Date:_________TIME:________    [employer], Date and Time must be completed for valid certification **    Please sign and return to [facility] [address] [place] [address] & [facility] [address]  [address], [facility] [address]  [phone] fax

## 2019-10-15 NOTE — PROGRESS NOTES
Hand Therapy Evaluation and Daily Note    Patient Name: Mary Black  Date:10/15/2019  : 1965  Age: 46 y.o.y/o  [x]  Patient  Verified  Payor: Monty Benitez / Plan: [id number] [employer] / Product Type: Managed Care Medicaid /    Referring Provider: DO Martha Chaves MD Visit:  10/17/2019  Onset Date:    Surgical Date: na  Surgical Procedure: na    In time:2:00 PM  Out time:3:00 PM  Total Treatment Time (min): 60  Total Timed Codes (min): 25  1:1 Treatment Time ( [address] only): 60   Visit #: 1 of 8    Treatment Area: Carpal tunnel syndrome, bilateral upper limbs [G56.03]    Precautions:    Hand Dominance: right handed   Hand Involved: bilateral    Total Evaluation Time:  35    History of Present Condition:  Patient is a 46 y.o. male with a chief complaint  of bilateral hand pain. Pt has extensive h/o hand and wrist injuries. In 2017, pt reports left hand lacerations on index and middle fingers in which he underwent surgery with external fixators and multiple stitches on remaining fingers. He underwent a second surgery in which tendons had to be lengthened and a third surgery to remove all hardware from left hand. Pt reports an extensive course of skilled OT treatment thereafter. He recently received bilateral MP injections at the index and middle digits and also received injections in bilateral wrists. Pt is undergoing left RTC repair surgery on 10/21/2019. Pt reported that referring physician gave him bilateral wrist immobilization splints to be worn at night and he reports improvement in ability to sleep since beginning wear. Pain Rating:   Current: (0-no pain 10-debilitating pain) severe 9/10  At best: (0-no pain 10-debilitating pain) moderate 6/10  At worst: (0-no pain 10-debilitating pain) severe 9/10  Location: bilateral hand and bilateral wrist  Type:  severe   Better with: Worse with:     Medications/Allergies/Past Medical History:  See chart; reviewed with patient.  Alcohol use, h/o left hand surgical intervention secondary to multiple lacerations, tobacco use, back pain, h/o right RTC repair, left RTC tear, right wrist cyst    Diagnostic Tests: EMG left UE 2017. Pending EMG on 10/16/2019    Prior Level of Function: driving, carrying groceries    Current Level of Function:  Pain with wrist movement, inability to , reduced digit ROM    Social History: Pt lives alone in home.      Occupation/Job Requirements: unemployed - used to work for [employer]    Observation: ganglion cyst on left radial wrist  Scar/incision:   Healed and intact  Location:  Left hand palm     Palpation:  Pain with palpation to bilateral index and middle digit MCP jts    Range of Motion:   Elbow/Wrist   Wrist 10/15/2019   Right/Left       Flex 70/68       Ext 50/56       UD 30/30       RD 16/23         Hand ROM 10/15/2019    2nd  Right/Left 3rd  Right/Left 4th 5th Thumb   MP 74/80 63/90      PIP 98/77 94/100      DIP 57/32-47 64/48      PABD        RABD        GEORGES 229/172 221/238        Strength:  NT    Sensation:    Paresthesias of bilateral hand digits    Edema: NT    Special Tests:   Provocative test: Phalen's test: positive bilateral UEs    ADLs  Feeding:        []MaxA   []ModA   []Charlette   [] CGA   []SBA   [x]Danielito   []Independent  UE Dressing:       []MaxA   []ModA   []Charlette   [] CGA   []SBA   [x]Danielito   []Independent  LE Dressing:       []MaxA   []ModA   []Charlette   [] CGA   []SBA   [x]Danielito   []Independent  Grooming:       []MaxA   []ModA   []Charlette   [] CGA   []SBA   [x]Danielito   []Independent  Toileting:       []MaxA   []ModA   []Charlette   [] CGA   []SBA   [x]Danielito   []Independent  Bathing:       []MaxA   []ModA   []Charlette   [] CGA   []SBA   [x]Danielito   []Independent  Light Meal Prep:    []MaxA   [x]ModA   []Charlette   [] CGA   []SBA   []Danielito   []Independent  Household/Other: []MaxA   [x]ModA   []Charlette   [] CGA   []SBA   []Danielito   []Independent  Adaptive Equip:     []MaxA   []ModA   []Charlette   [] CGA   []SBA []Danielito   []Independent  Driving:       []MaxA   []ModA   []Charlette   [] CGA   []SBA   [x]Danielito   []Independent      Todays Treatment:  Patient received an initial evaluation today followed by education as to diagnosis, precautions and treatment plan. Patient was provided with a basic home exercise program including tendon gliding, DIP and PIP blocking, and median nerve gliding. OBJECTIVE  15 min Therapeutic Exercise:  [x] See flow sheet :   Rationale: increase ROM to improve the patients ability to make a composite fist without increased symptoms. 10 min Self Care/Home Management: wrist immobilization splint wear, activity modifications. Rationale: education  to improve the patients ability to reduce symptoms in bilateral hands. With   [] TE   [] TA   [] neuro   [] other: Patient Education: [x] Review HEP    [] Progressed/Changed HEP based on:   [] positioning   [] body mechanics   [] transfers   [] heat/ice application   [] Splint wear/care   [] Sensory re-education   [] scar management      [] other:      Pain Level (0-10 scale) post treatment: 9/10    Patient will continue to benefit from skilled OT services to modify and progress therapeutic interventions, address ROM deficits and analyze and modify body mechanics/ergonomics to attain goals. Assessment: Patient reports pain with wrist movement and when attempting to make a fist. He has limited use of left UE due to left RTC tear. Short Term Goals: To be accomplished in 2  weeks:  1. Patient will be compliant with initial home exercise program to take an active role in their rehabilitation process. Status at Eval: pt provided and instructed in initial HEP  2. Patient will demonstrate a good understanding of their condition and strategies for self-management. Status at Eval: Pt educated on wrist immobilization splint wear and activity modifications to reduce paresthesias   Long Term Goals: To be accomplished in 4 weeks:   1.  Patient will report 75% reduced symptoms in bilateral wrists in order to increase use of hands for daily tasks. Status at eval: 100% of the time. 2. Patient will report reduced pain in hands to 5/10 in order to carry shopping bags witout increased pain. Status at eval: 9/10  3. Patient will improve FOTO outcome measure score by 15 points in order to demonstrate improved functional performance using bilateral hands. Status at eval: 43    Frequency / Duration: Patient to be seen 2 times per week for 4 weeks:    Patient/ Caregiver education and instruction: Diagnosis, prognosis, self care, activity modification, brace/ splint application and exercises    Functional Status Measure:  Patient's:43  FOTO Benchmark: 59  Expected Change: 16  FOTO score based on 0 - 100 point scale, with 100 being no impairment. These scores are determined by patient reported functional assessments compared against national benchmarked data.       Seda Su OT, 10/15/2019 2:08 PM

## 2019-10-15 NOTE — PATIENT INSTRUCTIONS
Gabapentin (By mouth)   Gabapentin (jason-a-PEN-tin)  Treats seizures and pain caused by shingles. Brand Name(s): ACTIVE-PAC with Gabapentin, Convenience Marlon, Cyclo/Mike 10/300 Pack, FusePaq Fanatrex, Mike-V, Gralise, [address] Pack, Neurontin, SmartRx Mike Kit   There may be other brand names for this medicine. When This Medicine Should Not Be Used: This medicine is not right for everyone. Do not use it if you had an allergic reaction to gabapentin. How to Use This Medicine:   Capsule, Liquid, Tablet  · Take your medicine as directed. Your dose may need to be changed several times to find what works best for you. If you have epilepsy, do not allow more than 12 hours to pass between doses. · Capsule: Swallow the capsule whole with plenty of water. Do not open, crush, or chew it. · Gralise® tablet: Swallow the tablet whole . Do not crush, break, or chew it. · Neurontin® tablet: If you break a tablet into 2 pieces, use the second half as your next dose. If you don't use it within 28 days, throw it away. · Measure the oral liquid medicine with a marked measuring spoon, oral syringe, or medicine cup. · This medicine should come with a Medication Guide. Ask your pharmacist for a copy if you do not have one. · Missed dose: Take a dose as soon as you remember. If it is almost time for your next dose, wait until then and take a regular dose. Do not take extra medicine to make up for a missed dose. · Store the medicine in a closed container at room temperature, away from heat, moisture, and direct light. Store the Neurontin® oral liquid in the refrigerator. Do not freeze. Drugs and Foods to Avoid:   Ask your doctor or pharmacist before using any other medicine, including over-the-counter medicines, vitamins, and herbal products. · Some medicines can affect how gabapentin works.  Tell your doctor if you also use any of the following:   ¨ Hydrocodone  ¨ Morphine  · If you take an antacid, wait at least 2 hours before you take gabapentin. · Tell your doctor if you use anything else that makes you sleepy. Some examples are allergy medicine, narcotic pain medicine, and alcohol. Warnings While Using This Medicine:   · Tell your doctor if you are pregnant or breastfeeding, or if you have kidney problems or are receiving dialysis. Tell your doctor if you have a history of depression or mental health problems. · This medicine may increase depression or thoughts of suicide. Tell your doctor right away if you start to feel more depressed or think about hurting yourself. · This medicine may cause a serious allergic reaction called multiorgan hypersensitivity, which can damage organs and be life-threatening. · Do not stop using this medicine suddenly. Your doctor will need to slowly decrease your dose before you stop it completely. If you take this medicine to prevent seizures, your seizures may return or occur more often if you stop this medicine suddenly. · This medicine may make you dizzy or drowsy. Do not drive or do anything else that could be dangerous until you know how this medicine affects you. · Tell any doctor or dentist who treats you that you are using this medicine. This medicine may affect certain medical test results. · Your doctor will check your progress and the effects of this medicine at regular visits. Keep all appointments. · Keep all medicine out of the reach of children. Never share your medicine with anyone.   Possible Side Effects While Using This Medicine:   Call your doctor right away if you notice any of these side effects:  · Allergic reaction: Itching or hives, swelling in your face or hands, swelling or tingling in your mouth or throat, chest tightness, trouble breathing  · Behavior problems, aggression, restlessness, trouble concentrating, moodiness (especially in children)  · Blistering, peeling, red skin rash  · Change in how much or how often you urinate, bloody or cloudy urine,  · Chest pain, fast heartbeat, trouble breathing  · Dark urine or pale stools, nausea, vomiting, loss of appetite, stomach pain, yellow skin or eyes  · Fever, rash, swollen or tender glands in the neck, armpit, or groin  · Problems with coordination, shakiness, unsteadiness  · Rapid weight gain, swelling in your hands, ankles, or feet  · Unusual moods or behaviors, thoughts of hurting yourself, feeling depressed  If you notice these less serious side effects, talk with your doctor:   · Dizziness, drowsiness, sleepiness, tiredness  If you notice other side effects that you think are caused by this medicine, tell your doctor. Call your doctor for medical advice about side effects. You may report side effects to FDA at 8-061-FDA-8577  © 2017 [facility] Information is for End User's use only and may not be sold, redistributed or otherwise used for commercial purposes. The above information is an  only. It is not intended as medical advice for individual conditions or treatments. Talk to your doctor, nurse or pharmacist before following any medical regimen to see if it is safe and effective for you.
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
## 2019-10-16 ENCOUNTER — OFFICE VISIT (OUTPATIENT)
Dept: ORTHOPEDIC SURGERY | Age: 54
End: 2019-10-16

## 2019-10-16 VITALS
HEIGHT: 69 IN | BODY MASS INDEX: 30.51 KG/M2 | SYSTOLIC BLOOD PRESSURE: 111 MMHG | HEART RATE: 59 BPM | WEIGHT: 206 LBS | DIASTOLIC BLOOD PRESSURE: 80 MMHG

## 2019-10-16 DIAGNOSIS — R20.0 NUMBNESS AND TINGLING IN BOTH HANDS: Primary | ICD-10-CM

## 2019-10-16 DIAGNOSIS — R94.131 ABNORMAL EMG: ICD-10-CM

## 2019-10-16 DIAGNOSIS — R20.2 NUMBNESS AND TINGLING IN BOTH HANDS: Primary | ICD-10-CM

## 2019-10-16 NOTE — PROGRESS NOTES
[address]  [address], [address]  [address]  Phone: [phone]  Fax: [phone]        Alexis Young  : 1965  PCP: Cira Cerrato NP  10/16/2019    ELECTROMYOGRAPHY AND NERVE CONDUCTION STUDIES    Afua Campbell was referred by Dr. Bee Li for electrodiagnostic evaluation of bilateral hand numbness and tingling. NCV & EMG Findings:  Evaluation of the left median motor and the right median motor nerves showed prolonged distal onset latency (L4.4, R4.5 ms). The left ulnar motor and the right ulnar motor nerves showed decreased conduction velocity (A Elbow-B Elbow, L38, R42 m/s). The left median sensory and the right median sensory nerves showed prolonged distal peak latency (L4.6, R4.4 ms) and decreased conduction velocity (Wrist-2nd Digit, L30, R32 m/s). The left ulnar sensory nerve showed prolonged distal peak latency (4.1 ms), reduced amplitude (10.5 µV), and decreased conduction velocity (Wrist-5th Digit, 34 m/s). The right ulnar sensory nerve showed prolonged distal peak latency (5.7 ms) and decreased conduction velocity (Wrist-5th Digit, 25 m/s). The left median/ulnar (palm) comparison nerve showed no response (Median Palm) and no response (Ulnar Palm). All remaining nerves (as indicated in the following tables) were within normal limits. Left vs. Right side comparison data for the ulnar motor nerve indicates abnormal L-R latency difference (0.8 ms). The median sensory nerve indicates abnormal L-R amplitude difference (63.1 %). The ulnar sensory nerve indicates abnormal L-R latency difference (1.6 ms). All remaining left vs. right side differences were within normal limits. All examined muscles (as indicated in the following table) showed no evidence of electrical instability. INTERPRETATION  This is an abnormal electrodiagnostic examination. These findings may be consistent with:  1. 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 Moderate median mononeuropathy at the wrist bilaterally (carpal tunnel syndrome)  3. Severe neuropathy focally related to both median and ulnar palmar branches    There is no electrodiagnostic evidence of any cervical radiculopathy, brachial plexopathy, peripheral polyneuropathy, or any other mononeuropathy. CLINICAL INTERPRETATION  His electrodiagnostic findings in the median and ulnar nerves are consistent with his symptoms of numbness and tingling in both hands. HISTORY OF PRESENT ILLNESS  Mayelin Valentine is a 46 y.o. male. Pt presents today for BUE EMG evaluation of bilateral hand numbness and tingling x 6-7 weeks. Pt notes that he has bilateral wrist, hand, and elbow pain. He has found improvement from bracing and minimal relief from bilateral carpal tunnel injections. He is scheduled to have a left rotator cuff repair on 10/21. He recently injured his right shoulder when a truck door fell on it. Pt notes that he injured his left hand in 2016 where he cut the palm and multiple digits. PAST MEDICAL HISTORY   Past Medical History:   Diagnosis Date    Depression     Foot drop, left foot     October, 2018. Seen by Podiatry in 2019    Headache     Psychotic disorder ([facility] [address]     anxiety       Past Surgical History:   Procedure Laterality Date    HX HERNIA REPAIR      HX KNEE ARTHROSCOPY Right     HX ORTHOPAEDIC Left 2017    lacerations to fingers and had pins, left thumb    HX ROTATOR CUFF REPAIR Right    . MEDICATIONS    Current Outpatient Medications   Medication Sig Dispense Refill    gabapentin (NEURONTIN) 100 mg capsule Take 2 Tab at night and 1 Tab in the morning 90 Cap 1    ergocalciferol (ERGOCALCIFEROL) 50,000 unit capsule TK 1 C PO WEEKLY      escitalopram oxalate (LEXAPRO) 10 mg tablet Indications: not taking  0    sertraline (ZOLOFT) 50 mg tablet Take  by mouth daily.       naltrexone (DEPADE) 50 mg tablet Take 50 mg by mouth daily.  acamprosate (CAMPRAL) 333 mg tablet Take 666 mg by mouth three (3) times daily. ALLERGIES  Allergies   Allergen Reactions    Motrin [Ibuprofen] Hives          SOCIAL HISTORY    Social History     Socioeconomic History    Marital status:      Spouse name: Not on file    Number of children: Not on file    Years of education: Not on file    Highest education level: Not on file   Tobacco Use    Smoking status: Never Smoker    Smokeless tobacco: Current User    Tobacco comment: chew tobacco   Substance and Sexual Activity    Alcohol use: Yes     Comment: 30 packs x 2 a day    Drug use: Yes     Types: Marijuana    Sexual activity: Yes       FAMILY HISTORY  Family History   Problem Relation Age of Onset    COPD Mother          PHYSICAL EXAMINATION  Visit Vitals  /80   Pulse (!) 59   Ht 5' 9\" (1.753 m)   Wt 206 lb (93.4 kg)   BMI 30.42 kg/m²       Pain Assessment  10/15/2019   Location of Pain Neck; Shoulder   Location Modifiers Left   Severity of Pain 9   Quality of Pain Dull;Aching; Jamal Rase; Other (Comment)   Quality of Pain Comment stabbing   Duration of Pain Persistent   Frequency of Pain Constant   Aggravating Factors Other (Comment)   Aggravating Factors Comment all movements   Limiting Behavior -   Relieving Factors Rest   Relieving Factors Comment -   Result of Injury -   Work-Related Injury -   How long out of work? -   Type of Injury -           Constitutional:  Well developed, well nourished, in no acute distress. Psychiatric: Affect and mood are appropriate. Integumentary: No rashes or abrasions noted on exposed areas. SPINE/MUSCULOSKELETAL EXAM      On brief examination: + carpal compress, carpal tinel, and Phalen's bilaterally.     MOTOR:      Biceps  Triceps Deltoids Wrist Ext Wrist Flex Hand Intrin   Right 5/5 5/5 5/5 5/5 5/5 5/5   Left 5/5 5/5 5/5 5/5 5/5 5/5             Hip Flex  Quads Hamstrings Ankle DF EHL Ankle PF   Right 5/5 5/5 5/5 5/5 5/5 5/5   Left 5/5 5/5 5/5 5/5 5/5 5/5     NCV & EMG Findings:  Nerve Conduction Studies  Anti Sensory Summary Table     Stim Site NR Peak (ms) Norm Peak (ms) O-P Amp (µV) Norm O-P Amp Site1 Site2 Delta-P (ms) Dist (cm) Gee (m/s) Norm Gee (m/s)   Left Median Anti Sensory (2nd Digit)   Wrist    4.6 <3.6 14.7 >10 Wrist 2nd Digit 4.6 14.0 30 >39   Right Median Anti Sensory (2nd Digit)   Wrist    4.4 <3.6 39.8 >10 Wrist 2nd Digit 4.4 14.0 32 >39   Left Radial Anti Sensory (Base 1st Digit)   Wrist    2.4 <3.1 27.7  Wrist Base 1st Digit 2.4 0.0     Right Radial Anti Sensory (Base 1st Digit)   Wrist    2.2 <3.1 24.0  Wrist Base 1st Digit 2.2 0.0     Left Ulnar Anti Sensory (5th Digit)   Wrist    4.1 <3.7 10.5 >15.0 Wrist 5th Digit 4.1 14.0 34 >38   Right Ulnar Anti Sensory (5th Digit)   Wrist    5.7 <3.7 18.6 >15.0 Wrist 5th Digit 5.7 14.0 25 >38   B Elbow    5.5  22.9  B Elbow Wrist 0.2 0.0  >47     Motor Summary Table     Stim Site NR Onset (ms) Norm Onset (ms) O-P Amp (mV) Norm O-P Amp Site1 Site2 Delta-0 (ms) Dist (cm) Gee (m/s) Norm Gee (m/s)   Left Median Motor (Abd Poll Brev)   Wrist    4.4 <4.2 6.1 >5 Elbow Wrist 3.7 23.0 62 >50   Elbow    8.1  5.8          Right Median Motor (Abd Poll Brev)   Wrist    4.5 <4.2 10.4 >5 Elbow Wrist 3.9 24.5 63 >50   Elbow    8.4  7.9          Left Ulnar Motor (Abd Dig Min)   Wrist    3.8 <4.2 11.1 >3 B Elbow Wrist 2.9 20.0 69 >53   B Elbow    6.7  10.0  A Elbow B Elbow 2.6 10.0 38 >53   A Elbow    9.3  9.7          Right Ulnar Motor (Abd Dig Min)   Wrist    3.0 <4.2 9.0 >3 B Elbow Wrist 3.1 20.0 65 >53   B Elbow    6.1  8.9  A Elbow B Elbow 2.4 10.0 42 >53   A Elbow    8.5  8.6            Comparison Summary Table     Stim Site NR Peak (ms) Norm Peak (ms) O-P Amp (µV) Site1 Site2 Delta-P (ms) Norm Delta (ms)   Left Median/Ulnar Palm Comparison (Wrist - 8cm)   Median Palm NR  <2.5  Median Palm Ulnar Palm  <0.3   Ulnar Palm NR 2.5 <2.5 19.5 EMG     Side Muscle Nerve Root Ins Act Fibs Psw Amp Dur Poly Recrt Int [facility] Comment   Left Biceps Musculocut C5-6 Nml Nml Nml Nml Nml 0 Nml Nml    Left Triceps Radial C6-7-8 Nml Nml Nml Nml Nml 0 Nml Nml    Left PronatorTeres Median C6-7 Nml Nml Nml Nml Nml 0 Nml Nml    Left Abd Poll Brev Median C8-T1 Nml Nml Nml Nml Nml 0 Nml Nml    Left 1stDorInt Ulnar C8-T1 Nml Nml Nml Nml Nml 0 Nml Nml    Right Biceps Musculocut C5-6 Nml Nml Nml Nml Nml 0 Nml Nml    Right Triceps Radial C6-7-8 Nml Nml Nml Nml Nml 0 Nml Nml    Right PronatorTeres Median C6-7 Nml Nml Nml Nml Nml 0 Nml Nml    Right Abd Poll Brev Median C8-T1 Nml Nml Nml Nml Nml 0 Nml Nml    Right 1stDorInt Ulnar C8-T1 Nml Nml Nml Nml Nml 0 Nml Nml    Left FlexCarpiUln Ulnar C8,T1 Nml Nml Nml Nml Nml 0 Nml Nml    Right FlexCarpiUln Ulnar C8,T1 Nml Nml Nml Nml Nml 0 Nml Nml        Nerve Conduction Studies  Anti Sensory Left/Right Comparison     Stim Site L Lat (ms) R Lat (ms) L-R Lat (ms) L Amp (µV) R Amp (µV) L-R Amp (%) Site1 Site2 L Gee (m/s) R Gee (m/s) L-R Gee (m/s)   Median Anti Sensory (2nd Digit)   Wrist 4.6 4.4 0.2 14.7 39.8 63.1 Wrist 2nd Digit 30 32 2   Radial Anti Sensory (Base 1st Digit)   Wrist 2.4 2.2 0.2 27.7 24.0 13.4 Wrist Base 1st Digit      Ulnar Anti Sensory (5th Digit)   Wrist 4.1 5.7 1.6 10.5 18.6 43.5 Wrist 5th Digit 34 25 9     Motor Left/Right Comparison     Stim Site L Lat (ms) R Lat (ms) L-R Lat (ms) L Amp (mV) R Amp (mV) L-R Amp (%) Site1 Site2 L Gee (m/s) R Gee (m/s) L-R Gee (m/s)   Median Motor (Abd Poll Brev)   Wrist 4.4 4.5 0.1 6.1 10.4 41.3 Elbow Wrist 62 63 1   Elbow 8.1 8.4 0.3 5.8 7.9 26.6        Ulnar Motor (Abd Dig Min)   Wrist 3.8 3.0 0.8 11.1 9.0 18.9 B Elbow Wrist 69 65 4   B Elbow 6.7 6.1 0.6 10.0 8.9 11.0 A Elbow B Elbow 38 42 4   A Elbow 9.3 8.5 0.8 9.7 8.6 11.3          Comparison Left/Right Comparison     Stim Site L Lat (ms) R Lat (ms) L-R Lat (ms) L Amp (µV) R Amp (µV) L-R Amp (%)   Median/Ulnar Palm Comparison (Wrist - 8cm)   Median Palm         Ulnar Palm 2.5   19.5           Waveforms:                                   [facility]  OFFICE PROCEDURE PROGRESS NOTE        Chart reviewed for the following:   Maryse BAIRD, have reviewed the History, Physical and updated the Allergic reactions for [address] performed immediately prior to start of procedure:   Toi Hickman, have performed the following reviews on Kevin Economy prior to the start of the procedure:            * Patient was identified by name and date of birth   * Agreement on procedure being performed was verified  * Risks and Benefits explained to the patient  * Procedure site verified and marked as necessary  * Patient was positioned for comfort  * Consent was signed and verified     Time: 4:49 PM      Date of procedure: 10/16/2019    Procedure performed by:  Srinivasna Ray MD    Provider accompanied by: Ludmila.     Patient accompanied by: None    How tolerated by patient: tolerated the procedure well with no complications    Post Procedural Pain Scale: 0 - No Hurt    Comments: none    Written by Shawn Conway, [address] as dictated by Maryse Cherry MD

## 2019-10-17 ENCOUNTER — HOSPITAL ENCOUNTER (OUTPATIENT)
Dept: PREADMISSION TESTING | Age: 54
Discharge: HOME | End: 2019-10-17
Payer: MEDICAID

## 2019-10-17 ENCOUNTER — HOSPITAL ENCOUNTER (OUTPATIENT)
Dept: PHYSICAL THERAPY | Age: 54
Discharge: HOME | End: 2019-10-17
Payer: MEDICAID

## 2019-10-17 ENCOUNTER — TELEPHONE (OUTPATIENT)
Dept: ORTHOPEDIC SURGERY | Age: 54
End: 2019-10-17

## 2019-10-17 ENCOUNTER — OFFICE VISIT (OUTPATIENT)
Dept: ORTHOPEDIC SURGERY | Age: 54
End: 2019-10-17

## 2019-10-17 VITALS
DIASTOLIC BLOOD PRESSURE: 74 MMHG | WEIGHT: 202.4 LBS | TEMPERATURE: 98.1 F | HEART RATE: 70 BPM | OXYGEN SATURATION: 97 % | HEIGHT: 69 IN | SYSTOLIC BLOOD PRESSURE: 110 MMHG | RESPIRATION RATE: 16 BRPM | BODY MASS INDEX: 29.98 KG/M2

## 2019-10-17 DIAGNOSIS — M75.42 IMPINGEMENT SYNDROME OF LEFT SHOULDER: ICD-10-CM

## 2019-10-17 DIAGNOSIS — Z01.810 PREOP CARDIOVASCULAR EXAM: ICD-10-CM

## 2019-10-17 DIAGNOSIS — G56.03 BILATERAL CARPAL TUNNEL SYNDROME: Primary | ICD-10-CM

## 2019-10-17 DIAGNOSIS — S46.012A TRAUMATIC COMPLETE TEAR OF LEFT ROTATOR CUFF, INITIAL ENCOUNTER: ICD-10-CM

## 2019-10-17 DIAGNOSIS — G56.23 CUBITAL TUNNEL SYNDROME, BILATERAL: ICD-10-CM

## 2019-10-17 DIAGNOSIS — Z01.812 PRE-OPERATIVE LABORATORY EXAMINATION: ICD-10-CM

## 2019-10-17 DIAGNOSIS — S40.252A: ICD-10-CM

## 2019-10-17 LAB
ANION GAP SERPL CALC-SCNC: 3 MMOL/L (ref 3–18)
ATRIAL RATE: 56 BPM
BASOPHILS # BLD: 0 K/UL (ref 0–0.1)
BASOPHILS NFR BLD: 0 % (ref 0–2)
BUN SERPL-MCNC: 11 MG/DL (ref 7–18)
BUN/CREAT SERPL: 14 (ref 12–20)
CALCIUM SERPL-MCNC: 8.5 MG/DL (ref 8.5–10.1)
CALCULATED P AXIS, ECG09: 34 DEGREES
CALCULATED R AXIS, ECG10: 9 DEGREES
CALCULATED T AXIS, ECG11: 20 DEGREES
CHLORIDE SERPL-SCNC: 105 MMOL/L (ref 100–111)
CO2 SERPL-SCNC: 33 MMOL/L (ref 21–32)
CREAT SERPL-MCNC: 0.81 MG/DL (ref 0.6–1.3)
DIAGNOSIS, 93000: NORMAL
DIFFERENTIAL METHOD BLD: NORMAL
EOSINOPHIL # BLD: 0.1 K/UL (ref 0–0.4)
EOSINOPHIL NFR BLD: 1 % (ref 0–5)
ERYTHROCYTE [DISTWIDTH] IN BLOOD BY AUTOMATED COUNT: 11.9 % (ref 11.6–14.5)
GLUCOSE SERPL-MCNC: 114 MG/DL (ref 74–99)
HCT VFR BLD AUTO: 46.4 % (ref 36–48)
HGB BLD-MCNC: 15.9 G/DL (ref 13–16)
LYMPHOCYTES # BLD: 1.6 K/UL (ref 0.9–3.6)
LYMPHOCYTES NFR BLD: 24 % (ref 21–52)
MCH RBC QN AUTO: 32.7 PG (ref 24–34)
MCHC RBC AUTO-ENTMCNC: 34.3 G/DL (ref 31–37)
MCV RBC AUTO: 95.5 FL (ref 74–97)
MONOCYTES # BLD: 0.6 K/UL (ref 0.05–1.2)
MONOCYTES NFR BLD: 9 % (ref 3–10)
NEUTS SEG # BLD: 4.5 K/UL (ref 1.8–8)
NEUTS SEG NFR BLD: 66 % (ref 40–73)
P-R INTERVAL, ECG05: 132 MS
PLATELET # BLD AUTO: 185 K/UL (ref 135–420)
PMV BLD AUTO: 11 FL (ref 9.2–11.8)
POTASSIUM SERPL-SCNC: 4.6 MMOL/L (ref 3.5–5.5)
Q-T INTERVAL, ECG07: 402 MS
QRS DURATION, ECG06: 86 MS
QTC CALCULATION (BEZET), ECG08: 387 MS
RBC # BLD AUTO: 4.86 M/UL (ref 4.7–5.5)
SODIUM SERPL-SCNC: 141 MMOL/L (ref 136–145)
VENTRICULAR RATE, ECG03: 56 BPM
WBC # BLD AUTO: 6.8 K/UL (ref 4.6–13.2)

## 2019-10-17 PROCEDURE — 85025 COMPLETE CBC W/AUTO DIFF WBC: CPT

## 2019-10-17 PROCEDURE — 97035 APP MDLTY 1+ULTRASOUND EA 15: CPT

## 2019-10-17 PROCEDURE — 97140 MANUAL THERAPY 1/> REGIONS: CPT

## 2019-10-17 PROCEDURE — 97535 SELF CARE MNGMENT TRAINING: CPT

## 2019-10-17 PROCEDURE — 80048 BASIC METABOLIC PNL TOTAL CA: CPT

## 2019-10-17 PROCEDURE — 93005 ELECTROCARDIOGRAM TRACING: CPT

## 2019-10-17 PROCEDURE — 36415 COLL VENOUS BLD VENIPUNCTURE: CPT

## 2019-10-17 NOTE — PROGRESS NOTES
Angel Natarajan is a 46 y.o. male right handed unknown employment. Worker's Compensation and legal considerations: none filed. Vitals:    10/17/19 1442   BP: 110/74   Pulse: 70   Resp: 16   Temp: 98.1 °F (36.7 °C)   TempSrc: Oral   SpO2: 97%   Weight: 202 lb 6.4 oz (91.8 kg)   Height: 5' 9\" (1.753 m)   PainSc:   6   PainLoc: Hand           Chief Complaint   Patient presents with    Hand Pain     artur hand pain       HPI: Patient comes in today for EMG follow-up of his bilateral upper extremities. He has been diagnosed with carpal tunnel syndrome and cubital tunnel syndrome bilaterally. He is scheduled for a right rotator cuff surgery on Monday. Previous HPI: Patient comes in today for follow-up after getting bilateral hand carpal tunnel injections approximately 6 to 7 weeks ago. He was supposed to have an EMG and nerve conduction study set up for both hands but he said he was never called. He reports also that the braces he was given last time did help with the injections only helped minimally. He also reports bilateral index and middle finger MCP pain that is worse on the right than the left. Initial HPI: Patient comes in today with complaints of bilateral hand numbness and tingling. He reports that he had a left carpal tunnel diagnosis after an EMG several years ago. He has not had any treatment for it. He also has a cyst on the right wrist that was injected several months ago but did not help. He also reports a history of having digits amputated that were placed back on many years ago on the left hand. He has had chronic pain from scar tissue in his hand.     Date of onset: Indeterminate    Injury: Yes: Comment: Old injury to left hand pain involving digital amputations    Prior Treatment:  No    Numbness/ Tingling: Yes: Comment: Bilateral hands left worse than right    ROS: Review of Systems - General ROS: negative  Respiratory ROS: no cough, shortness of breath, or wheezing  Cardiovascular ROS: no chest pain or dyspnea on exertion  Musculoskeletal ROS: positive for - pain in hand - bilateral  Neurological ROS: positive for - numbness/tingling  Dermatological ROS: negative    Past Medical History:   Diagnosis Date    Depression     Foot drop, left foot     October, 2018. Seen by Podiatry in 2019    Headache     Psychotic disorder ([facility] [address]     anxiety       Past Surgical History:   Procedure Laterality Date    HX HERNIA REPAIR      HX KNEE ARTHROSCOPY Right     HX ORTHOPAEDIC Left 2017    lacerations to fingers and had pins, left thumb    HX ROTATOR CUFF REPAIR Right        Current Outpatient Medications   Medication Sig Dispense Refill    gabapentin (NEURONTIN) 100 mg capsule Take 2 Tab at night and 1 Tab in the morning 90 Cap 1    escitalopram oxalate (LEXAPRO) 10 mg tablet Indications: not taking  0    ergocalciferol (ERGOCALCIFEROL) 50,000 unit capsule TK 1 C PO WEEKLY      sertraline (ZOLOFT) 50 mg tablet Take  by mouth daily.  naltrexone (DEPADE) 50 mg tablet Take 50 mg by mouth daily.  acamprosate (CAMPRAL) 333 mg tablet Take 666 mg by mouth three (3) times daily. Allergies   Allergen Reactions    Motrin [Ibuprofen] Hives         PE:       Right hand: There is tenderness to palpation about the MCP joints of the index finger and middle finger bilaterally significantly worse on the right. There is decreased range of motion in bilateral hands. There is some scar tissue noted in the left hand in the palm as well as multiple digits. Sensation is diminished in all digits bilaterally. There is also a cystic mass noted about the right volar wrist.    NEUROVASCULAR    Examination L R Examination L R   Carpal Comp. + + Pronator Comp. - -   Carpal Tinel + + Pronator Tinel - -   Phalen's + + Pronator Stress - -   Cubital Comp. ? ? Guyon Comp. - -   Cubital Tinel ? ? No Guyon Tinel - -   Elbow Hyperflexion - - Adson's - -   Spurling's - - SC Comp.  - -   PCB Median abn - - SC Tinel - -   Radial Tinel - - IC Comp. - -   Digital Tinel - - IC Tinel - -   Radial 2-Pt WNL WNL Ulnar 2-Pt WNL WNL     Radial Pulse: 2+  Capillary Refill: < 2 sec  [place]: Not Performed  [employer]: Not Performed      Imaging:    Plain films of bilateral wrist does not show substantial degenerative changes  Fracture dislocation or any other osseous abnormalities. NCV & EMG Findings:  Evaluation of the left median motor and the right median motor nerves showed prolonged distal onset latency (L4.4, R4.5 ms). The left ulnar motor and the right ulnar motor nerves showed decreased conduction velocity (A Elbow-B Elbow, L38, R42 m/s). The left median sensory and the right median sensory nerves showed prolonged distal peak latency (L4.6, R4.4 ms) and decreased conduction velocity (Wrist-2nd Digit, L30, R32 m/s). The left ulnar sensory nerve showed prolonged distal peak latency (4.1 ms), reduced amplitude (10.5 µV), and decreased conduction velocity (Wrist-5th Digit, 34 m/s). The right ulnar sensory nerve showed prolonged distal peak latency (5.7 ms) and decreased conduction velocity (Wrist-5th Digit, 25 m/s). The left median/ulnar (palm) comparison nerve showed no response (Median Palm) and no response (Ulnar Palm). All remaining nerves (as indicated in the following tables) were within normal limits. Left vs. Right side comparison data for the ulnar motor nerve indicates abnormal L-R latency difference (0.8 ms). The median sensory nerve indicates abnormal L-R amplitude difference (63.1 %). The ulnar sensory nerve indicates abnormal L-R latency difference (1.6 ms). All remaining left vs. right side differences were within normal limits.       All examined muscles (as indicated in the following table) showed no evidence of electrical instability.          INTERPRETATION  This is an abnormal electrodiagnostic examination. These findings may be consistent with:  1.  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 Moderate median mononeuropathy at the wrist bilaterally (carpal tunnel syndrome)  3. Severe neuropathy focally related to both median and ulnar palmar branches     There is no electrodiagnostic evidence of any cervical radiculopathy, brachial plexopathy, peripheral polyneuropathy, or any other mononeuropathy.     CLINICAL INTERPRETATION  His electrodiagnostic findings in the median and ulnar nerves are consistent with his symptoms of numbness and tingling in both hands.          ICD-10-CM ICD-9-CM    1. Bilateral carpal tunnel syndrome G56.03 354.0    2. Cubital tunnel syndrome, bilateral G56.23 354.2        Plan: At this point I had a discussion with the patient regarding delaying his carpal tunnel injections until after postoperative visit with . Follow-up on November 6 for reevaluation and possible injections and discussion about surgery for Carpal Tunl. and Cubital Tunl.     Plan was reviewed with patient, who verbalized agreement and understanding of the plan

## 2019-10-17 NOTE — TELEPHONE ENCOUNTER
Pt stated he need to know if he will need hand therapy after surgery, pt can be reached at [phone]

## 2019-10-17 NOTE — PROGRESS NOTES
OT DAILY TREATMENT NOTE 10-18    Patient Name: Man Stacy  Date:10/17/2019  : 1965  [x]  Patient  Verified  Payor: [employer] / Plan: [employer] / Product Type: Managed Care Medicaid /    In time:3:32 PM  Out time:4:38 PM  Total Treatment Time (min): 66  Visit #: 2 of 8    Treatment Area: Carpal tunnel syndrome, bilateral upper limbs [G56.03]    SUBJECTIVE  Pain Level (0-10 scale): 6-10/10  Any medication changes, allergies to medications, adverse drug reactions, diagnosis change, or new procedure performed?: [x] No    [] Yes (see summary sheet for update)  Subjective functional status/changes:   [] No changes reported  \"I get the sharp pains, the doctor said I have problems with my wrists and elbows. \"    OBJECTIVE    Modality rationale: decrease inflammation, decrease pain and increase tissue extensibility to improve the patients ability to reduce symptoms in bilateral wrists. Min Type Additional Details    [] Estim:  []Unatt       []IFC  []Premod                        []Other:  []w/ice   []w/heat  Position:  Location:    [] Estim: []Att    []TENS instruct  []NMES                    []Other:  []w/US   []w/ice   []w/heat  Position:  Location:    []  Traction: [] Cervical       []Lumbar                       [] Prone          []Supine                       []Intermittent   []Continuous Lbs:  [] before manual  [] after manual   10x2 [x]  Ultrasound: []Continuous   [x] Pulsed                           []1MHz   [x]3MHz W/cm2:1.0  Location:bilateral volar wrists.     []  Iontophoresis with dexamethasone         Location: [] Take home patch   [] In clinic   10 []  Ice     [x]  heat  []  Ice massage  []  Laser   []  Anodyne Position: resting  Location: bi    []  Laser with stim  []  Other:  Position:  Location:    []  Vasopneumatic Device Pressure:       [] lo [] med [] hi   Temperature: [] lo [] med [] hi       [x] Skin assessment post-treatment:  [x]intact [x]redness- no adverse reaction    []redness - adverse reaction:      21 min Manual Therapy:  IASTM using tool #5 using sweeping technique   Rationale: decrease pain, increase ROM, increase tissue extensibility and decrease edema  to bilateral dorsum hands and wrists. 15 min Self Care/Home Management: activity modification, splint wear, scar mgmt   Rationale: education  to improve the patients ability to reduce symptoms in bilateral wrists    With   [] TE   [] TA   [] neuro   [] other: Patient Education: [x] Review HEP    [] Progressed/Changed HEP based on:   [] positioning   [] body mechanics   [] transfers   [] heat/ice application   [] Splint wear/care   [] Sensory re-education   [] scar management      [] other:            Other Objective/Functional Measures: Increased paresthesias with wrist activity. Pain Level (0-10 scale) post treatment: 6/10    ASSESSMENT/Changes in Function: Initiated IASTM and US during this treatment session and patient tolerated this well overall with some difficulty positioning left wrist due to left shoulder RTC tear. Advised patient to complete scar massage on left digits from amputation surgery. Today, pt reported he consumes a 30 pack of beer daily and attends AA meetings however he denies drinking alcohol prior to skilled OT treatment session today. Patient will continue to benefit from skilled OT services to modify and progress therapeutic interventions, address ROM deficits, address strength deficits and analyze and address soft tissue restrictions to attain remaining goals. []  See Plan of Care  []  See progress note/recertification  []  See Discharge Summary         Progress towards goals / Updated goals:  Short Term Goals: To be accomplished in 2  weeks:  1. Patient will be compliant with initial home exercise program to take an active role in their rehabilitation process.   Status at [facility]: pt provided and instructed in initial HEP  10/17/19 - pt reports completing HEP as best he could with digit ROM difficulty. 2. Patient will demonstrate a good understanding of their condition and strategies for self-management. Status at Eval: Pt educated on wrist immobilization splint wear and activity modifications to reduce paresthesias 10/17/19 - pt reports wearing wrist immobilization splints at night  Long Term Goals: To be accomplished in 4 weeks:              1. Patient will report 75% reduced symptoms in bilateral wrists in order to increase use of hands for daily tasks. Status at eval: 100% of the time. 2. Patient will report reduced pain in hands to 5/10 in order to carry shopping bags witout increased pain. Status at eval: 9/10  3. Patient will improve FOTO outcome measure score by 15 points in order to demonstrate improved functional performance using bilateral hands.   Status at eval: 43     PLAN  []  Upgrade activities as tolerated     [x]  Continue plan of care  []  Update interventions per flow sheet       []  Discharge due to:_  []  Other:_      Murray Canchola OT 10/17/2019  3:34 PM    Future Appointments   Date Time Provider Abdullahi Horn   10/24/2019  8:00 AM ZAC Castellanos [place]   11/1/2019  1:20 PM Harpal Hawley MD [facility] [place] SCHED   11/14/2019  3:45 PM Briana Moore  [address]

## 2019-10-17 NOTE — PROGRESS NOTES
1. Have you been to the ER, urgent care clinic since your last visit? Hospitalized since your last visit? No    2. Have you seen or consulted any other health care providers outside of the [address] [place] [address] since your last visit? Include any pap smears or colon screening.  No

## 2019-10-20 ENCOUNTER — ANESTHESIA EVENT (OUTPATIENT)
Dept: SURGERY | Age: 54
End: 2019-10-20
Payer: MEDICAID

## 2019-10-21 ENCOUNTER — ANESTHESIA (OUTPATIENT)
Dept: SURGERY | Age: 54
End: 2019-10-21
Payer: MEDICAID

## 2019-10-21 ENCOUNTER — HOSPITAL ENCOUNTER (OUTPATIENT)
Age: 54
Setting detail: OUTPATIENT SURGERY
Discharge: HOME | End: 2019-10-21
Attending: ORTHOPAEDIC SURGERY | Admitting: ORTHOPAEDIC SURGERY
Payer: MEDICAID

## 2019-10-21 VITALS
HEART RATE: 61 BPM | WEIGHT: 199 LBS | RESPIRATION RATE: 16 BRPM | SYSTOLIC BLOOD PRESSURE: 105 MMHG | DIASTOLIC BLOOD PRESSURE: 72 MMHG | OXYGEN SATURATION: 94 % | HEIGHT: 69 IN | TEMPERATURE: 96.8 F | BODY MASS INDEX: 29.47 KG/M2

## 2019-10-21 DIAGNOSIS — G89.18 PAIN FOLLOWING SURGERY OR PROCEDURE: Primary | ICD-10-CM

## 2019-10-21 LAB
AMPHET UR QL SCN: NEGATIVE
BARBITURATES UR QL SCN: NEGATIVE
BENZODIAZ UR QL: NEGATIVE
CANNABINOIDS UR QL SCN: POSITIVE
COCAINE UR QL SCN: NEGATIVE
HDSCOM,HDSCOM: ABNORMAL
METHADONE UR QL: NEGATIVE
OPIATES UR QL: NEGATIVE
PCP UR QL: NEGATIVE

## 2019-10-21 PROCEDURE — 77030013789 HC KT REP BIO TEND ARTH -C: Performed by: ORTHOPAEDIC SURGERY

## 2019-10-21 PROCEDURE — C1713 ANCHOR/SCREW BN/BN,TIS/BN: HCPCS | Performed by: ORTHOPAEDIC SURGERY

## 2019-10-21 PROCEDURE — 77030008574 HC TBNG SUC IRR STRY -B: Performed by: ORTHOPAEDIC SURGERY

## 2019-10-21 PROCEDURE — 77030019908 HC STETH ESOPH SIMS -A: Performed by: ANESTHESIOLOGY

## 2019-10-21 PROCEDURE — 74011250637 HC RX REV CODE- 250/637: Performed by: NURSE ANESTHETIST, CERTIFIED REGISTERED

## 2019-10-21 PROCEDURE — 77030002967 HC SUT PDS J&J -B: Performed by: ORTHOPAEDIC SURGERY

## 2019-10-21 PROCEDURE — 76060000035 HC ANESTHESIA 2 TO 2.5 HR: Performed by: ORTHOPAEDIC SURGERY

## 2019-10-21 PROCEDURE — 77030022036 HC BLD SHV TOMCAT STRY -B: Performed by: ORTHOPAEDIC SURGERY

## 2019-10-21 PROCEDURE — 76942 ECHO GUIDE FOR BIOPSY: CPT | Performed by: ANESTHESIOLOGY

## 2019-10-21 PROCEDURE — 74011000272 HC RX REV CODE- 272: Performed by: ORTHOPAEDIC SURGERY

## 2019-10-21 PROCEDURE — 76010000131 HC OR TIME 2 TO 2.5 HR: Performed by: ORTHOPAEDIC SURGERY

## 2019-10-21 PROCEDURE — 74011250636 HC RX REV CODE- 250/636: Performed by: ORTHOPAEDIC SURGERY

## 2019-10-21 PROCEDURE — 77030040922 HC BLNKT HYPOTHRM STRY -A: Performed by: ORTHOPAEDIC SURGERY

## 2019-10-21 PROCEDURE — 74011000250 HC RX REV CODE- 250: Performed by: NURSE ANESTHETIST, CERTIFIED REGISTERED

## 2019-10-21 PROCEDURE — 77030002916 HC SUT ETHLN J&J -A: Performed by: ORTHOPAEDIC SURGERY

## 2019-10-21 PROCEDURE — 77030008683 HC TU ET CUF COVD -A: Performed by: ANESTHESIOLOGY

## 2019-10-21 PROCEDURE — 76210000016 HC OR PH I REC 1 TO 1.5 HR: Performed by: ORTHOPAEDIC SURGERY

## 2019-10-21 PROCEDURE — 77030017964 HC PRB COAG4 STRY -C: Performed by: ORTHOPAEDIC SURGERY

## 2019-10-21 PROCEDURE — 77030010427: Performed by: ORTHOPAEDIC SURGERY

## 2019-10-21 PROCEDURE — 77030003598 HC NDL MULT/FIRE ARTH -C: Performed by: ORTHOPAEDIC SURGERY

## 2019-10-21 PROCEDURE — 80307 DRUG TEST PRSMV CHEM ANLYZR: CPT

## 2019-10-21 PROCEDURE — 77030003666 HC NDL SPINAL BD -A: Performed by: ORTHOPAEDIC SURGERY

## 2019-10-21 PROCEDURE — 76210000026 HC REC RM PH II 1 TO 1.5 HR: Performed by: ORTHOPAEDIC SURGERY

## 2019-10-21 PROCEDURE — 77030002919 HC SUT FBRTAPE ARTH -B: Performed by: ORTHOPAEDIC SURGERY

## 2019-10-21 PROCEDURE — 74011250636 HC RX REV CODE- 250/636: Performed by: NURSE ANESTHETIST, CERTIFIED REGISTERED

## 2019-10-21 PROCEDURE — 77030013079 HC BLNKT BAIR HGGR 3M -A: Performed by: ANESTHESIOLOGY

## 2019-10-21 PROCEDURE — 77030018836 HC SOL IRR NACL ICUM -A: Performed by: ORTHOPAEDIC SURGERY

## 2019-10-21 PROCEDURE — 74011250636 HC RX REV CODE- 250/636: Performed by: ANESTHESIOLOGY

## 2019-10-21 PROCEDURE — 77030019605: Performed by: ORTHOPAEDIC SURGERY

## 2019-10-21 PROCEDURE — 77030013079 HC BLNKT BAIR HGGR 3M -A: Performed by: ORTHOPAEDIC SURGERY

## 2019-10-21 PROCEDURE — 77030004453 HC BUR SHV STRY -B: Performed by: ORTHOPAEDIC SURGERY

## 2019-10-21 PROCEDURE — 64415 NJX AA&/STRD BRCH PLXS IMG: CPT | Performed by: ANESTHESIOLOGY

## 2019-10-21 PROCEDURE — 77030040361 HC SLV COMPR DVT MDII -B: Performed by: ORTHOPAEDIC SURGERY

## 2019-10-21 DEVICE — ANCHOR SUTURE 4.75X19.1 MM TIG TAPE LOOP WHT BLK PUSHLOCK: Type: IMPLANTABLE DEVICE | Site: SHOULDER | Status: FUNCTIONAL

## 2019-10-21 DEVICE — ANCHOR SUT L19.1MM DIA8MM BIOCOMP SWIVELOCK TENODESIS: Type: IMPLANTABLE DEVICE | Site: SHOULDER | Status: FUNCTIONAL

## 2019-10-21 DEVICE — ANCHOR SUTURE BIOCOMP 4.75X19.1 MM SWIVELOCK C: Type: IMPLANTABLE DEVICE | Site: SHOULDER | Status: FUNCTIONAL

## 2019-10-21 RX ORDER — SODIUM CHLORIDE, SODIUM LACTATE, POTASSIUM CHLORIDE, CALCIUM CHLORIDE 600; 310; 30; 20 MG/100ML; MG/100ML; MG/100ML; MG/100ML
75 INJECTION, SOLUTION INTRAVENOUS CONTINUOUS
Status: DISCONTINUED | OUTPATIENT
Start: 2019-10-21 | End: 2019-10-21 | Stop reason: HOSPADM

## 2019-10-21 RX ORDER — CEFAZOLIN SODIUM 2 G/50ML
2 SOLUTION INTRAVENOUS ONCE
Status: COMPLETED | OUTPATIENT
Start: 2019-10-21 | End: 2019-10-21

## 2019-10-21 RX ORDER — GLYCOPYRROLATE 0.2 MG/ML
INJECTION INTRAMUSCULAR; INTRAVENOUS AS NEEDED
Status: DISCONTINUED | OUTPATIENT
Start: 2019-10-21 | End: 2019-10-21 | Stop reason: HOSPADM

## 2019-10-21 RX ORDER — PROPOFOL 10 MG/ML
INJECTION, EMULSION INTRAVENOUS AS NEEDED
Status: DISCONTINUED | OUTPATIENT
Start: 2019-10-21 | End: 2019-10-21 | Stop reason: HOSPADM

## 2019-10-21 RX ORDER — MIDAZOLAM HYDROCHLORIDE 1 MG/ML
2 INJECTION, SOLUTION INTRAMUSCULAR; INTRAVENOUS ONCE
Status: COMPLETED | OUTPATIENT
Start: 2019-10-21 | End: 2019-10-21

## 2019-10-21 RX ORDER — ROPIVACAINE HYDROCHLORIDE 5 MG/ML
INJECTION, SOLUTION EPIDURAL; INFILTRATION; PERINEURAL
Status: DISCONTINUED | OUTPATIENT
Start: 2019-10-21 | End: 2019-10-21 | Stop reason: HOSPADM

## 2019-10-21 RX ORDER — ROCURONIUM BROMIDE 10 MG/ML
INJECTION, SOLUTION INTRAVENOUS AS NEEDED
Status: DISCONTINUED | OUTPATIENT
Start: 2019-10-21 | End: 2019-10-21 | Stop reason: HOSPADM

## 2019-10-21 RX ORDER — KETOROLAC TROMETHAMINE 15 MG/ML
INJECTION, SOLUTION INTRAMUSCULAR; INTRAVENOUS AS NEEDED
Status: DISCONTINUED | OUTPATIENT
Start: 2019-10-21 | End: 2019-10-21 | Stop reason: HOSPADM

## 2019-10-21 RX ORDER — NEOSTIGMINE METHYLSULFATE 1 MG/ML
INJECTION, SOLUTION INTRAVENOUS AS NEEDED
Status: DISCONTINUED | OUTPATIENT
Start: 2019-10-21 | End: 2019-10-21 | Stop reason: HOSPADM

## 2019-10-21 RX ORDER — DEXMEDETOMIDINE HYDROCHLORIDE 100 UG/ML
INJECTION, SOLUTION INTRAVENOUS AS NEEDED
Status: DISCONTINUED | OUTPATIENT
Start: 2019-10-21 | End: 2019-10-21 | Stop reason: HOSPADM

## 2019-10-21 RX ORDER — SODIUM CHLORIDE 0.9 % (FLUSH) 0.9 %
5-40 SYRINGE (ML) INJECTION AS NEEDED
Status: DISCONTINUED | OUTPATIENT
Start: 2019-10-21 | End: 2019-10-21 | Stop reason: HOSPADM

## 2019-10-21 RX ORDER — ROPIVACAINE HYDROCHLORIDE 5 MG/ML
30 INJECTION, SOLUTION EPIDURAL; INFILTRATION; PERINEURAL
Status: COMPLETED | OUTPATIENT
Start: 2019-10-21 | End: 2019-10-21

## 2019-10-21 RX ORDER — ONDANSETRON 2 MG/ML
INJECTION INTRAMUSCULAR; INTRAVENOUS AS NEEDED
Status: DISCONTINUED | OUTPATIENT
Start: 2019-10-21 | End: 2019-10-21 | Stop reason: HOSPADM

## 2019-10-21 RX ORDER — LIDOCAINE HYDROCHLORIDE 10 MG/ML
3 INJECTION, SOLUTION EPIDURAL; INFILTRATION; INTRACAUDAL; PERINEURAL ONCE
Status: DISCONTINUED | OUTPATIENT
Start: 2019-10-21 | End: 2019-10-21 | Stop reason: HOSPADM

## 2019-10-21 RX ORDER — FAMOTIDINE 20 MG/1
20 TABLET, FILM COATED ORAL ONCE
Status: COMPLETED | OUTPATIENT
Start: 2019-10-21 | End: 2019-10-21

## 2019-10-21 RX ORDER — LIDOCAINE HYDROCHLORIDE 20 MG/ML
INJECTION, SOLUTION EPIDURAL; INFILTRATION; INTRACAUDAL; PERINEURAL AS NEEDED
Status: DISCONTINUED | OUTPATIENT
Start: 2019-10-21 | End: 2019-10-21 | Stop reason: HOSPADM

## 2019-10-21 RX ORDER — SODIUM CHLORIDE 0.9 % (FLUSH) 0.9 %
5-40 SYRINGE (ML) INJECTION EVERY 8 HOURS
Status: DISCONTINUED | OUTPATIENT
Start: 2019-10-21 | End: 2019-10-21 | Stop reason: HOSPADM

## 2019-10-21 RX ORDER — EPHEDRINE SULFATE/0.9% NACL/PF 50 MG/5 ML
SYRINGE (ML) INTRAVENOUS AS NEEDED
Status: DISCONTINUED | OUTPATIENT
Start: 2019-10-21 | End: 2019-10-21 | Stop reason: HOSPADM

## 2019-10-21 RX ORDER — KETAMINE HCL 50MG/ML(1)
SYRINGE (ML) INTRAVENOUS AS NEEDED
Status: DISCONTINUED | OUTPATIENT
Start: 2019-10-21 | End: 2019-10-21 | Stop reason: HOSPADM

## 2019-10-21 RX ORDER — HYDROMORPHONE HYDROCHLORIDE 2 MG/ML
1 INJECTION, SOLUTION INTRAMUSCULAR; INTRAVENOUS; SUBCUTANEOUS
Status: DISCONTINUED | OUTPATIENT
Start: 2019-10-21 | End: 2019-10-21 | Stop reason: HOSPADM

## 2019-10-21 RX ORDER — DEXAMETHASONE SODIUM PHOSPHATE 4 MG/ML
INJECTION, SOLUTION INTRA-ARTICULAR; INTRALESIONAL; INTRAMUSCULAR; INTRAVENOUS; SOFT TISSUE AS NEEDED
Status: DISCONTINUED | OUTPATIENT
Start: 2019-10-21 | End: 2019-10-21 | Stop reason: HOSPADM

## 2019-10-21 RX ORDER — OXYCODONE AND ACETAMINOPHEN 5; 325 MG/1; MG/1
1-2 TABLET ORAL
Qty: 40 TAB | Refills: 0 | Status: SHIPPED | OUTPATIENT
Start: 2019-10-21 | End: 2019-10-24

## 2019-10-21 RX ORDER — ONDANSETRON 2 MG/ML
4 INJECTION INTRAMUSCULAR; INTRAVENOUS ONCE
Status: DISCONTINUED | OUTPATIENT
Start: 2019-10-21 | End: 2019-10-21 | Stop reason: HOSPADM

## 2019-10-21 RX ORDER — SUCCINYLCHOLINE CHLORIDE 20 MG/ML
INJECTION INTRAMUSCULAR; INTRAVENOUS AS NEEDED
Status: DISCONTINUED | OUTPATIENT
Start: 2019-10-21 | End: 2019-10-21 | Stop reason: HOSPADM

## 2019-10-21 RX ORDER — FENTANYL CITRATE 50 UG/ML
50 INJECTION, SOLUTION INTRAMUSCULAR; INTRAVENOUS AS NEEDED
Status: DISCONTINUED | OUTPATIENT
Start: 2019-10-21 | End: 2019-10-21 | Stop reason: HOSPADM

## 2019-10-21 RX ORDER — FENTANYL CITRATE 50 UG/ML
100 INJECTION, SOLUTION INTRAMUSCULAR; INTRAVENOUS ONCE
Status: DISCONTINUED | OUTPATIENT
Start: 2019-10-21 | End: 2019-10-21 | Stop reason: HOSPADM

## 2019-10-21 RX ADMIN — DEXMEDETOMIDINE 6 MCG: 100 INJECTION, SOLUTION, CONCENTRATE INTRAVENOUS at 08:49

## 2019-10-21 RX ADMIN — DEXMEDETOMIDINE 8 MCG: 100 INJECTION, SOLUTION, CONCENTRATE INTRAVENOUS at 09:30

## 2019-10-21 RX ADMIN — ROPIVACAINE HYDROCHLORIDE 150 MG: 5 INJECTION, SOLUTION EPIDURAL; INFILTRATION; PERINEURAL at 07:20

## 2019-10-21 RX ADMIN — MIDAZOLAM 2 MG: 1 INJECTION INTRAMUSCULAR; INTRAVENOUS at 07:19

## 2019-10-21 RX ADMIN — DEXMEDETOMIDINE 10 MCG: 100 INJECTION, SOLUTION, CONCENTRATE INTRAVENOUS at 07:28

## 2019-10-21 RX ADMIN — Medication 50 MG: at 07:34

## 2019-10-21 RX ADMIN — GLYCOPYRROLATE 0.4 MG: 0.2 INJECTION INTRAMUSCULAR; INTRAVENOUS at 09:40

## 2019-10-21 RX ADMIN — DEXMEDETOMIDINE 4 MCG: 100 INJECTION, SOLUTION, CONCENTRATE INTRAVENOUS at 09:14

## 2019-10-21 RX ADMIN — Medication 3 MG: at 09:40

## 2019-10-21 RX ADMIN — DEXMEDETOMIDINE 4 MCG: 100 INJECTION, SOLUTION, CONCENTRATE INTRAVENOUS at 09:12

## 2019-10-21 RX ADMIN — DEXMEDETOMIDINE 4 MCG: 100 INJECTION, SOLUTION, CONCENTRATE INTRAVENOUS at 09:11

## 2019-10-21 RX ADMIN — ROPIVACAINE HYDROCHLORIDE 30 ML: 5 INJECTION, SOLUTION EPIDURAL; INFILTRATION; PERINEURAL at 10:10

## 2019-10-21 RX ADMIN — LIDOCAINE HYDROCHLORIDE 100 MG: 20 INJECTION, SOLUTION EPIDURAL; INFILTRATION; INTRACAUDAL; PERINEURAL at 07:34

## 2019-10-21 RX ADMIN — DEXMEDETOMIDINE 8 MCG: 100 INJECTION, SOLUTION, CONCENTRATE INTRAVENOUS at 09:20

## 2019-10-21 RX ADMIN — FAMOTIDINE 20 MG: 20 TABLET, FILM COATED ORAL at 06:42

## 2019-10-21 RX ADMIN — ROCURONIUM BROMIDE 10 MG: 10 INJECTION, SOLUTION INTRAVENOUS at 07:34

## 2019-10-21 RX ADMIN — ONDANSETRON 4 MG: 2 INJECTION INTRAMUSCULAR; INTRAVENOUS at 09:39

## 2019-10-21 RX ADMIN — DEXMEDETOMIDINE 4 MCG: 100 INJECTION, SOLUTION, CONCENTRATE INTRAVENOUS at 09:46

## 2019-10-21 RX ADMIN — DEXMEDETOMIDINE 6 MCG: 100 INJECTION, SOLUTION, CONCENTRATE INTRAVENOUS at 08:06

## 2019-10-21 RX ADMIN — SUCCINYLCHOLINE CHLORIDE 140 MG: 20 INJECTION, SOLUTION INTRAMUSCULAR; INTRAVENOUS at 07:34

## 2019-10-21 RX ADMIN — DEXMEDETOMIDINE 4 MCG: 100 INJECTION, SOLUTION, CONCENTRATE INTRAVENOUS at 08:26

## 2019-10-21 RX ADMIN — Medication 10 MG: at 08:17

## 2019-10-21 RX ADMIN — DEXMEDETOMIDINE 4 MCG: 100 INJECTION, SOLUTION, CONCENTRATE INTRAVENOUS at 08:37

## 2019-10-21 RX ADMIN — PROPOFOL 80 MG: 10 INJECTION, EMULSION INTRAVENOUS at 07:34

## 2019-10-21 RX ADMIN — DEXMEDETOMIDINE 4 MCG: 100 INJECTION, SOLUTION, CONCENTRATE INTRAVENOUS at 09:01

## 2019-10-21 RX ADMIN — DEXMEDETOMIDINE 4 MCG: 100 INJECTION, SOLUTION, CONCENTRATE INTRAVENOUS at 09:03

## 2019-10-21 RX ADMIN — DEXMEDETOMIDINE 10 MCG: 100 INJECTION, SOLUTION, CONCENTRATE INTRAVENOUS at 07:39

## 2019-10-21 RX ADMIN — SODIUM CHLORIDE, SODIUM LACTATE, POTASSIUM CHLORIDE, AND CALCIUM CHLORIDE 75 ML/HR: 600; 310; 30; 20 INJECTION, SOLUTION INTRAVENOUS at 06:42

## 2019-10-21 RX ADMIN — KETOROLAC TROMETHAMINE 15 MG: 15 INJECTION, SOLUTION INTRAMUSCULAR; INTRAVENOUS at 09:39

## 2019-10-21 RX ADMIN — DEXAMETHASONE SODIUM PHOSPHATE 4 MG: 4 INJECTION, SOLUTION INTRAMUSCULAR; INTRAVENOUS at 07:50

## 2019-10-21 RX ADMIN — CEFAZOLIN 2 G: 10 INJECTION, POWDER, FOR SOLUTION INTRAVENOUS at 07:28

## 2019-10-21 NOTE — ANESTHESIA POSTPROCEDURE EVALUATION
Procedure(s):  LEFT SHOULDER ARTHROSCOPIC ROTATOR CUFF REPAIR/SUBACROMIAL DECOMPRESSION/FOREIGN BODY REMOVAL/BICEPS TENDONESIS REPAIR. general, regional    Anesthesia Post Evaluation      Multimodal analgesia: multimodal analgesia used between 6 hours prior to anesthesia start to PACU discharge  Patient location during evaluation: PACU  Patient participation: complete - patient participated  Level of consciousness: awake and alert  Pain score: 0  Airway patency: patent  Anesthetic complications: no  Cardiovascular status: acceptable  Respiratory status: acceptable  Hydration status: acceptable  Post anesthesia nausea and vomiting:  none      Vitals Value Taken Time   /69 10/21/2019 11:10 AM   Temp     Pulse 66 10/21/2019 11:17 AM   Resp 15 10/21/2019 11:16 AM   SpO2 98 % 10/21/2019 11:16 AM   Vitals shown include unvalidated device data.

## 2019-10-21 NOTE — ANESTHESIA PREPROCEDURE EVALUATION
Relevant Problems   No relevant active problems       Anesthetic History   No history of anesthetic complications            Review of Systems / Medical History  Patient summary reviewed, nursing notes reviewed and pertinent labs reviewed    Pulmonary  Within defined limits                 Neuro/Psych         Psychiatric history     Cardiovascular  Within defined limits                Exercise tolerance: >4 METS     GI/Hepatic/Renal  Within defined limits              Endo/Other  Within defined limits           Other Findings              Physical Exam    Airway  Mallampati: III  TM Distance: 4 - 6 cm  Neck ROM: normal range of motion   Mouth opening: Normal     Cardiovascular  Regular rate and rhythm,  S1 and S2 normal,  no murmur, click, rub, or gallop  Rhythm: regular  Rate: normal         Dental    Dentition: Poor dentition     Pulmonary  Breath sounds clear to auscultation               Abdominal  Abdominal exam normal       Other Findings            Anesthetic Plan    ASA: 2  Anesthesia type: general and regional      Post-op pain plan if not by surgeon: peripheral nerve block single    Induction: Intravenous  Anesthetic plan and risks discussed with: Patient

## 2019-10-21 NOTE — BRIEF OP NOTE
BRIEF OPERATIVE NOTE    Date of Procedure: 10/21/2019   Preoperative Diagnosis: S46.012A LEFT SHOULDER ROTATOR CUFF TEAR  M75.42 IMPINGEMENT  M74.108H RETAINED FOREIGN BODY LEFT SHOULDER  Postoperative Diagnosis: S46.012A LEFT SHOULDER ROTATOR CUFF TEAR  M75.42 IMPINGEMENT  S40.252A RETAINED FOREIGN BODY LEFT SHOULDER    Procedure(s):  LEFT SHOULDER ARTHROSCOPIC ROTATOR CUFF REPAIR/SUBACROMIAL DECOMPRESSION/BICEPS TENODESIS/FOREIGN BODY REMOVAL/ARTHREX/SYPDER/TMAX  Surgeon(s) and Role:     * Gordo Quach MD - Primary         Surgical Assistant: Tay Wright [facility]    Surgical Staff:  Circ-1: Nandini Granda RN  Physician Assistant: JOHN Greenberg  Scrub Tech-1: Anita Opitz E  Surg Asst-1: Kurtis Oneal  Event Time In Time Out   Incision Start 0803    Incision Close       Anesthesia: General   Estimated Blood Loss: Minimal  Specimens: * No specimens in log *   Findings: Left shoulder rotator cuff tear, biceps tendinopathy, impingement, foreign body   Complications: None  Implants:   Implant Name Type Inv.  Item Serial No.  Lot No. LRB No. Used Action   ANCHOR SUT TENODESIS 8X19.1MM -- [employer] - [id number]  ANCHOR SUT TENODESIS 8X19.1MM -- Maximiliano Farfan [id number] Left 1 Implanted   SWIVELOCK BIOCOMP WHT BLK LOOP --  - [id number]  SWIVELOCK BIOCOMP WHT BLK LOOP --   ARTHREX [id number] Left 1 Implanted   ANCHOR BIOCOMP 4.75X19.1MM -- [facility] C-VENT - [id number]  ANCHOR BIOCOMP 4.75X19.1MM -- [employer] C-VENT  ARTHREX [id number] Left 1 Implanted   ANCHOR BIOCOMP 4.75X19.1MM -- [facility] C-VENT - [id number]  ANCHOR BIOCOMP 4.75X19.1MM -- [employer] C-VENT  ARTHREX [id number] Left 1 Implanted

## 2019-10-21 NOTE — DISCHARGE INSTRUCTIONS
DISCHARGE SUMMARY from Nurse    PATIENT INSTRUCTIONS:    After general anesthesia or intravenous sedation, for 24 hours or while taking prescription Narcotics:  · Limit your activities  · Do not drive and operate hazardous machinery  · Do not make important personal or business decisions  · Do  not drink alcoholic beverages  · If you have not urinated within 8 hours after discharge, please contact your surgeon on call. Report the following to your surgeon:  · Excessive pain, swelling, redness or odor of or around the surgical area  · Temperature over 100.5  · Nausea and vomiting lasting longer than 4 hours or if unable to take medications  · Any signs of decreased circulation or nerve impairment to extremity: change in color, persistent  numbness, tingling, coldness or increase pain  · Any questions    What to do at Home:  Recommended activity: Activity as tolerated and no driving for today. *  Please give a list of your current medications to your Primary Care Provider. *  Please update this list whenever your medications are discontinued, doses are      changed, or new medications (including over-the-counter products) are added. *  Please carry medication information at all times in case of emergency situations. These are general instructions for a healthy lifestyle:    No smoking/ No tobacco products/ Avoid exposure to second hand smoke  Surgeon General's Warning:  Quitting smoking now greatly reduces serious risk to your health. Obesity, smoking, and sedentary lifestyle greatly increases your risk for illness    A healthy diet, regular physical exercise & weight monitoring are important for maintaining a healthy lifestyle    You may be retaining fluid if you have a history of heart failure or if you experience any of the following symptoms:  Weight gain of 3 pounds or more overnight or 5 pounds in a week, increased swelling in our hands or feet or shortness of breath while lying flat in bed. Please call your doctor as soon as you notice any of these symptoms; do not wait until your next office visit. The discharge information has been reviewed with the patient. The patient verbalized understanding. Discharge medications reviewed with the patient and appropriate educational materials and side effects teaching were provided. ___________________________________________________________________________________________________________________________________  Patient armband removed and shredded  MyChart Activation    Thank you for requesting access to [employer]. Please follow the instructions below to securely access and download your online medical record. [employer] allows you to send messages to your doctor, view your test results, renew your prescriptions, schedule appointments, and more. How Do I Sign Up? 1. In your internet browser, go to [email or web address]  2. Click on the First Time User? Click Here link in the Sign In box. You will be redirect to the New Member Sign Up page. 3. Enter your [employer] Access Code exactly as it appears below. You will not need to use this code after youve completed the sign-up process. If you do not sign up before the expiration date, you must request a new code. [employer] Access Code: [email or web address]  Expires: 2019  2:21 PM (This is the date your [employer] access code will )    4. Enter the last four digits of your Social Security Number (xxxx) and Date of Birth (mm/dd/yyyy) as indicated and click Submit. You will be taken to the next sign-up page. 5. Create a [employer] ID. This will be your [employer] login ID and cannot be changed, so think of one that is secure and easy to remember. 6. Create a [employer] password. You can change your password at any time. 7. Enter your Password Reset Question and Answer. This can be used at a later time if you forget your password. 8. Enter your e-mail address.  You will receive e-mail notification when new information is available in [employer] 9. Click Sign Up. You can now view and download portions of your medical record. 10. Click the Download Summary menu link to download a portable copy of your medical information. Additional Information    If you have questions, please visit the Frequently Asked Questions section of the [employer] website at [email or web address] Remember, [employer] is NOT to be used for urgent needs. For medical emergencies, dial 911.

## 2019-10-21 NOTE — PROGRESS NOTES
conducted a pre-surgery visit with Yun Ruvalcaba, who is a 46 y.o.,male. The  provided the following Interventions:  Initiated a relationship of care and support. .   Plan:  Chaplains will continue to follow and will provide pastoral care on an as needed/requested basis.  recommends bedside caregivers page  on duty if patient shows signs of acute spiritual or emotional distress.     [address]  [phone]

## 2019-10-21 NOTE — ANESTHESIA PROCEDURE NOTES
Peripheral Block    Performed by: Gaby Britton DO  Authorized by: Gaby Britton DO       Pre-procedure: Indications: at surgeon's request and post-op pain management    Preanesthetic Checklist: patient identified, risks and benefits discussed, site marked, timeout performed, anesthesia consent given and patient being monitored    Timeout Time: 07:15          Block Type:   Block Type:   Interscalene  Laterality:  Left  Monitoring:  Standard ASA monitoring, continuous pulse ox, frequent vital sign checks, heart rate, responsive to questions and oxygen  Injection Technique:  Single shot  Procedures: ultrasound guided    Patient Position: seated  Prep: chlorhexidine    Location:  Interscalene  Needle Type:  Stimuplex  Needle Gauge:  22 G  Needle Localization:  Ultrasound guidance and anatomical landmarks    Assessment:  Number of attempts:  1  Injection Assessment:  Incremental injection every 5 mL, local visualized surrounding nerve on ultrasound, negative aspiration for CSF, negative aspiration for blood, no paresthesia, no intravascular symptoms and ultrasound image on chart  Patient tolerance:  Patient tolerated the procedure well with no immediate complications

## 2019-10-21 NOTE — INTERVAL H&P NOTE
H&P Update:  Tr Vasquez was seen and examined. History and physical has been reviewed. The patient has been examined.  There have been no significant clinical changes since the completion of the originally dated History and Physical.

## 2019-10-21 NOTE — OP NOTES
[address] [place] [address]   OPERATIVE REPORT    Name:  Jaycob Rivas  MR#:   [id number]  :  1965  ACCOUNT #:  [de-identified]  DATE OF SERVICE:  10/21/2019    PREOPERATIVE DIAGNOSES:  1. Left rotator cuff tear. 2.  Left shoulder biceps tendinopathy. 3.  Left shoulder impingement. 4.  Left shoulder retained foreign body. POSTOPERATIVE DIAGNOSES:  1. Left rotator cuff tear. 2.  Left shoulder biceps tendinopathy. 3.  Left shoulder impingement. 4.  Left shoulder retained foreign body. PROCEDURES PERFORMED:  1. Left shoulder arthroscopic rotator cuff repair. 2.  Left shoulder arthroscopic biceps tenodesis. 3.  Left shoulder arthroscopic subacromial decompression. 4.  Left shoulder foreign body removal, open. SURGEON:  Kenrick West MD    ASSISTANT:  Josiah Jamil PA-C MS    Josiah Jamil PA-C, was medically necessary for the procedure. She assisted in patient positioning, repair of the rotator cuff, removal of foreign body, placement of anchors, placement of DME, and transfer of the patient to the PACU. ANESTHESIA:  General with nerve block. IV FLUIDS:  800 mL of LR.    COMPLICATIONS:  None. SPECIMENS REMOVED:  None. IMPLANTS:  Arthrex suture anchors. ESTIMATED BLOOD LOSS:  Minimal.    FOREIGN BODY REMOVED:  One metallic object from the lateral shoulder. INDICATIONS FOR THE PROCEDURE:  The patient is a 79-year-old male who presented to the office with left shoulder pain. An MRI revealed a rotator cuff tear as well as impingement. He also had a history of a foreign body in the lateral deltoid. The foreign body was painful on exam.  After discussing the treatment options at length, he elected to undergo the procedure as described. DESCRIPTION OF THE PROCEDURE:  The patient was identified in the preoperative holding area. The left shoulder was marked as the operative extremity after confirmation with the patient.   After discussing the risks and benefits of the procedure, informed consent was obtained. Anesthesia performed a nerve block in the preoperative holding area. The patient was then taken to the operating room and moved to the OR table. General anesthesia was induced, and he was intubated. He was brought into the beach chair position. The left arm was prepped and draped in normal sterile fashion. A time-out was performed verifying the correct patient, procedure and operative extremity with all in agreement. Antibiotics were given through the IV prior to skin incision. The surgery began with a posterior portal.  The arthroscope was brought into the glenohumeral joint. An anterior portal was made in the rotator interval under spinal needle localization. A diagnostic arthroscopy revealed tearing of the biceps as well as the biceps anchor. The subscapularis was noted to be intact. There was full-thickness tear of the supraspinatus. Due to the tearing of the biceps anchor and tearing of the biceps tendon, the decision was made to tenodese the biceps. An anterior superolateral portal was made under spinal needle localization at the rotator interval.  A suture was passed through the biceps tendon. It was then tenotomized. The biceps was externalized. A whipstitch was placed in the biceps. The biceps was then tenodesed to the upper border of the bicipital groove at the anterior aspect of the greater tuberosity footprint with a tenodesis anchor, also loaded with FiberTape and FiberWire suture. Scope was then brought into the subacromial space. A subacromial debridement was performed. The anterolateral border of the acromion was identified. The footprint of the greater tuberosity was also debrided and burred down to bleeding bone. The supraspinatus tendon was debrided freshening up the edges for repair. A second anchor with FiberWire and FiberTape was placed at the posterior aspect of the articular margin of the greater tuberosity.   The suture tails from both suture anchors were shuttled through the supraspinatus tendon. They were tied. They were then brought down to two separate lateral row anchors completing the rotator cuff repair. A subacromial decompression and anterolateral acromioplasty was then performed arthroscopically. Final pictures were taken. The scope and instruments were removed. Using mini-C-arm fluoroscopy, the foreign body in the lateral deltoid was localized. A stab incision was made over the lateral deltoid and using a hemostat under live fluoro examination, the foreign body was grasped and removed. The skin was then closed in a standard fashion. A sterile dressing was placed over the left shoulder. The arm was placed into a sling. The patient was awakened from anesthesia and taken to the PACU in stable condition with a plan for discharge home.       MD JUAN Peguero/[email or web address]_ROD/[email or web address]  D:  10/21/2019 10:09  T:  10/21/2019 13:08  JOB #:  [id number]

## 2019-10-22 ENCOUNTER — TELEPHONE (OUTPATIENT)
Dept: ORTHOPEDIC SURGERY | Age: 54
End: 2019-10-22

## 2019-10-22 DIAGNOSIS — M47.812 FACET ARTHRITIS, DEGENERATIVE, CERVICAL SPINE: ICD-10-CM

## 2019-10-22 DIAGNOSIS — G89.18 POST-OP PAIN: Primary | ICD-10-CM

## 2019-10-22 DIAGNOSIS — G56.03 BILATERAL CARPAL TUNNEL SYNDROME: Primary | ICD-10-CM

## 2019-10-22 RX ORDER — OXYCODONE AND ACETAMINOPHEN 7.5; 325 MG/1; MG/1
1-2 TABLET ORAL
Qty: 30 TAB | Refills: 0 | Status: SHIPPED | OUTPATIENT
Start: 2019-10-22 | End: 2019-10-24 | Stop reason: SDUPTHER

## 2019-10-22 NOTE — TELEPHONE ENCOUNTER
Pt called and asked if he could be prescribed a higher dose of the gabapentin (NEURONTIN) 100 mg capsule and sent to pharmacy on file.  pt states he's still in pain    Pt can reached at : [phone]

## 2019-10-23 ENCOUNTER — TELEPHONE (OUTPATIENT)
Dept: ORTHOPEDIC SURGERY | Facility: CLINIC | Age: 54
End: 2019-10-23

## 2019-10-23 RX ORDER — GABAPENTIN 400 MG/1
400 CAPSULE ORAL 3 TIMES DAILY
Qty: 90 CAP | Refills: 0 | Status: SHIPPED | OUTPATIENT
Start: 2019-10-23 | End: 2019-12-05 | Stop reason: SDUPTHER

## 2019-10-23 NOTE — TELEPHONE ENCOUNTER
Patient notified he would like for it to go to [facility] on file. Medication pended.

## 2019-10-23 NOTE — TELEPHONE ENCOUNTER
Called and spoke to patient. States he hasn't gotten the stronger medication due to insurance. I advised to try a discount card and pay out of pocket. Patient verbailized understanding. Patient also inquired about a sling. I advised insurance probably won't cover another one, as he got one post-op.

## 2019-10-23 NOTE — TELEPHONE ENCOUNTER
How is he taking the medication? Is he taking 2 every 4 hours? Is he icing his shoulder and wearing the sling so the arm is supported. We increased the medication yesterday and now we are catching up to pain so I am hopeful his pain improves today.

## 2019-10-23 NOTE — TELEPHONE ENCOUNTER
He cannot just self increase his dose. He is taking 1200 mg per day the way he is taking it now. We are not going to increase it anymore. It takes time to become fully effective. He can discuss this at his upcoming appt.

## 2019-10-23 NOTE — TELEPHONE ENCOUNTER
Patient stated he has already increased it to 2 caps q 4hrs now and is tolerating that well. He started the gabapentin 10/15/19. He would like a stronger dose.

## 2019-10-23 NOTE — TELEPHONE ENCOUNTER
Patient states the Oxycodone 7.5-325mg is not helping his pain. He would like something else.     Erma on [address]    His# [phone]    PO 11/1/19  SX 10/21/19

## 2019-10-24 ENCOUNTER — TELEPHONE (OUTPATIENT)
Dept: ORTHOPEDIC SURGERY | Age: 54
End: 2019-10-24

## 2019-10-24 ENCOUNTER — OFFICE VISIT (OUTPATIENT)
Dept: FAMILY MEDICINE CLINIC | Facility: CLINIC | Age: 54
End: 2019-10-24

## 2019-10-24 VITALS
OXYGEN SATURATION: 98 % | HEIGHT: 69 IN | BODY MASS INDEX: 30.21 KG/M2 | TEMPERATURE: 98 F | RESPIRATION RATE: 12 BRPM | DIASTOLIC BLOOD PRESSURE: 88 MMHG | WEIGHT: 204 LBS | SYSTOLIC BLOOD PRESSURE: 138 MMHG | HEART RATE: 73 BPM

## 2019-10-24 DIAGNOSIS — F41.8 DEPRESSION WITH ANXIETY: ICD-10-CM

## 2019-10-24 DIAGNOSIS — Z98.890 S/P ROTATOR CUFF REPAIR: ICD-10-CM

## 2019-10-24 DIAGNOSIS — Z12.11 SCREEN FOR COLON CANCER: ICD-10-CM

## 2019-10-24 DIAGNOSIS — G89.18 POST-OP PAIN: Primary | ICD-10-CM

## 2019-10-24 DIAGNOSIS — G47.00 INSOMNIA, UNSPECIFIED TYPE: ICD-10-CM

## 2019-10-24 DIAGNOSIS — K76.0 FATTY LIVER: Primary | ICD-10-CM

## 2019-10-24 RX ORDER — OXYCODONE AND ACETAMINOPHEN 7.5; 325 MG/1; MG/1
1 TABLET ORAL
Qty: 30 TAB | Refills: 0 | Status: SHIPPED | OUTPATIENT
Start: 2019-10-24 | End: 2019-10-29

## 2019-10-24 RX ORDER — MIRTAZAPINE 15 MG/1
15 TABLET, FILM COATED ORAL
Qty: 30 TAB | Refills: 1 | Status: SHIPPED | OUTPATIENT
Start: 2019-10-24 | End: 2020-05-29 | Stop reason: DRUGHIGH

## 2019-10-24 RX ORDER — ACETAMINOPHEN 325 MG/1
TABLET ORAL
COMMUNITY
End: 2020-07-23 | Stop reason: ALTCHOICE

## 2019-10-24 NOTE — PROGRESS NOTES
Satya Jack is a 46 y.o. male presenting today for Follow-up (liver enzymes)    HPI:  Satya Jack presents to the office today for liver enzymes elevation follow-up care. Patient was seen in the practice on 1010 had elevated liver enzymes. Hepatitis C was ordered and he presents today for follow-up. Also during the same visit patient had hematuria via POC urinalysis. Urine was sent to the lab for urinalysis with microscopic and he presents today for the results. He also is status post left shoulder arthroscopic rotator cuff repair/subacromial decompression/foreign body removal/bicep tendinesis repair with Dr. Faiza Bocanegra on 10/21/2019 he presents today with his daughter. His pain level is a 10 on 10 today. He was given oxycodone which she is taking it daily. His shoulder is currently immobilized. He is also complaining of insomnia. His daughter has noticed that he only gets about 2 hours of sleep per night. This is a not a new occurrence and he is requesting something to help him sleep. Review of Systems   Respiratory: Negative for cough, sputum production and shortness of breath. Cardiovascular: Negative for chest pain, palpitations and leg swelling. Gastrointestinal: Negative for nausea and vomiting. Musculoskeletal: Positive for joint pain ( Left shoulder, status post surgery). Negative for back pain. Allergies   Allergen Reactions    Motrin [Ibuprofen] Hives       Current Outpatient Medications   Medication Sig Dispense Refill    acetaminophen (TYLENOL) 325 mg tablet Take  by mouth every four (4) hours as needed for Pain.  mirtazapine (REMERON) 15 mg tablet Take 1 Tab by mouth nightly. 30 Tab 1    gabapentin (NEURONTIN) 400 mg capsule Take 1 Cap by mouth three (3) times daily. Max Daily Amount: 1,200 mg. 90 Cap 0    oxyCODONE-acetaminophen (PERCOCET) 7.5-325 mg per tablet Take 1-2 Tabs by mouth every four (4) hours as needed for Pain for up to 5 days. Max Daily Amount: 12 Tabs. 30 Tab 0    ergocalciferol (ERGOCALCIFEROL) 50,000 unit capsule TK 1 C PO WEEKLY      escitalopram oxalate (LEXAPRO) 10 mg tablet Indications: not taking  0       Past Medical History:   Diagnosis Date    Depression     Foot drop, left foot     October, 2018.   Seen by Podiatry in 2019    Headache     Psychotic disorder ([facility] [address]     anxiety       Past Surgical History:   Procedure Laterality Date    HX HERNIA REPAIR      HX KNEE ARTHROSCOPY Right     HX ORTHOPAEDIC Left 2017    lacerations to fingers and had pins, left thumb    HX ROTATOR CUFF REPAIR Right        Social History     Socioeconomic History    Marital status:      Spouse name: Not on file    Number of children: Not on file    Years of education: Not on file    Highest education level: Not on file   Occupational History    Not on file   Social Needs    Financial resource strain: Not on file    Food insecurity:     Worry: Not on file     Inability: Not on file    Transportation needs:     Medical: Not on file     Non-medical: Not on file   Tobacco Use    Smoking status: Never Smoker    Smokeless tobacco: Current User    Tobacco comment: chew tobacco   Substance and Sexual Activity    Alcohol use: Yes     Comment: 30 packs x 2 a day    Drug use: Yes     Types: Marijuana    Sexual activity: Yes   Lifestyle    Physical activity:     Days per week: Not on file     Minutes per session: Not on file    Stress: Not on file   Relationships    Social connections:     Talks on phone: Not on file     Gets together: Not on file     Attends Zoroastrianism service: Not on file     Active member of club or organization: Not on file     Attends meetings of clubs or organizations: Not on file     Relationship status: Not on file    Intimate partner violence:     Fear of current or ex partner: Not on file     Emotionally abused: Not on file     Physically abused: Not on file     Forced sexual activity: Not on file   Other Topics Concern    Not on file   Social History Narrative    Not on file       Patient does not have an advanced directive on file    Vitals:    10/24/19 0811   BP: 138/88   Pulse: 73   Resp: 12   Temp: 98 °F (36.7 °C)   TempSrc: Oral   SpO2: 98%   Weight: 204 lb (92.5 kg)   Height: 5' 9\" (1.753 m)   PainSc:  10 - Worst pain ever       Physical Exam   Constitutional: No distress. Cardiovascular: Normal rate, regular rhythm and normal heart sounds. Pulmonary/Chest: Effort normal and breath sounds normal.   Neurological: He is alert. Nursing note and vitals reviewed. Admission on 10/21/2019, Discharged on 10/21/2019   Component Date Value Ref Range Status    BENZODIAZEPINES 10/21/2019 NEGATIVE   NEG   Final    BARBITURATES 10/21/2019 NEGATIVE   NEG   Final    THC (TH-CANNABINOL) 10/21/2019 POSITIVE* NEG   Final    OPIATES 10/21/2019 NEGATIVE   NEG   Final    PCP(PHENCYCLIDINE) 10/21/2019 NEGATIVE   NEG   Final    COCAINE 10/21/2019 NEGATIVE   NEG   Final    AMPHETAMINES 10/21/2019 NEGATIVE   NEG   Final    METHADONE 10/21/2019 NEGATIVE   NEG   Final    HDSCOM 10/21/2019 (NOTE)   Final    Comment: Specimen analysis was performed without chain of custody handling. These results should be used for medical purposes only and not for   legal or employment purposes. Unconfirmed screening results must not   be used for non-medical purposes.     The cut-off concentration for positive results are as follows:    AMPH     1000 ng/mL  TOMER      200 ng/mL  KARLI      200 ng/mL  DULCE       300 ng/mL  METH      300 ng/mL  OPI       300 ng/mL  PCP        25 ng/mL  THC        50 ng/mL       Hospital Outpatient Visit on 10/17/2019   Component Date Value Ref Range Status    Ventricular Rate 10/17/2019 56  BPM Final    Atrial Rate 10/17/2019 56  BPM Final    P-R Interval 10/17/2019 132  ms Final    QRS Duration 10/17/2019 86  ms Final    Q-T Interval 10/17/2019 402  ms Final    QTC Calculation (Wilbert) 10/17/2019 387  ms Final    Calculated P Axis 10/17/2019 34  degrees Final    Calculated R Axis 10/17/2019 9  degrees Final    Calculated T Axis 10/17/2019 20  degrees Final    Diagnosis 10/17/2019    Final                    Value:Sinus bradycardia  Otherwise normal ECG  No previous ECGs available  Confirmed by Eunice Flaherty ([id number]) on 10/17/2019 4:13:45 PM      Sodium 10/17/2019 141  136 - 145 mmol/L Final    Potassium 10/17/2019 4.6  3.5 - 5.5 mmol/L Final    Chloride 10/17/2019 105  100 - 111 mmol/L Final    CO2 10/17/2019 33* 21 - 32 mmol/L Final    Anion gap 10/17/2019 3  3.0 - 18 mmol/L Final    Glucose 10/17/2019 114* 74 - 99 mg/dL Final    BUN 10/17/2019 11  7.0 - 18 MG/DL Final    Creatinine 10/17/2019 0.81  0.6 - 1.3 MG/DL Final    BUN/Creatinine ratio 10/17/2019 14  12 - 20   Final    GFR est AA 10/17/2019 >60  >60 ml/min/1.73m2 Final    GFR est non-AA 10/17/2019 >60  >60 ml/min/1.73m2 Final    Comment: (NOTE)  Estimated GFR is calculated using the Modification of Diet in Renal   Disease (MDRD) Study equation, reported for both  Americans   (GFRAA) and non- Americans (GFRNA), and normalized to 1.73m2   body surface area. The physician must decide which value applies to   the patient. The MDRD study equation should only be used in   individuals age 25 or older. It has not been validated for the   following: pregnant women, patients with serious comorbid conditions,   or on certain medications, or persons with extremes of body size,   muscle mass, or nutritional status.       Calcium 10/17/2019 8.5  8.5 - 10.1 MG/DL Final    WBC 10/17/2019 6.8  4.6 - 13.2 K/uL Final    RBC 10/17/2019 4.86  4.70 - 5.50 M/uL Final    HGB 10/17/2019 15.9  13.0 - 16.0 g/dL Final    HCT 10/17/2019 46.4  36.0 - 48.0 % Final    MCV 10/17/2019 95.5  74.0 - 97.0 FL Final    MCH 10/17/2019 32.7  24.0 - 34.0 PG Final    MCHC 10/17/2019 34.3  31.0 - 37.0 g/dL Final    RDW 10/17/2019 11.9  11.6 - 14.5 % Final    PLATELET 71/98/7040 828  135 - 420 K/uL Final    MPV 10/17/2019 11.0  9.2 - 11.8 FL Final    NEUTROPHILS 10/17/2019 66  40 - 73 % Final    LYMPHOCYTES 10/17/2019 24  21 - 52 % Final    MONOCYTES 10/17/2019 9  3 - 10 % Final    EOSINOPHILS 10/17/2019 1  0 - 5 % Final    BASOPHILS 10/17/2019 0  0 - 2 % Final    ABS. NEUTROPHILS 10/17/2019 4.5  1.8 - 8.0 K/UL Final    ABS. LYMPHOCYTES 10/17/2019 1.6  0.9 - 3.6 K/UL Final    ABS. MONOCYTES 10/17/2019 0.6  0.05 - 1.2 K/UL Final    ABS. EOSINOPHILS 10/17/2019 0.1  0.0 - 0.4 K/UL Final    ABS.  BASOPHILS 10/17/2019 0.0  0.0 - 0.1 K/UL Final    DF 10/17/2019 AUTOMATED    Final   Office Visit on 10/10/2019   Component Date Value Ref Range Status    Color (UA POC) 10/10/2019 Yellow   Final    Clarity (UA POC) 10/10/2019 Clear   Final    Glucose (UA POC) 10/10/2019 Negative  Negative Final    Bilirubin (UA POC) 10/10/2019 Negative  Negative Final    Ketones (UA POC) 10/10/2019 Negative  Negative Final    Specific gravity (UA POC) 10/10/2019 1.015  1.001 - 1.035 Final    Blood (UA POC) 10/10/2019 Trace  Negative Final    pH (UA POC) 10/10/2019 7.5  4.6 - 8.0 Final    Protein (UA POC) 10/10/2019 Negative  Negative Final    Urobilinogen (UA POC) 10/10/2019 0.2 mg/dL  0.2 - 1 Final    Nitrites (UA POC) 10/10/2019 Negative  Negative Final    Leukocyte esterase (UA POC) 10/10/2019 Negative  Negative Final    Hep C Virus Ab 10/10/2019 None Detected  None Detec Final    Specific Gravity 10/10/2019 1.006  1.005 - 1.03 Final    pH (UA) 10/10/2019 8.0  5.0 - 8.0 pH Final    Protein 10/10/2019 Negative  Negative, mg/dL Final    Glucose 10/10/2019 Negative  Negative mg/dL Final    Ketone 10/10/2019 Negative  Negative, mg/dL Final    Bilirubin 10/10/2019 Negative  Negative Final    Blood 10/10/2019 Negative  Negative Final    Nitrites 10/10/2019 Negative  Negative Final    Leukocyte Esterase 10/10/2019 Negative  Negative Final    Urobilinogen 10/10/2019 <2.0  <2.0 mg/dL Final    WBC 10/10/2019 0-2  0 - 2 /hpf Final    RBC 10/10/2019 Negative  Negative, /hpf Final   Orders Only on 09/19/2019   Component Date Value Ref Range Status    Glucose 10/08/2019 82  70 - 99 mg/dL Final    BUN 10/08/2019 8  6 - 22 mg/dL Final    Creatinine 10/08/2019 0.8  0.5 - 1.2 mg/dL Final    Sodium 10/08/2019 143  133 - 145 mmol/L Final    Potassium 10/08/2019 5.0  3.5 - 5.5 mmol/L Final    Chloride 10/08/2019 103  98 - 110 mmol/L Final    CO2 10/08/2019 27  20 - 32 mmol/L Final    AST (SGOT) 10/08/2019 29  10 - 37 U/L Final    ALT (SGPT) 10/08/2019 41* 5 - 40 U/L Final    Alk. phosphatase 10/08/2019 67  25 - 115 U/L Final    Bilirubin, total 10/08/2019 0.4  0.2 - 1.2 mg/dL Final    Calcium 10/08/2019 9.2  8.4 - 10.4 mg/dL Final    Protein, total 10/08/2019 6.7  6.4 - 8.3 g/dL Final    Albumin 10/08/2019 4.5  3.5 - 5.0 g/dL Final    A-G Ratio 10/08/2019 2.0  1.1 - 2.6 ratio Final    Globulin 10/08/2019 2.2  2.0 - 4.0 g/dL Final    Anion gap 10/08/2019 13.0  mmol/L Final    Comment: Estimated GFR results are reported in mL/min/1.73 sq.m. by the MDRD equation. This eGFR is validated for stable chronic renal failure patients. This   equation  is unreliable in acute illness or patients with normal renal function.       GFRAA 10/08/2019 >60.0  >60.0 Final    GFRNA 10/08/2019 >60.0  >60.0 Final   Office Visit on 09/19/2019   Component Date Value Ref Range Status    Color (UA POC) 09/19/2019 Yellow   Final    Clarity (UA POC) 09/19/2019 Clear   Final    Glucose (UA POC) 09/19/2019 Negative  Negative Final    Bilirubin (UA POC) 09/19/2019 Negative  Negative Final    Ketones (UA POC) 09/19/2019 Negative  Negative Final    Specific gravity (UA POC) 09/19/2019 1.020  1.001 - 1.035 Final    Blood (UA POC) 09/19/2019 Trace  Negative Final    pH (UA POC) 09/19/2019 6.0  4.6 - 8.0 Final    Protein (UA POC) 09/19/2019 Negative  Negative Final    Urobilinogen (UA POC) 09/19/2019 0.2 mg/dL  0.2 - 1 Final    Nitrites (UA POC) 09/19/2019 Negative  Negative Final    Leukocyte esterase (UA POC) 09/19/2019 Negative  Negative Final   Orders Only on 09/18/2019   Component Date Value Ref Range Status    WBC 09/18/2019 6.6  4.0 - 11.0 K/uL Final    RBC 09/18/2019 4.67  3.80 - 5.80 M/uL Final    HGB 09/18/2019 15.2  13.1 - 17.2 g/dL Final    HCT 09/18/2019 45.9  39.3 - 51.6 % Final    MCV 09/18/2019 98* 80 - 95 fL Final    MCH 09/18/2019 33  26 - 34 pg Final    MCHC 09/18/2019 33  31 - 36 g/dL Final    RDW 09/18/2019 12.0  10.0 - 15.5 % Final    PLATELET 14/05/9992 377  140 - 440 K/uL Final    MPV 09/18/2019 10.5  9.0 - 13.0 fL Final    NEUTROPHILS 09/18/2019 64  40 - 75 % Final    Lymphocytes 09/18/2019 23  20 - 45 % Final    MONOCYTES 09/18/2019 12  3 - 12 % Final    EOSINOPHILS 09/18/2019 1  0 - 6 % Final    BASOPHILS 09/18/2019 1  0 - 2 % Final    ABS. NEUTROPHILS 09/18/2019 4.2  1.8 - 7.7 K/uL Final    ABSOLUTE LYMPHOCYTE COUNT 09/18/2019 1.5  1.0 - 4.8 K/uL Final    ABS. MONOCYTES 09/18/2019 0.8  0.1 - 1.0 K/uL Final    ABS. EOSINOPHILS 09/18/2019 0.1  0.0 - 0.5 K/uL Final    ABS. BASOPHILS 09/18/2019 0.0  0.0 - 0.2 K/uL Final    TSH 09/18/2019 0.88  0.27 - 4.20 mcU/mL Final    Prostate Specific Ag 09/18/2019 0.686  <=3.100 ng/mL Final    VITAMIN D, 25-HYDROXY 09/18/2019 23.5* 32.0 - 100.0 ng/mL Final    Glucose 09/18/2019 98  70 - 99 mg/dL Final    BUN 09/18/2019 11  6 - 22 mg/dL Final    Creatinine 09/18/2019 0.8  0.5 - 1.2 mg/dL Final    Sodium 09/18/2019 141  133 - 145 mmol/L Final    Potassium 09/18/2019 4.5  3.5 - 5.5 mmol/L Final    Chloride 09/18/2019 102  98 - 110 mmol/L Final    CO2 09/18/2019 21  20 - 32 mmol/L Final    AST (SGOT) 09/18/2019 100* 10 - 37 U/L Final    ALT (SGPT) 09/18/2019 111* 5 - 40 U/L Final    Alk.  phosphatase 09/18/2019 76  25 - 115 U/L Final    Bilirubin, total 09/18/2019 0.4  0.2 - 1.2 mg/dL Final    Calcium 09/18/2019 9.2  8.4 - 10.4 mg/dL Final    Protein, total 09/18/2019 7.1  6.4 - 8.3 g/dL Final    Albumin 09/18/2019 4.5  3.5 - 5.0 g/dL Final    A-G Ratio 09/18/2019 1.7  1.1 - 2.6 ratio Final    Globulin 09/18/2019 2.6  2.0 - 4.0 g/dL Final    Anion gap 09/18/2019 18.0  mmol/L Final    Comment: Estimated GFR results are reported in mL/min/1.73 sq.m. by the MDRD equation. This eGFR is validated for stable chronic renal failure patients. This   equation  is unreliable in acute illness or patients with normal renal function.  GFRAA 09/18/2019 >60.0  >60.0 Final    GFRNA 09/18/2019 >60.0  >60.0 Final    Triglyceride 09/18/2019 71  40 - 149 mg/dL Final    HDL Cholesterol 09/18/2019 115* 40 - 59 mg/dL Final    Cholesterol, total 09/18/2019 205* 110 - 200 mg/dL Final    CHOLESTEROL/HDL 09/18/2019 1.8  0.0 - 5.0 Final    LDL, calculated 09/18/2019 76  50 - 99 mg/dL Final    VLDL, calculated 09/18/2019 14  8 - 30 mg/dL Final    Comment: Cholesterol Recommended NCEP guidelines in mg/dL:  Less than 200            Desirable  200 - 239                Borderline High  Greater than or  = 240   High  Please Note:  Total Chol/HDL Ratio                   Men     Women  1/2 Avg. Risk    3.4     3.3      Avg. Risk    5.0     4.4  2X  Avg. Risk    9.6     7.1  3X  Avg. Risk   23.4    11.0         . No results found for any visits on 10/24/19. Assessment / Plan:      ICD-10-CM ICD-9-CM    1. Fatty liver K76.0 571.8    2. Screen for colon cancer Z12.11 V76.51 REFERRAL TO COLON AND RECTAL SURGERY   3. Insomnia, unspecified type G47.00 780.52 mirtazapine (REMERON) 15 mg tablet   4. Depression with anxiety F41.8 300.4    5.  S/P rotator cuff repair Z98.890 V45.89      Per ultrasound of the liver-patient has a fatty liver  Insomnia-patient given Remeron 15 mg  Referral to Dr. Stephenie Nissen for colonoscopy  Blood pressure controlled        I asked the patient if he  had any questions and answered his questions. The patient stated that he understands the treatment plan and agrees with the treatment plan    This document was created with a voice activated dictation system and may contain transcription errors. Discussed the patient's BMI with him. The BMI follow up plan is as follows:     dietary management education, guidance, and counseling  encourage exercise  monitor weight  prescribed dietary intake    An After Visit Summary was printed and given to the patient.

## 2019-10-24 NOTE — PROGRESS NOTES
Visit Vitals  BP (!) 133/91   Pulse 73   Temp 98 °F (36.7 °C) (Oral)   Resp 12   Ht 5' 9\" (1.753 m)   Wt 204 lb (92.5 kg)   SpO2 98%   BMI 30.13 kg/m²     Naila Contreras presents today for   Chief Complaint   Patient presents with    Follow-up     liver enzymes       Is someone accompanying this pt? no    Is the patient using any DME equipment during OV? no    Depression Screening:  3 most recent PHQ Screens 10/24/2019   PHQ Not Done -   Little interest or pleasure in doing things Not at all   Feeling down, depressed, irritable, or hopeless Not at all   Total Score PHQ 2 0   Trouble falling or staying asleep, or sleeping too much -   Feeling tired or having little energy -   Poor appetite, weight loss, or overeating -   Feeling bad about yourself - or that you are a failure or have let yourself or your family down -   Trouble concentrating on things such as school, work, reading, or watching TV -   Moving or speaking so slowly that other people could have noticed; or the opposite being so fidgety that others notice -   Thoughts of being better off dead, or hurting yourself in some way -   PHQ 9 Score -   How difficult have these problems made it for you to do your work, take care of your home and get along with others -       Learning Assessment:  No flowsheet data found. Abuse Screening:  Abuse Screening Questionnaire 1/28/2019   Do you ever feel afraid of your partner? N   Are you in a relationship with someone who physically or mentally threatens you? N   Is it safe for you to go home? Y       Fall Risk  Fall Risk Assessment, last 12 mths 1/28/2019   Able to walk? Yes   Fall in past 12 months? No       Health Maintenance reviewed and discussed and ordered per Provider. Health Maintenance Due   Topic Date Due    Shingrix Vaccine Age 50> (1 of 2) 12/06/2014    FOBT Q 1 YEAR AGE 50-75  12/06/2014           Coordination of Care:  1.  Have you been to the ER, urgent care clinic since your last visit? Hospitalized since your last visit? no    2. Have you seen or consulted any other health care providers outside of the [address] [place] [address] since your last visit? Include any pap smears or colon screening.  no

## 2019-10-24 NOTE — TELEPHONE ENCOUNTER
Pt called and asked if his oxyCODONE-acetaminophen (PERCOCET) 7.5-325 mg per tablet can be sent to the  [facility], [address] [facility], states that the rx is too expensive to fill at [place].        Pt can be reached at :[phone]

## 2019-10-24 NOTE — PATIENT INSTRUCTIONS
Body Mass Index: Care Instructions  Your Care Instructions    Body mass index (BMI) can help you see if your weight is raising your risk for health problems. It uses a formula to compare how much you weigh with how tall you are. · A BMI lower than 18.5 is considered underweight. · A BMI between 18.5 and 24.9 is considered healthy. · A BMI between 25 and 29.9 is considered overweight. A BMI of 30 or higher is considered obese. If your BMI is in the normal range, it means that you have a lower risk for weight-related health problems. If your BMI is in the overweight or obese range, you may be at increased risk for weight-related health problems, such as high blood pressure, heart disease, stroke, arthritis or joint pain, and diabetes. If your BMI is in the underweight range, you may be at increased risk for health problems such as fatigue, lower protection (immunity) against illness, muscle loss, bone loss, hair loss, and hormone problems. BMI is just one measure of your risk for weight-related health problems. You may be at higher risk for health problems if you are not active, you eat an unhealthy diet, or you drink too much alcohol or use tobacco products. Follow-up care is a key part of your treatment and safety. Be sure to make and go to all appointments, and call your doctor if you are having problems. It's also a good idea to know your test results and keep a list of the medicines you take. How can you care for yourself at home? · Practice healthy eating habits. This includes eating plenty of fruits, vegetables, whole grains, lean protein, and low-fat dairy. · If your doctor recommends it, get more exercise. Walking is a good choice. Bit by bit, increase the amount you walk every day. Try for at least 30 minutes on most days of the week. · Do not smoke. Smoking can increase your risk for health problems. If you need help quitting, talk to your doctor about stop-smoking programs and medicines. These can increase your chances of quitting for good. · Limit alcohol to 2 drinks a day for men and 1 drink a day for women. Too much alcohol can cause health problems. If you have a BMI higher than 25  · Your doctor may do other tests to check your risk for weight-related health problems. This may include measuring the distance around your waist. A waist measurement of more than 40 inches in men or 35 inches in women can increase the risk of weight-related health problems. · Talk with your doctor about steps you can take to stay healthy or improve your health. You may need to make lifestyle changes to lose weight and stay healthy, such as changing your diet and getting regular exercise. If you have a BMI lower than 18.5  · Your doctor may do other tests to check your risk for health problems. · Talk with your doctor about steps you can take to stay healthy or improve your health. You may need to make lifestyle changes to gain or maintain weight and stay healthy, such as getting more healthy foods in your diet and doing exercises to build muscle. Where can you learn more? Go to http://reina-oumar.info/. Enter S176 in the search box to learn more about \"Body Mass Index: Care Instructions. \"  Current as of: October 13, 2016  Content Version: 11.4  © 8794-7751 [employer]. Care instructions adapted under license by [employer] (which disclaims liability or warranty for this information). If you have questions about a medical condition or this instruction, always ask your healthcare professional. [address] any warranty or liability for your use of this information.

## 2019-10-29 ENCOUNTER — TELEPHONE (OUTPATIENT)
Dept: ORTHOPEDIC SURGERY | Facility: CLINIC | Age: 54
End: 2019-10-29

## 2019-10-29 NOTE — TELEPHONE ENCOUNTER
Patient called stating his post op shoulder is in severe pain and was not sure if he needed to come in to be seen before his first post op appointment on 11/1/19.  Please advise patient at [phone]

## 2019-10-29 NOTE — TELEPHONE ENCOUNTER
Yes he can come in sooner to have his shoulder looked at. We will get xrays and make sure everything is ok.

## 2019-10-31 NOTE — TELEPHONE ENCOUNTER
Post op patient called for . Patient said he is running a Fever since this morning. That his temperature is 101.5    Patient would like a call back with some advice. Patient tel. [phone]. Note : patient had sx done on 10/21/19 for the left shoulder. First post op appt on 11/01/2019 tomorrow Friday.

## 2019-10-31 NOTE — TELEPHONE ENCOUNTER
Patient advised he go to the ER asap he is headed to [facility] but will come for his follow up tomorrow.

## 2019-11-01 ENCOUNTER — OFFICE VISIT (OUTPATIENT)
Dept: ORTHOPEDIC SURGERY | Facility: CLINIC | Age: 54
End: 2019-11-01

## 2019-11-01 VITALS
BODY MASS INDEX: 30.64 KG/M2 | TEMPERATURE: 97.8 F | RESPIRATION RATE: 16 BRPM | HEART RATE: 80 BPM | DIASTOLIC BLOOD PRESSURE: 85 MMHG | HEIGHT: 70 IN | SYSTOLIC BLOOD PRESSURE: 126 MMHG | WEIGHT: 214 LBS

## 2019-11-01 DIAGNOSIS — S46.012D TRAUMATIC COMPLETE TEAR OF LEFT ROTATOR CUFF, SUBSEQUENT ENCOUNTER: Primary | ICD-10-CM

## 2019-11-01 DIAGNOSIS — S40.252D: ICD-10-CM

## 2019-11-01 RX ORDER — OXYCODONE AND ACETAMINOPHEN 5; 325 MG/1; MG/1
1 TABLET ORAL
Qty: 30 TAB | Refills: 0 | Status: SHIPPED | OUTPATIENT
Start: 2019-11-01 | End: 2019-11-08

## 2019-11-01 NOTE — PROGRESS NOTES
HISTORY OF PRESENT ILLNESS:  Titi Tejada returns to the office today for his left shoulder. He had a left shoulder rotator cuff repair, biceps tenodesis, decompression and loose body removal.  He is complaining of pain 9/10 today. He has been in his sling. PHYSICAL EXAM:  Physical exam of the left shoulder with portal sites that are healing. Range of motion was not tested. ASSESSMENT AND PLAN:   Titi Tejada is now about two weeks out from his left shoulder surgery. We took his sutures out today and placed Steri-Strips. He is to remain in the sling. I will see him back in two weeks.

## 2019-11-01 NOTE — PROGRESS NOTES
1. Have you been to the ER, urgent care clinic since your last visit? Hospitalized since your last visit? Yes, [facility] ED       2. Have you seen or consulted any other health care providers outside of the [address] [place] [address] since your last visit? Include any pap smears or colon screening.    Yes, see above

## 2019-11-01 NOTE — Clinical Note
This note has been dictated below. Patient: Afua Campbell                MRN: [id number]       SSN: [id number] YOB: 1965        AGE: 46 y.o. SEX: male Body mass index is 30.71 kg/m². PCP: Cira Cerrato NP 
11/01/19 Past Medical History:  
Diagnosis Date  Depression  Foot drop, left foot October, 2018. Seen by Podiatry in 2019  
 Headache  Psychotic disorder ([facility] [address]   
 anxiety Past Surgical History:  
Procedure Laterality Date  HX HERNIA REPAIR    
 HX KNEE ARTHROSCOPY Right  HX ORTHOPAEDIC Left 2017  
 lacerations to fingers and had pins, left thumb  HX ROTATOR CUFF REPAIR Right  HX ROTATOR CUFF REPAIR Left 10/21/2019 Family History Problem Relation Age of Onset  COPD Mother Social History Socioeconomic History  Marital status:  Spouse name: Not on file  Number of children: Not on file  Years of education: Not on file  Highest education level: Not on file Occupational History  Not on file Social Needs  Financial resource strain: Not on file  Food insecurity:  
  Worry: Not on file Inability: Not on file  Transportation needs:  
  Medical: Not on file Non-medical: Not on file Tobacco Use  Smoking status: Never Smoker  Smokeless tobacco: Current User  Tobacco comment: chew tobacco, using since 1976 Substance and Sexual Activity  Alcohol use: Not Currently Comment: states he quit drinking alchohol 2 months ago  Drug use: Not Currently Types: Marijuana Comment: uses marijuana once a month  Sexual activity: Yes Lifestyle  Physical activity:  
  Days per week: Not on file Minutes per session: Not on file  Stress: Not on file Relationships  Social connections:  
  Talks on phone: Not on file Gets together: Not on file Attends [employer] service: Not on file Active member of club or organization: Not on file Attends meetings of clubs or organizations: Not on file Relationship status: Not on file  Intimate partner violence:  
  Fear of current or ex partner: Not on file Emotionally abused: Not on file Physically abused: Not on file Forced sexual activity: Not on file Other Topics Concern  Not on file Social History Narrative  Not on file Current Outpatient Medications Medication Sig Dispense Refill  oxyCODONE-acetaminophen (PERCOCET) 5-325 mg per tablet Take 1 Tab by mouth every four (4) hours as needed for Pain for up to 7 days. Max Daily Amount: 6 Tabs. 30 Tab 0  
 HYDROcodone-acetaminophen (NORCO) 7.5-325 mg per tablet Take 1 Tab by mouth every six (6) hours as needed for Pain.  acetaminophen (TYLENOL) 325 mg tablet Take  by mouth every four (4) hours as needed for Pain.  mirtazapine (REMERON) 15 mg tablet Take 1 Tab by mouth nightly. 30 Tab 1  
 gabapentin (NEURONTIN) 400 mg capsule Take 1 Cap by mouth three (3) times daily. Max Daily Amount: 1,200 mg. 90 Cap 0  
 ergocalciferol (ERGOCALCIFEROL) 50,000 unit capsule Take 50,000 Units by mouth Every Saturday.  escitalopram oxalate (LEXAPRO) 10 mg tablet Take 10 mg by mouth daily. Indications: not taking  0 Allergies Allergen Reactions  Motrin [Ibuprofen] Hives REVIEW OF SYSTEMS:   
 
Review of systems unchanged from previous visit except as noted below. PHYSICAL EXAMINATION: 
Visit Vitals /85 (BP 1 Location: Left arm, BP Patient Position: Sitting) Pulse 80 Temp 97.8 °F (36.6 °C) (Oral) Resp 16 Ht 5' 10\" (1.778 m) Wt 214 lb (97.1 kg) BMI 30.71 kg/m² Body mass index is 30.71 kg/m². HISTORY OF PRESENT ILLNESS:  Bonita Eugene returns to the office today for his left shoulder.   He had a left shoulder rotator cuff repair, biceps tenodesis, decompression and loose body removal.  He is complaining of pain 9/10 today. He has been in his sling. PHYSICAL EXAM:  Physical exam of the left shoulder with portal sites that are healing. Range of motion was not tested. ASSESSMENT AND PLAN:   Tao Alvarez is now about two weeks out from his left shoulder surgery. We took his sutures out today and placed Steri-Strips. He is to remain in the sling. I will see him back in two weeks.    
 
 
 
  
 
 
Electronically signed by: Denver Sarah, MD

## 2019-11-01 NOTE — PROGRESS NOTES
This note has been dictated below. Patient: Alonzo Burns                MRN: [id number]       SSN: [id number] YOB: 1965        AGE: 46 y.o. SEX: male Body mass index is 30.71 kg/m². PCP: Jayson Driscoll NP 
11/01/19 Past Medical History:  
Diagnosis Date  Depression  Foot drop, left foot October, 2018. Seen by Podiatry in 2019  
 Headache  Psychotic disorder ([facility] [address]   
 anxiety Past Surgical History:  
Procedure Laterality Date  HX HERNIA REPAIR    
 HX KNEE ARTHROSCOPY Right  HX ORTHOPAEDIC Left 2017  
 lacerations to fingers and had pins, left thumb  HX ROTATOR CUFF REPAIR Right  HX ROTATOR CUFF REPAIR Left 10/21/2019 Family History Problem Relation Age of Onset  COPD Mother Social History Socioeconomic History  Marital status:  Spouse name: Not on file  Number of children: Not on file  Years of education: Not on file  Highest education level: Not on file Occupational History  Not on file Social Needs  Financial resource strain: Not on file  Food insecurity:  
  Worry: Not on file Inability: Not on file  Transportation needs:  
  Medical: Not on file Non-medical: Not on file Tobacco Use  Smoking status: Never Smoker  Smokeless tobacco: Current User  Tobacco comment: chew tobacco, using since 1976 Substance and Sexual Activity  Alcohol use: Not Currently Comment: states he quit drinking alchohol 2 months ago  Drug use: Not Currently Types: Marijuana Comment: uses marijuana once a month  Sexual activity: Yes Lifestyle  Physical activity:  
  Days per week: Not on file Minutes per session: Not on file  Stress: Not on file Relationships  Social connections:  
  Talks on phone: Not on file Gets together: Not on file Attends Yazidism service: Not on file Active member of club or organization: Not on file Attends meetings of clubs or organizations: Not on file Relationship status: Not on file  Intimate partner violence:  
  Fear of current or ex partner: Not on file Emotionally abused: Not on file Physically abused: Not on file Forced sexual activity: Not on file Other Topics Concern  Not on file Social History Narrative  Not on file Current Outpatient Medications Medication Sig Dispense Refill  oxyCODONE-acetaminophen (PERCOCET) 5-325 mg per tablet Take 1 Tab by mouth every four (4) hours as needed for Pain for up to 7 days. Max Daily Amount: 6 Tabs. 30 Tab 0  
 HYDROcodone-acetaminophen (NORCO) 7.5-325 mg per tablet Take 1 Tab by mouth every six (6) hours as needed for Pain.  acetaminophen (TYLENOL) 325 mg tablet Take  by mouth every four (4) hours as needed for Pain.  mirtazapine (REMERON) 15 mg tablet Take 1 Tab by mouth nightly. 30 Tab 1  
 gabapentin (NEURONTIN) 400 mg capsule Take 1 Cap by mouth three (3) times daily. Max Daily Amount: 1,200 mg. 90 Cap 0  
 ergocalciferol (ERGOCALCIFEROL) 50,000 unit capsule Take 50,000 Units by mouth Every Saturday.  escitalopram oxalate (LEXAPRO) 10 mg tablet Take 10 mg by mouth daily. Indications: not taking  0 Allergies Allergen Reactions  Motrin [Ibuprofen] Hives REVIEW OF SYSTEMS:   
 
Review of systems unchanged from previous visit except as noted below. PHYSICAL EXAMINATION: 
Visit Vitals /85 (BP 1 Location: Left arm, BP Patient Position: Sitting) Pulse 80 Temp 97.8 °F (36.6 °C) (Oral) Resp 16 Ht 5' 10\" (1.778 m) Wt 214 lb (97.1 kg) BMI 30.71 kg/m² Body mass index is 30.71 kg/m². Dictation on: 11/01/2019  2:11 PM by: Will Holder [[id number]] Electronically signed by: Melany Saucedo MD

## 2019-11-07 ENCOUNTER — OFFICE VISIT (OUTPATIENT)
Dept: ORTHOPEDIC SURGERY | Age: 54
End: 2019-11-07

## 2019-11-07 VITALS
OXYGEN SATURATION: 97 % | RESPIRATION RATE: 16 BRPM | DIASTOLIC BLOOD PRESSURE: 70 MMHG | HEIGHT: 70 IN | WEIGHT: 214 LBS | SYSTOLIC BLOOD PRESSURE: 104 MMHG | BODY MASS INDEX: 30.64 KG/M2 | HEART RATE: 67 BPM | TEMPERATURE: 98.5 F

## 2019-11-07 DIAGNOSIS — G56.23 CUBITAL TUNNEL SYNDROME, BILATERAL: Primary | ICD-10-CM

## 2019-11-07 DIAGNOSIS — M67.431 GANGLION CYST OF VOLAR ASPECT OF RIGHT WRIST: ICD-10-CM

## 2019-11-07 DIAGNOSIS — G56.03 CARPAL TUNNEL SYNDROME, BILATERAL: ICD-10-CM

## 2019-11-07 NOTE — PROGRESS NOTES
1. Have you been to the ER, urgent care clinic since your last visit? Hospitalized since your last visit? No    2. Have you seen or consulted any other health care providers outside of the [address] [place] [address] since your last visit? Include any pap smears or colon screening.  No

## 2019-11-07 NOTE — PROGRESS NOTES
Oren Dejesus is a 46 y.o. male right handed unknown employment. Worker's Compensation and legal considerations: none filed. Vitals:    11/07/19 1302   BP: 104/70   Pulse: 67   Resp: 16   Temp: 98.5 °F (36.9 °C)   TempSrc: Oral   SpO2: 97%   Weight: 214 lb (97.1 kg)   Height: 5' 10\" (1.778 m)   PainSc:   7   PainLoc: Hand           Chief Complaint   Patient presents with    Hand Pain     APM HAND PAIN       HPI:  Patient comes in today for follow-up regarding his bilateral hand numbness and tingling. He reports the right side has been worse recently than the left. He has recently had a rotator cuff repair on the left but he will start therapy on in the next few weeks. Previous HPI: Patient comes in today for EMG follow-up of his bilateral upper extremities. He has been diagnosed with carpal tunnel syndrome and cubital tunnel syndrome bilaterally. He is scheduled for a right rotator cuff surgery on Monday. Previous HPI: Patient comes in today for follow-up after getting bilateral hand carpal tunnel injections approximately 6 to 7 weeks ago. He was supposed to have an EMG and nerve conduction study set up for both hands but he said he was never called. He reports also that the braces he was given last time did help with the injections only helped minimally. He also reports bilateral index and middle finger MCP pain that is worse on the right than the left. Initial HPI: Patient comes in today with complaints of bilateral hand numbness and tingling. He reports that he had a left carpal tunnel diagnosis after an EMG several years ago. He has not had any treatment for it. He also has a cyst on the right wrist that was injected several months ago but did not help. He also reports a history of having digits amputated that were placed back on many years ago on the left hand. He has had chronic pain from scar tissue in his hand. Date of onset: Indeterminate    Injury: Yes: Comment:  Old injury to left hand pain involving digital amputations    Prior Treatment:  No    Numbness/ Tingling: Yes: Comment: Bilateral hands left worse than right    ROS: Review of Systems - General ROS: negative  Respiratory ROS: no cough, shortness of breath, or wheezing  Cardiovascular ROS: no chest pain or dyspnea on exertion  Musculoskeletal ROS: positive for - pain in hand - bilateral  Neurological ROS: positive for - numbness/tingling  Dermatological ROS: negative    Past Medical History:   Diagnosis Date    Depression     Foot drop, left foot     October, 2018. Seen by Podiatry in 2019    Headache     Psychotic disorder ([facility] [address]     anxiety       Past Surgical History:   Procedure Laterality Date    HX HERNIA REPAIR      HX KNEE ARTHROSCOPY Right     HX ORTHOPAEDIC Left 2017    lacerations to fingers and had pins, left thumb    HX ROTATOR CUFF REPAIR Right     HX ROTATOR CUFF REPAIR Left 10/21/2019       Current Outpatient Medications   Medication Sig Dispense Refill    oxyCODONE-acetaminophen (PERCOCET) 5-325 mg per tablet Take 1 Tab by mouth every four (4) hours as needed for Pain for up to 7 days. Max Daily Amount: 6 Tabs. 30 Tab 0    HYDROcodone-acetaminophen (NORCO) 7.5-325 mg per tablet Take 1 Tab by mouth every six (6) hours as needed for Pain.  acetaminophen (TYLENOL) 325 mg tablet Take  by mouth every four (4) hours as needed for Pain.  mirtazapine (REMERON) 15 mg tablet Take 1 Tab by mouth nightly. 30 Tab 1    gabapentin (NEURONTIN) 400 mg capsule Take 1 Cap by mouth three (3) times daily. Max Daily Amount: 1,200 mg. 90 Cap 0    ergocalciferol (ERGOCALCIFEROL) 50,000 unit capsule Take 50,000 Units by mouth Every Saturday.  escitalopram oxalate (LEXAPRO) 10 mg tablet Take 10 mg by mouth daily. Indications: not taking  0       Allergies   Allergen Reactions    Motrin [Ibuprofen] Hives         PE:       Right hand:  There is tenderness to palpation about the MCP joints of the index finger and middle finger bilaterally significantly worse on the right. There is decreased range of motion in bilateral hands. There is some scar tissue noted in the left hand in the palm as well as multiple digits. Sensation is diminished in all digits bilaterally. There is also a cystic mass noted about the right volar wrist.    NEUROVASCULAR    Examination L R Examination L R   Carpal Comp. + + Pronator Comp. - -   Carpal Tinel + + Pronator Tinel - -   Phalen's + + Pronator Stress - -   Cubital Comp. ? ? Guyon Comp. - -   Cubital Tinel ? ? No Guyon Tinel - -   Elbow Hyperflexion - - Adson's - -   Spurling's - - SC Comp. - -   PCB Median abn - - SC Tinel - -   Radial Tinel - - IC Comp. - -   Digital Tinel - - IC Tinel - -   Radial 2-Pt WNL WNL Ulnar 2-Pt WNL WNL     Radial Pulse: 2+  Capillary Refill: < 2 sec  [employer]: Not Performed  [employer]: Not Performed      Imaging:    Plain films of bilateral wrist does not show substantial degenerative changes  Fracture dislocation or any other osseous abnormalities. NCV & EMG Findings:  Evaluation of the left median motor and the right median motor nerves showed prolonged distal onset latency (L4.4, R4.5 ms). The left ulnar motor and the right ulnar motor nerves showed decreased conduction velocity (A Elbow-B Elbow, L38, R42 m/s). The left median sensory and the right median sensory nerves showed prolonged distal peak latency (L4.6, R4.4 ms) and decreased conduction velocity (Wrist-2nd Digit, L30, R32 m/s). The left ulnar sensory nerve showed prolonged distal peak latency (4.1 ms), reduced amplitude (10.5 µV), and decreased conduction velocity (Wrist-5th Digit, 34 m/s). The right ulnar sensory nerve showed prolonged distal peak latency (5.7 ms) and decreased conduction velocity (Wrist-5th Digit, 25 m/s). The left median/ulnar (palm) comparison nerve showed no response (Median Palm) and no response (Ulnar Palm).   All remaining nerves (as indicated in the following tables) were within normal limits. Left vs. Right side comparison data for the ulnar motor nerve indicates abnormal L-R latency difference (0.8 ms). The median sensory nerve indicates abnormal L-R amplitude difference (63.1 %). The ulnar sensory nerve indicates abnormal L-R latency difference (1.6 ms). All remaining left vs. right side differences were within normal limits.       All examined muscles (as indicated in the following table) showed no evidence of electrical instability.          INTERPRETATION  This is an abnormal electrodiagnostic examination. These findings may be consistent with:  1. 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 Moderate median mononeuropathy at the wrist bilaterally (carpal tunnel syndrome)  3. Severe neuropathy focally related to both median and ulnar palmar branches     There is no electrodiagnostic evidence of any cervical radiculopathy, brachial plexopathy, peripheral polyneuropathy, or any other mononeuropathy.     CLINICAL INTERPRETATION  His electrodiagnostic findings in the median and ulnar nerves are consistent with his symptoms of numbness and tingling in both hands.          ICD-10-CM ICD-9-CM    1. Cubital tunnel syndrome, bilateral G56.23 354.2    2. Carpal tunnel syndrome, bilateral G56.03 354.0    3. Ganglion cyst of volar aspect of right wrist M67.431 727.41        Plan:     I had a discussion with the patient regarding a possible steroid injection into his carpal tunnel today. He declined today and would like to wait until just having the surgery. I told him to follow-up in 2 months after he is healed his rotator cuff repair. At that point we will set him up for surgery.     Plan was reviewed with patient, who verbalized agreement and understanding of the plan

## 2019-11-12 ENCOUNTER — OFFICE VISIT (OUTPATIENT)
Dept: ORTHOPEDIC SURGERY | Facility: CLINIC | Age: 54
End: 2019-11-12

## 2019-11-12 VITALS
HEART RATE: 70 BPM | TEMPERATURE: 99 F | DIASTOLIC BLOOD PRESSURE: 78 MMHG | SYSTOLIC BLOOD PRESSURE: 115 MMHG | OXYGEN SATURATION: 98 % | RESPIRATION RATE: 16 BRPM | BODY MASS INDEX: 29.49 KG/M2 | HEIGHT: 70 IN | WEIGHT: 206 LBS

## 2019-11-12 DIAGNOSIS — G89.18 POST-OP PAIN: Primary | ICD-10-CM

## 2019-11-12 DIAGNOSIS — S46.012D TRAUMATIC COMPLETE TEAR OF LEFT ROTATOR CUFF, SUBSEQUENT ENCOUNTER: ICD-10-CM

## 2019-11-12 DIAGNOSIS — Z98.890 STATUS POST ARTHROSCOPY OF LEFT SHOULDER: ICD-10-CM

## 2019-11-12 DIAGNOSIS — S40.252D: ICD-10-CM

## 2019-11-12 RX ORDER — HYDROCODONE BITARTRATE AND ACETAMINOPHEN 7.5; 325 MG/1; MG/1
1 TABLET ORAL
Qty: 21 TAB | Refills: 0 | Status: SHIPPED | OUTPATIENT
Start: 2019-11-12 | End: 2019-11-19

## 2019-11-12 NOTE — PROGRESS NOTES
Eduar Gates  1965     HISTORY OF PRESENT ILLNESS  Eduar Gates is a 46 y.o. male who presents today for evaluation S/P Left shoulder arthroscopic rotator cuff repair, biceps tenodesis, subacromial decompression and loose body removal on 10/21/19. Patient has not been going to PT. He has been compliant with his sling and restrictions. He has pain in the biceps area and top of shoulder. He is listening to pain and reports the norco does help. Describes pain as a 10/10. Has been taking norco for pain. Still has night pain. Patient denies any fever, chills, chest pain, shortness of breath or calf pain. There are no new illness or injuries to report since last seen in the office. PHYSICAL EXAM:   Visit Vitals  /78   Pulse 70   Temp 99 °F (37.2 °C) (Oral)   Resp 16   Ht 5' 10\" (1.778 m)   Wt 206 lb (93.4 kg)   SpO2 98%   BMI 29.56 kg/m²      The patient is a well-developed, well-nourished male in no acute distress. The patient is alert and oriented times three. The patient appears to be well groomed. Mood and affect are normal.  ORTHOPEDIC EXAM of left shoulder:  Inspection: swelling not present,  Bruising not present  Incision well healed  Passive glenohumeral abduction 0-20 degrees in sling otherwise not assessed  Stability: Stable  Strength: n/a  2+ distal pulses    IMPRESSION:  S/P Left shoulder arthroscopic rotator cuff repair, biceps tenodesis, subacromial decompression and loose body removal    PLAN:   Pt having pain post operatively  I think his discomfort is post surgical. Talked about his restrictions and he will be in the sling for another week. Continue wearing sling until 4 weeks post op. Will start exercises and PT at next visit. Stressed to patient that nothing causes an increase in pain. Pt given refill of pain medications. norco 7.5 mg Patient given pain medication for short term acute pain relief.  Goal is to treat patient according to above plan and to ultimately have patient off all pain medications once appropriate. If chronic pain management is required beyond what is expected for current orthopedic problem, will refer patient to pain management.   was reviewed and will be reviewed with every medication refill request.   RTC 1 week    Patient seen and evaluated by Dr. Viet Sommers today who agrees with treatment plan    [facility] and Spine Specialist

## 2019-11-19 NOTE — PROGRESS NOTES
[facility]  [address]  [place], [address]  [phone] fax    Occupational Therapy Discharge Summary    Patient name: Satya Jack Start of Care: 10/15/2019   Referral source: Jessika Jean DO : 1965   Medical/Treatment Diagnosis: Carpal tunnel syndrome, bilateral upper limbs [G56.03]  Payor: [employer] / Plan: [employer] / Product Type: Managed Care Medicaid /  Onset Date:     Prior Hospitalization: see medical history Provider#: [id number]   Medications: Verified on Patient Summary List     Comorbidities: Alcohol use, h/o left hand surgical intervention secondary to multiple lacerations, tobacco use, back pain, h/o right RTC repair, left RTC tear, right wrist cyst   Prior Level of Function: driving, carrying groceries  Visits from Start of Care: 2    Missed Visits: 2  Reporting Period : 10/15/2019 to 10/17/2019    Summary of Care:  Short Term Goals: To be accomplished in 2  weeks:  1. Patient will be compliant with initial home exercise program to take an active role in their rehabilitation process. Status at Eval: pt provided and instructed in initial HEP  10/17/19 - pt reports completing HEP as best he could with digit ROM difficulty. 2. Patient will demonstrate a good understanding of their condition and strategies for self-management. Status at Eval: Pt educated on wrist immobilization splint wear and activity modifications to reduce paresthesias 10/17/19 - pt reports wearing wrist immobilization splints at night  Long Term Goals: To be accomplished in 4 weeks:              1. Patient will report 75% reduced symptoms in bilateral wrists in order to increase use of hands for daily tasks. Status at eval: 100% of the time. 2. Patient will report reduced pain in hands to 5/10 in order to carry shopping bags witout increased pain. Status at eval: 9/10  3.  Patient will improve FOTO outcome measure score by 15 points in order to demonstrate improved functional performance using bilateral hands. Status at eval: 43   ASSESSMENT/RECOMMENDATIONS: Unable to further assess goals due to pt abdicated therapy. Pt was to undergo unrelated surgical procedure on left UE which is a possible reason for why he did not complete course of skilled OT. Will d/c at this time and reassess in the future if medically necessary.  Thank you for this referral.     [x]Discontinue therapy: []Patient has reached or is progressing toward set goals      [x]Patient is non-compliant or has abdicated      []Due to lack of appreciable progress towards set goals    Seda Su OT 11/19/2019 2:52 PM

## 2019-11-21 ENCOUNTER — OFFICE VISIT (OUTPATIENT)
Dept: ORTHOPEDIC SURGERY | Facility: CLINIC | Age: 54
End: 2019-11-21

## 2019-11-21 VITALS
RESPIRATION RATE: 16 BRPM | HEART RATE: 54 BPM | DIASTOLIC BLOOD PRESSURE: 69 MMHG | HEIGHT: 70 IN | BODY MASS INDEX: 29.63 KG/M2 | TEMPERATURE: 97 F | SYSTOLIC BLOOD PRESSURE: 121 MMHG | WEIGHT: 207 LBS

## 2019-11-21 DIAGNOSIS — S46.012D TRAUMATIC COMPLETE TEAR OF LEFT ROTATOR CUFF, SUBSEQUENT ENCOUNTER: Primary | ICD-10-CM

## 2019-11-21 DIAGNOSIS — Z98.890 STATUS POST ARTHROSCOPY OF LEFT SHOULDER: ICD-10-CM

## 2019-11-21 DIAGNOSIS — S40.252D: ICD-10-CM

## 2019-11-21 DIAGNOSIS — G89.18 POST-OP PAIN: ICD-10-CM

## 2019-11-21 RX ORDER — HYDROCODONE BITARTRATE AND ACETAMINOPHEN 7.5; 325 MG/1; MG/1
1 TABLET ORAL
Qty: 21 TAB | Refills: 0 | Status: SHIPPED | OUTPATIENT
Start: 2019-11-21 | End: 2019-11-28

## 2019-11-21 NOTE — PROGRESS NOTES
Judith Figueroa  1965     HISTORY OF PRESENT ILLNESS  Judith Figueroa is a 46 y.o. male who presents today for evaluation S/P Left shoulder arthroscopic rotator cuff repair, biceps tenodesis, subacromial decompression and loose body removal on 10/21/19. Patient has not been going to PT. He has been compliant with his sling and restrictions. He has pain in the biceps area and top of shoulder. He is listening to pain and reports the norco does help. His pain has improved since last visit. Describes pain as a 6/10. Has been taking norco for pain. Still has night pain. Patient denies any fever, chills, chest pain, shortness of breath or calf pain. There are no new illness or injuries to report since last seen in the office. PHYSICAL EXAM:   Visit Vitals  /69 (BP 1 Location: Left arm, BP Patient Position: Sitting)   Pulse (!) 54   Temp 97 °F (36.1 °C) (Oral)   Resp 16   Ht 5' 10\" (1.778 m)   Wt 207 lb (93.9 kg)   BMI 29.70 kg/m²      The patient is a well-developed, well-nourished male in no acute distress. The patient is alert and oriented times three. The patient appears to be well groomed. Mood and affect are normal.  ORTHOPEDIC EXAM of left shoulder:  Inspection: swelling not present,  Bruising not present  Incision well healed  Passive glenohumeral abduction 0-20 degrees, 10 PFF, 0 PER  Stability: Stable  Strength: n/a  2+ distal pulses    IMPRESSION:  S/P Left shoulder arthroscopic rotator cuff repair, biceps tenodesis, subacromial decompression and loose body removal    PLAN:   Pt having pain post operatively  I think his discomfort is post surgical and stiffness. D/C sling today. Pt given exercises and PT order today. Demonstrated exercises in the office  Stressed to patient that nothing causes an increase in pain. Pt given ONE TIME refill of pain medications. norco 7.5 mg Patient given pain medication for short term acute pain relief.  Goal is to treat patient according to above plan and to ultimately have patient off all pain medications once appropriate. If chronic pain management is required beyond what is expected for current orthopedic problem, will refer patient to pain management.   was reviewed and will be reviewed with every medication refill request.   RTC 4 weeks      TANNA Mir [phone] and Spine Specialist

## 2019-11-21 NOTE — PROGRESS NOTES
1. Have you been to the ER, urgent care clinic since your last visit? Hospitalized since your last visit? NO    2. Have you seen or consulted any other health care providers outside of the [address] [place] [address] since your last visit? Include any pap smears or colon screening.    No

## 2019-11-26 ENCOUNTER — TELEPHONE (OUTPATIENT)
Dept: ORTHOPEDIC SURGERY | Facility: CLINIC | Age: 54
End: 2019-11-26

## 2019-11-26 ENCOUNTER — HOSPITAL ENCOUNTER (OUTPATIENT)
Dept: PHYSICAL THERAPY | Age: 54
Discharge: HOME | End: 2019-11-26
Payer: MEDICAID

## 2019-11-26 PROCEDURE — 97162 PT EVAL MOD COMPLEX 30 MIN: CPT

## 2019-11-26 PROCEDURE — 97530 THERAPEUTIC ACTIVITIES: CPT

## 2019-11-26 PROCEDURE — 97110 THERAPEUTIC EXERCISES: CPT

## 2019-11-26 NOTE — PROGRESS NOTES
PT DAILY TREATMENT NOTE/SHOULDER EVAL 10-18    Patient Name: Judith Figueroa  Date:2019  : 1965  [x]  Patient  Verified  Payor: Girish Gregory / Plan: [employer] / Product Type: Managed Care Medicaid /    In time:8:45  Out time:9:30  Total Treatment Time (min): 45  Visit #: 1 of 24    Treatment Area: Pain in left shoulder [M25.512]    SUBJECTIVE  Pain Level (0-10 scale): 5/10  []constant []intermittent []improving []worsening []no change since onset    Any medication changes, allergies to medications, adverse drug reactions, diagnosis change, or new procedure performed?: [x] No    [] Yes (see summary sheet for update)  Subjective functional status/changes:       Pt is a 47 yo M who presents s/p left shoulder rotator cuff repair, biceps tenodesis, decompression and loose body (piece of wire) removal.  He initially injured his shoulder ~2 years ago when his dog pulled on his leash, forcing his arm posterior. The pain has been severe, but he has been dealing with the pain and progressively using the left UE less until the pain become unbearable, and he had surgery 10/21/19. Sling was DC at his last follow up appointment on 19 per PA's note. He presents to outpatient therapy without sling, holding left shoulder in guarded position with IR against trunk. Incisions are healing without signs of complication. Pt reports he has tried to avoid using his shoulder at home, but he has used it for light reaching/lifting with severe pain increase. He also hit his shoulder off his trailer yesterday with severe pain increase, and the pain is slowly decreasing since. Pt has a history of alcohol abuse but reports he stopped alcohol use 3 months ago. Pt reports that he has had dizziness for years and he takes medication for the dizziness.       Barriers: []pain []financial []time []transportation []other  Motivation: high   Substance use: []Alcohol [x]Tobacco []other:   FABQ Score: []low []elevate  Cognition: A & O x 4    Other:    OBJECTIVE/EXAMINATION    26 min [x]Eval                  []Re-Eval       9 min Therapeutic Exercise:  [] See flow sheet : see HEP   Rationale: increase ROM and increase strength to improve the patients ability to improve ease of ADLs    10 min Therapeutic Activity:  []  See flow sheet :   Rationale: Educated patient regarding findings of evaluation, anatomy of shoulder, importance of compliance with not using arm during protection phase, progression through protocol, ice use 10-15 minutes, strategies for dressing/ADLs while avoiding lifting/reaching with left UE, new therex technique and purpose, purpose of therapy, and therapy plan in order to ensure pt understanding/compliance with therapy.           With   [] TE   [] TA   [] neuro   [] other: Patient Education: [x] Review HEP    [] Progressed/Changed HEP based on:   [] positioning   [] body mechanics   [] transfers   [] heat/ice application    [] other:      Other Objective/Functional Measures:     /72 taken manually prior to therapy     Physical Therapy Evaluation - Shoulder    Posture: [] Poor    [x] Fair    [] Good    Describe: forward head/protracted shoulders     ROM:  [x] Unable to assess left shoulder AROM at this time                                           AROM                                                              PROM   Left Right  Left Right   Flexion  130 Flexion 25    Extension   Extension     Abduction   104 Scaption  52    ER @ 0 Degrees   ER @ 0 Degrees     Functional ER  T1 ER @ 90 Degrees     Functional IR  L4 IR @ 90 Degrees       End Feel / Painful Arc:    Strength:   [x] Unable to assess on the left at this time                                                                            L (1-5) R (1-5) Pain   Flexors  4- [] Yes   [] No   Abductors  3 [] Yes   [] No   External Rotators  4- [] Yes   [] No   Internal Rotators  4- [] Yes   [] No   Supraspinatus   [] Yes   [] No Serratus Anterior   [] Yes   [] No   Lower Trapezius   [] Yes   [] No   Elbow Flexion  4 [] Yes   [] No   Elbow Extension  4- [] Yes   [] No       Palpation  [] Min  [x] Mod  [] Severe    Location:TTP left subacromial space, pec minor, acromioclavicular joint       Other Tests / Comments:   Incisions healed without signs of complication  No redness, tenderness, pitting edema or increased temp B calves  No findings consistent with DVT using [employer] Clinical Prediction rules    Pt held arm against side in guarded position with elbow flexed and arm IR - educated pt regarding importance of relaxing arm and avoiding elbow flexion AROM d/t bicep involvement     Advised pt to wear sling in public and when arm is very sore to reduce guarded position      No increased pain following session     Pain Level (0-10 scale) post treatment: 5/10    ASSESSMENT/Changes in Function: See POC    Patient will continue to benefit from skilled PT services to modify and progress therapeutic interventions, address ROM deficits, address strength deficits, analyze and address soft tissue restrictions, analyze and cue movement patterns, analyze and modify body mechanics/ergonomics, assess and modify postural abnormalities and instruct in home and community integration to attain remaining goals.      [x]  See Plan of Care  []  See progress note/recertification  []  See Discharge Summary         Progress towards goals / Updated goals:  See POC    PLAN  [x]  Upgrade activities as tolerated     []  Continue plan of care  [x]  Update interventions per flow sheet       []  Discharge due to:_  []  Other:_      Joe Cooley PT 11/26/2019  8:52 AM

## 2019-11-26 NOTE — TELEPHONE ENCOUNTER
Carly with [facility] called stating that the order they received for the patient stated \"PROM protocol attached\" but they do not have that protocol. She was wondering if it can be faxed over now due to the patient being in the office now. Please fax to [phone].  Please advise Carly at [phone]

## 2019-11-26 NOTE — PROGRESS NOTES
[facility] - [facility] [address]  [phone] fax    Plan of Care/ Statement of Necessity for Physical Therapy Services    Patient name: Brenda Curry Start of Care: 2019   [facility] 1965    Medical Diagnosis: Traumatic complete tear of rotator cuff, subsequent encounter (S46.012D)  Superficial foreign body of left shoulder, subsequent encounter (S40.252D)  Status post arthroscopy of left shoulder (Z98.890)  Payor: Joshua Daly / Plan: [employer] / Product Type: Managed Care Medicaid /  Onset Date:Date of Surgery 10/21/19    Treatment Diagnosis: Left Shoulder Pain    Prior Hospitalization: see medical history Provider#: [id number]   Medications: Verified on Patient summary List   Comorbidities: history of alcohol abuse, tobacco use (chewing tobacco), depression    Prior Level of Function: lives alone in a one story home, yard work, housework, metal work, 3 step to enter house, Ind with all mobility and ADLs, left-handed     The Plan of Care and following information is based on the information from the initial evaluation. Assessment/ key information: Pt is a 47 yo M who presents s/p left shoulder rotator cuff repair, biceps tenodesis, decompression and loose body (piece of wire) removal.  He initially injured his shoulder ~2 years ago when his dog pulled on his leash, forcing his arm posteriorly. The pain has been severe, but he has been dealing with the pain and progressively using the left UE less until the pain become unbearable, and he had surgery 10/21/19. Sling was DC at his last follow up appointment on 19 per PA's note. He presents to outpatient therapy without sling, holding left shoulder in guarded position with IR and elbow flexed against trunk. Incisions are healing without signs of complication.   Pt reports he has tried to avoid using his shoulder at home, but he has used it for light reaching/lifting with severe pain increase. He also hit his shoulder off his trailer yesterday with severe pain increase, and the pain has slowly been decreasing since. Right shoulder AROM and strength is limited d/t pain, and he reports a history of right RTC repair in 2000. Left shoulder PROM flexion is limited to 25* and scaption is limited to 52* with empty end-feel. PROM is limited by guarding. Pt will benefit from skilled PT to address strength, ROM, flexibility, and postural deficits per protocol in order to increase functional use of left UE and allow for return to PLOF. Evaluation Complexity History HIGH Complexity :3+ comorbidities / personal factors will impact the outcome/ POC ; Examination MEDIUM Complexity : 3 Standardized tests and measures addressing body structure, function, activity limitation and / or participation in recreation  ;Presentation MEDIUM Complexity : Evolving with changing characteristics  ; Clinical Decision Making HIGH Complexity : FOTO score of 1- 25   Overall Complexity Rating: MEDIUM  Problem List: pain affecting function, decrease ROM, decrease strength, decrease ADL/ functional abilitiies, decrease activity tolerance and decrease flexibility/ joint mobility   Treatment Plan may include any combination of the following: Therapeutic exercise, Therapeutic activities, Neuromuscular re-education, Physical agent/modality, Gait/balance training, Manual therapy, Patient education, Self Care training, Functional mobility training, Home safety training and Stair training  Patient / Family readiness to learn indicated by: asking questions, trying to perform skills and interest  Persons(s) to be included in education: patient (P)  Barriers to Learning/Limitations: None  Patient Goal (s): motion  Patient Self Reported Health Status: fair  Rehabilitation Potential: fair    Short Term Goals: To be accomplished in 1 weeks:  1.   Pt will be compliant with initial HEP in order to optimize therapy outcomes. Long Term Goals: To be accomplished in 8 weeks:  1. Pt will improve FOTO by 38 points in order to demonstrate functional improvement. 2.  Pt will report 50% improvement in sx since start of care in order to improve QOL. 3.  Pt will improve PROM flexion/scaption to >135* in order to progress towards reaching overhead once cleared for AROM per protocol. Frequency / Duration: Patient to be seen 2-3 times per week for 8 weeks. Patient/ CarPatient/ Caregiver education and instruction: Diagnosis, prognosis, self care, activity modification and exercises   [x]  Plan of care has been reviewed with CORBY Nayak, PT 11/26/2019 8:45 AM    ________________________________________________________________________    I certify that the above Therapy Services are being furnished while the patient is under my care. I agree with the treatment plan and certify that this therapy is necessary.     Physician's Signature:____________Date:_________TIME:________    ** Signature, Date and Time must be completed for valid certification **    Please sign and return to [facility] [employer] [employer]  [address] [facility]  [phone] fax

## 2019-12-02 ENCOUNTER — HOSPITAL ENCOUNTER (OUTPATIENT)
Dept: PHYSICAL THERAPY | Age: 54
Discharge: HOME | End: 2019-12-02
Payer: MEDICAID

## 2019-12-02 PROCEDURE — 97140 MANUAL THERAPY 1/> REGIONS: CPT

## 2019-12-02 PROCEDURE — 97110 THERAPEUTIC EXERCISES: CPT

## 2019-12-02 NOTE — PROGRESS NOTES
PT DAILY TREATMENT NOTE 10-18    Patient Name: Judith Figueroa  Date:2019  : 1965  [x]  Patient  Verified  Payor: Girish Gregory / Plan: [employer] / Product Type: Managed Care Medicaid /    In time:930  Out time:1007  Total Treatment Time (min): 37  Visit #: 2 of 24    Treatment Area: Pain in left shoulder [M25.512]    SUBJECTIVE  Pain Level (0-10 scale): 5/10  Any medication changes, allergies to medications, adverse drug reactions, diagnosis change, or new procedure performed?: [x] No    [] Yes (see summary sheet for update)  Subjective functional status/changes:   [] No changes reported  Pt stated that he is hurting today    OBJECTIVE    Modality rationale: decrease inflammation and decrease pain to improve the patients ability to increase ease with ADLs   Min Type Additional Details    [] Estim:  []Unatt       []IFC  []Premod                        []Other:  []w/ice   []w/heat  Position:  Location:    [] Estim: []Att    []TENS instruct  []NMES                    []Other:  []w/US   []w/ice   []w/heat  Position:  Location:    []  Traction: [] Cervical       []Lumbar                       [] Prone          []Supine                       []Intermittent   []Continuous Lbs:  [] before manual  [] after manual    []  Ultrasound: []Continuous   [] Pulsed                           []1MHz   []3MHz W/cm2:  Location:    []  Iontophoresis with dexamethasone         Location: [] Take home patch   [] In clinic   10 [x]  Ice     []  heat  []  Ice massage  []  Laser   []  Anodyne Position:supine  Location:left sh    []  Laser with stim  []  Other:  Position:  Location:    []  Vasopneumatic Device Pressure:       [] lo [] med [] hi   Temperature: [] lo [] med [] hi   [x] Skin assessment post-treatment:  [x]intact []redness- no adverse reaction    []redness - adverse reaction:     19 min Therapeutic Exercise:  [x] See flow sheet :   Rationale: increase ROM and increase strength to improve the patients ability to increase ease with ADLs    8 min Manual Therapy:  PROM with oscillations and clocks   Rationale: decrease pain, increase ROM and increase tissue extensibility to increase ease with ADLs    With   [x] TE   [] TA   [] neuro   [] other: Patient Education: [x] Review HEP    [] Progressed/Changed HEP based on:   [] positioning   [] body mechanics   [] transfers   [] heat/ice application    [] other:      Other Objective/Functional Measures:   Had no difficulty with exercises  Pt was very talkative and hard to keep on task  Pt is very guarded with PROM and is quite limited for all motions     Pain Level (0-10 scale) post treatment: 5/10    ASSESSMENT/Changes in Function:   Initiated therex today per flow sheet. Pt put forth fair effort with exercises. Reported compliance with HEP    Patient will continue to benefit from skilled PT services to modify and progress therapeutic interventions, address functional mobility deficits, address ROM deficits, assess and modify postural abnormalities and instruct in home and community integration to attain remaining goals. [x]  See Plan of Care  []  See progress note/recertification  []  See Discharge Summary         Progress towards goals / Updated goals:  Short Term Goals: To be accomplished in 1 weeks:  1. Pt will be compliant with initial HEP in order to optimize therapy outcomes. Goal met. 12/2/19    Long Term Goals: To be accomplished in 8 weeks:  1. Pt will improve FOTO by 38 points in order to demonstrate functional improvement. 2.  Pt will report 50% improvement in sx since start of care in order to improve QOL. 3.  Pt will improve PROM flexion/scaption to >135* in order to progress towards reaching overhead once cleared for AROM per protocol.      PLAN  []  Upgrade activities as tolerated     [x]  Continue plan of care  []  Update interventions per flow sheet       []  Discharge due to:_  []  Other:_      Ernestine Reyes PTA 12/2/2019  9:29 AM    Future Appointments   Date Time Provider Abdullahi Lovelli   12/2/2019  9:30 AM Aristides Obregon [facility]   12/5/2019  1:00 PM Aristides Obregon [facility]   12/5/2019  3:30 PM Avery Bianchi  [address]   12/19/2019  1:00 PM [facility]   1/13/2020  1:30 PM DO Yang Grover

## 2019-12-05 ENCOUNTER — HOSPITAL ENCOUNTER (OUTPATIENT)
Dept: PHYSICAL THERAPY | Age: 54
Discharge: HOME | End: 2019-12-05
Payer: MEDICAID

## 2019-12-05 ENCOUNTER — OFFICE VISIT (OUTPATIENT)
Dept: ORTHOPEDIC SURGERY | Age: 54
End: 2019-12-05

## 2019-12-05 VITALS
OXYGEN SATURATION: 98 % | WEIGHT: 208 LBS | DIASTOLIC BLOOD PRESSURE: 70 MMHG | HEIGHT: 70 IN | TEMPERATURE: 97.8 F | SYSTOLIC BLOOD PRESSURE: 99 MMHG | HEART RATE: 69 BPM | BODY MASS INDEX: 29.78 KG/M2 | RESPIRATION RATE: 16 BRPM

## 2019-12-05 DIAGNOSIS — G56.23 CUBITAL TUNNEL SYNDROME OF BOTH UPPER EXTREMITIES: ICD-10-CM

## 2019-12-05 DIAGNOSIS — M54.12 CERVICAL RADICULOPATHY: Primary | ICD-10-CM

## 2019-12-05 DIAGNOSIS — G62.9 NEUROPATHY: ICD-10-CM

## 2019-12-05 DIAGNOSIS — G56.03 BILATERAL CARPAL TUNNEL SYNDROME: ICD-10-CM

## 2019-12-05 PROCEDURE — 97110 THERAPEUTIC EXERCISES: CPT

## 2019-12-05 PROCEDURE — 97140 MANUAL THERAPY 1/> REGIONS: CPT

## 2019-12-05 RX ORDER — AMITRIPTYLINE HYDROCHLORIDE 25 MG/1
75 TABLET, FILM COATED ORAL
Qty: 90 TAB | Refills: 2 | Status: SHIPPED | OUTPATIENT
Start: 2019-12-05 | End: 2020-07-23 | Stop reason: SDUPTHER

## 2019-12-05 RX ORDER — GABAPENTIN 400 MG/1
400 CAPSULE ORAL 3 TIMES DAILY
Qty: 90 CAP | Refills: 5 | Status: SHIPPED | OUTPATIENT
Start: 2019-12-05 | End: 2020-10-21
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
## 2019-12-05 NOTE — PATIENT INSTRUCTIONS
Amitriptyline Daily Instructions: 
Days 1 to 3: Take one 25 mg pill at night Days 4 to 6: Take two 25 mg pills at night After day 6: Take three 25 mg pills at night

## 2019-12-05 NOTE — PROGRESS NOTES
PT DAILY TREATMENT NOTE 10-18    Patient Name: Pablito Blum  Date:2019  : 1965  [x]  Patient  Verified  Payor: Jimbo Kennedy / Plan: [employer] / Product Type: Managed Care Medicaid /    In time:102  Out time:141  Total Treatment Time (min): 39  Visit #: 3 of 24    Treatment Area: Pain in left shoulder [M25.512]    SUBJECTIVE  Pain Level (0-10 scale): 6/10  Any medication changes, allergies to medications, adverse drug reactions, diagnosis change, or new procedure performed?: [x] No    [] Yes (see summary sheet for update)  Subjective functional status/changes:   [] No changes reported  Pt stated that his arm hurts since he has been out of the sling    OBJECTIVE    Modality rationale: decrease inflammation and decrease pain to improve the patients ability to increase ease with ADLs   Min Type Additional Details    [] Estim:  []Unatt       []IFC  []Premod                        []Other:  []w/ice   []w/heat  Position:  Location:    [] Estim: []Att    []TENS instruct  []NMES                    []Other:  []w/US   []w/ice   []w/heat  Position:  Location:    []  Traction: [] Cervical       []Lumbar                       [] Prone          []Supine                       []Intermittent   []Continuous Lbs:  [] before manual  [] after manual    []  Ultrasound: []Continuous   [] Pulsed                           []1MHz   []3MHz W/cm2:  Location:    []  Iontophoresis with dexamethasone         Location: [] Take home patch   [] In clinic   10 [x]  Ice     []  heat  []  Ice massage  []  Laser   []  Anodyne Position: supine  Location:left sh    []  Laser with stim  []  Other:  Position:  Location:    []  Vasopneumatic Device Pressure:       [] lo [] med [] hi   Temperature: [] lo [] med [] hi   [x] Skin assessment post-treatment:  [x]intact []redness- no adverse reaction    []redness - adverse reaction:     21 min Therapeutic Exercise:  [x] See flow sheet :   Rationale: increase ROM and increase strength to improve the patients ability to increase ease with ADLs    8 min Manual Therapy:  PROM with oscillations and clocks   Rationale: decrease pain, increase ROM and increase tissue extensibility to increase ease with ADLs    With   [x] TE   [] TA   [] neuro   [] other: Patient Education: [x] Review HEP    [] Progressed/Changed HEP based on:   [] positioning   [] body mechanics   [] transfers   [] heat/ice application    [] other:      Other Objective/Functional Measures:   PROM was much improved today as pt was less guarded  No complaint of increased pain with exercises  No difficulty with stretches     Pain Level (0-10 scale) post treatment: 7/10    ASSESSMENT/Changes in Function:   Pt is making slow progress toward goals. Pt cont to have decreased PROM due to guarding. Cont with significant pain in the left sh since coming out of the sling. Pt is trying to return to normal arm swing with ambulation, but finds this increases his pain level    Patient will continue to benefit from skilled PT services to modify and progress therapeutic interventions, address functional mobility deficits, address ROM deficits, analyze and cue movement patterns, assess and modify postural abnormalities and instruct in home and community integration to attain remaining goals. [x]  See Plan of Care  []  See progress note/recertification  []  See Discharge Summary         Progress towards goals / Updated goals:  Short Term Goals: To be accomplished in 1 weeks:  1.  Pt will be compliant with initial HEP in order to optimize therapy outcomes. Goal met. 12/2/19     Long Term Goals: To be accomplished in 8 weeks:  1.  Pt will improve FOTO by 38 points in order to demonstrate functional improvement. 2.  Pt will report 50% improvement in sx since start of care in order to improve QOL. 3.  Pt will improve PROM flexion/scaption to >135* in order to progress towards reaching overhead once cleared for AROM per protocol. PLAN  []  Upgrade activities as tolerated     [x]  Continue plan of care  []  Update interventions per flow sheet       []  Discharge due to:_  []  Other:_      Ernestine Reyes PTA 12/5/2019  1:09 PM    Future Appointments   Date Time Provider Abdullahi Horn   12/5/2019  3:30 PM Avery Bianchi  [address]   12/19/2019  1:00 PM JOHN Ortiz [facility]   1/13/2020  1:30 PM DO Yang Grover

## 2019-12-09 ENCOUNTER — HOSPITAL ENCOUNTER (OUTPATIENT)
Dept: PHYSICAL THERAPY | Age: 54
Discharge: HOME | End: 2019-12-09
Payer: MEDICAID

## 2019-12-09 PROCEDURE — 97110 THERAPEUTIC EXERCISES: CPT

## 2019-12-09 PROCEDURE — 97140 MANUAL THERAPY 1/> REGIONS: CPT

## 2019-12-09 NOTE — PROGRESS NOTES
PT DAILY TREATMENT NOTE 10-18    Patient Name: Precious Mtz  Date:2019  : 1965  [x]  Patient  Verified  Payor: Karlie Goode / Plan: [id number] [id number] / Product Type: Managed Care Medicaid /    In time:3:00  Out time:3:45  Total Treatment Time (min): 45  Visit #: 4 of 24      Treatment Area: Pain in left shoulder [M25.512]    SUBJECTIVE  Pain Level (0-10 scale): 5/10  Any medication changes, allergies to medications, adverse drug reactions, diagnosis change, or new procedure performed?: [x] No    [] Yes (see summary sheet for update)  Subjective functional status/changes:   [] No changes reported  Pt reports that he is still having a lot pain. He wears the sling sometimes when it's really hurting. He is trying very hard not to use his arm. He holds his arm against his body a lot when the pain is increased (demonstrated with shoulder in IR and elbow flexed against trunk).       OBJECTIVE    Modality rationale: decrease inflammation and decrease pain to improve the patients ability to tolerate daily tasks    Min Type Additional Details    [] Estim:  []Unatt       []IFC  []Premod                        []Other:  []w/ice   []w/heat  Position:  Location:    [] Estim: []Att    []TENS instruct  []NMES                    []Other:  []w/US   []w/ice   []w/heat  Position:  Location:    []  Traction: [] Cervical       []Lumbar                       [] Prone          []Supine                       []Intermittent   []Continuous Lbs:  [] before manual  [] after manual    []  Ultrasound: []Continuous   [] Pulsed                           []1MHz   []3MHz W/cm2:  Location:    []  Iontophoresis with dexamethasone         Location: [] Take home patch   [] In clinic   10 [x]  Ice     []  heat  []  Ice massage  []  Laser   []  Anodyne Position: seated  Location: left shoulder     []  Laser with stim  []  Other:  Position:  Location:    []  Vasopneumatic Device Pressure:       [] lo [] med [] hi Temperature: [] lo [] med [] hi   [x] Skin assessment post-treatment:  [x]intact []redness- no adverse reaction    []redness - adverse reaction:       27 min Therapeutic Exercise:  [x] See flow sheet :   Rationale: increase ROM and increase strength to improve the patients ability to improve ease of ADLs    8 min Manual Therapy:  PROM flexion, scaption, abduction of left shoulder with oscillations, shoulder clocks    Rationale: decrease pain, increase ROM and increase tissue extensibility to improve ease of future reaching with ADLs        With   [] TE   [] TA   [] neuro   [] other: Patient Education: [x] Review HEP    [] Progressed/Changed HEP based on:   [] positioning   [] body mechanics   [] transfers   [] heat/ice application    [x] other: discussed progression through protocol and how pt should continue to avoid use of left UE with daily tasks, discussed wearing sling for short durations when pain is increased instead of holding arm in guarded position against body      Other Objective/Functional Measures:     PROM flexion, scaption, and abduction ~90*, limited by empty end feel  Guarding initially with PROM, reduced guarding with oscillations and verbal cues to relax  All PROM performed unforced   Pt was very talkative during session and required cues to remain on task  Correct form with initiation of manually resisted lower trap  No increased pain following session      Pain Level (0-10 scale) post treatment: 3-4/10    ASSESSMENT/Changes in Function:     Pt is making slow, steady progress towards initial goals in therapy. PROM is improving but continues to be limited by guarding and empty end-feel. Pt is holding arm against trunk with shoulder in IR and elbow flexed when pain is increased. Discussed bicep involvement and utilization of sling for short durations when pain is increased in order to avoid prolonged active elbow flexion.   Will continue to address strength, ROM, and flexibility deficits per protocol in order to increase functional use of left UE. Patient will continue to benefit from skilled PT services to modify and progress therapeutic interventions, address ROM deficits, address strength deficits, analyze and address soft tissue restrictions, analyze and cue movement patterns, assess and modify postural abnormalities and instruct in home and community integration to attain remaining goals. Progress towards goals / Updated goals:  Short Term Goals: To be accomplished in 1 weeks:  1.  Pt will be compliant with initial HEP in order to optimize therapy outcomes.   Goal met. 12/2/19     Long Term Goals: To be accomplished in 8 weeks:  1.  Pt will improve FOTO by 38 points in order to demonstrate functional improvement. 2.  Pt will report 50% improvement in sx since start of care in order to improve QOL.   3.  Pt will improve PROM flexion/scaption to >135* in order to progress towards reaching overhead once cleared for AROM per protocol. Not met.  ~90* 12/9/19     PLAN  []  Upgrade activities as tolerated     [x]  Continue plan of care  []  Update interventions per flow sheet       []  Discharge due to:_  []  Other:_      Ana M Nayak, PT 12/9/2019  3:25 PM    Future Appointments   Date Time Provider Abdullahi Horn   12/12/2019  2:00 PM Jack Mascorro PTA [id number]   12/19/2019  1:00 PM JOHN Barbosa [facility] [place] [facility]ED   1/13/2020  1:30 PM DO Nickie Freed [address]

## 2019-12-12 ENCOUNTER — HOSPITAL ENCOUNTER (OUTPATIENT)
Dept: PHYSICAL THERAPY | Age: 54
Discharge: HOME | End: 2019-12-12
Payer: MEDICAID

## 2019-12-12 PROCEDURE — 97110 THERAPEUTIC EXERCISES: CPT

## 2019-12-12 PROCEDURE — 97140 MANUAL THERAPY 1/> REGIONS: CPT

## 2019-12-12 NOTE — PROGRESS NOTES
PT DAILY TREATMENT NOTE 10-18    Patient Name: Bryant Munson  Date:2019  : 1965  [x]  Patient  Verified  Payor: Bunny Krabbe / Plan: [place] Setting / Product Type: Managed Care Medicaid /    In time:201  Out time:240  Total Treatment Time (min): 39  Visit #: 5 of 24    Treatment Area: Pain in left shoulder [M25.512]    SUBJECTIVE  Pain Level (0-10 scale): 4-6/10  Any medication changes, allergies to medications, adverse drug reactions, diagnosis change, or new procedure performed?: [x] No    [] Yes (see summary sheet for update)  Subjective functional status/changes:   [] No changes reported  Pt stated that his shoulder is painful    OBJECTIVE    Modality rationale: decrease inflammation and decrease pain to improve the patients ability to increase ease with ADLs   Min Type Additional Details    [] Estim:  []Unatt       []IFC  []Premod                        []Other:  []w/ice   []w/heat  Position:  Location:    [] Estim: []Att    []TENS instruct  []NMES                    []Other:  []w/US   []w/ice   []w/heat  Position:  Location:    []  Traction: [] Cervical       []Lumbar                       [] Prone          []Supine                       []Intermittent   []Continuous Lbs:  [] before manual  [] after manual    []  Ultrasound: []Continuous   [] Pulsed                           []1MHz   []3MHz W/cm2:  Location:    []  Iontophoresis with dexamethasone         Location: [] Take home patch   [] In clinic   10 [x]  Ice     []  heat  []  Ice massage  []  Laser   []  Anodyne Position: seated  Location:left sh    []  Laser with stim  []  Other:  Position:  Location:    []  Vasopneumatic Device Pressure:       [] lo [] med [] hi   Temperature: [] lo [] med [] hi   [x] Skin assessment post-treatment:  [x]intact []redness- no adverse reaction    []redness - adverse reaction:     21 min Therapeutic Exercise:  [x] See flow sheet :   Rationale: increase ROM and increase strength to improve the patients ability to increase ease with ADLs    8 min Manual Therapy:  PROM with oscillations and clocks   Rationale: decrease pain, increase ROM and increase tissue extensibility to increase ease with ADLs    With   [x] TE   [] TA   [] neuro   [] other: Patient Education: [x] Review HEP    [] Progressed/Changed HEP based on:   [] positioning   [] body mechanics   [] transfers   [] heat/ice application    [] other:      Other Objective/Functional Measures:   Had increased pain in the post shoulder with wand flexion  No other complaint of pain with exercises  Pt was less guarded today with improved PROM for all motions     Pain Level (0-10 scale) post treatment: 5/10    ASSESSMENT/Changes in Function:   Pt is slowly progressing toward goals. Pt cont to report difficulty with performing ADLs. Cont to have moderate pain in the left sh. PROM is improving for all motions    Patient will continue to benefit from skilled PT services to modify and progress therapeutic interventions, address functional mobility deficits, address ROM deficits, analyze and address soft tissue restrictions, analyze and cue movement patterns, analyze and modify body mechanics/ergonomics, assess and modify postural abnormalities and instruct in home and community integration to attain remaining goals. [x]  See Plan of Care  []  See progress note/recertification  []  See Discharge Summary         Progress towards goals / Updated goals:  Short Term Goals: To be accomplished in 1 weeks:  1.  Pt will be compliant with initial HEP in order to optimize therapy outcomes.   Goal met. 12/2/19     Long Term Goals: To be accomplished in 8 weeks:  1.  Pt will improve FOTO by 38 points in order to demonstrate functional improvement. 2.  Pt will report 50% improvement in sx since start of care in order to improve QOL.   3.  Pt will improve PROM flexion/scaption to >135* in order to progress towards reaching overhead once cleared for AROM per protocol. Not met.  ~90* 12/9/19    PLAN  []  Upgrade activities as tolerated     [x]  Continue plan of care  []  Update interventions per flow sheet       []  Discharge due to:_  []  Other:_      Bill Peacock, CORBY 12/12/2019  2:00 PM    Future Appointments   Date Time Provider Abdullahi Horn   12/16/2019  2:00 PM Aicha Rosas, PT [facility]   12/19/2019 11:00 AM ROMERO Barrett [facility]   12/19/2019  1:00 PM JOHN Weaver [facility] [place] SCHED   1/13/2020  1:30 PM DO Yang Starks

## 2019-12-16 ENCOUNTER — HOSPITAL ENCOUNTER (OUTPATIENT)
Dept: PHYSICAL THERAPY | Age: 54
Discharge: HOME | End: 2019-12-16
Payer: MEDICAID

## 2019-12-16 PROCEDURE — 97110 THERAPEUTIC EXERCISES: CPT

## 2019-12-16 PROCEDURE — 97140 MANUAL THERAPY 1/> REGIONS: CPT

## 2019-12-16 NOTE — PROGRESS NOTES
PT DAILY TREATMENT NOTE 10-18    Patient Name: April Frances  Date:2019  : 1965  [x]  Patient  Verified  Payor: Juan Miguel Omalley / Plan: Merly Damon / Product Type: Managed Care Medicaid /    In time:2:00  Out time:2:50  Total Treatment Time (min): 50 (-7 minutes for pt talking to tech following session = 43 minutes)  Visit #: 6 of 24    Treatment Area: Pain in left shoulder [M25.512]    SUBJECTIVE  Pain Level (0-10 scale): 3-5/10  Any medication changes, allergies to medications, adverse drug reactions, diagnosis change, or new procedure performed?: [x] No    [] Yes (see summary sheet for update)  Subjective functional status/changes:   [] No changes reported  Pt wants to know when he can start lifting at the gym. He wants to know if he can exercise with the cable column and do bicep curls.       OBJECTIVE    Modality rationale: decrease inflammation and decrease pain to improve the patients ability to tolerate daily tasks    Min Type Additional Details    [] Estim:  []Unatt       []IFC  []Premod                        []Other:  []w/ice   []w/heat  Position:  Location:    [] Estim: []Att    []TENS instruct  []NMES                    []Other:  []w/US   []w/ice   []w/heat  Position:  Location:    []  Traction: [] Cervical       []Lumbar                       [] Prone          []Supine                       []Intermittent   []Continuous Lbs:  [] before manual  [] after manual    []  Ultrasound: []Continuous   [] Pulsed                           []1MHz   []3MHz W/cm2:  Location:    []  Iontophoresis with dexamethasone         Location: [] Take home patch   [] In clinic   10 [x]  Ice     []  heat  []  Ice massage  []  Laser   []  Anodyne Position: seated  Location: left shoulder     []  Laser with stim  []  Other:  Position:  Location:    []  Vasopneumatic Device Pressure:       [] lo [] med [] hi   Temperature: [] lo [] med [] hi   [x] Skin assessment post-treatment:  [x]intact []redness- no adverse reaction    []redness - adverse reaction:       25 min Therapeutic Exercise:  [x] See flow sheet :   Rationale: increase ROM and increase strength to improve the patients ability to improve ease of ADLs    8 min Manual Therapy:  PROM flexion, abduction, and ER with oscillations    Rationale: decrease pain, increase ROM, increase tissue extensibility and decrease trigger points to improve ease of ADLs          With   [] TE   [] TA   [] neuro   [] other: Patient Education: [x] Review HEP    [] Progressed/Changed HEP based on:   [] positioning   [] body mechanics   [] transfers   [] heat/ice application    [] other:      Other Objective/Functional Measures:     Left Shoulder AAROM Flexion: 120* with supine wand with HOB elevated to ~30*     Left Shoulder PROM  Abduction 90*  ER 15*    Guarding during PROM, reduced with oscillations, verbal cues to relax, and when pt was distracted by conversation  All PROM performed unforced    Pt asked multiple times if he could begin lifting at the gym. Educated pt repeatedly that he was not to be lifting with left UE until cleared by therapy and MD     Reduced pain following session     Pain Level (0-10 scale) post treatment: 3/10    ASSESSMENT/Changes in Function:     Pt is making slow, steady progress in therapy. PROM is improving but continues to be limited by guarding and empty end-feel. AAROM in gravity-eliminated position is improving. Will continue to address strength, ROM, and flexibility deficits per protocol in order to increase functional use of left UE.       Patient will continue to benefit from skilled PT services to modify and progress therapeutic interventions, address ROM deficits, address strength deficits, analyze and address soft tissue restrictions, analyze and cue movement patterns, analyze and modify body mechanics/ergonomics, assess and modify postural abnormalities and instruct in home and community integration to attain remaining goals.           Progress towards goals / Updated goals:  Short Term Goals: To be accomplished in 1 weeks:  1.  Pt will be compliant with initial HEP in order to optimize therapy outcomes.   Goal met. 12/2/19     Long Term Goals: To be accomplished in 8 weeks:  1.  Pt will improve FOTO by 38 points in order to demonstrate functional improvement. 2.  Pt will report 50% improvement in sx since start of care in order to improve QOL. 3.  Pt will improve PROM flexion/scaption to >135* in order to progress towards reaching overhead once cleared for AROM per protocol. Progressing.   AAROM flexion with supine wand 120* 12/16/19      PLAN  []  Upgrade activities as tolerated     [x]  Continue plan of care  []  Update interventions per flow sheet       []  Discharge due to:_  []  Other:_      Jared Koch, PT 12/16/2019  2:18 PM    Future Appointments   Date Time Provider Abdullahi Horn   12/19/2019 11:00 AM Stephannie Duverney, PTA [id number]   12/19/2019  1:00 PM JOHN Christiansen [facility] [place] SCHED   1/13/2020  1:30 PM DO Yang Leo 74

## 2019-12-17 ENCOUNTER — TELEPHONE (OUTPATIENT)
Dept: ORTHOPEDIC SURGERY | Facility: CLINIC | Age: 54
End: 2019-12-17

## 2019-12-17 ENCOUNTER — TELEPHONE (OUTPATIENT)
Dept: PHYSICAL THERAPY | Age: 54
End: 2019-12-17

## 2019-12-17 NOTE — TELEPHONE ENCOUNTER
Received call from [facility] Pt    She has questions about [facility]'s protocols - she wants to clarify if patient should be progressing with active assisted ROM? And is he to start strengthing after 12 weeks?       Genie Samples can be reached: [phone]

## 2019-12-18 NOTE — TELEPHONE ENCOUNTER
Strengthening is started at 3 months post op. Once pt has full passive ROM progression to [facility] can be done. I would advance him to [facility] at this point.

## 2019-12-19 ENCOUNTER — OFFICE VISIT (OUTPATIENT)
Dept: ORTHOPEDIC SURGERY | Facility: CLINIC | Age: 54
End: 2019-12-19

## 2019-12-19 ENCOUNTER — HOSPITAL ENCOUNTER (OUTPATIENT)
Dept: PHYSICAL THERAPY | Age: 54
Discharge: HOME | End: 2019-12-19
Payer: MEDICAID

## 2019-12-19 VITALS
HEIGHT: 70 IN | RESPIRATION RATE: 16 BRPM | BODY MASS INDEX: 30.64 KG/M2 | TEMPERATURE: 98.2 F | DIASTOLIC BLOOD PRESSURE: 82 MMHG | OXYGEN SATURATION: 96 % | HEART RATE: 77 BPM | WEIGHT: 214 LBS | SYSTOLIC BLOOD PRESSURE: 119 MMHG

## 2019-12-19 DIAGNOSIS — Z98.890 STATUS POST ARTHROSCOPY OF LEFT SHOULDER: ICD-10-CM

## 2019-12-19 DIAGNOSIS — S46.012D TRAUMATIC COMPLETE TEAR OF LEFT ROTATOR CUFF, SUBSEQUENT ENCOUNTER: Primary | ICD-10-CM

## 2019-12-19 DIAGNOSIS — S40.252D: ICD-10-CM

## 2019-12-19 PROCEDURE — 97140 MANUAL THERAPY 1/> REGIONS: CPT

## 2019-12-19 PROCEDURE — 97110 THERAPEUTIC EXERCISES: CPT

## 2019-12-19 NOTE — PROGRESS NOTES
Pablito Blum  1965     HISTORY OF PRESENT ILLNESS  Pablito Blum is a 53 y.o. male who presents today for evaluation S/P Left shoulder arthroscopic rotator cuff repair, biceps tenodesis, subacromial decompression and loose body removal on 10/21/19. Patient has been going to PT. He has been compliant with his exercises and restrictions. He has pain in the biceps area and top of shoulder. He is listening to pain and reports the norco does help. Describes pain as a 6/10. Has been taking norco for pain. Still has night pain. Patient denies any fever, chills, chest pain, shortness of breath or calf pain. There are no new illness or injuries to report since last seen in the office. PHYSICAL EXAM:   Visit Vitals  /82 (BP 1 Location: Left arm, BP Patient Position: Sitting)   Pulse 77   Temp 98.2 °F (36.8 °C) (Oral)   Resp 16   Ht 5' 10\" (1.778 m)   Wt 214 lb (97.1 kg)   SpO2 96%   BMI 30.71 kg/m²      The patient is a well-developed, well-nourished male in no acute distress. The patient is alert and oriented times three. The patient appears to be well groomed. Mood and affect are normal.  ORTHOPEDIC EXAM of left shoulder:  Inspection: swelling not present,  Bruising not present  Incision well healed  Passive glenohumeral abduction 0-40 degrees, 60 PFF, 10 PER  Stability: Stable  Strength: n/a  2+ distal pulses    IMPRESSION:  S/P Left shoulder arthroscopic rotator cuff repair, biceps tenodesis, subacromial decompression and loose body removal    PLAN:   Pt having pain post operatively  I think his discomfort is post surgical and stiffness. Talked about continuing PT working on ROM at this time. I think as the stiffness is worked out his pain will improve. No strengthening until 3 months post op. He will use OTC creams and tylenol to help with pain. He has allergy to motrin so he can not take NSAIDS. Stressed to patient that nothing causes an increase in pain.   Pt not given refill of pain medications.   RTC 4 weeks      TANNA [facility]

## 2019-12-19 NOTE — PROGRESS NOTES
PT DAILY TREATMENT NOTE 10-18    Patient Name: Judith Figueroa  Date:2019  : 1965  [x]  Patient  Verified  Payor: Girish Gregory / Plan: [employer] / Product Type: Managed Care Medicaid /    In time:300  Out time:340  Total Treatment Time (min): 40  Visit #: 7 of 24    Treatment Area: Pain in left shoulder [M25.512]    SUBJECTIVE  Pain Level (0-10 scale): 5-6/10  Any medication changes, allergies to medications, adverse drug reactions, diagnosis change, or new procedure performed?: [x] No    [] Yes (see summary sheet for update)  Subjective functional status/changes:   [] No changes reported  Pt stated that he has been using his arm a little more than he probably should    OBJECTIVE    Modality rationale: decrease inflammation and decrease pain to improve the patients ability to increase ease with ADLs   Min Type Additional Details    [] Estim:  []Unatt       []IFC  []Premod                        []Other:  []w/ice   []w/heat  Position:  Location:    [] Estim: []Att    []TENS instruct  []NMES                    []Other:  []w/US   []w/ice   []w/heat  Position:  Location:    []  Traction: [] Cervical       []Lumbar                       [] Prone          []Supine                       []Intermittent   []Continuous Lbs:  [] before manual  [] after manual    []  Ultrasound: []Continuous   [] Pulsed                           []1MHz   []3MHz W/cm2:  Location:    []  Iontophoresis with dexamethasone         Location: [] Take home patch   [] In clinic   10 [x]  Ice     []  heat  []  Ice massage  []  Laser   []  Anodyne Position:seated  Location:left sh    []  Laser with stim  []  Other:  Position:  Location:    []  Vasopneumatic Device Pressure:       [] lo [] med [] hi   Temperature: [] lo [] med [] hi   [x] Skin assessment post-treatment:  [x]intact []redness- no adverse reaction    []redness - adverse reaction:     22 min Therapeutic Exercise:  [x] See flow sheet :   Rationale: increase ROM and increase strength to improve the patients ability to increase ease with ADLs    8 min Manual Therapy:  PROM all motions with oscillations and clocls   Rationale: decrease pain, increase ROM and increase tissue extensibility to increase ease with ADLs    With   [x] TE   [] TA   [] neuro   [] other: Patient Education: [x] Review HEP    [] Progressed/Changed HEP based on:   [] positioning   [] body mechanics   [] transfers   [] heat/ice application    [] other:      Other Objective/Functional Measures:   PROM is improving for all motions  Cont with pain at end ranges  Guarding is less     Pain Level (0-10 scale) post treatment: 5/10    ASSESSMENT/Changes in Function:   Pt is making slow progress toward goals per protocol. Pt cont with decreased PROM for all motions. Cont with moderate pain in the shoulder. Pt was again reminded of his lifting restrictions    Patient will continue to benefit from skilled PT services to modify and progress therapeutic interventions, address functional mobility deficits, address ROM deficits, analyze and address soft tissue restrictions, analyze and cue movement patterns, assess and modify postural abnormalities and instruct in home and community integration to attain remaining goals. []  See Plan of Care  [x]  See progress note/recertification  []  See Discharge Summary         Progress towards goals / Updated goals:  Short Term Goals: To be accomplished in 1 weeks:  1.  Pt will be compliant with initial HEP in order to optimize therapy outcomes.   Goal met. 12/2/19     Long Term Goals: To be accomplished in 8 weeks:  1.  Pt will improve FOTO by 38 points in order to demonstrate functional improvement. 2.  Pt will report 50% improvement in sx since start of care in order to improve QOL. 3.  Pt will improve PROM flexion/scaption to >135* in order to progress towards reaching overhead once cleared for AROM per protocol.   Progressing.   AAROM flexion with supine wand 120* 12/16/19     PLAN  []  Upgrade activities as tolerated     [x]  Continue plan of care  []  Update interventions per flow sheet       []  Discharge due to:_  []  Other:_      Zach Frank PTA 12/19/2019  3:29 PM    Future Appointments   Date Time Provider Abdullahi Horn   1/13/2020  1:30 PM DO Nickie Graham French 68

## 2019-12-19 NOTE — PROGRESS NOTES
1. Have you been to the ER, urgent care clinic since your last visit? Hospitalized since your last visit? NO    2. Have you seen or consulted any other health care providers outside of the [address] [place] [address] since your last visit? Include any pap smears or colon screening.   NO

## 2019-12-23 ENCOUNTER — HOSPITAL ENCOUNTER (OUTPATIENT)
Dept: PHYSICAL THERAPY | Age: 54
Discharge: HOME | End: 2019-12-23
Payer: MEDICAID

## 2019-12-23 PROCEDURE — 97110 THERAPEUTIC EXERCISES: CPT

## 2019-12-23 PROCEDURE — 97164 PT RE-EVAL EST PLAN CARE: CPT

## 2019-12-23 NOTE — PROGRESS NOTES
[facility] - Fiona Hess  [address]  [phone] fax    Physical Therapy Progress Note  Patient name: Ivania Aguilar Start of Care: 19   Referral source: Soraya Rae Sandieshadiama : 1965   Medical/Treatment Diagnosis: Pain in left shoulder [M25.512]  Payor: Abdiel Lozar / Plan: [place] / Product Type: Managed Care Medicaid /  Onset Date:Date of Surgery 10/21/19               Prior Hospitalization: see medical history Provider#: [id number]   Medications: Verified on Patient Summary List    Comorbidities: history of alcohol abuse, tobacco use (chewing tobacco), depression    Prior Level of Function: lives alone in a one story home, yard work, housework, metal work, 3 step to enter house, Ind with all mobility and ADLs, left-handed      Visits from [place] of Care: 8    Missed Visits: 0    Established Goals:         Excellent           Good         Limited           None  [x] Increased ROM   []  [x]  []  []  [] Increased Strength  []  []  []  []  [] Increased Mobility  []  []  []  []   [x] Decreased Pain   []  []  [x]  []  [] Decreased Swelling  []  []  []  []    Key Functional Changes: slowly improving PROM, improving FOTO score     Updated Goals: to be achieved in 6 weeks:  1.  Pt will improve FOTO by 38 points in order to demonstrate functional improvement. 2.  Pt will report 50% improvement in sx since start of care in order to improve QOL. 3.  Pt will improve left shoulder AAROM flexion/scaption/abduction to >135* in order to progress towards reaching overhead once cleared for AROM per protocol. 4.  Pt will improve left shoulder ER AAROM to 40* in order to improve pt's ability with washing/styling hair. ASSESSMENT/RECOMMENDATIONS:   Pt is making slow, steady progress in therapy, meeting short term goal and progressing towards 3/3 long term goals.   FOTO score is improving, and pt reports 20% overall improvement since start of care. PROM is improving but is behind anticipated progression (flexion 107*, scaption 122*, abduction 90*, ER 2* from neutral), and further progression with PROM is limited by guarding and empty end-feel all planes. Will continue to address strength, ROM, and flexibility deficits in order to increase functional use of left UE and allow for return to PLOF. [x]Continue therapy per initial plan/protocol at a frequency of  2 x per week for 6 weeks  []Continue therapy with the following recommended changes:_____________________      _____________________________________________________________________  []Discontinue therapy progressing towards or have reached established goals  []Discontinue therapy due to lack of appreciable progress towards goals  []Discontinue therapy due to lack of attendance or compliance  []Await Physician's recommendations/decisions regarding therapy  []Other:________________________________________________________________    Thank you for this referral.   Vonda Lefort, PT 12/23/2019 5:23 PM    NOTE TO PHYSICIAN:  [address]   FAX TO [facility]: [phone]  If you are unable to process this request in 24 hours please contact our office: [phone] I have read the above report and request that my patient continue as recommended. ? I have read the above report and request that my patient continue therapy with the following changes/special instructions:____________________________________  ? I have read the above report and request that my patient be discharged from therapy.     Physicians signature: ______________________________Date: ______Time:______

## 2019-12-23 NOTE — PROGRESS NOTES
PT DAILY TREATMENT NOTE 10-18    Patient Name: Oren Dejesus  Date:2019  : 1965  [x]  Patient  Verified  Payor: Lauren Rowland / [place] [employer] / Product Type: Managed Care Medicaid /    In time:3:01  Out time:3:42  Total Treatment Time (min): 41  Visit #: 8 of 24      Treatment Area: Pain in left shoulder [M25.512]    SUBJECTIVE  Pain Level (0-10 scale): 3.5/10  Any medication changes, allergies to medications, adverse drug reactions, diagnosis change, or new procedure performed?: [x] No    [] Yes (see summary sheet for update)  Subjective functional status/changes:   [] No changes reported  Pt reports that he followed up with his MD last week. She did not prescribe any more pain medication. He reports 20% overall improvement since start of care. He is still trying not to use his shoulder at home. Telephone message from [place], [place] from 19 states: \"Strengthening is started at 3 months post op. Once pt has full passive ROM progression to [facility] can be done. I would advance him to [facility] at this point. \"     OBJECTIVE    Modality rationale: decrease inflammation and decrease pain to improve the patients ability to tolerate daily tasks    Min Type Additional Details    [] Estim:  []Unatt       []IFC  []Premod                        []Other:  []w/ice   []w/heat  Position:  Location:    [] Estim: []Att    []TENS instruct  []NMES                    []Other:  []w/US   []w/ice   []w/heat  Position:  Location:    []  Traction: [] Cervical       []Lumbar                       [] Prone          []Supine                       []Intermittent   []Continuous Lbs:  [] before manual  [] after manual    []  Ultrasound: []Continuous   [] Pulsed                           []1MHz   []3MHz W/cm2:  Location:    []  Iontophoresis with dexamethasone         Location: [] Take home patch   [] In clinic   10 []  Ice     []  heat  []  Ice massage  []  Laser   []  Anodyne Position: seated  Location:left shoulder    []  Laser with stim  []  Other:  Position:  Location:    []  Vasopneumatic Device Pressure:       [] lo [] med [] hi   Temperature: [] lo [] med [] hi   [x] Skin assessment post-treatment:  [x]intact []redness- no adverse reaction    []redness - adverse reaction:     8 min []Eval                  [x]Re-Eval        23 min Therapeutic Exercise:  [x] See flow sheet :   Rationale: increase ROM and increase strength to improve the patients ability to improve ability with future reaching with ADLs      With   [] TE   [] TA   [] neuro   [] other: Patient Education: [x] Review HEP    [] Progressed/Changed HEP based on:   [] positioning   [] body mechanics   [] transfers   [] heat/ice application    [] other:      Other Objective/Functional Measures:     Left Shoulder PROM  Flexion 107*  Scaption 122*  Abduction 90*  ER 2+ from neutral     PROM only performed to assess this visit rather than as a manual intervention d/t time constraint  All PROM limited by guarding and empty end-feel  All PROM performed unforced     Left Shoulder AAROM with Supine Wand  Flexion 102*  ER 8*    AAROM with pulleys:   Flexion 98*  Scaption: 98*    Pain with end range for all PROM and AAROM, pain subsided when removed from end-range   Reduced pain following session     FOTO: 28     Pain Level (0-10 scale) post treatment: 3/10    ASSESSMENT/Changes in Function: See Progress Note    Patient will continue to benefit from skilled PT services to modify and progress therapeutic interventions, address ROM deficits, address strength deficits, analyze and address soft tissue restrictions, analyze and cue movement patterns, analyze and modify body mechanics/ergonomics, assess and modify postural abnormalities and instruct in home and community integration to attain remaining goals.      []  See Plan of Care  [x]  See progress note/recertification  []  See Discharge Summary         Progress towards goals / Updated goals:Short Term Goals: To be accomplished in 1 weeks:  1.  Pt will be compliant with initial HEP in order to optimize therapy outcomes.   Goal met. 12/2/19     Long Term Goals: To be accomplished in 8 weeks:  1.  Pt will improve FOTO by 38 points in order to demonstrate functional improvement. Progressing. Improved 12 points 12/23/19  2.  Pt will report 50% improvement in sx since start of care in order to improve QOL. Progressing.   Pt reports 20% improvement in therapy 12/23/19   3.  Pt will improve PROM flexion/scaption to >135* in order to progress towards reaching overhead once cleared for AROM per protocol. Progressing.  PROM flexion 107* and scaption 122* 12/23/19        PLAN  [x]  Upgrade activities as tolerated     [x]  Continue plan of care  [x]  Update interventions per flow sheet       []  Discharge due to:_  []  Other:_      Joe Cooley PT 12/23/2019  2:31 PM    Future Appointments   Date Time Provider Abdullahi Lovelli   12/23/2019  3:00 PM Roxie Gottlieb PT [facility]   12/27/2019  1:30 PM Carmina Pollock PTA [id number]   1/13/2020  1:30 PM DO Yang Prater

## 2019-12-27 ENCOUNTER — HOSPITAL ENCOUNTER (OUTPATIENT)
Dept: PHYSICAL THERAPY | Age: 54
Discharge: HOME | End: 2019-12-27
Payer: MEDICAID

## 2019-12-27 PROCEDURE — 97110 THERAPEUTIC EXERCISES: CPT

## 2019-12-27 NOTE — PROGRESS NOTES
PT DAILY TREATMENT NOTE 10-18    Patient Name: Kevin Bradford  Date:2019  : 1965  [x]  Patient  Verified  Payor: Corbin Dunlap / Plan: [employer] / Product Type: Managed Care Medicaid /    In time:302  Out time:350  Total Treatment Time (min): 48  Visit #: 1 of 12    Treatment Area: Pain in left shoulder [M25.512]    SUBJECTIVE  Pain Level (0-10 scale): 3/10 when still, 5/10 with movement  Any medication changes, allergies to medications, adverse drug reactions, diagnosis change, or new procedure performed?: [x] No    [] Yes (see summary sheet for update)  Subjective functional status/changes:   [] No changes reported  Pt stated that he has more pain when he moves the shoulder    OBJECTIVE    Modality rationale: decrease inflammation and decrease pain to improve the patients ability to increase ease with ADLs   Min Type Additional Details    [] Estim:  []Unatt       []IFC  []Premod                        []Other:  []w/ice   []w/heat  Position:  Location:    [] Estim: []Att    []TENS instruct  []NMES                    []Other:  []w/US   []w/ice   []w/heat  Position:  Location:    []  Traction: [] Cervical       []Lumbar                       [] Prone          []Supine                       []Intermittent   []Continuous Lbs:  [] before manual  [] after manual    []  Ultrasound: []Continuous   [] Pulsed                           []1MHz   []3MHz W/cm2:  Location:    []  Iontophoresis with dexamethasone         Location: [] Take home patch   [] In clinic   10 [x]  Ice     []  heat  []  Ice massage  []  Laser   []  Anodyne Position:seated  Location:left sh    []  Laser with stim  []  Other:  Position:  Location:    []  Vasopneumatic Device Pressure:       [] lo [] med [] hi   Temperature: [] lo [] med [] hi   [x] Skin assessment post-treatment:  [x]intact []redness- no adverse reaction    []redness - adverse reaction:     38 min Therapeutic Exercise:  [x] See flow sheet : Rationale: increase ROM and increase strength to improve the patients ability to increase ease with ADLs    With   [x] TE   [] TA   [] neuro   [] other: Patient Education: [x] Review HEP    [] Progressed/Changed HEP based on:   [] positioning   [] body mechanics   [] transfers   [] heat/ice application    [] other:      Other Objective/Functional Measures:   Pt was slow with new exercises  Had increased pain with most exercises  Most exercises were challenging     Pain Level (0-10 scale) post treatment: 5/10    ASSESSMENT/Changes in Function:   Pt is slowly progressing toward goals per protocol. Pt cont with moderate pain with movement. Cont to have decreased AROM and AAROM in the left shoulder. Cont to have difficulty with ADLs. Patient will continue to benefit from skilled PT services to modify and progress therapeutic interventions, address functional mobility deficits, address ROM deficits, address strength deficits, analyze and address soft tissue restrictions, analyze and cue movement patterns, assess and modify postural abnormalities and instruct in home and community integration to attain remaining goals. []  See Plan of Care  [x]  See progress note/recertification  []  See Discharge Summary         Progress towards goals / Updated goals:  1.  Pt will improve FOTO by 38 points in order to demonstrate functional improvement. 2.  Pt will report 50% improvement in sx since start of care in order to improve QOL. 3.  Pt will improve left shoulder AAROM flexion/scaption/abduction to >135* in order to progress towards reaching overhead once cleared for AROM per protocol. Not met. 12/27/19  4. Pt will improve left shoulder ER AAROM to 40* in order to improve pt's ability with washing/styling hair.       PLAN  []  Upgrade activities as tolerated     [x]  Continue plan of care  []  Update interventions per flow sheet       []  Discharge due to:_  []  Other:_      Keya Herrmann, CORBY 12/27/2019  3:00 PM    Future Appointments   Date Time Provider Abdullahi Horn   1/13/2020  1:30 PM DO Nickie Starks [place] 68

## 2019-12-30 ENCOUNTER — HOSPITAL ENCOUNTER (OUTPATIENT)
Dept: PHYSICAL THERAPY | Age: 54
Discharge: HOME | End: 2019-12-30
Payer: MEDICAID

## 2019-12-30 PROCEDURE — 97110 THERAPEUTIC EXERCISES: CPT

## 2019-12-30 NOTE — PROGRESS NOTES
PT DAILY TREATMENT NOTE 10-18    Patient Name: Jasbir Monroy  Date:2019  : 1965  [x]  Patient  Verified  Payor: Karolyn Cm / [place] [employer] / Product Type: Managed Care Medicaid /    In time:327  Out time:400  Total Treatment Time (min): 33  Visit #: 2 of 12    Treatment Area: Pain in left shoulder [M25.512]    SUBJECTIVE  Pain Level (0-10 scale): 4/10  Any medication changes, allergies to medications, adverse drug reactions, diagnosis change, or new procedure performed?: [x] No    [] Yes (see summary sheet for update)  Subjective functional status/changes:   [] No changes reported  Pt stated that he was stuck in traffic    OBJECTIVE    Modality rationale: decrease inflammation and decrease pain to improve the patients ability to increase ease with ADLs   Min Type Additional Details    [] Estim:  []Unatt       []IFC  []Premod                        []Other:  []w/ice   []w/heat  Position:  Location:    [] Estim: []Att    []TENS instruct  []NMES                    []Other:  []w/US   []w/ice   []w/heat  Position:  Location:    []  Traction: [] Cervical       []Lumbar                       [] Prone          []Supine                       []Intermittent   []Continuous Lbs:  [] before manual  [] after manual    []  Ultrasound: []Continuous   [] Pulsed                           []1MHz   []3MHz W/cm2:  Location:    []  Iontophoresis with dexamethasone         Location: [] Take home patch   [] In clinic   10 [x]  Ice     []  heat  []  Ice massage  []  Laser   []  Anodyne Position: seated  Location:left sh    []  Laser with stim  []  Other:  Position:  Location:    []  Vasopneumatic Device Pressure:       [] lo [] med [] hi   Temperature: [] lo [] med [] hi   [x] Skin assessment post-treatment:  [x]intact []redness- no adverse reaction    []redness - adverse reaction:     23 min Therapeutic Exercise:  [x] See flow sheet :   Rationale: increase ROM and increase strength to improve the patients ability to increase ease with ADLs    With   [x] TE   [] TA   [] neuro   [] other: Patient Education: [x] Review HEP    [] Progressed/Changed HEP based on:   [] positioning   [] body mechanics   [] transfers   [] heat/ice application    [] other:      Other Objective/Functional Measures:   Pt was 27 minutes late for session  No complaint of increased pain during session   No difficulty with exercises, but program cont to be challenging     Pain Level (0-10 scale) post treatment: 2/10    ASSESSMENT/Changes in Function:   Pt is slowly progressing toward goals. Pt cont with decreased strength and range of motion in the left sh. Cont to have difficulty with performing ADLs. Patient will continue to benefit from skilled PT services to modify and progress therapeutic interventions, address functional mobility deficits, address ROM deficits, address strength deficits, analyze and address soft tissue restrictions, analyze and cue movement patterns, assess and modify postural abnormalities and instruct in home and community integration to attain remaining goals. []  See Plan of Care  [x]  See progress note/recertification  []  See Discharge Summary         Progress towards goals / Updated goals:  1.  Pt will improve FOTO by 38 points in order to demonstrate functional improvement. 2.  Pt will report 50% improvement in sx since start of care in order to improve QOL. 3.  Pt will improve left shoulder AAROM flexion/scaption/abduction to >135* in order to progress towards reaching overhead once cleared for AROM per protocol. Not met.  12/27/19  4.  Pt will improve left shoulder ER AAROM to 40* in order to improve pt's ability with washing/styling hair.      PLAN  []  Upgrade activities as tolerated     [x]  Continue plan of care  []  Update interventions per flow sheet       []  Discharge due to:_  []  Other:_      Cammy Bower PTA 12/30/2019  4:02 PM    Future Appointments   Date Time Provider Abdullahi Horn   1/2/2020 11:00 AM Prem Mcmullen, PTA [facility]   1/13/2020  1:30 PM DO Nickie Calivllo

## 2020-01-02 ENCOUNTER — HOSPITAL ENCOUNTER (OUTPATIENT)
Dept: PHYSICAL THERAPY | Age: 55
Discharge: HOME | End: 2020-01-02
Payer: MEDICAID

## 2020-01-02 PROCEDURE — 97110 THERAPEUTIC EXERCISES: CPT

## 2020-01-02 NOTE — PROGRESS NOTES
PT DAILY TREATMENT NOTE 10-18    Patient Name: Danny Fernandes  Date:2020  : 1965  [x]  Patient  Verified  Payor: Angela Sawyer / [place] [employer] / Product Type: Managed Care Medicaid /    In time:1057  Out time:1142  Total Treatment Time (min): 45  Visit #: 3 of 12    Treatment Area: Pain in left shoulder [M25.512]    SUBJECTIVE  Pain Level (0-10 scale): 5/10  Any medication changes, allergies to medications, adverse drug reactions, diagnosis change, or new procedure performed?: [x] No    [] Yes (see summary sheet for update)  Subjective functional status/changes:   [] No changes reported  Pt stated that he changed a tire yesterday and has increased pain    OBJECTIVE    Modality rationale: decrease inflammation and decrease pain to improve the patients ability to increase ease with ADLs   Min Type Additional Details    [] Estim:  []Unatt       []IFC  []Premod                        []Other:  []w/ice   []w/heat  Position:  Location:    [] Estim: []Att    []TENS instruct  []NMES                    []Other:  []w/US   []w/ice   []w/heat  Position:  Location:    []  Traction: [] Cervical       []Lumbar                       [] Prone          []Supine                       []Intermittent   []Continuous Lbs:  [] before manual  [] after manual    []  Ultrasound: []Continuous   [] Pulsed                           []1MHz   []3MHz W/cm2:  Location:    []  Iontophoresis with dexamethasone         Location: [] Take home patch   [] In clinic   10 [x]  Ice     []  heat  []  Ice massage  []  Laser   []  Anodyne Position:seated  Location:left sh    []  Laser with stim  []  Other:  Position:  Location:    []  Vasopneumatic Device Pressure:       [] lo [] med [] hi   Temperature: [] lo [] med [] hi   [x] Skin assessment post-treatment:  [x]intact []redness- no adverse reaction    []redness - adverse reaction:     35 min Therapeutic Exercise:  [x] See flow sheet :   Rationale: increase ROM and increase strength to improve the patients ability to increase ease with ADLs    With   [x] TE   [] TA   [] neuro   [] other: Patient Education: [x] Review HEP    [] Progressed/Changed HEP based on:   [] positioning   [] body mechanics   [] transfers   [] heat/ice application    [] other:      Other Objective/Functional Measures:   Was able to reach rung #28 on the wall ladder  No complaint of increased pain during session   SL ER was challenging  Had no difficulty with wall wipes     Pain Level (0-10 scale) post treatment: 4/10    ASSESSMENT/Changes in Function:   Pt is slowly progressing toward goals. AROM in the left sh is improving. Pt cont to disregard instructions and is performing tasks with the left sh that are not appropriate at this time. Pain level cont to fluctuate and increases with inappropriate activity. Cont with decreased strength in the left sh    Patient will continue to benefit from skilled PT services to modify and progress therapeutic interventions, address functional mobility deficits, address ROM deficits, address strength deficits, analyze and address soft tissue restrictions, analyze and cue movement patterns, analyze and modify body mechanics/ergonomics, assess and modify postural abnormalities and instruct in home and community integration to attain remaining goals. []  See Plan of Care  [x]  See progress note/recertification  []  See Discharge Summary         Progress towards goals / Updated goals:  1.  Pt will improve FOTO by 38 points in order to demonstrate functional improvement. 2.  Pt will report 50% improvement in sx since start of care in order to improve QOL. 3.  Pt will improve left shoulder AAROM flexion/scaption/abduction to >135* in order to progress towards reaching overhead once cleared for AROM per protocol.   Goal met.  1/2/20  4.  Pt will improve left shoulder ER AAROM to 40* in order to improve pt's ability with washing/styling hair.      PLAN  []  Upgrade activities as tolerated     [x]  Continue plan of care  []  Update interventions per flow sheet       []  Discharge due to:_  []  Other:_      Radha Burris PTA 1/2/2020  10:43 AM    Future Appointments   Date Time Provider Abdullahi Horn   1/2/2020 11:00 AM Joon Benitez PTA [facility]   1/13/2020  1:30 PM DO Nickie Reyes [place] 68

## 2020-01-07 ENCOUNTER — HOSPITAL ENCOUNTER (OUTPATIENT)
Dept: PHYSICAL THERAPY | Age: 55
End: 2020-01-07
Payer: MEDICAID

## 2020-01-08 ENCOUNTER — HOSPITAL ENCOUNTER (OUTPATIENT)
Dept: PHYSICAL THERAPY | Age: 55
Discharge: HOME | End: 2020-01-08
Payer: MEDICAID

## 2020-01-08 PROCEDURE — 97110 THERAPEUTIC EXERCISES: CPT

## 2020-01-08 NOTE — PROGRESS NOTES
PT DAILY TREATMENT NOTE 10-18    Patient Name: Dorys Shabazz  Date:2020  : 1965  [x]  Patient  Verified  Payor: [employer] / [place] [employer] / Product Type: Managed Care Medicaid /    In time:100  Out time:143  Total Treatment Time (min): 43  Visit #: 4 of 12    Treatment Area: Pain in left shoulder [M25.512]    SUBJECTIVE  Pain Level (0-10 scale): 4/10  Any medication changes, allergies to medications, adverse drug reactions, diagnosis change, or new procedure performed?: [x] No    [] Yes (see summary sheet for update)  Subjective functional status/changes:   [] No changes reported  Pt stated that his shoulder is stiff today    OBJECTIVE    Modality rationale: decrease inflammation and decrease pain to improve the patients ability to increase ease with ADLs   Min Type Additional Details    [] Estim:  []Unatt       []IFC  []Premod                        []Other:  []w/ice   []w/heat  Position:  Location:    [] Estim: []Att    []TENS instruct  []NMES                    []Other:  []w/US   []w/ice   []w/heat  Position:  Location:    []  Traction: [] Cervical       []Lumbar                       [] Prone          []Supine                       []Intermittent   []Continuous Lbs:  [] before manual  [] after manual    []  Ultrasound: []Continuous   [] Pulsed                           []1MHz   []3MHz W/cm2:  Location:    []  Iontophoresis with dexamethasone         Location: [] Take home patch   [] In clinic   10 [x]  Ice     []  heat  []  Ice massage  []  Laser   []  Anodyne Position: seated  Location:left sh    []  Laser with stim  []  Other:  Position:  Location:    []  Vasopneumatic Device Pressure:       [] lo [] med [] hi   Temperature: [] lo [] med [] hi   [x] Skin assessment post-treatment:  [x]intact []redness- no adverse reaction    []redness - adverse reaction:     30 min Therapeutic Exercise:  [x] See flow sheet :   Rationale: increase ROM and increase strength to improve the patients ability to increase ease with ADLs    With   [x] TE   [] TA   [] neuro   [] other: Patient Education: [x] Review HEP    [] Progressed/Changed HEP based on:   [] positioning   [] body mechanics   [] transfers   [] heat/ice application    [] other:      Other Objective/Functional Measures:   Cont to have decreased AROM for all motions  Had increased pain with most exercises  Was able to reach rung #29 on the wall ladder     Pain Level (0-10 scale) post treatment: 5/10    ASSESSMENT/Changes in Function:   Pt is slowly progressing toward goals. Pt cont to have moderate pain in the left shoulder. Pt cont to perform activities he has been instructed not to perform. Cont with decreased strength and range of motion in the left sh for all motions. Cont to have difficulty with performing some ADLs    Patient will continue to benefit from skilled PT services to modify and progress therapeutic interventions, address functional mobility deficits, address ROM deficits, address strength deficits, analyze and cue movement patterns, analyze and modify body mechanics/ergonomics, assess and modify postural abnormalities and instruct in home and community integration to attain remaining goals. []  See Plan of Care  [x]  See progress note/recertification  []  See Discharge Summary         Progress towards goals / Updated goals:  1.  Pt will improve FOTO by 38 points in order to demonstrate functional improvement. 2.  Pt will report 50% improvement in sx since start of care in order to improve QOL. 3.  Pt will improve left shoulder AAROM flexion/scaption/abduction to >135* in order to progress towards reaching overhead once cleared for AROM per protocol.   Goal met. 1/2/20  4.  Pt will improve left shoulder ER AAROM to 40* in order to improve pt's ability with washing/styling hair.    Not met.  1/8/20    PLAN  []  Upgrade activities as tolerated     [x]  Continue plan of care  []  Update interventions per flow sheet []  Discharge due to:_  []  Other:_      Eva [facility], PTA 1/8/2020  1:03 PM    Future Appointments   Date Time Provider Abdullahi Horn   1/10/2020  [facility]   1/13/2020  1:30 PM DO Yang Ureña

## 2020-01-09 ENCOUNTER — APPOINTMENT (OUTPATIENT)
Dept: PHYSICAL THERAPY | Age: 55
End: 2020-01-09
Payer: MEDICAID

## 2020-01-10 ENCOUNTER — HOSPITAL ENCOUNTER (OUTPATIENT)
Dept: PHYSICAL THERAPY | Age: 55
Discharge: HOME | End: 2020-01-10
Payer: MEDICAID

## 2020-01-10 PROCEDURE — 97140 MANUAL THERAPY 1/> REGIONS: CPT

## 2020-01-10 PROCEDURE — 97110 THERAPEUTIC EXERCISES: CPT

## 2020-01-10 NOTE — PROGRESS NOTES
PT DAILY TREATMENT NOTE 10-18    Patient Name: May Burciaga  Date:1/10/2020  : 1965  [x]  Patient  Verified  Payor: Aparna Sherman / Plan: Toni Villalobos / Product Type: Managed Care Medicaid /    In time: 2:03  Out time: 2:48  Total Treatment Time (min): 45  Visit #: 5 of 12    Treatment Area: Pain in left shoulder [M25.512]    SUBJECTIVE  Pain Level (0-10 scale):  4/10  Any medication changes, allergies to medications, adverse drug reactions, diagnosis change, or new procedure performed?: [x] No    [] Yes (see summary sheet for update)  Subjective functional status/changes:   [] No changes reported  Pt. Reports he continues to have a lot of shoulder pain with lifting. He reports moving a refrigerator the other day.      OBJECTIVE    Modality rationale: decrease inflammation and decrease pain to improve the patients ability to increase ease of ADLs   Min Type Additional Details    [] Estim:  []Unatt       []IFC  []Premod                        []Other:  []w/ice   []w/heat  Position:  Location:    [] Estim: []Att    []TENS instruct  []NMES                    []Other:  []w/US   []w/ice   []w/heat  Position:  Location:    []  Traction: [] Cervical       []Lumbar                       [] Prone          []Supine                       []Intermittent   []Continuous Lbs:  [] before manual  [] after manual    []  Ultrasound: []Continuous   [] Pulsed                           []1MHz   []3MHz W/cm2:  Location:    []  Iontophoresis with dexamethasone         Location: [] Take home patch   [] In clinic   10 [x]  Ice     []  heat  []  Ice massage  []  Laser   []  Anodyne Position: seated  Location: left shoulder    []  Laser with stim  []  Other:  Position:  Location:    []  Vasopneumatic Device Pressure:       [] lo [] med [] hi   Temperature: [] lo [] med [] hi   [x] Skin assessment post-treatment:  [x]intact [x]redness- no adverse reaction    []redness - adverse reaction:     27 min Therapeutic Exercise:  [x] See flow sheet :   Rationale: increase ROM and increase strength to improve the patients ability to increase ease of ADLs    8 min Manual Therapy:  Trigger point release to left infraspinatus and scap mobs   Rationale: decrease pain, increase ROM and increase tissue extensibility to increase ease of reaching          With   [x] TE   [] TA   [] neuro   [] other: Patient Education: [x] Review HEP    [] Progressed/Changed HEP based on:   [] positioning   [] body mechanics   [] transfers   [] heat/ice application    [] other:      Other Objective/Functional Measures:   Pt. Required cues to avoid pain during therex  Performed side lying flexion to 90 degrees without pain but pain beyond that point  Pain with flexion AROM in supine, with no change in mobility following manual  He has pain with most activities     Pain Level (0-10 scale) post treatment: 4/10    ASSESSMENT/Changes in Function:  Pt. Is making limited progress towards goals. He continues to be non-compliant with his shoulder precautions and continues to have moderate left shoulder pain. He has decreased left shoulder AROM with pain even in supine and side lying position. Patient will continue to benefit from skilled PT services to modify and progress therapeutic interventions, address functional mobility deficits, address ROM deficits, address strength deficits, analyze and address soft tissue restrictions, analyze and cue movement patterns, analyze and modify body mechanics/ergonomics and assess and modify postural abnormalities to attain remaining goals. Progress towards goals / Updated goals:  1.  Pt will improve FOTO by 38 points in order to demonstrate functional improvement. 2.  Pt will report 50% improvement in sx since start of care in order to improve QOL.    3.  Pt will improve left shoulder AAROM flexion/scaption/abduction to >135* in order to progress towards reaching overhead once cleared for AROM per protocol.   Goal met. 1/2/20  4.  Pt will improve left shoulder ER AAROM to 40* in order to improve pt's ability with washing/styling hair.    Not met.  1/8/20    PLAN  []  Upgrade activities as tolerated     [x]  Continue plan of care  []  Update interventions per flow sheet       []  Discharge due to:_  []  Other:_      Nelson Lewis, PT 1/10/2020  1:59 PM    Future Appointments   Date Time Provider Abdullahi Justina   1/10/2020  2:00 PM Unique Paul, PT [facility]   1/13/2020  1:30 PM DO Nickie Castillo [address] 68

## 2020-01-15 ENCOUNTER — HOSPITAL ENCOUNTER (OUTPATIENT)
Dept: PHYSICAL THERAPY | Age: 55
Discharge: HOME | End: 2020-01-15
Payer: MEDICAID

## 2020-01-15 PROCEDURE — 97110 THERAPEUTIC EXERCISES: CPT

## 2020-01-15 NOTE — PROGRESS NOTES
PT DAILY TREATMENT NOTE 10-18    Patient Name: Saeid Muniz  Date:1/15/2020  : 1965  [x]  Patient  Verified  Payor: Devon Valenzuela / Plan: Rolando Rodriguez / Product Type: Managed Care Medicaid /    In time:300  Out time:341  Total Treatment Time (min): 41  Visit #: 6 of 12    Treatment Area: Pain in left shoulder [M25.512]    SUBJECTIVE  Pain Level (0-10 scale): 4/10  Any medication changes, allergies to medications, adverse drug reactions, diagnosis change, or new procedure performed?: [x] No    [] Yes (see summary sheet for update)  Subjective functional status/changes:   [] No changes reported  Pt stated that he cont with the pain in his shoulder    OBJECTIVE    Modality rationale: decrease inflammation and decrease pain to improve the patients ability to increase ease with ADLs   Min Type Additional Details    [] Estim:  []Unatt       []IFC  []Premod                        []Other:  []w/ice   []w/heat  Position:  Location:    [] Estim: []Att    []TENS instruct  []NMES                    []Other:  []w/US   []w/ice   []w/heat  Position:  Location:    []  Traction: [] Cervical       []Lumbar                       [] Prone          []Supine                       []Intermittent   []Continuous Lbs:  [] before manual  [] after manual    []  Ultrasound: []Continuous   [] Pulsed                           []1MHz   []3MHz W/cm2:  Location:    []  Iontophoresis with dexamethasone         Location: [] Take home patch   [] In clinic   10 [x]  Ice     []  heat  []  Ice massage  []  Laser   []  Anodyne Position: seated  Location:left sh    []  Laser with stim  []  Other:  Position:  Location:    []  Vasopneumatic Device Pressure:       [] lo [] med [] hi   Temperature: [] lo [] med [] hi   [x] Skin assessment post-treatment:  [x]intact []redness- no adverse reaction    []redness - adverse reaction:     31 min Therapeutic Exercise:  [x] See flow sheet :   Rationale: increase ROM to improve the patients ability to increase ease with ADLs    With   [x] TE   [] TA   [] neuro   [] other: Patient Education: [x] Review HEP    [] Progressed/Changed HEP based on:   [] positioning   [] body mechanics   [] transfers   [] heat/ice application    [] other:      Other Objective/Functional Measures:   Had increased pain with most exercises  Was not able to reach as high with the wall ladder or wall wipes today  No difficulty with isometrics    Pain Level (0-10 scale) post treatment: 5/10    ASSESSMENT/Changes in Function:   Pt is making limited progress toward goals. Pt cont with mild pain in the shoulder 2* performing ill advised tasks using the left UE. Cont to have decreased strength and AROM in the left sh. Patient will continue to benefit from skilled PT services to modify and progress therapeutic interventions, address functional mobility deficits, address ROM deficits, address strength deficits, analyze and cue movement patterns, assess and modify postural abnormalities and instruct in home and community integration to attain remaining goals. []  See Plan of Care  [x]  See progress note/recertification  []  See Discharge Summary         Progress towards goals / Updated goals:  1.  Pt will improve FOTO by 38 points in order to demonstrate functional improvement. 2.  Pt will report 50% improvement in sx since start of care in order to improve QOL. 3.  Pt will improve left shoulder AAROM flexion/scaption/abduction to >135* in order to progress towards reaching overhead once cleared for AROM per protocol.   Goal met. 1/2/20  4.  Pt will improve left shoulder ER AAROM to 40* in order to improve pt's ability with washing/styling hair.    Not met.  Cont to have decreased ER AROM of less than 20*. 1/15/20    PLAN  []  Upgrade activities as tolerated     [x]  Continue plan of care  []  Update interventions per flow sheet       []  Discharge due to:_  []  Other:_      Stefanie Berg, CORBY 1/15/2020  3:01 PM    Future Appointments   Date Time Provider Abdullahi Justina   1/17/2020  3:00 PM Gokul Hughes [facility] [address]   1/22/2020  3:00 PM Prem Mcmullen PTA [facility] [address]

## 2020-01-16 ENCOUNTER — APPOINTMENT (OUTPATIENT)
Dept: PHYSICAL THERAPY | Age: 55
End: 2020-01-16
Payer: MEDICAID

## 2020-01-17 ENCOUNTER — OFFICE VISIT (OUTPATIENT)
Dept: ORTHOPEDIC SURGERY | Facility: CLINIC | Age: 55
End: 2020-01-17

## 2020-01-17 ENCOUNTER — HOSPITAL ENCOUNTER (OUTPATIENT)
Dept: PHYSICAL THERAPY | Age: 55
Discharge: HOME | End: 2020-01-17
Payer: MEDICAID

## 2020-01-17 VITALS
HEIGHT: 70 IN | HEART RATE: 63 BPM | WEIGHT: 218.2 LBS | OXYGEN SATURATION: 97 % | BODY MASS INDEX: 31.24 KG/M2 | TEMPERATURE: 97.7 F | DIASTOLIC BLOOD PRESSURE: 76 MMHG | SYSTOLIC BLOOD PRESSURE: 112 MMHG | RESPIRATION RATE: 16 BRPM

## 2020-01-17 DIAGNOSIS — S46.012D TRAUMATIC COMPLETE TEAR OF LEFT ROTATOR CUFF, SUBSEQUENT ENCOUNTER: Primary | ICD-10-CM

## 2020-01-17 DIAGNOSIS — M25.511 RIGHT SHOULDER PAIN, UNSPECIFIED CHRONICITY: ICD-10-CM

## 2020-01-17 DIAGNOSIS — S46.011A TRAUMATIC TEAR OF RIGHT ROTATOR CUFF, UNSPECIFIED TEAR EXTENT, INITIAL ENCOUNTER: ICD-10-CM

## 2020-01-17 DIAGNOSIS — Z98.890 STATUS POST ARTHROSCOPY OF LEFT SHOULDER: ICD-10-CM

## 2020-01-17 DIAGNOSIS — S40.252D: ICD-10-CM

## 2020-01-17 PROCEDURE — 97110 THERAPEUTIC EXERCISES: CPT

## 2020-01-17 NOTE — PROGRESS NOTES
1. Have you been to the ER, urgent care clinic since your last visit? Hospitalized since your last visit? No    2. Have you seen or consulted any other health care providers outside of the [address] [place] [address] since your last visit? Include any pap smears or colon screening.  No

## 2020-01-17 NOTE — PROGRESS NOTES
PT DAILY TREATMENT NOTE 10-18    Patient Name: iMchelle Malone  Date:2020  : 1965  [x]  Patient  Verified  Payor: Husam Schrader / Plan: Hiwot Yanez / Product Type: Managed Care Medicaid /    In time:130  Out time:210  Total Treatment Time (min): 40  Visit #: 7 of 12    Treatment Area: Pain in left shoulder [M25.512]    SUBJECTIVE  Pain Level (0-10 scale): 3/10  Any medication changes, allergies to medications, adverse drug reactions, diagnosis change, or new procedure performed?: [x] No    [] Yes (see summary sheet for update)  Subjective functional status/changes:   [] No changes reported  Pt stated that he is still having pain and is going to MD after therapy    OBJECTIVE    Modality rationale: decrease inflammation and decrease pain to improve the patients ability to increase ease with ADLs   Min Type Additional Details    [] Estim:  []Unatt       []IFC  []Premod                        []Other:  []w/ice   []w/heat  Position:  Location:    [] Estim: []Att    []TENS instruct  []NMES                    []Other:  []w/US   []w/ice   []w/heat  Position:  Location:    []  Traction: [] Cervical       []Lumbar                       [] Prone          []Supine                       []Intermittent   []Continuous Lbs:  [] before manual  [] after manual    []  Ultrasound: []Continuous   [] Pulsed                           []1MHz   []3MHz W/cm2:  Location:    []  Iontophoresis with dexamethasone         Location: [] Take home patch   [] In clinic   10 [x]  Ice     []  heat  []  Ice massage  []  Laser   []  Anodyne Position:seated  Location:left sh    []  Laser with stim  []  Other:  Position:  Location:    []  Vasopneumatic Device Pressure:       [] lo [] med [] hi   Temperature: [] lo [] med [] hi   [x] Skin assessment post-treatment:  [x]intact []redness- no adverse reaction    []redness - adverse reaction:     30 min Therapeutic Exercise:  [x] See flow sheet :   Rationale: increase ROM and increase strength to improve the patients ability to increase ease with ADLs    With   [x] TE   [] TA   [] neuro   [] other: Patient Education: [x] Review HEP    [] Progressed/Changed HEP based on:   [] positioning   [] body mechanics   [] transfers   [] heat/ice application    [] other:      Other Objective/Functional Measures:   Had difficulty with supine wand  Had increased pain with pulleys and wall slides     Pain Level (0-10 scale) post treatment: 5/10    ASSESSMENT/Changes in Function:   Pt is slowly progressing toward goals. Pt cont with decreased strength and range of motion in the left sh. Cont to have difficulty with ADLs    Patient will continue to benefit from skilled PT services to modify and progress therapeutic interventions, address functional mobility deficits, address ROM deficits, address strength deficits, analyze and address soft tissue restrictions, analyze and cue movement patterns, assess and modify postural abnormalities and instruct in home and community integration to attain remaining goals. []  See Plan of Care  [x]  See progress note/recertification  []  See Discharge Summary         Progress towards goals / Updated goals:  1.  Pt will improve FOTO by 38 points in order to demonstrate functional improvement. 2.  Pt will report 50% improvement in sx since start of care in order to improve QOL. 3.  Pt will improve left shoulder AAROM flexion/scaption/abduction to >135* in order to progress towards reaching overhead once cleared for AROM per protocol.   Goal met. 1/2/20  4.  Pt will improve left shoulder ER AAROM to 40* in order to improve pt's ability with washing/styling hair.    Not met.  Cont to have decreased ER AROM of less than 20*. 1/15/20    PLAN  []  Upgrade activities as tolerated     [x]  Continue plan of care  []  Update interventions per flow sheet       []  Discharge due to:_  []  Other:_      Radha Burgos, CORBY 1/17/2020  1:34 PM    Future Appointments   Date Time Provider Abdullahi Horn   1/17/2020  3:00 PM [facility]   1/22/2020  3:00 PM Vero Alejandra [facility]   1/27/2020  3:10 PM DO Nickie Zavaleta [address] 68

## 2020-01-17 NOTE — PROGRESS NOTES
Gary Chu  1965     HISTORY OF PRESENT ILLNESS  Gary Chu is a 53 y.o. male who presents today for evaluation S/P Left shoulder arthroscopic rotator cuff repair, biceps tenodesis, subacromial decompression and loose body removal on 10/21/19. Patient has been going to PT. He has been compliant with his exercises and restrictions. He has pain in the biceps area and top of shoulder. He is listening to pain and reports the norco does help. Describes pain as a 4/10. Has been taking norco for pain. He is also having right shoulder pain. His pain is worse with overhead movement. Achy pain on the lateral aspect of his shoulder. He remembers a couple years ago having an injury and feeling a pop. He started having pain and its gotten worse since having to use it during his left shoulder recovery. He had a rotator cuff repair in 2000. He says the pain feels different. He has taken gabapentin but no other treatment at this time. Patient denies any fever, chills, chest pain, shortness of breath or calf pain. There are no new illness or injuries to report since last seen in the office. PHYSICAL EXAM:   Visit Vitals  /76   Pulse 63   Temp 97.7 °F (36.5 °C) (Oral)   Resp 16   Ht 5' 10\" (1.778 m)   Wt 218 lb 3.2 oz (99 kg)   SpO2 97%   BMI 31.31 kg/m²      The patient is a well-developed, well-nourished male in no acute distress. The patient is alert and oriented times three. The patient appears to be well groomed.  Mood and affect are normal.  ORTHOPEDIC EXAM of left shoulder:  Inspection: swelling not present,  Bruising not present  Incision well healed  Passive glenohumeral abduction 0-90 degrees, 160 PFF, 30 PER, 140 AFF  Stability: Stable  Strength: n/a  2+ distal pulses    Examination Right shoulder   Skin Intact   AC joint tenderness -   Biceps tenderness -   Forward flexion/Elevation    Active abduction    Glenohumeral abduction 90   External rotation ROM 60 Internal rotation ROM Lateral buttock   Apprehension Not assessed   Marks Relocation Not assessed   Jerk Not assessed   Load and Shift Not assessed   Obriens +   Speeds +   Impingement sign -   Supraspinatus/Empty Can +, 4/5   External Rotation Strength +, 4/5   Lift Off/Belly Press -, 5/5   Neurovascular Intact       XR: 4 views of the right shoulder 1/17/20 show proximal migration of the humerus, anchor noted from previous surgery. No other acute bony abnormalities. IMPRESSION:  S/P Left shoulder arthroscopic rotator cuff repair, biceps tenodesis, subacromial decompression and loose body removal    PLAN:   Pt doing well post operatively  His ROM and pain have improved since last visit. He will use OTC creams and tylenol to help with pain. He has allergy to motrin so he can not take NSAIDS. Stressed to patient that nothing causes an increase in pain. Pt not given refill of pain medications. I think his right shoulder pain is coming from the rotator cuff. I am worried he may have retorn his rotator cuff. I would like to get an MRI to further evaluate.    RTC 6 weeks      TANNA [facility]

## 2020-01-22 ENCOUNTER — HOSPITAL ENCOUNTER (OUTPATIENT)
Dept: PHYSICAL THERAPY | Age: 55
Discharge: HOME | End: 2020-01-22
Payer: MEDICAID

## 2020-01-22 PROCEDURE — 97110 THERAPEUTIC EXERCISES: CPT

## 2020-01-22 NOTE — PROGRESS NOTES
[facility] - Veldon Shorten  [address]  [phone] fax    Physical Therapy Progress Note  Patient name: Alissa Clarke Start of Care: 19   Referral source: [facility] [place] : 1965   Medical/Treatment Diagnosis: Pain in left shoulder [M25.512]  Payor: Bob Faust / Plan: [employer] / Product Type: Managed Care Medicaid /  Onset Date:Date of Surgery 10/21/19                Prior Hospitalization: see medical history Provider#: [id number]   Medications: Verified on Patient Summary List     Comorbidities: history of alcohol abuse, tobacco use (chewing tobacco), depression    Prior Level of Function: lives alone in a one story home, yard work, housework, metal work, 3 step to enter house, Ind with all mobility and ADLs, left-handed     Visits from [place] of Care: 16    Missed Visits: 0    Established Goals:         Excellent           Good         Limited           None  [x] Increased ROM   []  [x]  []  []  [] Increased Strength  []  []  []  []  [x] Increased Mobility  []  [x]  []  []   [x] Decreased Pain   []  [x]  []  []  [] Decreased Swelling  []  []  []  []    Key Functional Changes: improving AROM of Left shoulder, improving ease with ADLs, improving strength gradually    Updated Goals: to be achieved in 4 weeks:  1.  Pt will improve FOTO by 38 points in order to demonstrate functional improvement. Nearly met increased 37 points 2020  2.  Pt will report 80% improvement in sx since start of care in order to improve QOL. Met 50% improvement overall 2020  3.  Pt will improve left shoulder ER AAROM to 40* in order to improve pt's ability with washing/styling hair. Not met. Cont to have decreased ER AROM of less than 20*. 1/15/20  4. Pt will be independent with HEP at time of discharge to improve his QOL.      ASSESSMENT/RECOMMENDATIONS: pt making steady progress with improving AROM of Left shoulder and overall ease with ADLs. Pt's main limitations are mod decreased ER (18 degs at 75 degs abd position) and abd (90 degs) AROM, decreased end range of flex AROM (140 degs), decreased strength overall and persistent pain. Pt was min challenged with strengthening therex with light weight, reported min soreness at the end of PT session. He also c/o Rigth shoulder pain due to compensation for his Left side, will have MRI in near future. Pt would like to get some strengthening for Right side and reported that MD is ok with this; please sign this note if agreed. Patient will continue to benefit from skilled PT services to modify and progress therapeutic interventions, address functional mobility deficits, address ROM deficits, address strength deficits, analyze and address soft tissue restrictions, analyze and cue movement patterns, analyze and modify body mechanics/ergonomics, assess and modify postural abnormalities and instruct in home and community integration to attain remaining goals    [x]Continue therapy per initial plan/protocol at a frequency of  2-3 x per week for 6 weeks  []Continue therapy with the following recommended changes:_____________________      _____________________________________________________________________  []Discontinue therapy progressing towards or have reached established goals  []Discontinue therapy due to lack of appreciable progress towards goals  []Discontinue therapy due to lack of attendance or compliance  []Await Physician's recommendations/decisions regarding therapy  []Other:________________________________________________________________    Thank you for this referral.   Rosenda Kearney, PT 1/22/2020 4:29 PM    NOTE TO PHYSICIAN:  PLEASE COMPLETE THE ORDERS BELOW AND   FAX TO [facility] [place] [facility]: [phone]  If you are unable to process this request in 24 hours please contact our office: [phone]  I have read the above report and request that my patient continue as recommended. ? I have read the above report and request that my patient continue therapy with the following changes/special instructions:____________________________________  ? I have read the above report and request that my patient be discharged from therapy.     Physicians signature: ______________________________Date: ______Time:______

## 2020-01-22 NOTE — PROGRESS NOTES
PT DAILY TREATMENT NOTE 10-18    Patient Name: Breanna Gomez  Date:2020  : 1965  [x]  Patient  Verified  Payor: Evgeny Challenger / Plan: Donato Dolan / Product Type: Managed Care Medicaid /    In time: 3:25  Out time:4:20  Total Treatment Time (min): 55  Visit #: 8 of 12      Treatment Area: Pain in left shoulder [M25.512]    SUBJECTIVE  Pain Level (0-10 scale): 4/10  Any medication changes, allergies to medications, adverse drug reactions, diagnosis change, or new procedure performed?: [x] No    [] Yes (see summary sheet for update)  Subjective functional status/changes:   [] No changes reported  Pt reports most pain locates along side of the shoulder (referring pattern of RTC and capsule).  He still having     OBJECTIVE    Modality rationale: decrease edema, decrease inflammation and decrease pain to improve the patients ability to tolerate ADLs   Min Type Additional Details    [] Estim:  []Unatt       []IFC  []Premod                        []Other:  []w/ice   []w/heat  Position:  Location:    [] Estim: []Att    []TENS instruct  []NMES                    []Other:  []w/US   []w/ice   []w/heat  Position:  Location:    []  Traction: [] Cervical       []Lumbar                       [] Prone          []Supine                       []Intermittent   []Continuous Lbs:  [] before manual  [] after manual    []  Ultrasound: []Continuous   [] Pulsed                           []1MHz   []3MHz W/cm2:  Location:    []  Iontophoresis with dexamethasone         Location: [] Take home patch   [] In clinic   15 (10 during TE) [x]  Ice     []  heat  []  Ice massage  []  Laser   []  Anodyne Position: seated   Location: left shoulder    []  Laser with stim  []  Other:  Position:  Location:    []  Vasopneumatic Device Pressure:       [] lo [] med [] hi   Temperature: [] lo [] med [] hi   [] Skin assessment post-treatment:  []intact []redness- no adverse reaction    []redness - adverse reaction:     45 min Therapeutic Exercise:  [x] See flow sheet :   Rationale: increase ROM, increase strength, improve coordination and increase proprioception to improve the patients ability to perform ADLs with ease and safety          With   [] TE   [] TA   [] neuro   [] other: Patient Education: [x] Review HEP    [] Progressed/Changed HEP based on:   [] positioning   [] body mechanics   [] transfers   [] heat/ice application    [] other:      Other Objective/Functional Measures:    AROM of Left shoulder Flex: 140 degs; abd: 90 degs; IR: 40; ER: 18 degs at 75 degs abd    No pain during and right after Biceps 3 ways with 1# but reported min soreness at the end of PT session after ice   Min soreness with ER wall stretch    Pain Level (0-10 scale) post treatment: 5/10    ASSESSMENT/Changes in Function: see Progress note please    Patient will continue to benefit from skilled PT services to modify and progress therapeutic interventions, address functional mobility deficits, address ROM deficits, address strength deficits, analyze and address soft tissue restrictions, analyze and cue movement patterns, analyze and modify body mechanics/ergonomics, assess and modify postural abnormalities and instruct in home and community integration to attain remaining goals. []  See Plan of Care  [x]  See progress note/recertification  []  See Discharge Summary         Progress towards goals / Updated goals:  1.  Pt will improve FOTO by 38 points in order to demonstrate functional improvement. Nearly met increased 37 points 1-  2.  Pt will report 50% improvement in sx since start of care in order to improve QOL. Met 50% improvement overall 1-  3.  Pt will improve left shoulder AAROM flexion/scaption/abduction to >135* in order to progress towards reaching overhead once cleared for AROM per protocol. Goal met.  1/2/20  4.  Pt will improve left shoulder ER AAROM to 40* in order to improve pt's ability with washing/styling hair. Not met. Cont to have decreased ER AROM of less than 20*. 1/15/20    PLAN  [x]  Upgrade activities as tolerated     [x]  Continue plan of care  []  Update interventions per flow sheet       []  Discharge due to:_  []  Other:_      Vera Manning, PT 1/22/2020  3:17 PM    Future Appointments   Date Time Provider Abdullahi Horn   1/22/2020  3:30 PM Ronda Norman [id number]   1/27/2020  3:10 PM Radha Everett DO [facility] [place] [facility]   1/28/2020  4:45 PM [facility] MRI RM 1 [facility]

## 2020-01-24 ENCOUNTER — APPOINTMENT (OUTPATIENT)
Dept: PHYSICAL THERAPY | Age: 55
End: 2020-01-24
Payer: MEDICAID

## 2020-01-27 ENCOUNTER — HOSPITAL ENCOUNTER (OUTPATIENT)
Dept: PHYSICAL THERAPY | Age: 55
Discharge: HOME | End: 2020-01-27
Payer: MEDICAID

## 2020-01-27 PROCEDURE — 97140 MANUAL THERAPY 1/> REGIONS: CPT

## 2020-01-27 PROCEDURE — 97110 THERAPEUTIC EXERCISES: CPT

## 2020-01-27 NOTE — PROGRESS NOTES
PT DAILY TREATMENT NOTE 10-18    Patient Name: Yen Fernando  Date:2020  : 1965  [x]  Patient  Verified  Payor: Darien Slot / Plan: [employer] / Product Type: Managed Care Medicaid /    In time: 5:03  Out time: 6:01  Total Treatment Time (min): 62  Visit #: 1 of     Treatment Area: Pain in left shoulder [M25.512]    SUBJECTIVE  Pain Level (0-10 scale): 3/10  Any medication changes, allergies to medications, adverse drug reactions, diagnosis change, or new procedure performed?: [x] No    [] Yes (see summary sheet for update)  Subjective functional status/changes:   [] No changes reported  Pt. Reports he is doing pretty good but continues to have pain in his shoulder.  He reports trying to use a sawzall and the vibrations were painful    OBJECTIVE    Modality rationale: decrease inflammation and decrease pain to improve the patients ability to increase ease of ADLs   Min Type Additional Details    [] Estim:  []Unatt       []IFC  []Premod                        []Other:  []w/ice   []w/heat  Position:  Location:    [] Estim: []Att    []TENS instruct  []NMES                    []Other:  []w/US   []w/ice   []w/heat  Position:  Location:    []  Traction: [] Cervical       []Lumbar                       [] Prone          []Supine                       []Intermittent   []Continuous Lbs:  [] before manual  [] after manual    []  Ultrasound: []Continuous   [] Pulsed                           []1MHz   []3MHz W/cm2:  Location:    []  Iontophoresis with dexamethasone         Location: [] Take home patch   [] In clinic   10 [x]  Ice     []  heat  []  Ice massage  []  Laser   []  Anodyne Position: seated  Location: left shoulder    []  Laser with stim  []  Other:  Position:  Location:    []  Vasopneumatic Device Pressure:       [] lo [] med [] hi   Temperature: [] lo [] med [] hi   [x] Skin assessment post-treatment:  [x]intact []redness- no adverse reaction    []redness - adverse reaction:       38 min Therapeutic Exercise:  [x] See flow sheet :   Rationale: increase ROM and increase strength to improve the patients ability to increase ease of ADLs    10 min Manual Therapy:  Trigger point release and STM to left infraspinatus, pec major, and deltoid   Rationale: decrease pain, increase ROM and increase tissue extensibility to increase ease of reaching          With   [x] TE   [] TA   [] neuro   [] other: Patient Education: [x] Review HEP    [] Progressed/Changed HEP based on:   [] positioning   [] body mechanics   [] transfers   [] heat/ice application    [] other:      Other Objective/Functional Measures:   Pt. Tolerated PT well with no reports of increased pain  He demonstrates improving shoulder flexion mobility during side lying AROM  He was challenged with 0.5# during portia ER  Pt. Was provided with orange band for rows and extensions for HEP  He was re-educated on avoiding heavy lifting and avoiding increased pain in left shoulder     Pain Level (0-10 scale) post treatment: 0/10    ASSESSMENT/Changes in Function:  Pt. Is progressing slowly towards goals. He continues to have decreased left shoulder AROM and has decreased left shoulder strength. He tolerated increased resistance during therex well today. Patient will continue to benefit from skilled PT services to modify and progress therapeutic interventions, address functional mobility deficits, address ROM deficits, address strength deficits, analyze and address soft tissue restrictions, analyze and cue movement patterns and analyze and modify body mechanics/ergonomics to attain remaining goals. Progress towards goals / Updated goals:  1.  Pt will improve FOTO by 38 points in order to demonstrate functional improvement. Nearly met increased 37 points 1-  2.  Pt will report 80% improvement in sx since start of care in order to improve QOL.  Met 50% improvement overall 1-  3.  Pt will improve left shoulder ER AAROM to 40* in order to improve pt's ability with washing/styling hair. Not met. Cont to have decreased ER AROM of less than 20*. 1/15/20  4. Pt will be independent with HEP at time of discharge to improve his QOL.     PLAN  []  Upgrade activities as tolerated     [x]  Continue plan of care  []  Update interventions per flow sheet       []  Discharge due to:_  []  Other:_      Gabriela Bunn, PT 1/27/2020  5:09 PM    Future Appointments   Date Time Provider Abdullahi Horn   1/27/2020  5:30 PM Leroy Mishra, PT [id number]   1/28/2020  4:45 PM [facility] MRI RM 1 [facility]   1/29/2020  9:10 AM Genie Leonardo DO [facility] [place] SCH[facility]   1/29/2020  4:00 PM Leroy Mishra, PT [id number] [facility]   2/4/2020  3:00 PM Ayaan De La Rosa PT [id number]   2/6/2020  4:30 PM Leroy Mishra PT [id number]

## 2020-01-28 ENCOUNTER — HOSPITAL ENCOUNTER (OUTPATIENT)
Dept: MRI IMAGING | Age: 55
Discharge: HOME | End: 2020-01-28
Attending: ORTHOPAEDIC SURGERY
Payer: MEDICAID

## 2020-01-28 DIAGNOSIS — S46.011A TRAUMATIC TEAR OF RIGHT ROTATOR CUFF, UNSPECIFIED TEAR EXTENT, INITIAL ENCOUNTER: ICD-10-CM

## 2020-01-28 DIAGNOSIS — M25.511 RIGHT SHOULDER PAIN, UNSPECIFIED CHRONICITY: ICD-10-CM

## 2020-01-28 PROCEDURE — 73221 MRI JOINT UPR EXTREM W/O DYE: CPT

## 2020-01-29 ENCOUNTER — APPOINTMENT (OUTPATIENT)
Dept: PHYSICAL THERAPY | Age: 55
End: 2020-01-29
Payer: MEDICAID

## 2020-01-29 ENCOUNTER — OFFICE VISIT (OUTPATIENT)
Dept: ORTHOPEDIC SURGERY | Facility: CLINIC | Age: 55
End: 2020-01-29

## 2020-01-29 VITALS
BODY MASS INDEX: 31.07 KG/M2 | TEMPERATURE: 96.7 F | HEART RATE: 56 BPM | OXYGEN SATURATION: 98 % | DIASTOLIC BLOOD PRESSURE: 82 MMHG | SYSTOLIC BLOOD PRESSURE: 121 MMHG | HEIGHT: 70 IN | WEIGHT: 217 LBS | RESPIRATION RATE: 16 BRPM

## 2020-01-29 DIAGNOSIS — G56.23 CUBITAL TUNNEL SYNDROME OF BOTH UPPER EXTREMITIES: ICD-10-CM

## 2020-01-29 DIAGNOSIS — G56.03 BILATERAL CARPAL TUNNEL SYNDROME: Primary | ICD-10-CM

## 2020-01-29 NOTE — PROGRESS NOTES
Carlota Holstein is a 53 y.o. male right handed unknown employment. Worker's Compensation and legal considerations: none filed. Vitals:    01/29/20 0856   BP: 121/82   Pulse: (!) 56   Resp: 16   Temp: 96.7 °F (35.9 °C)   TempSrc: Oral   SpO2: 98%   Weight: 217 lb (98.4 kg)   Height: 5' 10\" (1.778 m)   PainSc:   5   PainLoc: Hand           Chief Complaint   Patient presents with    Hand Pain     Cristobal hand pain       HPI: Patient comes in today for follow-up regarding his bilateral carpal tunnel syndrome. We previously had him set up this appointment to set up carpal tunnel release after he had recovered from his left rotator cuff surgery. However he recently had an MRI of his right shoulder and says he would like to get that taken care of before proceeding with any surgery on his hands. He has a follow-up with Dr Selean Persaud coming up. Previous HPI:  Patient comes in today for follow-up regarding his bilateral hand numbness and tingling. He reports the right side has been worse recently than the left. He has recently had a rotator cuff repair on the left but he will start therapy on in the next few weeks. Previous HPI: Patient comes in today for EMG follow-up of his bilateral upper extremities. He has been diagnosed with carpal tunnel syndrome and cubital tunnel syndrome bilaterally. He is scheduled for a right rotator cuff surgery on Monday. Previous HPI: Patient comes in today for follow-up after getting bilateral hand carpal tunnel injections approximately 6 to 7 weeks ago. He was supposed to have an EMG and nerve conduction study set up for both hands but he said he was never called. He reports also that the braces he was given last time did help with the injections only helped minimally. He also reports bilateral index and middle finger MCP pain that is worse on the right than the left. Initial HPI: Patient comes in today with complaints of bilateral hand numbness and tingling.   He reports that he had a left carpal tunnel diagnosis after an EMG several years ago. He has not had any treatment for it. He also has a cyst on the right wrist that was injected several months ago but did not help. He also reports a history of having digits amputated that were placed back on many years ago on the left hand. He has had chronic pain from scar tissue in his hand. Date of onset: Indeterminate    Injury: Yes: Comment: Old injury to left hand pain involving digital amputations    Prior Treatment:  No    Numbness/ Tingling: Yes: Comment: Bilateral hands left worse than right    ROS: Review of Systems - General ROS: negative  Respiratory ROS: no cough, shortness of breath, or wheezing  Cardiovascular ROS: no chest pain or dyspnea on exertion  Musculoskeletal ROS: positive for - pain in hand - bilateral  Neurological ROS: positive for - numbness/tingling  Dermatological ROS: negative    Past Medical History:   Diagnosis Date    Depression     Foot drop, left foot     October, 2018. Seen by Podiatry in 2019    Headache     Psychotic disorder ([facility] [address]     anxiety       Past Surgical History:   Procedure Laterality Date    HX HERNIA REPAIR      HX KNEE ARTHROSCOPY Right     HX ORTHOPAEDIC Left 2017    lacerations to fingers and had pins, left thumb    HX ROTATOR CUFF REPAIR Right     HX ROTATOR CUFF REPAIR Left 10/21/2019       Current Outpatient Medications   Medication Sig Dispense Refill    triamcinolone acetonide (KENALOG) 10 mg/mL injection 1 mL by Intra artICUlar route once for 1 dose. 1 Vial 0    gabapentin (NEURONTIN) 400 mg capsule Take 1 Cap by mouth three (3) times daily. Max Daily Amount: 1,200 mg. 90 Cap 5    amitriptyline (ELAVIL) 25 mg tablet Take 3 Tabs by mouth nightly. 90 Tab 2    acetaminophen (TYLENOL) 325 mg tablet Take  by mouth every four (4) hours as needed for Pain.  mirtazapine (REMERON) 15 mg tablet Take 1 Tab by mouth nightly.  30 Tab 1    ergocalciferol (ERGOCALCIFEROL) 50,000 unit capsule Take 50,000 Units by mouth Every Saturday.  escitalopram oxalate (LEXAPRO) 10 mg tablet Take 10 mg by mouth daily. Indications: not taking  0       Allergies   Allergen Reactions    Motrin [Ibuprofen] Hives         PE:       Right hand: There is tenderness to palpation about the MCP joints of the index finger and middle finger bilaterally significantly worse on the right. There is decreased range of motion in bilateral hands. There is some scar tissue noted in the left hand in the palm as well as multiple digits. Sensation is diminished in all digits bilaterally. There is also a cystic mass noted about the right volar wrist.    NEUROVASCULAR    Examination L R Examination L R   Carpal Comp. + + Pronator Comp. - -   Carpal Tinel + + Pronator Tinel - -   Phalen's + + Pronator Stress - -   Cubital Comp. ? ? Guyon Comp. - -   Cubital Tinel ? ? No Guyon Tinel - -   Elbow Hyperflexion - - Adson's - -   Spurling's - - SC Comp. - -   PCB Median abn - - SC Tinel - -   Radial Tinel - - IC Comp. - -   Digital Tinel - - IC Tinel - -   Radial 2-Pt WNL WNL Ulnar 2-Pt WNL WNL     Radial Pulse: 2+  Capillary Refill: < 2 sec  [place]: Not Performed  [employer]: Not Performed      Imaging:    Plain films of bilateral wrist does not show substantial degenerative changes  Fracture dislocation or any other osseous abnormalities. NCV & EMG Findings:  Evaluation of the left median motor and the right median motor nerves showed prolonged distal onset latency (L4.4, R4.5 ms). The left ulnar motor and the right ulnar motor nerves showed decreased conduction velocity (A Elbow-B Elbow, L38, R42 m/s). The left median sensory and the right median sensory nerves showed prolonged distal peak latency (L4.6, R4.4 ms) and decreased conduction velocity (Wrist-2nd Digit, L30, R32 m/s).   The left ulnar sensory nerve showed prolonged distal peak latency (4.1 ms), reduced amplitude (10.5 µV), and decreased conduction velocity (Wrist-5th Digit, 34 m/s). The right ulnar sensory nerve showed prolonged distal peak latency (5.7 ms) and decreased conduction velocity (Wrist-5th Digit, 25 m/s). The left median/ulnar (palm) comparison nerve showed no response (Median Palm) and no response (Ulnar Palm). All remaining nerves (as indicated in the following tables) were within normal limits. Left vs. Right side comparison data for the ulnar motor nerve indicates abnormal L-R latency difference (0.8 ms). The median sensory nerve indicates abnormal L-R amplitude difference (63.1 %). The ulnar sensory nerve indicates abnormal L-R latency difference (1.6 ms). All remaining left vs. right side differences were within normal limits.       All examined muscles (as indicated in the following table) showed no evidence of electrical instability.          INTERPRETATION  This is an abnormal electrodiagnostic examination. These findings may be consistent with:  1. 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 Moderate median mononeuropathy at the wrist bilaterally (carpal tunnel syndrome)  3. Severe neuropathy focally related to both median and ulnar palmar branches     There is no electrodiagnostic evidence of any cervical radiculopathy, brachial plexopathy, peripheral polyneuropathy, or any other mononeuropathy.     CLINICAL INTERPRETATION  His electrodiagnostic findings in the median and ulnar nerves are consistent with his symptoms of numbness and tingling in both hands.          ICD-10-CM ICD-9-CM    1. Bilateral carpal tunnel syndrome G56.03 354.0 TRIAMCINOLONE ACETONIDE INJ      triamcinolone acetonide (KENALOG) 10 mg/mL injection      INJECT CARPAL TUNNEL   2.  Cubital tunnel syndrome of both upper extremities G56.23 354.2        Plan:      repeat bilateral carpal tunnel injections    Follow-up in 3 months to set up surgery likely for the left side carpal tunnel and cubital tunnel. Plan was reviewed with patient, who verbalized agreement and understanding of the plan    [address] NOTE        Chart reviewed for the following:   Rodger BAIRD DO, have reviewed the History, Physical and updated the Allergic reactions for [facility] performed immediately prior to start of procedure:   Rodger BAIRD DO, have performed the following reviews on Esau Perry prior to the start of the procedure:            * Patient was identified by name and date of birth   * Agreement on procedure being performed was verified  * Risks and Benefits explained to the patient  * Procedure site verified and marked as necessary  * Patient was positioned for comfort  * Consent was signed and verified     Time: 09:10 AM      Date of procedure: 1/29/2020    Procedure performed by: Kiana Nathan DO    Provider assisted by: Samantha Mancia LPN    Patient assisted by: self    How tolerated by patient: tolerated the procedure well with no complications    Post Procedural Pain Scale: 0 - No Hurt    Comments: none    Procedure:  After consent was obtained, using sterile technique the carpal tunnel was prepped. Local anesthetic used: 1% lidocaine. Kenalog 5 mg X2 and was then injected and the needle withdrawn. The procedure was well tolerated. The patient is asked to continue to rest the area for a few more days before resuming regular activities. It may be more painful for the first 1-2 days. Watch for fever, or increased swelling or persistent pain in the joint. Call or return to clinic prn if such symptoms occur or there is failure to improve as anticipated.

## 2020-01-29 NOTE — PROGRESS NOTES
1. Have you been to the ER, urgent care clinic since your last visit? Hospitalized since your last visit? No    2. Have you seen or consulted any other health care providers outside of the [address] [place] [address] since your last visit? Include any pap smears or colon screening.  No

## 2020-01-30 ENCOUNTER — HOSPITAL ENCOUNTER (OUTPATIENT)
Dept: PHYSICAL THERAPY | Age: 55
Discharge: HOME | End: 2020-01-30
Payer: MEDICAID

## 2020-01-30 PROCEDURE — 97140 MANUAL THERAPY 1/> REGIONS: CPT

## 2020-01-30 PROCEDURE — 97110 THERAPEUTIC EXERCISES: CPT

## 2020-01-30 NOTE — PROGRESS NOTES
PT DAILY TREATMENT NOTE 10-18    Patient Name: Michelle Malone  Date:2020  : 1965  [x]  Patient  Verified  Payor: Husam Schrader / Plan: Hiwot Yanez / Product Type: Managed Care Medicaid /    In time:130  Out time:214  Total Treatment Time (min): 44  Visit #: 2 of 18    Treatment Area: Pain in left shoulder [M25.512]    SUBJECTIVE  Pain Level (0-10 scale): 310  Any medication changes, allergies to medications, adverse drug reactions, diagnosis change, or new procedure performed?: [x] No    [] Yes (see summary sheet for update)  Subjective functional status/changes:   [] No changes reported  Pt stated that he is doing all right    OBJECTIVE    Modality rationale: decrease inflammation and decrease pain to improve the patients ability to increase ease with ADLs   Min Type Additional Details    [] Estim:  []Unatt       []IFC  []Premod                        []Other:  []w/ice   []w/heat  Position:  Location:    [] Estim: []Att    []TENS instruct  []NMES                    []Other:  []w/US   []w/ice   []w/heat  Position:  Location:    []  Traction: [] Cervical       []Lumbar                       [] Prone          []Supine                       []Intermittent   []Continuous Lbs:  [] before manual  [] after manual    []  Ultrasound: []Continuous   [] Pulsed                           []1MHz   []3MHz W/cm2:  Location:    []  Iontophoresis with dexamethasone         Location: [] Take home patch   [] In clinic   10 [x]  Ice     []  heat  []  Ice massage  []  Laser   []  Anodyne Position:seated  Location:left sh    []  Laser with stim  []  Other:  Position:  Location:    []  Vasopneumatic Device Pressure:       [] lo [] med [] hi   Temperature: [] lo [] med [] hi   [x] Skin assessment post-treatment:  [x]intact []redness- no adverse reaction    []redness - adverse reaction:     26 min Therapeutic Exercise:  [x] See flow sheet :   Rationale: increase ROM and increase strength to improve the patients ability to increase ease with ADLs    8 min Manual Therapy:  Trigger point release and STM to left infraspinatus, pec major   Rationale: decrease pain, increase ROM and increase tissue extensibility to increase ease with ADLs    With   [x] TE   [] TA   [] neuro   [] other: Patient Education: [x] Review HEP    [] Progressed/Changed HEP based on:   [] positioning   [] body mechanics   [] transfers   [] heat/ice application    [] other:      Other Objective/Functional Measures:   Had increased range of motion with wand in flex and scap  Cont with trigger points in the infraspinatus and pec major     Pain Level (0-10 scale) post treatment: 3/10    ASSESSMENT/Changes in Function:   Pt is progressing slowly toward goals. Strength and range of motion in the left sh are improving. Pt cont to have some difficulty with ADLs and work duties    Patient will continue to benefit from skilled PT services to modify and progress therapeutic interventions, address functional mobility deficits, address ROM deficits, address strength deficits, analyze and cue movement patterns, assess and modify postural abnormalities and instruct in home and community integration to attain remaining goals. []  See Plan of Care  [x]  See progress note/recertification  []  See Discharge Summary         Progress towards goals / Updated goals:  1.  Pt will improve FOTO by 38 points in order to demonstrate functional improvement. Nearly met increased 37 points 1-  2.  Pt will report 80% improvement in sx since start of care in order to improve QOL. Met 50% improvement overall 1-  3.  Pt will improve left shoulder ER AAROM to 40* in order to improve pt's ability with washing/styling hair.   Not met. Cont to have decreased ER AROM of less than 20*. 1/15/20, no change. 1/30/20  4. Pt will be independent with HEP at time of discharge to improve his QOL.     PLAN  []  Upgrade activities as tolerated     [x]  Continue plan of care  []  Update interventions per flow sheet       []  Discharge due to:_  []  Other:_      Ion Ray, PTA 1/30/2020  1:36 PM    Future Appointments   Date Time Provider Abdullahi Horn   2/4/2020  3:00 PM Nevaeh Hooper, PT [facility]   2/6/2020  4:30 PM Sara León, PT [facility]   2/7/2020  3:50 PM [facility] Cobb [facility]   4/29/2020  9:30 AM DO Nickie Roberts [address]

## 2020-02-04 ENCOUNTER — APPOINTMENT (OUTPATIENT)
Dept: PHYSICAL THERAPY | Age: 55
End: 2020-02-04
Payer: MEDICAID

## 2020-02-05 ENCOUNTER — HOSPITAL ENCOUNTER (OUTPATIENT)
Dept: PHYSICAL THERAPY | Age: 55
Discharge: HOME | End: 2020-02-05
Payer: MEDICAID

## 2020-02-05 PROCEDURE — 97110 THERAPEUTIC EXERCISES: CPT | Performed by: PHYSICAL THERAPIST

## 2020-02-05 PROCEDURE — 97140 MANUAL THERAPY 1/> REGIONS: CPT | Performed by: PHYSICAL THERAPIST

## 2020-02-05 NOTE — PROGRESS NOTES
PT DAILY TREATMENT NOTE 10-18    Patient Name: Mandy Drilling  Date:2020  : 1965  [x]  Patient  Verified  Payor: Ramy Dumont / Plan: [employer] / Product Type: Managed Care Medicaid /    In time:231  Out time:316  Total Treatment Time (min): 45  Visit #: 3 of 18    Treatment Area: Pain in left shoulder [M25.512]    SUBJECTIVE  Pain Level (0-10 scale): 3/10  Any medication changes, allergies to medications, adverse drug reactions, diagnosis change, or new procedure performed?: [x] No    [] Yes (see summary sheet for update)  Subjective functional status/changes:   [] No changes reported  Patient states his motion is improved which helps with dressing though still has difficulty donning/doffing jacket. Also has pain after sleeping at times.       OBJECTIVE    Modality rationale: decrease inflammation and decrease pain to improve the patients ability to perform ADLs   Min Type Additional Details    [] Estim:  []Unatt       []IFC  []Premod                        []Other:  []w/ice   []w/heat  Position:  Location:    [] Estim: []Att    []TENS instruct  []NMES                    []Other:  []w/US   []w/ice   []w/heat  Position:  Location:    []  Traction: [] Cervical       []Lumbar                       [] Prone          []Supine                       []Intermittent   []Continuous Lbs:  [] before manual  [] after manual    []  Ultrasound: []Continuous   [] Pulsed                           []1MHz   []3MHz W/cm2:  Location:    []  Iontophoresis with dexamethasone         Location: [] Take home patch   [] In clinic   10 [x]  Ice     []  heat  []  Ice massage  []  Laser   []  Anodyne Position:seated  Location: left shoulder    []  Laser with stim  []  Other:  Position:  Location:    []  Vasopneumatic Device Pressure:       [] lo [] med [] hi   Temperature: [] lo [] med [] hi   [x] Skin assessment post-treatment:  [x]intact []redness- no adverse reaction    []redness - adverse reaction: 27 min Therapeutic Exercise:  [x] See flow sheet :   Rationale: increase ROM and increase strength to improve the patients ability to perform ADLs    8 min Manual Therapy:  TRP L UT, infraspinatus, pec major   Rationale: decrease pain, increase ROM, increase tissue extensibility and decrease trigger points to facilitate ability to perform ADLs          With   [x] TE   [] TA   [] neuro   [] other: Patient Education: [x] Review HEP    [] Progressed/Changed HEP based on:   [] positioning   [] body mechanics   [] transfers   [] heat/ice application    [] other:      Other Objective/Functional Measures:   Patients exhibits good effort with exercises. Muscle fatigue noted particularly with sidelying external rotation. Significant trigger points in left UT, infraspinatus, pec major. Pain Level (0-10 scale) post treatment: 3/10  ASSESSMENT/Changes in Function:   Patient is making progress with ability to perform ADLs such as dressing. Patient expressed interest in obtaining Theracane for self management of trigger points. Patient will continue to benefit from skilled PT services to modify and progress therapeutic interventions, address functional mobility deficits, address ROM deficits, address strength deficits, analyze and address soft tissue restrictions and analyze and cue movement patterns to attain remaining goals. Progress towards goals / Updated goals:  1.  Pt will improve FOTO by 38 points in order to demonstrate functional improvement. Nearly met increased 37 points 1-  2.  Pt will report 80% improvement in sx since start of care in order to improve QOL. Continues to report 50% improvement overall 2-5-2020  3.  Pt will improve left shoulder ER AAROM to 40* in order to improve pt's ability with washing/styling hair.   Not met. Cont to have decreased ER AROM of less than 20*. 1/15/20, no change. 1/30/20  4.  Pt will be independent with HEP at time of discharge to improve his QOL.    PLAN  []  Upgrade activities as tolerated     [x]  Continue plan of care  []  Update interventions per flow sheet       []  Discharge due to:_  []  Other:_      Shirley Nunes 2/5/2020  2:38 PM    Future Appointments   Date Time Provider Abdullahi Horn   2/7/2020  3:50 PM hDiraj Suresh MD [facility] ROM   2/7/2020  5:00 PM Violet [facility]   4/29/2020  9:30 AM [place]son Hernandes [place] [id number] [facility]

## 2020-02-06 ENCOUNTER — APPOINTMENT (OUTPATIENT)
Dept: PHYSICAL THERAPY | Age: 55
End: 2020-02-06
Payer: MEDICAID

## 2020-02-07 ENCOUNTER — OFFICE VISIT (OUTPATIENT)
Dept: ORTHOPEDIC SURGERY | Age: 55
End: 2020-02-07

## 2020-02-07 ENCOUNTER — HOSPITAL ENCOUNTER (OUTPATIENT)
Dept: PHYSICAL THERAPY | Age: 55
Discharge: HOME | End: 2020-02-07
Payer: MEDICAID

## 2020-02-07 VITALS
OXYGEN SATURATION: 97 % | WEIGHT: 217.6 LBS | HEIGHT: 70 IN | TEMPERATURE: 98.9 F | DIASTOLIC BLOOD PRESSURE: 71 MMHG | SYSTOLIC BLOOD PRESSURE: 113 MMHG | BODY MASS INDEX: 31.15 KG/M2 | HEART RATE: 87 BPM | RESPIRATION RATE: 18 BRPM

## 2020-02-07 DIAGNOSIS — M75.101 ROTATOR CUFF SYNDROME, RIGHT: Primary | ICD-10-CM

## 2020-02-07 PROCEDURE — 97110 THERAPEUTIC EXERCISES: CPT

## 2020-02-07 RX ORDER — TRIAMCINOLONE ACETONIDE 40 MG/ML
40 INJECTION, SUSPENSION INTRA-ARTICULAR; INTRAMUSCULAR ONCE
Qty: 1 VIAL | Refills: 0
Start: 2020-02-07 | End: 2020-02-07

## 2020-02-07 NOTE — PROGRESS NOTES
PT DAILY TREATMENT NOTE 10-18    Patient Name: Daya Benavides  Date:2020  : 1965  [x]  Patient  Verified  Payor: Tonya Mcallister / Plan: [id number] [employer] / Product Type: Managed Care Medicaid /    In time: 4:50  Out time: 5:28  Total Treatment Time (min): 38  Visit #: 4 of 18    Treatment Area: Pain in left shoulder [M25.512]    SUBJECTIVE  Pain Level (0-10 scale): 3/10  Any medication changes, allergies to medications, adverse drug reactions, diagnosis change, or new procedure performed?: [x] No    [] Yes (see summary sheet for update)  Subjective functional status/changes:   [] No changes reported  Pt. Reports he is doing pretty good    OBJECTIVE    38 min Therapeutic Exercise:  [x] See flow sheet :   Rationale: increase ROM and increase strength to improve the patients ability to increase ease of ADLs    With   [] TE   [] TA   [] neuro   [] other: Patient Education: [x] Review HEP    [] Progressed/Changed HEP based on:   [] positioning   [] body mechanics   [] transfers   [] heat/ice application    [] other:      Other Objective/Functional Measures:   Pt. Has improving strength with left shoulder abduction with increased resistance with 2#  He continues to have decreased behind back reaching   Challenged with wall lift offs  Pt. Required cues to perform prone T and portia ER correctly     Pain Level (0-10 scale) post treatment: 3/10    ASSESSMENT/Changes in Function:  Pt. Is progressing slowly towards goals. He continues to have decreased mobility at end ranges but is slowly improving. He also demonstrates improving left shoulder mobility.      Patient will continue to benefit from skilled PT services to modify and progress therapeutic interventions, address functional mobility deficits, address ROM deficits, address strength deficits, analyze and address soft tissue restrictions, analyze and cue movement patterns, analyze and modify body mechanics/ergonomics and assess and modify postural abnormalities to attain remaining goals. Progress towards goals / Updated goals:  1.  Pt will improve FOTO by 38 points in order to demonstrate functional improvement. Nearly met increased 37 points 1-  2.  Pt will report 80% improvement in sx since start of care in order to improve QOL. Continues to report 50% improvement overall 2-5-2020  3.  Pt will improve left shoulder ER AAROM to 40* in order to improve pt's ability with washing/styling hair.   Not met. Cont to have decreased ER AROM of less than 20*. 1/15/20, no change. 1/30/20  4. Pt will be independent with HEP at time of discharge to improve his QOL.     PLAN  []  Upgrade activities as tolerated     [x]  Continue plan of care  []  Update interventions per flow sheet       []  Discharge due to:_  []  Other:_      Dyan Rush, PT 2/7/2020  5:00 PM    Future Appointments   Date Time Provider Abdullahi Horn   4/29/2020  9:30 AM DO Nickie Koehler [address]

## 2020-02-07 NOTE — PROGRESS NOTES
Patient: Chucky Osgood                MRN: [id number]       SSN: [id number]  YOB: 1965        AGE: 53 y.o. SEX: male  Body mass index is 31.22 kg/m². PCP: Jorge Luis Pham NP  02/07/20    Chief Complaint: Right shoulder MRI follow up    HISTORY OF PRESENT ILLNESS:  Mariangel Schultz returns to the office today for his right shoulder. He felt a pop in his shoulder several months ago. He had an MRI done. He continues to complain of some right shoulder pain. He has a history of right shoulder rotator cuff repair 20 years ago. Past Medical History:   Diagnosis Date    Depression     Foot drop, left foot     October, 2018. Seen by Podiatry in 2019    Headache     Psychotic disorder ([facility] [address]     anxiety       Family History   Problem Relation Age of Onset    COPD Mother        Current Outpatient Medications   Medication Sig Dispense Refill    gabapentin (NEURONTIN) 400 mg capsule Take 1 Cap by mouth three (3) times daily. Max Daily Amount: 1,200 mg. 90 Cap 5    amitriptyline (ELAVIL) 25 mg tablet Take 3 Tabs by mouth nightly. 90 Tab 2    mirtazapine (REMERON) 15 mg tablet Take 1 Tab by mouth nightly. 30 Tab 1    ergocalciferol (ERGOCALCIFEROL) 50,000 unit capsule Take 50,000 Units by mouth Every Saturday.  escitalopram oxalate (LEXAPRO) 10 mg tablet Take 10 mg by mouth daily. Indications: not taking  0    acetaminophen (TYLENOL) 325 mg tablet Take  by mouth every four (4) hours as needed for Pain.          Allergies   Allergen Reactions    Motrin [Ibuprofen] Hives       Past Surgical History:   Procedure Laterality Date    HX HERNIA REPAIR      HX KNEE ARTHROSCOPY Right     HX ORTHOPAEDIC Left 2017    lacerations to fingers and had pins, left thumb    HX ROTATOR CUFF REPAIR Right     HX ROTATOR CUFF REPAIR Left 10/21/2019       Social History     Socioeconomic History    Marital status:      Spouse name: Not on file    Number of children: Not on file    Years of education: Not on file    Highest education level: Not on file   Occupational History    Not on file   Social Needs    Financial resource strain: Not on file    Food insecurity:     Worry: Not on file     Inability: Not on file    Transportation needs:     Medical: Not on file     Non-medical: Not on file   Tobacco Use    Smoking status: Never Smoker    Smokeless tobacco: Current User    Tobacco comment: chew tobacco, using since 1976   Substance and Sexual Activity    Alcohol use: Not Currently     Comment: states he quit drinking alchohol 2 months ago    Drug use: Not Currently     Types: Marijuana     Comment: uses marijuana once a month    Sexual activity: Yes   Lifestyle    Physical activity:     Days per week: Not on file     Minutes per session: Not on file    Stress: Not on file   Relationships    Social connections:     Talks on phone: Not on file     Gets together: Not on file     Attends Nondenominational service: Not on file     Active member of club or organization: Not on file     Attends meetings of clubs or organizations: Not on file     Relationship status: Not on file    Intimate partner violence:     Fear of current or ex partner: Not on file     Emotionally abused: Not on file     Physically abused: Not on file     Forced sexual activity: Not on file   Other Topics Concern    Not on file   Social History Narrative    Not on file       REVIEW OF SYSTEMS:      No changes from previous review of systems unless noted. PHYSICAL EXAMINATION:  Visit Vitals  /71   Pulse 87   Temp 98.9 °F (37.2 °C) (Oral)   Resp 18   Ht 5' 10\" (1.778 m)   Wt 217 lb 9.6 oz (98.7 kg)   SpO2 97%   BMI 31.22 kg/m²     Body mass index is 31.22 kg/m². GENERAL: Alert and oriented x3, in no acute distress. HEENT: Normocephalic, atraumatic. RESP: Non labored breathing. SKIN: No rashes or lesions noted. PHYSICAL EXAM:  Physical exam of the right shoulder with full range of motion. Pain and weakness with supraspinatus and infraspinatus rotator cuff testing. No pain with impingement. Neurovascularly intact distally. IMAGING:  An MRI was reviewed. This does show a history of a rotator cuff repair, but I do not appreciate any full thickness rotator cuff tears on the MRI. ASSESSMENT AND PLAN:   Robert Byrd has right shoulder pain concerning for rotator cuff pathology. I do not see any obvious full thickness tears of his rotator cuff on his MRI from his previous surgery. I would try to treat this conservatively with a cortisone shot today and see how he does. He can also do some exercises for his shoulder that he has been doing in physical therapy.        [facility]  OFFICE PROCEDURE PROGRESS NOTE        Chart reviewed for the following:   Norman Garcia MD, have reviewed the History, Physical and updated the Allergic reactions for [address] [facility] performed immediately prior to start of procedure:   Norman Garcia MD, have performed the following reviews on Dafne Tarango prior to the start of the procedure:            * Patient was identified by name and date of birth   * Agreement on procedure being performed was verified  * Risks and Benefits explained to the patient  * Procedure site verified and marked as necessary  * Patient was positioned for comfort  * Consent was signed and verified    Time: 1600      Date of procedure: 2/7/2020    Procedure performed by:  Kori Laurent MD    Provider assisted by: Michael Lyon LPN    Patient assisted by: self    How tolerated by patient: tolerated the procedure well with no complications    Post Procedural Pain Scale: 0 - No Hurt    Comments: none                  Electronically signed by: Kori Laurent MD

## 2020-03-05 ENCOUNTER — PATIENT OUTREACH (OUTPATIENT)
Dept: FAMILY MEDICINE CLINIC | Facility: CLINIC | Age: 55
End: 2020-03-05

## 2020-03-05 NOTE — PROGRESS NOTES
NN health screening:    Negative fit result from December of 2019 is updated in HM. Closed this episode.

## 2020-03-18 NOTE — PROGRESS NOTES
[facility] [employer] [facility] [address]  [phone] fax    Physical Therapy Discharge Summary    Patient name: May Burciaga Start of Care: 2019   Referral source: Salvador AmericoEnedina corado : 1965                Medical Diagnosis: Traumatic complete tear of rotator cuff, subsequent encounter (S46.012D)  Superficial foreign body of left shoulder, subsequent encounter (S40.053D)  Status post arthroscopy of left shoulder (Z98.890)  Payor: [employer] / Plan: Toni Redo / Product Type: Managed Care Medicaid /  Onset Date:Date of Surgery 10/21/19                Treatment Diagnosis: Left Shoulder Pain    Prior Hospitalization: see medical history Provider#: [id number]   Medications: Verified on Patient summary List   Comorbidities: history of alcohol abuse, tobacco use (chewing tobacco), depression    Prior Level of Function: lives alone in a one story home, yard work, housework, metal work, 3 step to enter house, Ind with all mobility and ADLs, left-handed     Visits from [place] of Care: 20    Missed Visits: 0    Reporting Period : 2020 to 2020    Summary of Care:  1.  Pt will improve FOTO by 38 points in order to demonstrate functional improvement. Nearly met increased 37 points 2020  2.  Pt will report 80% improvement in sx since start of care in order to improve QOL. Continues to report 50% improvement overall 2020  3.  Pt will improve left shoulder ER AAROM to 40* in order to improve pt's ability with washing/styling hair.   Not met. Cont to have decreased ER AROM of less than 20*. 1/15/20, no change. 20  4. Pt will be independent with HEP at time of discharge to improve his QOL. ASSESSMENT/RECOMMENDATIONS:   Patient was progressing well with therapy. FOTO score from 16 to 53 which indicates increased functional ability.  Strength and range of motion improved in the left shoulder for all motions. Final HEP was not issued as patient self discharged, reason unknown.      [x]Discontinue therapy: []Patient has reached or is progressing toward set goals      [x]Patient is non-compliant or has abdicated      []Due to lack of appreciable progress towards set goals    Flash Rodriguez, CORBY 3/18/2020 9:37 AM

## 2020-05-29 ENCOUNTER — VIRTUAL VISIT (OUTPATIENT)
Dept: FAMILY MEDICINE CLINIC | Facility: CLINIC | Age: 55
End: 2020-05-29

## 2020-05-29 DIAGNOSIS — K13.70 MOUTH LESION: Primary | ICD-10-CM

## 2020-05-29 DIAGNOSIS — G47.00 INSOMNIA, UNSPECIFIED TYPE: ICD-10-CM

## 2020-05-29 RX ORDER — MIRTAZAPINE 30 MG/1
30 TABLET, FILM COATED ORAL
Qty: 90 TAB | Refills: 1 | Status: SHIPPED | OUTPATIENT
Start: 2020-05-29 | End: 2020-07-23 | Stop reason: ALTCHOICE

## 2020-05-29 NOTE — PROGRESS NOTES
Laisha Sheikh is a 53 y.o. male who was seen by synchronous (real-time) audio-video technology on 5/29/2020. Consent: Laisha Sheikh, who was seen by synchronous (real-time) audio-video technology, and/or his healthcare decision maker, is aware that this patient-initiated, Telehealth encounter on 5/29/2020 is a billable service, with coverage as determined by his insurance carrier. He is aware that he may receive a bill and has provided verbal consent to proceed: Yes. Assessment & Plan:   Diagnoses and all orders for this visit:    1. Mouth lesion  -     REFERRAL TO ENT-OTOLARYNGOLOGY    2. Insomnia, unspecified type  -     mirtazapine (REMERON) 30 mg tablet; Take 1 Tab by mouth nightly. Referral to Dr. Erika Galvan for evaluation. Increased Remeron prescription    I spent at least 17 minutes on this visit with this established patient. Subjective:   Laisha Sheikh is a 53 y.o. male who was seen for No chief complaint on file. The patient presents for an Audio-visual teleconference appointment for a lesion on the roof of his mouth. [facility] was told he had a lesion in the roof of his mouth. He made an appointment with a dentist but his insurance plan would not cover anesthesia. Notes the area is painful with swallowing and when he brush his teeth. Notes his daughter had mouth cancer but had it removed several years ago. He admits he has been chewing tobacco daily for about 40 years. He notes he is addicted to chewing tobacco.   Insomnia- notes the Remeron helped with his sleep disturbances but he continues to wake up throughout the night. He would like to increase the dose  ETOH- notes he has stopped drinking any alcohol. Prior to Admission medications    Medication Sig Start Date End Date Taking? Authorizing Provider   mirtazapine (REMERON) 30 mg tablet Take 1 Tab by mouth nightly.  5/29/20  Yes Ellis Palomares NP gabapentin (NEURONTIN) 400 mg capsule Take 1 Cap by mouth three (3) times daily. Max Daily Amount: 1,200 mg. 12/5/19   Fly Max MD   amitriptyline (ELAVIL) 25 mg tablet Take 3 Tabs by mouth nightly. 12/5/19   Fly Max MD   acetaminophen (TYLENOL) 325 mg tablet Take  by mouth every four (4) hours as needed for Pain. Provider, Historical   mirtazapine (REMERON) 15 mg tablet Take 1 Tab by mouth nightly. 10/24/19 5/29/20  Geronimo Elam NP   ergocalciferol (ERGOCALCIFEROL) 50,000 unit capsule Take 50,000 Units by mouth Every Saturday. 2/7/17   Provider, Historical   escitalopram oxalate (LEXAPRO) 10 mg tablet Take 10 mg by mouth daily. Indications: not taking 6/27/19   Provider, Historical     Allergies   Allergen Reactions    Motrin [Ibuprofen] Hives       There is no problem list on file for this patient. There are no active problems to display for this patient. Current Outpatient Medications   Medication Sig Dispense Refill    mirtazapine (REMERON) 30 mg tablet Take 1 Tab by mouth nightly. 90 Tab 1    gabapentin (NEURONTIN) 400 mg capsule Take 1 Cap by mouth three (3) times daily. Max Daily Amount: 1,200 mg. 90 Cap 5    amitriptyline (ELAVIL) 25 mg tablet Take 3 Tabs by mouth nightly. 90 Tab 2    acetaminophen (TYLENOL) 325 mg tablet Take  by mouth every four (4) hours as needed for Pain.  ergocalciferol (ERGOCALCIFEROL) 50,000 unit capsule Take 50,000 Units by mouth Every Saturday.  escitalopram oxalate (LEXAPRO) 10 mg tablet Take 10 mg by mouth daily. Indications: not taking  0     Allergies   Allergen Reactions    Motrin [Ibuprofen] Hives     Past Medical History:   Diagnosis Date    Depression     Foot drop, left foot     October, 2018.   Seen by Podiatry in 2019    Headache     Psychotic disorder ([facility] [address]     anxiety       ROS    Constitutional: No apparent distress noted  General- negative for fever, chills or fatigue  Eyes- negative visual changes  Mouth- mouth lesion  CV- denies chest pain, palpitation  Pul: negative cough or SOB  GI: negative nausea, flank pain, diarrhea, constipation  Urinary:- No dysuria or polyuria  MS- negative myalgia, negative joint pain  Neuro- negative headache, dizziness or weakness  Skin- negative for rashes or lesions. Psych- anxiety, insomnia    Objective:   Vital Signs: (As obtained by patient/caregiver at home)  There were no vitals taken for this visit. [INSTRUCTIONS:  \"[x]\" Indicates a positive item  \"[]\" Indicates a negative item  -- DELETE ALL ITEMS NOT EXAMINED]    Constitutional: [x] Appears well-developed and well-nourished [x] No apparent distress      [] Abnormal -     Mental status: [x] Alert and awake  [x] Oriented to person/place/time [x] Able to follow commands    [] Abnormal -     Eyes:   EOM    [x]  Normal    [] Abnormal -   Sclera  [x]  Normal    [] Abnormal -          Discharge [x]  None visible   [] Abnormal -     HENT: [x] Normocephalic, atraumatic  [] Abnormal -   [x] Mouth/Throat: Mucous membranes are moist    External Ears [x] Normal  [] Abnormal -    Neck: [x] No visualized mass [] Abnormal -     Pulmonary/Chest: [x] Respiratory effort normal   [x] No visualized signs of difficulty breathing or respiratory distress        [] Abnormal -      Musculoskeletal:   [x] Normal gait with no signs of ataxia         [x] Normal range of motion of neck        [] Abnormal -     Neurological:        [x] No Facial Asymmetry (Cranial nerve 7 motor function) (limited exam due to video visit)          [x] No gaze palsy        [] Abnormal -          Skin:        [x] No significant exanthematous lesions or discoloration noted on facial skin         [] Abnormal -            Psychiatric:       [x] Normal Affect [] Abnormal -        [x] No Hallucinations    Other pertinent observable physical exam findings:-        We discussed the expected course, resolution and complications of the diagnosis(es) in detail. Medication risks, benefits, costs, interactions, and alternatives were discussed as indicated. I advised him to contact the office if his condition worsens, changes or fails to improve as anticipated. He expressed understanding with the diagnosis(es) and plan. Roger Whitman is a 53 y.o. male who was evaluated by a video visit encounter for concerns as above. Patient identification was verified prior to start of the visit. A caregiver was present when appropriate. Due to this being a TeleHealth encounter (During [facility] public health emergency), evaluation of the following organ systems was limited: Vitals/Constitutional/EENT/Resp/CV/GI//MS/Neuro/Skin/Heme-Lymph-Imm. Pursuant to the emergency declaration under the [facility], [facility] waiver authority and the [employer] Act, this Virtual  Visit was conducted, with patient's (and/or legal guardian's) consent, to reduce the patient's risk of exposure to COVID-19 and provide necessary medical care. Services were provided through a video synchronous discussion virtually to substitute for in-person clinic visit. Patient and provider were located at their individual homes.       Anirudh Toney NP

## 2020-07-23 ENCOUNTER — VIRTUAL VISIT (OUTPATIENT)
Dept: ORTHOPEDIC SURGERY | Age: 55
End: 2020-07-23

## 2020-07-23 ENCOUNTER — TELEPHONE (OUTPATIENT)
Dept: ORTHOPEDIC SURGERY | Facility: CLINIC | Age: 55
End: 2020-07-23

## 2020-07-23 DIAGNOSIS — M54.12 CERVICAL RADICULOPATHY: ICD-10-CM

## 2020-07-23 DIAGNOSIS — M54.2 CERVICAL PAIN: Primary | ICD-10-CM

## 2020-07-23 RX ORDER — AMITRIPTYLINE HYDROCHLORIDE 25 MG/1
75 TABLET, FILM COATED ORAL
Qty: 270 TAB | Refills: 1 | Status: SHIPPED | OUTPATIENT
Start: 2020-07-23 | End: 2020-12-15

## 2020-07-23 RX ORDER — GABAPENTIN 800 MG/1
800 TABLET ORAL
Qty: 90 TAB | Refills: 1 | Status: SHIPPED | OUTPATIENT
Start: 2020-07-23 | End: 2021-01-21 | Stop reason: SDUPTHER

## 2020-07-23 NOTE — PROGRESS NOTES
History of Present Illness:    Consent: Precious Mtz, who was seen by and/or his healthcare decision maker, is aware that this patient-initiated, Telehealth encounter on 7/23/2020 is a billable service, with coverage as determined by his insurance carrier. He is aware that he may receive a bill and has provided verbal consent to proceed: Yes. The provider was located at [facility] office and the patient was located at their residence. No one else participated in the visit with the patient. The platform used was telephone call evaluation    Patient was evaluated for his neck pain and left arm pain. At the last visit he was seen by Dr. Mary Jane Shirley and referred to Dr. Moni Grossman for cervical injections. He states he did have 1 cervical injection and it helped tremendously. He is starting to have pain back in that same area and he wishes to have more injections. He states he is taking Neurontin 400 mg 2 tabs in the morning which seems effective for him instead of the 400 mg 3 times a day. He is taking amitriptyline 25 mg at bedtime. It is prescribed 75 mg at bedtime. He states he has increased pain at night. Since we last saw him he underwent a left rotator cuff repair by Dr. Gómez Singh and did well following that surgery. He also sees Dr. Karina Haynes for his carpal tunnel syndrome and needs to have surgery for that but has not had it yet. He denies fever bowel bladder dysfunction. He he does not work. Physical Exam: Patient is a 70-year-old male who is alert and oriented. He spoke with fluency. He did not appear to be short of breath. Vital Signs: (As obtained by patient/caregiver at home)  There were no vitals taken for this visit. Assessment & Plan: This is a patient with neck pain with radiating left arm pain. He is going to call Dr. Pradip Marley office to set up a repeat injection of his neck.   I am going to give him Neurontin 800 mg to take in the morning and he will increase his Elavil to 75 mg at nighttime. Hopefully this will help control his symptoms better. We will see him back in 6 months sooner if needed. Diagnoses and all orders for this visit:    1. Cervical pain  -     gabapentin (Neurontin) 800 mg tablet; Take 1 Tab by mouth daily (after breakfast). Max Daily Amount: 800 mg. Indications: neuropathic pain  -     amitriptyline (ELAVIL) 25 mg tablet; Take 3 Tabs by mouth nightly. 2. Cervical radiculopathy  -     gabapentin (Neurontin) 800 mg tablet; Take 1 Tab by mouth daily (after breakfast). Max Daily Amount: 800 mg. Indications: neuropathic pain  -     amitriptyline (ELAVIL) 25 mg tablet; Take 3 Tabs by mouth nightly. Pain Scale: /10      has been reviewed and is appropriate    We discussed the expected course, resolution and complications of the diagnosis(es) in detail. Medication risks, benefits, costs, interactions, and alternatives were discussed as indicated. I advised him to contact the office if his condition worsens, changes or fails to improve as anticipated. For emergencies or worsening neurological symptoms the patient was instructed to call 911. He expressed understanding with the diagnosis(es) and plan. Follow-up and Dispositions    · Return in about 6 months (around 1/23/2021) for with NP Ranjit. Rosendo Bradshaw is a 53 y.o. male being evaluated by a video visit encounter for concerns as above. A caregiver was present when appropriate. Due to this being a TeleHealth encounter (During [facility]- public health emergency), evaluation of the following organ systems was limited: Vitals/Constitutional/EENT/Resp/CV/GI//MS/Neuro/Skin/Heme-Lymph-Imm.   Pursuant to the emergency declaration under the [facility] waiver authority and the [employer] and [employer] Act, this Virtual  Visit was conducted, with patient's (and/or legal guardian's) consent, to reduce the patient's risk of exposure to COVID-19 and provide necessary medical care. CPT Codes 37863-58968 for Established Patients may apply to this Telehealth Visit  Time-based coding, delete if not needed: I spent at least 15 minutes with this established patient, and >50% of the time was spent counseling and/or coordinating care regarding His neck pain  Start time: 2:10 PM and Stop time: 2:32 PM.        Due to this being a TeleHealth evaluation, many elements of the physical examination are unable to be assessed. Pursuant to the emergency declaration under the [address] [place] [address], [facility] waiver authority and the [employer] [facility] and [employer] Act, this Virtual  Visit was conducted, with patient's consent, to reduce the patient's risk of exposure to COVID-19 and provide continuity of care for an established patient. Services were provided through a video synchronous discussion virtually to substitute for in-person clinic visit.     Stephanie Carrasquillo NP

## 2020-07-23 NOTE — TELEPHONE ENCOUNTER
Patient called wishing to speak to [facility] regarding his right shoulder. He asked for a call back at [phone] after 3pm if possible.

## 2020-08-05 ENCOUNTER — OFFICE VISIT (OUTPATIENT)
Dept: ORTHOPEDIC SURGERY | Facility: CLINIC | Age: 55
End: 2020-08-05

## 2020-08-05 VITALS
HEART RATE: 65 BPM | BODY MASS INDEX: 29.63 KG/M2 | HEIGHT: 70 IN | SYSTOLIC BLOOD PRESSURE: 107 MMHG | TEMPERATURE: 96.9 F | RESPIRATION RATE: 15 BRPM | WEIGHT: 207 LBS | DIASTOLIC BLOOD PRESSURE: 77 MMHG

## 2020-08-05 DIAGNOSIS — M19.011 PRIMARY OSTEOARTHRITIS OF RIGHT SHOULDER: ICD-10-CM

## 2020-08-05 DIAGNOSIS — M75.41 IMPINGEMENT SYNDROME OF RIGHT SHOULDER: ICD-10-CM

## 2020-08-05 DIAGNOSIS — M75.111 NONTRAUMATIC INCOMPLETE TEAR OF RIGHT ROTATOR CUFF: Primary | ICD-10-CM

## 2020-08-05 DIAGNOSIS — M75.21 TENDINITIS OF LONG HEAD OF BICEPS BRACHII OF RIGHT SHOULDER: ICD-10-CM

## 2020-08-05 RX ORDER — ESCITALOPRAM OXALATE 20 MG/1
20 TABLET ORAL DAILY
COMMUNITY
Start: 2020-07-08

## 2020-08-05 RX ORDER — DEXTROAMPHETAMINE SACCHARATE, AMPHETAMINE ASPARTATE, DEXTROAMPHETAMINE SULFATE AND AMPHETAMINE SULFATE 2.5; 2.5; 2.5; 2.5 MG/1; MG/1; MG/1; MG/1
10 TABLET ORAL 3 TIMES DAILY
COMMUNITY
End: 2021-05-05

## 2020-08-05 RX ORDER — LURASIDONE HYDROCHLORIDE 60 MG/1
60 TABLET, FILM COATED ORAL
COMMUNITY
Start: 2020-07-21

## 2020-08-06 NOTE — PROGRESS NOTES
Patient: Kenroy Bañuelos                MRN: [id number]       SSN: [id number]  YOB: 1965        AGE: 53 y.o. SEX: male  Body mass index is 29.7 kg/m². PCP: Sabrina Fitzpatrick NP  08/06/20    Chief Complaint: Right shoulder pain    HPI: Kenroy Bañuelos is a 53 y.o. male patient who returns to the office today for his right shoulder. He continues to have right shoulder pain that is worse with use of the right arm. He is doing well recovering from his left shoulder surgery. He would like to discuss treatment for the right shoulder today, including surgery. He has pain at night in the right shoulder. Past Medical History:   Diagnosis Date    Depression     Foot drop, left foot     October, 2018. Seen by Podiatry in 2019    Headache     Psychotic disorder ([facility] [address]     anxiety       Family History   Problem Relation Age of Onset    COPD Mother        Current Outpatient Medications   Medication Sig Dispense Refill    dextroamphetamine-amphetamine (AdderalL) 10 mg tablet Take 10 mg by mouth.  Latuda 60 mg tab tablet       escitalopram oxalate (LEXAPRO) 20 mg tablet       gabapentin (Neurontin) 800 mg tablet Take 1 Tab by mouth daily (after breakfast). Max Daily Amount: 800 mg. Indications: neuropathic pain 90 Tab 1    amitriptyline (ELAVIL) 25 mg tablet Take 3 Tabs by mouth nightly. 270 Tab 1    gabapentin (NEURONTIN) 400 mg capsule Take 1 Cap by mouth three (3) times daily. Max Daily Amount: 1,200 mg. 90 Cap 5    escitalopram oxalate (LEXAPRO) 10 mg tablet Take 10 mg by mouth daily.  Indications: not taking  0       Allergies   Allergen Reactions    Motrin [Ibuprofen] Hives       Past Surgical History:   Procedure Laterality Date    HX HERNIA REPAIR      HX KNEE ARTHROSCOPY Right     HX ORTHOPAEDIC Left 2017    lacerations to fingers and had pins, left thumb    HX ROTATOR CUFF REPAIR Right     HX ROTATOR CUFF REPAIR Left 10/21/2019 Social History     Socioeconomic History    Marital status:      Spouse name: Not on file    Number of children: Not on file    Years of education: Not on file    Highest education level: Not on file   Occupational History    Not on file   Social Needs    Financial resource strain: Not on file    Food insecurity     Worry: Not on file     Inability: Not on file    Transportation needs     Medical: Not on file     Non-medical: Not on file   Tobacco Use    Smoking status: Never Smoker    Smokeless tobacco: Current User    Tobacco comment: chew tobacco, using since 1976   Substance and Sexual Activity    Alcohol use: Not Currently     Comment: states he quit drinking alchohol 2 months ago    Drug use: Not Currently     Types: Marijuana     Comment: uses marijuana once a month    Sexual activity: Yes   Lifestyle    Physical activity     Days per week: Not on file     Minutes per session: Not on file    Stress: Not on file   Relationships    Social connections     Talks on phone: Not on file     Gets together: Not on file     Attends [employer] service: Not on file     Active member of club or organization: Not on file     Attends meetings of clubs or organizations: Not on file     Relationship status: Not on file    Intimate partner violence     Fear of current or ex partner: Not on file     Emotionally abused: Not on file     Physically abused: Not on file     Forced sexual activity: Not on file   Other Topics Concern    Not on file   Social History Narrative    Not on file       REVIEW OF SYSTEMS:      No changes from previous review of systems unless noted. PHYSICAL EXAMINATION:  Visit Vitals  /77   Pulse 65   Temp 96.9 °F (36.1 °C)   Resp 15   Ht 5' 10\" (1.778 m)   Wt 207 lb (93.9 kg)   BMI 29.70 kg/m²     Body mass index is 29.7 kg/m². GENERAL: Alert and oriented x3, in no acute distress. HEENT: Normocephalic, atraumatic. RESP: Non labored breathing.   SKIN: No rashes or lesions noted. Shoulder Examination     R   L  ROM   FF  Full   Full  ER  Full   Full   IR  Full   Full  Rotator Cuff Pain   Supra  +   -   Infra  +   -   Subscap +   -  Crepitus  -   -  Effusion  -   -  Warmth  -   -   Erythema  -   -  Instability  -   -  AC Joint TTP  +   -  Clavicle   Deformity -   -   TTP  -   -  Proximal Humerus   Deformity -   -   TTP  -   -  Deltoid Strength 5   5  Biceps Strength 5   5  Biceps Deformity -   -  Biceps Groove Pain +   -  Impingement Sign -   -       IMAGING:  Right shoulder MRI reviewed which shows high grade partial thickness supraspinatus rotator cuff tear, AC DJD and biceps tendinopathy    ASSESSMENT & PLAN  Diagnosis: Right rotator cuff tear, AC DJD, biceps tendinopathy, impingement    Braeden Hunt would like to move forward with surgery for his right shoulder. This would be a rotator cuff repair, distal clavicle excision, biceps tenodesis, and decompression all done arthroscopically. He understands the risks and benefits. We will start the process of moving forward with surgery. The patient was counseled at length about the risks of chris Covid-19 during their perioperative period and any recovery window from their procedure. The patient was made aware that chris Covid-19  may worsen their prognosis for recovering from their procedure and lend to a higher morbidity and/or mortality risk. All material risks, benefits, and reasonable alternatives including postponing the procedure were discussed. The patient does  wish to proceed with the procedure at this time.             Electronically signed by: Fiona Doshi MD

## 2020-08-24 ENCOUNTER — TELEPHONE (OUTPATIENT)
Dept: FAMILY MEDICINE CLINIC | Facility: CLINIC | Age: 55
End: 2020-08-24

## 2020-08-24 NOTE — TELEPHONE ENCOUNTER
Patient would like referral to specialist for pain in groin. No other information given. Would like a call back.

## 2020-08-25 NOTE — TELEPHONE ENCOUNTER
Patient needs a virtual visit information was given to [facility] to schedule patient appointment.

## 2020-08-27 ENCOUNTER — VIRTUAL VISIT (OUTPATIENT)
Dept: FAMILY MEDICINE CLINIC | Facility: CLINIC | Age: 55
End: 2020-08-27

## 2020-08-27 DIAGNOSIS — R10.31 RIGHT GROIN PAIN: Primary | ICD-10-CM

## 2020-08-27 RX ORDER — MIRTAZAPINE 30 MG/1
30 TABLET, FILM COATED ORAL DAILY
COMMUNITY
Start: 2020-08-24 | End: 2020-09-15 | Stop reason: SDUPTHER

## 2020-08-27 NOTE — PROGRESS NOTES
Guero Mendoza is a 53 y.o. male who was seen by synchronous (real-time) audio-video technology on 8/27/2020 for No chief complaint on file. Assessment & Plan:   Diagnoses and all orders for this visit:    1. Right groin pain  -     US EXT NONVAS RT LTD; Future            Subjective: The patient presents for an Audio-visual teleconference appointment for right groin pain. Patient notes he was told approximately 20 years ago he was diagnosed with an inguinal hernia. Notes the last week he has been having severe right groin pain. The pain increased when he was reading in a friend truck that was bouncing around. Notes he has a \"knot\" near the groin/scrotum area. He notes he has had pain from the groin area to his back. Prior to Admission medications    Medication Sig Start Date End Date Taking? Authorizing Provider   mirtazapine (REMERON) 30 mg tablet Take 30 mg by mouth daily. 8/24/20  Yes Provider, Historical   dextroamphetamine-amphetamine (AdderalL) 10 mg tablet Take 10 mg by mouth. Yes Provider, Historical   Latuda 60 mg tab tablet  7/21/20  Yes Provider, Historical   escitalopram oxalate (LEXAPRO) 20 mg tablet  7/8/20  Yes Provider, Historical   gabapentin (Neurontin) 800 mg tablet Take 1 Tab by mouth daily (after breakfast). Max Daily Amount: 800 mg. Indications: neuropathic pain 7/23/20  Yes Morehouse, Gilda C, NP   amitriptyline (ELAVIL) 25 mg tablet Take 3 Tabs by mouth nightly. 7/23/20  Yes Ranjit, Gilda C, NP   gabapentin (NEURONTIN) 400 mg capsule Take 1 Cap by mouth three (3) times daily. Max Daily Amount: 1,200 mg. 12/5/19   Roberto Zuniga MD   escitalopram oxalate (LEXAPRO) 10 mg tablet Take 10 mg by mouth daily. Indications: not taking 6/27/19   Provider, Historical     There is no problem list on file for this patient. There are no active problems to display for this patient.     Current Outpatient Medications   Medication Sig Dispense Refill    mirtazapine (REMERON) 30 mg tablet Take 30 mg by mouth daily.  dextroamphetamine-amphetamine (AdderalL) 10 mg tablet Take 10 mg by mouth.  Latuda 60 mg tab tablet       escitalopram oxalate (LEXAPRO) 20 mg tablet       gabapentin (Neurontin) 800 mg tablet Take 1 Tab by mouth daily (after breakfast). Max Daily Amount: 800 mg. Indications: neuropathic pain 90 Tab 1    amitriptyline (ELAVIL) 25 mg tablet Take 3 Tabs by mouth nightly. 270 Tab 1    gabapentin (NEURONTIN) 400 mg capsule Take 1 Cap by mouth three (3) times daily. Max Daily Amount: 1,200 mg. 90 Cap 5    escitalopram oxalate (LEXAPRO) 10 mg tablet Take 10 mg by mouth daily. Indications: not taking  0     Allergies   Allergen Reactions    Motrin [Ibuprofen] Hives     Past Medical History:   Diagnosis Date    Depression     Foot drop, left foot     October, 2018. Seen by Podiatry in 2019    Headache     Psychotic disorder ([facility] [address]     anxiety       ROS    Constitutional: No apparent distress noted  General- negative for fever, chills or fatigue  Eyes- negative visual changes  CV- denies chest pain, palpitation  Pul: negative cough or SOB  GI: negative nausea, flank pain, diarrhea, constipation  Abd: groin pain  Urinary:- No dysuria or polyuria  MS- negative myalgia, negative joint pain  Neuro- negative headache, dizziness or weakness  Skin- negative for rashes or lesions. Psych- denies any anxiety or depression    Objective:   No flowsheet data found.      [INSTRUCTIONS:  \"[x]\" Indicates a positive item  \"[]\" Indicates a negative item  -- DELETE ALL ITEMS NOT EXAMINED]    Constitutional: [x] Appears well-developed and well-nourished [x] No apparent distress      [] Abnormal -     Mental status: [x] Alert and awake  [x] Oriented to person/place/time [x] Able to follow commands    [] Abnormal -     Eyes:   EOM    [x]  Normal    [] Abnormal -   Sclera  [x]  Normal    [] Abnormal -          Discharge [x]  None visible   [] Abnormal -     HENT: [x] Normocephalic, atraumatic  [] Abnormal -   [x] Mouth/Throat: Mucous membranes are moist    External Ears [x] Normal  [] Abnormal -    Neck: [x] No visualized mass [] Abnormal -     Pulmonary/Chest: [x] Respiratory effort normal   [x] No visualized signs of difficulty breathing or respiratory distress        [] Abnormal -      Musculoskeletal:   [x] Normal gait with no signs of ataxia         [x] Normal range of motion of neck        [] Abnormal -     Neurological:        [x] No Facial Asymmetry (Cranial nerve 7 motor function) (limited exam due to video visit)          [x] No gaze palsy        [] Abnormal -          Skin:        [x] No significant exanthematous lesions or discoloration noted on facial skin         [] Abnormal -            Psychiatric:       [x] Normal Affect [] Abnormal -        [x] No Hallucinations    Other pertinent observable physical exam findings:-        We discussed the expected course, resolution and complications of the diagnosis(es) in detail. Medication risks, benefits, costs, interactions, and alternatives were discussed as indicated. I advised him to contact the office if his condition worsens, changes or fails to improve as anticipated. He expressed understanding with the diagnosis(es) and plan. Michelle Limon, who was evaluated through a patient-initiated, synchronous (real-time) audio-video encounter, and/or his healthcare decision maker, is aware that it is a billable service, with coverage as determined by his insurance carrier. He provided verbal consent to proceed: Yes, and patient identification was verified. It was conducted pursuant to the emergency declaration under the [address] [place] [address] and the [employer] and [employer] General Act. A caregiver was present when appropriate. Ability to conduct physical exam was limited. I was at home. The patient was at home.       Jaye Rodriguez NP

## 2020-08-28 DIAGNOSIS — M75.21 TENDINITIS OF LONG HEAD OF BICEPS BRACHII OF RIGHT SHOULDER: ICD-10-CM

## 2020-08-28 DIAGNOSIS — M19.011 PRIMARY OSTEOARTHRITIS OF RIGHT SHOULDER: ICD-10-CM

## 2020-08-28 DIAGNOSIS — M75.41 IMPINGEMENT SYNDROME OF RIGHT SHOULDER: ICD-10-CM

## 2020-08-28 DIAGNOSIS — M75.111 NONTRAUMATIC INCOMPLETE TEAR OF RIGHT ROTATOR CUFF: Primary | ICD-10-CM

## 2020-09-01 ENCOUNTER — TELEPHONE (OUTPATIENT)
Dept: FAMILY MEDICINE CLINIC | Facility: CLINIC | Age: 55
End: 2020-09-01

## 2020-09-01 ENCOUNTER — HOSPITAL ENCOUNTER (OUTPATIENT)
Dept: ULTRASOUND IMAGING | Age: 55
Discharge: HOME | End: 2020-09-01
Attending: NURSE PRACTITIONER
Payer: MEDICAID

## 2020-09-01 DIAGNOSIS — R10.31 RIGHT GROIN PAIN: ICD-10-CM

## 2020-09-01 PROCEDURE — 93975 VASCULAR STUDY: CPT

## 2020-09-01 PROCEDURE — 76882 US LMTD JT/FCL EVL NVASC XTR: CPT

## 2020-09-01 NOTE — TELEPHONE ENCOUNTER
Patient states she had the ultrasound ordered and Leola Mcallister wanted to know as soon as they were done. Please advise.

## 2020-09-02 DIAGNOSIS — N45.1 EPIDIDYMITIS: Primary | ICD-10-CM

## 2020-09-02 RX ORDER — DOXYCYCLINE 100 MG/1
100 CAPSULE ORAL 2 TIMES DAILY
Qty: 28 CAP | Refills: 0 | Status: SHIPPED | OUTPATIENT
Start: 2020-09-02 | End: 2020-09-16

## 2020-09-08 DIAGNOSIS — R19.09 NODULE OF GROIN: Primary | ICD-10-CM

## 2020-09-08 NOTE — PROGRESS NOTES
TC to the patient regarding the need for further evaluation of the left groin area. He is aware that a CT was ordered.

## 2020-09-11 DIAGNOSIS — M75.41 IMPINGEMENT SYNDROME OF RIGHT SHOULDER: ICD-10-CM

## 2020-09-11 DIAGNOSIS — M19.011 PRIMARY OSTEOARTHRITIS OF RIGHT SHOULDER: ICD-10-CM

## 2020-09-11 DIAGNOSIS — M75.21 TENDINITIS OF LONG HEAD OF BICEPS BRACHII OF RIGHT SHOULDER: ICD-10-CM

## 2020-09-11 DIAGNOSIS — Z01.812 PRE-OPERATIVE LABORATORY EXAMINATION: ICD-10-CM

## 2020-09-11 DIAGNOSIS — M75.111 NONTRAUMATIC INCOMPLETE TEAR OF RIGHT ROTATOR CUFF: Primary | ICD-10-CM

## 2020-09-15 ENCOUNTER — OFFICE VISIT (OUTPATIENT)
Dept: SURGERY | Age: 55
End: 2020-09-15

## 2020-09-15 VITALS
RESPIRATION RATE: 20 BRPM | HEART RATE: 70 BPM | TEMPERATURE: 97.9 F | DIASTOLIC BLOOD PRESSURE: 78 MMHG | WEIGHT: 209 LBS | HEIGHT: 69 IN | BODY MASS INDEX: 30.96 KG/M2 | OXYGEN SATURATION: 99 % | SYSTOLIC BLOOD PRESSURE: 124 MMHG

## 2020-09-15 DIAGNOSIS — Z01.818 PRE-OP EXAM: Primary | ICD-10-CM

## 2020-09-15 DIAGNOSIS — Z12.11 ENCOUNTER FOR SCREENING COLONOSCOPY: Primary | ICD-10-CM

## 2020-09-15 NOTE — PROGRESS NOTES
Colon Screen    Patient: Mary Black MRN: [id number]  SSN: [id number]    YOB: 1965  Age: 53 y.o. Sex: male        Subjective:   Mary Black was referred by Dr. Johns ref. provider found. PCP is Manuel Puga NP. Patient referred for colonoscopy for   Screening colonoscopy. Patient denies rectal pain or bleeding. Abdominal surgeries as described below, specifically right inguinal hernia  Family history as described below, specifically none. Patient never had colonoscopy  Allergies   Allergen Reactions    Motrin [Ibuprofen] Hives    Motrin [Ibuprofen] Rash       Past Medical History:   Diagnosis Date    Depression     Foot drop, left foot     . Seen by Podiatry in 2019    Headache     Psychotic disorder ([facility] [address]     anxiety     Past Surgical History:   Procedure Laterality Date    HX HERNIA REPAIR      HX HERNIA REPAIR      Inguinal hernia- right side    HX KNEE ARTHROSCOPY Right     HX ORTHOPAEDIC Left 2017    lacerations to fingers and had pins, left thumb    HX ORTHOPAEDIC      Right knee surgery    HX OTHER SURGICAL  2017    Left hand surgery x 3    HX ROTATOR CUFF REPAIR Right     HX ROTATOR CUFF REPAIR Left 10/21/2019    HX ROTATOR CUFF REPAIR  2000    Right shoulder      Family History   Problem Relation Age of Onset    Heart Disease Mother     Other Mother         Lung disease.  COPD Mother      Social History     Tobacco Use    Smoking status: Former Smoker     Years: 2.00     Last attempt to quit: 2000     Years since quittin.7    Smokeless tobacco: Current User    Tobacco comment: Starting Chewing to bacco in . Substance Use Topics    Alcohol use: Not Currently     Comment: states he quit drinking alchohol 2 months ago      Prior to Admission medications    Medication Sig Start Date End Date Taking?  Authorizing Provider   mirtazapine (REMERON) 30 mg tablet  20  Yes Provider, Historical doxycycline (VIBRAMYCIN) 100 mg capsule Take 1 Cap by mouth two (2) times a day for 14 days. 9/2/20 9/16/20 Yes Lonny Acosta, ZAC   dextroamphetamine-amphetamine (AdderalL) 10 mg tablet Take 10 mg by mouth. Yes Provider, Historical   Latuda 60 mg tab tablet  7/21/20  Yes Provider, Historical   escitalopram oxalate (LEXAPRO) 20 mg tablet  7/8/20  Yes Provider, Historical   gabapentin (Neurontin) 800 mg tablet Take 1 Tab by mouth daily (after breakfast). Max Daily Amount: 800 mg. Indications: neuropathic pain 7/23/20  Yes Ranjit, Gilda C, NP   amitriptyline (ELAVIL) 25 mg tablet Take 3 Tabs by mouth nightly. 7/23/20  Yes Treutlen, Gilda C, NP   amitriptyline (ELAVIL) 25 mg tablet  7/23/20 9/15/20  Provider, Historical   dextroamphetamine-amphetamine (ADDERALL) 10 mg tablet  8/12/20 9/15/20  Provider, Historical   doxycycline (VIBRAMYCIN) 100 mg capsule  9/2/20 9/15/20  Provider, Historical   escitalopram oxalate (LEXAPRO) 20 mg tablet  8/25/20 9/15/20  Provider, Historical   gabapentin (NEURONTIN) 800 mg tablet  7/23/20 9/15/20  Provider, Historical   Latuda 60 mg tab tablet  8/25/20 9/15/20  Provider, Historical   mirtazapine (REMERON) 30 mg tablet Take 30 mg by mouth daily. 8/24/20 9/15/20  Provider, Historical   gabapentin (NEURONTIN) 400 mg capsule Take 1 Cap by mouth three (3) times daily. Max Daily Amount: 1,200 mg. 12/5/19   Marion Luque MD   escitalopram oxalate (LEXAPRO) 10 mg tablet Take 10 mg by mouth daily. Indications: not taking 6/27/19   Provider, Historical          Review of Systems:  Review of Systems   Constitutional: Negative. HENT: Negative. Eyes: Negative. Respiratory: Negative. Cardiovascular: Negative. Gastrointestinal: Negative. Genitourinary: Negative. Musculoskeletal: Positive for back pain, joint pain, myalgias and neck pain. Negative for falls. Skin: Negative. Neurological: Positive for tingling.  Negative for dizziness, tremors, sensory change, speech change, focal weakness, seizures, loss of consciousness, weakness and headaches. Endo/Heme/Allergies: Negative. Psychiatric/Behavioral: Positive for depression and hallucinations. Negative for memory loss, substance abuse and suicidal ideas. The patient is nervous/anxious and has insomnia.           Risks colonoscopy described- colon injury, missed lesion, anesthesia problems, bleeding       Leyla Lemus RN  September 15, 2020  8:44 AM

## 2020-09-16 ENCOUNTER — OFFICE VISIT (OUTPATIENT)
Dept: ORTHOPEDIC SURGERY | Facility: CLINIC | Age: 55
End: 2020-09-16

## 2020-09-16 ENCOUNTER — HOSPITAL ENCOUNTER (OUTPATIENT)
Dept: PREADMISSION TESTING | Age: 55
Discharge: HOME | End: 2020-09-16
Payer: MEDICAID

## 2020-09-16 VITALS
HEIGHT: 69 IN | OXYGEN SATURATION: 99 % | DIASTOLIC BLOOD PRESSURE: 73 MMHG | SYSTOLIC BLOOD PRESSURE: 115 MMHG | HEART RATE: 77 BPM | WEIGHT: 209.4 LBS | RESPIRATION RATE: 16 BRPM | TEMPERATURE: 97.5 F | BODY MASS INDEX: 31.01 KG/M2

## 2020-09-16 DIAGNOSIS — R22.31 MASS OF FINGER, RIGHT: Primary | ICD-10-CM

## 2020-09-16 DIAGNOSIS — G56.03 CARPAL TUNNEL SYNDROME, BILATERAL: ICD-10-CM

## 2020-09-16 DIAGNOSIS — M75.41 IMPINGEMENT SYNDROME OF RIGHT SHOULDER: ICD-10-CM

## 2020-09-16 DIAGNOSIS — Z01.812 PRE-OPERATIVE LABORATORY EXAMINATION: ICD-10-CM

## 2020-09-16 DIAGNOSIS — M79.644 PAIN OF RIGHT MIDDLE FINGER: ICD-10-CM

## 2020-09-16 DIAGNOSIS — M19.011 PRIMARY OSTEOARTHRITIS OF RIGHT SHOULDER: ICD-10-CM

## 2020-09-16 DIAGNOSIS — M67.431 GANGLION CYST OF DORSUM OF RIGHT WRIST: ICD-10-CM

## 2020-09-16 DIAGNOSIS — M75.111 NONTRAUMATIC INCOMPLETE TEAR OF RIGHT ROTATOR CUFF: ICD-10-CM

## 2020-09-16 DIAGNOSIS — M67.431 GANGLION CYST OF VOLAR ASPECT OF RIGHT WRIST: ICD-10-CM

## 2020-09-16 DIAGNOSIS — G56.23 CUBITAL TUNNEL SYNDROME, BILATERAL: ICD-10-CM

## 2020-09-16 DIAGNOSIS — M75.21 TENDINITIS OF LONG HEAD OF BICEPS BRACHII OF RIGHT SHOULDER: ICD-10-CM

## 2020-09-16 PROCEDURE — 87635 SARS-COV-2 COVID-19 AMP PRB: CPT

## 2020-09-16 NOTE — PROGRESS NOTES
Ivania Aguilar is a 53 y.o. male right handed unknown employment. Worker's Compensation and legal considerations: none filed. Vitals:    09/16/20 1321   BP: 115/73   Pulse: 77   Resp: 16   Temp: 97.5 °F (36.4 °C)   TempSrc: Oral   SpO2: 99%   Weight: 209 lb 6.4 oz (95 kg)   Height: 5' 9\" (1.753 m)   PainSc:   5   PainLoc: Hand           Chief Complaint   Patient presents with    Hand Pain     right hand pain       HPI: Patient comes in today with new complaint of right middle finger mass and pain. He has a history of right volar and dorsal ganglion cyst.  He also has a history of left shoulder rotator cuff repair and is planning to have his right shoulder rotator cuff done next week. Previous HPI: Patient comes in today for follow-up regarding his bilateral carpal tunnel syndrome. We previously had him set up this appointment to set up carpal tunnel release after he had recovered from his left rotator cuff surgery. However he recently had an MRI of his right shoulder and says he would like to get that taken care of before proceeding with any surgery on his hands. He has a follow-up with Dr Faiza Gallego coming up. Initial HPI: Patient comes in today with complaints of bilateral hand numbness and tingling. He reports that he had a left carpal tunnel diagnosis after an EMG several years ago. He has not had any treatment for it. He also has a cyst on the right wrist that was injected several months ago but did not help. He also reports a history of having digits amputated that were placed back on many years ago on the left hand. He has had chronic pain from scar tissue in his hand.     Date of onset: Indeterminate    Injury: Yes: Comment: Old injury to left hand pain involving digital amputations    Prior Treatment:  No    Numbness/ Tingling: Yes: Comment: Bilateral hands left worse than right    ROS: Review of Systems - General ROS: negative  Respiratory ROS: no cough, shortness of breath, or wheezing  Cardiovascular ROS: no chest pain or dyspnea on exertion  Musculoskeletal ROS: positive for - pain in hand - bilateral  Neurological ROS: positive for - numbness/tingling  Dermatological ROS: negative    Past Medical History:   Diagnosis Date    Depression     Foot drop, left foot     October, 2018. Seen by Podiatry in 2019    Headache     Psychotic disorder ([facility] [address]     anxiety       Past Surgical History:   Procedure Laterality Date    HX HERNIA REPAIR      HX HERNIA REPAIR      Inguinal hernia- right side    HX KNEE ARTHROSCOPY Right     HX ORTHOPAEDIC Left 2017    lacerations to fingers and had pins, left thumb    HX ORTHOPAEDIC      Right knee surgery    HX OTHER SURGICAL  2017    Left hand surgery x 3    HX ROTATOR CUFF REPAIR Right     HX ROTATOR CUFF REPAIR Left 10/21/2019    HX ROTATOR CUFF REPAIR  2000    Right shoulder       Current Outpatient Medications   Medication Sig Dispense Refill    mirtazapine (REMERON) 30 mg tablet       doxycycline (VIBRAMYCIN) 100 mg capsule Take 1 Cap by mouth two (2) times a day for 14 days. 28 Cap 0    dextroamphetamine-amphetamine (AdderalL) 10 mg tablet Take 10 mg by mouth.  Latuda 60 mg tab tablet       escitalopram oxalate (LEXAPRO) 20 mg tablet       gabapentin (Neurontin) 800 mg tablet Take 1 Tab by mouth daily (after breakfast). Max Daily Amount: 800 mg. Indications: neuropathic pain 90 Tab 1    amitriptyline (ELAVIL) 25 mg tablet Take 3 Tabs by mouth nightly. 270 Tab 1    gabapentin (NEURONTIN) 400 mg capsule Take 1 Cap by mouth three (3) times daily. Max Daily Amount: 1,200 mg. 90 Cap 5    escitalopram oxalate (LEXAPRO) 10 mg tablet Take 10 mg by mouth daily. Indications: not taking  0       Allergies   Allergen Reactions    Motrin [Ibuprofen] Hives    Motrin [Ibuprofen] Rash         PE:       Physical Exam  Vitals signs and nursing note reviewed. Constitutional:       General: He is not in acute distress.      Appearance: Normal appearance. He is not ill-appearing. Eyes:      Extraocular Movements: Extraocular movements intact. Pupils: Pupils are equal, round, and reactive to light. Neck:      Musculoskeletal: Normal range of motion and neck supple. Cardiovascular:      Pulses: Normal pulses. Pulmonary:      Effort: Pulmonary effort is normal. No respiratory distress. Abdominal:      General: Abdomen is flat. There is no distension. Musculoskeletal: Normal range of motion. General: Tenderness present. No swelling, deformity or signs of injury. Right lower leg: No edema. Left lower leg: No edema. Skin:     General: Skin is warm and dry. Capillary Refill: Capillary refill takes less than 2 seconds. Findings: No bruising or erythema. Neurological:      General: No focal deficit present. Mental Status: He is alert and oriented to person, place, and time. Psychiatric:         Mood and Affect: Mood normal.         Behavior: Behavior normal.         Right upper extremity: There is a palpable mass about the ulnar aspect of the middle finger that is approximately 2 to 3 mm in diameter is firm, noncompressible, and does not appear to be fluid-filled. Additionally there are cystic-like masses palpated along the volar and dorsal aspects of the wrist.      NEUROVASCULAR    Examination L R Examination L R   Carpal Comp. + + Pronator Comp. - -   Carpal Tinel + + Pronator Tinel - -   Phalen's + + Pronator Stress - -   Cubital Comp. ? ? Guyon Comp. - -   Cubital Tinel ? ? No Guyon Tinel - -   Elbow Hyperflexion - - Adson's - -   Spurling's - - SC Comp. - -   PCB Median abn - - SC Tinel - -   Radial Tinel - - IC Comp.  - -   Digital Tinel - - IC Tinel - -   Radial 2-Pt WNL WNL Ulnar 2-Pt WNL WNL     Radial Pulse: 2+  Capillary Refill: < 2 sec  Angelito: Not Performed  Digital Angelito: Not Performed      Imaging:    Plain films of bilateral wrist does not show substantial degenerative changes  Fracture dislocation or any other osseous abnormalities. NCV & EMG Findings:  Evaluation of the left median motor and the right median motor nerves showed prolonged distal onset latency (L4.4, R4.5 ms). The left ulnar motor and the right ulnar motor nerves showed decreased conduction velocity (A Elbow-B Elbow, L38, R42 m/s). The left median sensory and the right median sensory nerves showed prolonged distal peak latency (L4.6, R4.4 ms) and decreased conduction velocity (Wrist-2nd Digit, L30, R32 m/s). The left ulnar sensory nerve showed prolonged distal peak latency (4.1 ms), reduced amplitude (10.5 µV), and decreased conduction velocity (Wrist-5th Digit, 34 m/s). The right ulnar sensory nerve showed prolonged distal peak latency (5.7 ms) and decreased conduction velocity (Wrist-5th Digit, 25 m/s). The left median/ulnar (palm) comparison nerve showed no response (Median Palm) and no response (Ulnar Palm). All remaining nerves (as indicated in the following tables) were within normal limits. Left vs. Right side comparison data for the ulnar motor nerve indicates abnormal L-R latency difference (0.8 ms). The median sensory nerve indicates abnormal L-R amplitude difference (63.1 %). The ulnar sensory nerve indicates abnormal L-R latency difference (1.6 ms). All remaining left vs. right side differences were within normal limits.       All examined muscles (as indicated in the following table) showed no evidence of electrical instability.          INTERPRETATION  This is an abnormal electrodiagnostic examination. These findings may be consistent with:  1. 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 Moderate median mononeuropathy at the wrist bilaterally (carpal tunnel syndrome)  3.  Severe neuropathy focally related to both median and ulnar palmar branches     There is no electrodiagnostic evidence of any cervical radiculopathy, brachial plexopathy, peripheral polyneuropathy, or any other mononeuropathy.     CLINICAL INTERPRETATION  His electrodiagnostic findings in the median and ulnar nerves are consistent with his symptoms of numbness and tingling in both hands.          ICD-10-CM ICD-9-CM    1. Mass of finger, right  R22.31 782.2 AMB POC XRAY, FINGER(S), 2+ VIEWS   2. Pain of right middle finger  M79.644 729.5    3. Ganglion cyst of volar aspect of right wrist  M67.431 727.41    4. Ganglion cyst of dorsum of right wrist  M67.431 727.41    5. Cubital tunnel syndrome, bilateral  G56.23 354.2    6. Carpal tunnel syndrome, bilateral  G56.03 354.0        Plan: We will see patient back to discuss surgery for the right side including nerve releases. Follow-up and Dispositions    · Return in about 6 weeks (around 10/26/2020) for Reevaluation and surgical discussion.        Plan was reviewed with patient, who verbalized agreement and understanding of the plan

## 2020-09-17 LAB — SARS-COV-2, COV2NT: NOT DETECTED

## 2020-09-18 ENCOUNTER — OFFICE VISIT (OUTPATIENT)
Dept: ORTHOPEDIC SURGERY | Age: 55
End: 2020-09-18

## 2020-09-18 ENCOUNTER — ANESTHESIA EVENT (OUTPATIENT)
Dept: SURGERY | Age: 55
End: 2020-09-18
Payer: MEDICAID

## 2020-09-18 DIAGNOSIS — Z01.818 PRE-OP EXAM: ICD-10-CM

## 2020-09-18 DIAGNOSIS — M75.111 NONTRAUMATIC INCOMPLETE TEAR OF RIGHT ROTATOR CUFF: Primary | ICD-10-CM

## 2020-09-18 NOTE — H&P
HISTORY AND PHYSICAL          Patient: Awa Hernández                MRN: [id number]       SSN: [id number]  YOB: 1965          AGE: 53 y.o. SEX: male      Patient scheduled for:  RIGHT SHOULDER ARTHROSCOPIC ROTATOR CUFF REPAIR, BICEPS TENODESIS, DISTAL CLAVICLE RESECTION, SUBACROMIAL DECOMPRESSION    Surgeon: William Elliott MD    ANESTHESIA TYPE:  General    HISTORY:     The patient was seen in the office today for a preoperative history and physical for an upcoming above listed surgery. The patient is a pleasant 53 y.o. male who has a history of right shoulder. He continues to have right shoulder pain that is worse with use of the right arm. He is doing well recovering from his left shoulder surgery. He would like to discuss treatment for the right shoulder today, including surgery. He has pain at night in the right shoulder. Due to the current findings, affected activity of daily living and continued pain and discomfort, surgical intervention is indicated. The alternatives, risks, and complications, including but not limited to infection, blood loss, need for blood transfusion, neurovascular damage, abrahan-incisional numbness, subcutaneous hematoma, bone fracture, anesthetic complications, DVT, PE, death, RSD, postoperative stiffness and pain, possible surgical scar, delayed healing and nonhealing, reflexive sympathetic dystrophy, damage to blood vessels and nerves, need for more surgery, MI, and stroke,  failure of hardware, gait disturbances,have been discussed. The patient understands and wishes to proceed with surgery. PAST MEDICAL HISTORY:     Past Medical History:   Diagnosis Date    Depression     and anxiety    Foot drop, left foot     October, 2018.   Seen by Podiatry in 2019    Headache     Psychotic disorder ([facility] [address]     anxiety       CURRENT MEDICATIONS:     Current Outpatient Medications   Medication Sig Dispense Refill    mirtazapine (REMERON) 30 mg tablet       dextroamphetamine-amphetamine (AdderalL) 10 mg tablet Take 10 mg by mouth.  Latuda 60 mg tab tablet       escitalopram oxalate (LEXAPRO) 20 mg tablet       gabapentin (Neurontin) 800 mg tablet Take 1 Tab by mouth daily (after breakfast). Max Daily Amount: 800 mg. Indications: neuropathic pain 90 Tab 1    amitriptyline (ELAVIL) 25 mg tablet Take 3 Tabs by mouth nightly. 270 Tab 1    gabapentin (NEURONTIN) 400 mg capsule Take 1 Cap by mouth three (3) times daily. Max Daily Amount: 1,200 mg. 90 Cap 5    escitalopram oxalate (LEXAPRO) 10 mg tablet Take 10 mg by mouth daily. Indications: not taking  0       ALLERGIES:     Allergies   Allergen Reactions    Motrin [Ibuprofen] Hives    Motrin [Ibuprofen] Rash         SURGICAL HISTORY:     Past Surgical History:   Procedure Laterality Date    HX HERNIA REPAIR      HX HERNIA REPAIR      Inguinal hernia- right side    HX KNEE ARTHROSCOPY Right     HX ORTHOPAEDIC Left 2017    lacerations to fingers and had pins, left thumb    HX ORTHOPAEDIC      Right knee surgery    HX OTHER SURGICAL  2017    Left hand surgery x 3    HX ROTATOR CUFF REPAIR Right     HX ROTATOR CUFF REPAIR Left 10/21/2019    HX ROTATOR CUFF REPAIR  2000    Right shoulder       SOCIAL HISTORY:     Social History     Socioeconomic History    Marital status:      Spouse name: Not on file    Number of children: Not on file    Years of education: Not on file    Highest education level: Not on file   Tobacco Use    Smoking status: Former Smoker     Years: 2.00     Last attempt to quit: 2000     Years since quittin.7    Smokeless tobacco: Current User    Tobacco comment: Starting Chewing to bacco in .    Substance and Sexual Activity    Alcohol use: Not Currently     Comment: states he quit drinking alchohol 2 months ago    Drug use: Yes     Types: Marijuana     Comment: uses marijuana once a month    Sexual activity: Not Currently   Social History Narrative    ** Merged History Encounter **            FAMILY HISTORY:     Family History   Problem Relation Age of Onset    Heart Disease Mother     Other Mother         Lung disease.  COPD Mother        REVIEW OF SYSTEMS:     Negative for fevers, chills, chest pain, shortness of breath, weight loss, recent illness     General: Negative for fever and chills. No unexpected change in weight. Denies fatigue. No change in appetite. Skin: Negative for rash or itching. HEENT: Negative for congestion, sore throat, neck pain and neck stiffness. No change in vision or hearing. Hasn't noted any enlarged lymph nodes in the neck. Cardiovascular:  Negative for chest pain and palpitations. Has not noted pedal edema. Respiratory: Negative for cough, colds, sinus, hemoptysis, shortness of breath and wheezing. Gastrointestinal: Negative for nausea and vomiting, rectal bleeding, coffee ground emesis, abdominal pain, diarrhea and constipation. Genitourinary: Negative for dysuria, frequency urgency, or burning on micturition. No flank pain, no foul smelling urine, no difficulty with initiating urination. Hematological: Negative for bleeding or easy bruising. Musculoskeletal: Negative  for arthralgias, back pain or neck pain. Neurological: Negative for dizziness, seizures or syncopal episodes. Denies headaches. Endocrine: Denies excessive thirst.  No heat/cold intolerance. Psychiatric: Negative for depression or insomnia. PHYSICAL EXAMINATION:     VITALS: There were no vitals taken for this visit. GEN:  Well developed, well nourished 53 y.o. male in no acute distress. HEENT: Normocephalic and atraumatic. Eyes: Conjunctivae and EOM are normal.Pupils are equal, round, and reactive to light. External ear normal appearance, external nose normal appearing. Mouth/Throat: Oropharynx is clear and moist, able to handle oral secretions w/out difficulty, airway patent  NECK: Supple. Normal ROM, No lymphadenopathy. Trachea is midline. No bruising, swelling or deformity  RESP: Clear to auscultation bilaterally. No wheezes, rales, rhonchi. Normal effort and breath sounds. No respiratory distress  CARDIO: Normal rate, regular rhythm and normal heart sounds. No MGR. ABDOMEN: Soft, non-tender, non-distended, normoactive bowel sounds in all four quadrants. There is no tenderness. There is no rebound and no guarding.    BACK: No CVA or spinal tenderness  BREAST:  Deferred  PELVIC:    Deferred   RECTAL:  Deferred   :           Deferred  EXTREMITIES: EXAMINATION OF: right shoulder  Shoulder Examination                                      R                                  L  ROM              FF                    Full                              Full  ER                   Full                              Full              IR                     Full                              Full  Rotator Cuff Pain              Supra               +                                  -              Infra                 +                                  -              Subscap          +                                  -  Crepitus                       -                                   -  Effusion                       -                                   -  Warmth                       -                                   -             Erythema                     -                                   -  Instability                     -                                   -  AC Joint TTP               +                                  -  Clavicle              Deformity         -                                   -              TTP                 -                                   -  Proximal Humerus              Deformity         -                                   -              TTP                 -                                   -  Deltoid Strength          5                                  5  Biceps Strength          5 5  Biceps Deformity         -                                   -  Biceps Groove Pain    +                                  -  Impingement Sign       -                                   -             NEUROVASCULAR: Sensation intact to light touch and strength grossly intact and symmetrical. No nystagmus. Positive distal pulses and capillary refill. DVT ASSESSMENT:  There is not  calf tenderness. No evidence of DVT seen on physical exam.  MOTOR: In tact  PSYCH: Alert an oriented to person, place and time. Mood, memory, affect, behavior and judgment normal       RADIOGRAPHS & DIAGNOSTIC STUDIES:     MRI/xray reveals :     Right shoulder MRI reviewed which shows high grade partial thickness supraspinatus rotator cuff tear, AC DJD and biceps tendinopathy      LABS:       @  CBC:   Lab Results   Component Value Date/Time    WBC 8.9 10/31/2019 06:35 PM    RBC 4.52 10/31/2019 06:35 PM    HGB 14.7 10/31/2019 06:35 PM    HCT 42.7 10/31/2019 06:35 PM    PLATELET 562 57/40/3864 06:35 PM    and CMP:   Lab Results   Component Value Date/Time    Glucose 115 (H) 10/31/2019 06:35 PM    Sodium 136 10/31/2019 06:35 PM    Potassium 4.0 10/31/2019 06:35 PM    Chloride 99 10/31/2019 06:35 PM    CO2 31 10/31/2019 06:35 PM    BUN 15 10/31/2019 06:35 PM    Creatinine 0.9 10/31/2019 06:35 PM    Calcium 9.5 10/31/2019 06:35 PM    Anion gap 7 10/31/2019 06:35 PM    BUN/Creatinine ratio 14 10/17/2019 01:05 PM    Alk. phosphatase 67 10/08/2019 11:35 AM    Protein, total 6.7 10/08/2019 11:35 AM    Albumin 4.5 10/08/2019 11:35 AM    Globulin 2.2 10/08/2019 11:35 AM    A-G Ratio 2.0 10/08/2019 11:35 AM   @    Preoperative labs were reviewed and are substantially within normal limits. EKG:   Sinus bradycardia   Otherwise normal ECG   No previous ECGs available       ASSESSMENT:       Encounter Diagnoses   Name Primary?     Nontraumatic incomplete tear of right rotator cuff Yes    Pre-op exam        PLAN:     Again, the alternatives, risks, and complications, as well as expected outcome were discussed. The patient understands and agrees to proceed with RIGHT SHOULDER ARTHROSCOPIC ROTATOR CUFF REPAIR, BICEPS TENODESIS, DISTAL CLAVICLE RESECTION, SUBACROMIAL DECOMPRESSION. Patient given orders listed below:    No orders of the defined types were placed in this encounter.         Zach Morillo PA-C  9/18/2020  10:13 AM

## 2020-09-18 NOTE — PROGRESS NOTES
History and Physical Performed today and documented in chart    Enedina Ramirez  9/18/2020.  10:13 AM

## 2020-09-18 NOTE — H&P (VIEW-ONLY)
HISTORY AND PHYSICAL Patient: Eduar Gates                MRN: [id number]       SSN: [id number] YOB: 1965          AGE: 53 y.o. SEX: male Patient scheduled for:  RIGHT SHOULDER ARTHROSCOPIC ROTATOR CUFF REPAIR, BICEPS TENODESIS, DISTAL CLAVICLE RESECTION, SUBACROMIAL DECOMPRESSION Surgeon: Puma Hawkins MD 
 
ANESTHESIA TYPE:  General 
 
HISTORY:  
 
The patient was seen in the office today for a preoperative history and physical for an upcoming above listed surgery. The patient is a pleasant 53 y.o. male who has a history of right shoulder. He continues to have right shoulder pain that is worse with use of the right arm. He is doing well recovering from his left shoulder surgery. He would like to discuss treatment for the right shoulder today, including surgery. He has pain at night in the right shoulder. Due to the current findings, affected activity of daily living and continued pain and discomfort, surgical intervention is indicated. The alternatives, risks, and complications, including but not limited to infection, blood loss, need for blood transfusion, neurovascular damage, abrahan-incisional numbness, subcutaneous hematoma, bone fracture, anesthetic complications, DVT, PE, death, RSD, postoperative stiffness and pain, possible surgical scar, delayed healing and nonhealing, reflexive sympathetic dystrophy, damage to blood vessels and nerves, need for more surgery, MI, and stroke,  failure of hardware, gait disturbances,have been discussed. The patient understands and wishes to proceed with surgery. PAST MEDICAL HISTORY:  
 
Past Medical History:  
Diagnosis Date  Depression   
 and anxiety  Foot drop, left foot October, 2018. Seen by Podiatry in 2019  
 Headache  Psychotic disorder ([facility] [place] [address]   
 anxiety CURRENT MEDICATIONS:  
 
Current Outpatient Medications Medication Sig Dispense Refill  mirtazapine (REMERON) 30 mg tablet  dextroamphetamine-amphetamine (AdderalL) 10 mg tablet Take 10 mg by mouth.  Latuda 60 mg tab tablet  escitalopram oxalate (LEXAPRO) 20 mg tablet  gabapentin (Neurontin) 800 mg tablet Take 1 Tab by mouth daily (after breakfast). Max Daily Amount: 800 mg. Indications: neuropathic pain 90 Tab 1  
 amitriptyline (ELAVIL) 25 mg tablet Take 3 Tabs by mouth nightly. 270 Tab 1  
 gabapentin (NEURONTIN) 400 mg capsule Take 1 Cap by mouth three (3) times daily. Max Daily Amount: 1,200 mg. 90 Cap 5  escitalopram oxalate (LEXAPRO) 10 mg tablet Take 10 mg by mouth daily. Indications: not taking  0 ALLERGIES:  
 
Allergies Allergen Reactions  Motrin [Ibuprofen] Hives  Motrin [Ibuprofen] Rash SURGICAL HISTORY:  
 
Past Surgical History:  
Procedure Laterality Date  HX HERNIA REPAIR    
 HX HERNIA REPAIR Inguinal hernia- right side  HX KNEE ARTHROSCOPY Right  HX ORTHOPAEDIC Left 2017  
 lacerations to fingers and had pins, left thumb  HX ORTHOPAEDIC Right knee surgery  HX OTHER SURGICAL  2017 Left hand surgery x 3  
 HX ROTATOR CUFF REPAIR Right  HX ROTATOR CUFF REPAIR Left 10/21/2019 [address] Right shoulder SOCIAL HISTORY:  
 
Social History Socioeconomic History  Marital status:  Spouse name: Not on file  Number of children: Not on file  Years of education: Not on file  Highest education level: Not on file Tobacco Use  Smoking status: Former Smoker Years: 2.00 Last attempt to quit: 2000 Years since quittin.7  Smokeless tobacco: Current User  Tobacco comment: Starting Chewing to bacco in . Substance and Sexual Activity  Alcohol use: Not Currently Comment: states he quit drinking alchohol 2 months ago  Drug use: Yes Types: Marijuana Comment: uses marijuana once a month  Sexual activity: Not Currently Social History Narrative ** Merged History Encounter ** FAMILY HISTORY:  
 
Family History Problem Relation Age of Onset  Heart Disease Mother Dia Leonardo Other Mother Lung disease.  COPD Mother REVIEW OF SYSTEMS:  
 
Negative for fevers, chills, chest pain, shortness of breath, weight loss, recent illness General: Negative for fever and chills. No unexpected change in weight. Denies fatigue. No change in appetite. Skin: Negative for rash or itching. HEENT: Negative for congestion, sore throat, neck pain and neck stiffness. No change in vision or hearing. Hasn't noted any enlarged lymph nodes in the neck. Cardiovascular:  Negative for chest pain and palpitations. Has not noted pedal edema. Respiratory: Negative for cough, colds, sinus, hemoptysis, shortness of breath and wheezing. Gastrointestinal: Negative for nausea and vomiting, rectal bleeding, coffee ground emesis, abdominal pain, diarrhea and constipation. Genitourinary: Negative for dysuria, frequency urgency, or burning on micturition. No flank pain, no foul smelling urine, no difficulty with initiating urination. Hematological: Negative for bleeding or easy bruising. Musculoskeletal: Negative  for arthralgias, back pain or neck pain. Neurological: Negative for dizziness, seizures or syncopal episodes. Denies headaches. Endocrine: Denies excessive thirst.  No heat/cold intolerance. Psychiatric: Negative for depression or insomnia. PHYSICAL EXAMINATION:  
 
VITALS: There were no vitals taken for this visit. GEN:  Well developed, well nourished 53 y.o. male in no acute distress. HEENT: Normocephalic and atraumatic. Eyes: Conjunctivae and EOM are normal.Pupils are equal, round, and reactive to light. External ear normal appearance, external nose normal appearing. Mouth/Throat: Oropharynx is clear and moist, able to handle oral secretions w/out difficulty, airway patent NECK: Supple. Normal ROM, No lymphadenopathy. Trachea is midline. No bruising, swelling or deformity RESP: Clear to auscultation bilaterally. No wheezes, rales, rhonchi. Normal effort and breath sounds. No respiratory distress CARDIO: Normal rate, regular rhythm and normal heart sounds. No MGR. ABDOMEN: Soft, non-tender, non-distended, normoactive bowel sounds in all four quadrants. There is no tenderness. There is no rebound and no guarding. BACK: No CVA or spinal tenderness BREAST:  Deferred PELVIC:    Deferred RECTAL:  Deferred :           Deferred EXTREMITIES: EXAMINATION OF: right shoulder Shoulder Examination R                                  L ROM 
            FF                    Full                              Full ER                   Full                              Full IR                     Full                              Full Rotator Cuff Pain Supra               +                                  - 
            Infra                 +                                  - 
            Subscap          +                                  - 
Crepitus                       -                                   - 
Effusion                       -                                   - 
Warmth                       -                                   -            
Erythema                     -                                   - 
Instability                     -                                   - 
AC Joint TTP               +                                  - 
Clavicle Deformity         -                                   - 
            TTP                 -                                   - 
Proximal Humerus             Deformity         -                                   - 
            TTP                 -                                   - 
 Deltoid Strength          5                                  5 
Biceps Strength          5                                  5 
Biceps Deformity         -                                   - 
Biceps Groove Pain    +                                  - 
Impingement Sign       -                                   -            
NEUROVASCULAR: Sensation intact to light touch and strength grossly intact and symmetrical. No nystagmus. Positive distal pulses and capillary refill. DVT ASSESSMENT:  There is not  calf tenderness. No evidence of DVT seen on physical exam. 
MOTOR: In tact PSYCH: Alert an oriented to person, place and time. Mood, memory, affect, behavior and judgment normal 
  
 
RADIOGRAPHS & DIAGNOSTIC STUDIES:  
 
MRI/xray reveals :  
 
Right shoulder MRI reviewed which shows high grade partial thickness supraspinatus rotator cuff tear, AC DJD and biceps tendinopathy LABS:  
 
 
@ 
CBC:  
Lab Results Component Value Date/Time WBC 8.9 10/31/2019 06:35 PM  
 RBC 4.52 10/31/2019 06:35 PM  
 HGB 14.7 10/31/2019 06:35 PM  
 HCT 42.7 10/31/2019 06:35 PM  
 PLATELET 954 19/59/5595 06:35 PM  
 and CMP:  
Lab Results Component Value Date/Time Glucose 115 (H) 10/31/2019 06:35 PM  
 Sodium 136 10/31/2019 06:35 PM  
 Potassium 4.0 10/31/2019 06:35 PM  
 Chloride 99 10/31/2019 06:35 PM  
 CO2 31 10/31/2019 06:35 PM  
 BUN 15 10/31/2019 06:35 PM  
 Creatinine 0.9 10/31/2019 06:35 PM  
 Calcium 9.5 10/31/2019 06:35 PM  
 Anion gap 7 10/31/2019 06:35 PM  
 BUN/Creatinine ratio 14 10/17/2019 01:05 PM  
 Alk. phosphatase 67 10/08/2019 11:35 AM  
 Protein, total 6.7 10/08/2019 11:35 AM  
 Albumin 4.5 10/08/2019 11:35 AM  
 Globulin 2.2 10/08/2019 11:35 AM  
 A-G Ratio 2.0 10/08/2019 11:35 AM  
@ Preoperative labs were reviewed and are substantially within normal limits. EKG:  
Sinus bradycardia Otherwise normal ECG No previous ECGs available ASSESSMENT:  
 
 
Encounter Diagnoses Name Primary?  Nontraumatic incomplete tear of right rotator cuff Yes  Pre-op exam   
 
 
PLAN:  
 
Again, the alternatives, risks, and complications, as well as expected outcome were discussed. The patient understands and agrees to proceed with RIGHT SHOULDER ARTHROSCOPIC ROTATOR CUFF REPAIR, BICEPS TENODESIS, DISTAL CLAVICLE RESECTION, SUBACROMIAL DECOMPRESSION. Patient given orders listed below: No orders of the defined types were placed in this encounter.  
 
 
 
Rosa Johansen PA-C 
9/18/2020 
10:13 AM

## 2020-09-21 ENCOUNTER — HOSPITAL ENCOUNTER (OUTPATIENT)
Age: 55
Setting detail: OUTPATIENT SURGERY
Discharge: HOME | End: 2020-09-21
Attending: ORTHOPAEDIC SURGERY | Admitting: ORTHOPAEDIC SURGERY
Payer: MEDICAID

## 2020-09-21 ENCOUNTER — ANESTHESIA (OUTPATIENT)
Dept: SURGERY | Age: 55
End: 2020-09-21
Payer: MEDICAID

## 2020-09-21 VITALS
OXYGEN SATURATION: 97 % | HEIGHT: 69 IN | DIASTOLIC BLOOD PRESSURE: 63 MMHG | BODY MASS INDEX: 29.62 KG/M2 | HEART RATE: 70 BPM | RESPIRATION RATE: 14 BRPM | WEIGHT: 200 LBS | SYSTOLIC BLOOD PRESSURE: 99 MMHG | TEMPERATURE: 97.3 F

## 2020-09-21 DIAGNOSIS — M75.41 SHOULDER IMPINGEMENT, RIGHT: ICD-10-CM

## 2020-09-21 DIAGNOSIS — G89.18 PAIN FOLLOWING SURGERY OR PROCEDURE: Primary | ICD-10-CM

## 2020-09-21 DIAGNOSIS — S46.011A TRAUMATIC COMPLETE TEAR OF RIGHT ROTATOR CUFF, INITIAL ENCOUNTER: ICD-10-CM

## 2020-09-21 DIAGNOSIS — S46.101A INJURY OF TENDON OF LONG HEAD OF RIGHT BICEPS, INITIAL ENCOUNTER: ICD-10-CM

## 2020-09-21 DIAGNOSIS — M19.011 PRIMARY OSTEOARTHRITIS OF RIGHT SHOULDER: ICD-10-CM

## 2020-09-21 LAB
AMPHET UR QL SCN: NEGATIVE
BARBITURATES UR QL SCN: NEGATIVE
BENZODIAZ UR QL: NEGATIVE
BUN BLD-MCNC: 15 MG/DL (ref 7–18)
CANNABINOIDS UR QL SCN: NEGATIVE
CHLORIDE BLD-SCNC: 100 MMOL/L (ref 100–108)
COCAINE UR QL SCN: NEGATIVE
GLUCOSE BLD STRIP.AUTO-MCNC: 94 MG/DL (ref 74–106)
HCT VFR BLD CALC: 44 % (ref 36–49)
HDSCOM,HDSCOM: NORMAL
HGB BLD-MCNC: 15 G/DL (ref 12–16)
METHADONE UR QL: NEGATIVE
OPIATES UR QL: NEGATIVE
PCP UR QL: NEGATIVE
POTASSIUM BLD-SCNC: 4 MMOL/L (ref 3.5–5.5)
SODIUM BLD-SCNC: 139 MMOL/L (ref 136–145)

## 2020-09-21 PROCEDURE — 80307 DRUG TEST PRSMV CHEM ANLYZR: CPT

## 2020-09-21 PROCEDURE — 76210000023 HC REC RM PH II 2 TO 2.5 HR: Performed by: ORTHOPAEDIC SURGERY

## 2020-09-21 PROCEDURE — 74011000250 HC RX REV CODE- 250: Performed by: NURSE ANESTHETIST, CERTIFIED REGISTERED

## 2020-09-21 PROCEDURE — 77030019605: Performed by: ORTHOPAEDIC SURGERY

## 2020-09-21 PROCEDURE — 74011250637 HC RX REV CODE- 250/637: Performed by: NURSE ANESTHETIST, CERTIFIED REGISTERED

## 2020-09-21 PROCEDURE — 29824 SHO ARTHRS SRG DSTL CLAVICLC: CPT | Performed by: ORTHOPAEDIC SURGERY

## 2020-09-21 PROCEDURE — 82947 ASSAY GLUCOSE BLOOD QUANT: CPT

## 2020-09-21 PROCEDURE — 77030010427: Performed by: ORTHOPAEDIC SURGERY

## 2020-09-21 PROCEDURE — 77030002916 HC SUT ETHLN J&J -A: Performed by: ORTHOPAEDIC SURGERY

## 2020-09-21 PROCEDURE — 74011250637 HC RX REV CODE- 250/637: Performed by: ORTHOPAEDIC SURGERY

## 2020-09-21 PROCEDURE — 77030022036 HC BLD SHV TOMCAT STRY -B: Performed by: ORTHOPAEDIC SURGERY

## 2020-09-21 PROCEDURE — 29827 SHO ARTHRS SRG RT8TR CUF RPR: CPT | Performed by: ORTHOPAEDIC SURGERY

## 2020-09-21 PROCEDURE — 77030040922 HC BLNKT HYPOTHRM STRY -A: Performed by: ORTHOPAEDIC SURGERY

## 2020-09-21 PROCEDURE — C1713 ANCHOR/SCREW BN/BN,TIS/BN: HCPCS | Performed by: ORTHOPAEDIC SURGERY

## 2020-09-21 PROCEDURE — 77030008683 HC TU ET CUF COVD -A: Performed by: ANESTHESIOLOGY

## 2020-09-21 PROCEDURE — 74011250636 HC RX REV CODE- 250/636: Performed by: ORTHOPAEDIC SURGERY

## 2020-09-21 PROCEDURE — 74011250636 HC RX REV CODE- 250/636

## 2020-09-21 PROCEDURE — 77030033005 HC TBNG ARTHSC PMP STRY -B: Performed by: ORTHOPAEDIC SURGERY

## 2020-09-21 PROCEDURE — 76060000035 HC ANESTHESIA 2 TO 2.5 HR: Performed by: ORTHOPAEDIC SURGERY

## 2020-09-21 PROCEDURE — 74011250636 HC RX REV CODE- 250/636: Performed by: NURSE ANESTHETIST, CERTIFIED REGISTERED

## 2020-09-21 PROCEDURE — 74011000250 HC RX REV CODE- 250: Performed by: ANESTHESIOLOGY

## 2020-09-21 PROCEDURE — 29826 SHO ARTHRS SRG DECOMPRESSION: CPT | Performed by: ORTHOPAEDIC SURGERY

## 2020-09-21 PROCEDURE — 77030017964 HC PRB COAG4 STRY -C: Performed by: ORTHOPAEDIC SURGERY

## 2020-09-21 PROCEDURE — 76942 ECHO GUIDE FOR BIOPSY: CPT | Performed by: ANESTHESIOLOGY

## 2020-09-21 PROCEDURE — 64450 NJX AA&/STRD OTHER PN/BRANCH: CPT | Performed by: ANESTHESIOLOGY

## 2020-09-21 PROCEDURE — 76210000006 HC OR PH I REC 0.5 TO 1 HR: Performed by: ORTHOPAEDIC SURGERY

## 2020-09-21 PROCEDURE — 77030004453 HC BUR SHV STRY -B: Performed by: ORTHOPAEDIC SURGERY

## 2020-09-21 PROCEDURE — 77030013789 HC KT REP BIO TEND ARTH -C: Performed by: ORTHOPAEDIC SURGERY

## 2020-09-21 PROCEDURE — 77030040361 HC SLV COMPR DVT MDII -B: Performed by: ORTHOPAEDIC SURGERY

## 2020-09-21 PROCEDURE — 74011250636 HC RX REV CODE- 250/636: Performed by: ANESTHESIOLOGY

## 2020-09-21 PROCEDURE — 77030003666 HC NDL SPINAL BD -A: Performed by: ORTHOPAEDIC SURGERY

## 2020-09-21 PROCEDURE — 2709999900 HC NON-CHARGEABLE SUPPLY: Performed by: ORTHOPAEDIC SURGERY

## 2020-09-21 PROCEDURE — 77030020268 HC MISC GENERAL SUPPLY: Performed by: ORTHOPAEDIC SURGERY

## 2020-09-21 PROCEDURE — 77030037837: Performed by: ORTHOPAEDIC SURGERY

## 2020-09-21 PROCEDURE — 76010000131 HC OR TIME 2 TO 2.5 HR: Performed by: ORTHOPAEDIC SURGERY

## 2020-09-21 PROCEDURE — 29828 SHO ARTHRS SRG BICP TENODSIS: CPT | Performed by: ORTHOPAEDIC SURGERY

## 2020-09-21 PROCEDURE — 74011250637 HC RX REV CODE- 250/637: Performed by: ANESTHESIOLOGY

## 2020-09-21 DEVICE — ANCHOR SUT L14.7MM DIA5.5MM 2 NO2 TIGERTAIL FULL THRD: Type: IMPLANTABLE DEVICE | Site: SHOULDER | Status: FUNCTIONAL

## 2020-09-21 DEVICE — SWVLK TENO BIO-COMP 8X 19.1MM
Type: IMPLANTABLE DEVICE | Site: SHOULDER | Status: FUNCTIONAL
Brand: ARTHREX®

## 2020-09-21 DEVICE — ANCHOR SUTURE BIOCOMP 4.75X19.1 MM SWIVELOCK C: Type: IMPLANTABLE DEVICE | Site: SHOULDER | Status: FUNCTIONAL

## 2020-09-21 RX ORDER — FAMOTIDINE 20 MG/1
20 TABLET, FILM COATED ORAL ONCE
Status: COMPLETED | OUTPATIENT
Start: 2020-09-21 | End: 2020-09-21

## 2020-09-21 RX ORDER — MIDAZOLAM HYDROCHLORIDE 1 MG/ML
2 INJECTION, SOLUTION INTRAMUSCULAR; INTRAVENOUS ONCE
Status: COMPLETED | OUTPATIENT
Start: 2020-09-21 | End: 2020-09-21

## 2020-09-21 RX ORDER — MAGNESIUM SULFATE 100 %
4 CRYSTALS MISCELLANEOUS AS NEEDED
Status: DISCONTINUED | OUTPATIENT
Start: 2020-09-21 | End: 2020-09-21 | Stop reason: HOSPADM

## 2020-09-21 RX ORDER — INSULIN LISPRO 100 [IU]/ML
INJECTION, SOLUTION INTRAVENOUS; SUBCUTANEOUS ONCE
Status: DISCONTINUED | OUTPATIENT
Start: 2020-09-21 | End: 2020-09-21 | Stop reason: HOSPADM

## 2020-09-21 RX ORDER — DEXAMETHASONE SODIUM PHOSPHATE 4 MG/ML
4 INJECTION, SOLUTION INTRA-ARTICULAR; INTRALESIONAL; INTRAMUSCULAR; INTRAVENOUS; SOFT TISSUE ONCE
Status: COMPLETED | OUTPATIENT
Start: 2020-09-21 | End: 2020-09-21

## 2020-09-21 RX ORDER — KETOROLAC TROMETHAMINE 15 MG/ML
INJECTION, SOLUTION INTRAMUSCULAR; INTRAVENOUS AS NEEDED
Status: DISCONTINUED | OUTPATIENT
Start: 2020-09-21 | End: 2020-09-21 | Stop reason: HOSPADM

## 2020-09-21 RX ORDER — FENTANYL CITRATE 50 UG/ML
INJECTION, SOLUTION INTRAMUSCULAR; INTRAVENOUS AS NEEDED
Status: DISCONTINUED | OUTPATIENT
Start: 2020-09-21 | End: 2020-09-21 | Stop reason: HOSPADM

## 2020-09-21 RX ORDER — EPINEPHRINE 1 MG/ML
INJECTION, SOLUTION, CONCENTRATE INTRAVENOUS AS NEEDED
Status: DISCONTINUED | OUTPATIENT
Start: 2020-09-21 | End: 2020-09-21 | Stop reason: HOSPADM

## 2020-09-21 RX ORDER — LIDOCAINE HYDROCHLORIDE 10 MG/ML
2 INJECTION, SOLUTION EPIDURAL; INFILTRATION; INTRACAUDAL; PERINEURAL ONCE
Status: COMPLETED | OUTPATIENT
Start: 2020-09-21 | End: 2020-09-21

## 2020-09-21 RX ORDER — DEXTROSE 50 % IN WATER (D50W) INTRAVENOUS SYRINGE
25-50 AS NEEDED
Status: DISCONTINUED | OUTPATIENT
Start: 2020-09-21 | End: 2020-09-21 | Stop reason: HOSPADM

## 2020-09-21 RX ORDER — FENTANYL CITRATE 50 UG/ML
50 INJECTION, SOLUTION INTRAMUSCULAR; INTRAVENOUS AS NEEDED
Status: DISCONTINUED | OUTPATIENT
Start: 2020-09-21 | End: 2020-09-21 | Stop reason: HOSPADM

## 2020-09-21 RX ORDER — ONDANSETRON 2 MG/ML
INJECTION INTRAMUSCULAR; INTRAVENOUS AS NEEDED
Status: DISCONTINUED | OUTPATIENT
Start: 2020-09-21 | End: 2020-09-21 | Stop reason: HOSPADM

## 2020-09-21 RX ORDER — MIDAZOLAM HYDROCHLORIDE 1 MG/ML
INJECTION, SOLUTION INTRAMUSCULAR; INTRAVENOUS
Status: COMPLETED | OUTPATIENT
Start: 2020-09-21 | End: 2020-09-21

## 2020-09-21 RX ORDER — CEFAZOLIN SODIUM 2 G/50ML
2 SOLUTION INTRAVENOUS ONCE
Status: COMPLETED | OUTPATIENT
Start: 2020-09-21 | End: 2020-09-21

## 2020-09-21 RX ORDER — LIDOCAINE HYDROCHLORIDE 20 MG/ML
INJECTION, SOLUTION EPIDURAL; INFILTRATION; INTRACAUDAL; PERINEURAL AS NEEDED
Status: DISCONTINUED | OUTPATIENT
Start: 2020-09-21 | End: 2020-09-21 | Stop reason: HOSPADM

## 2020-09-21 RX ORDER — DIPHENHYDRAMINE HYDROCHLORIDE 50 MG/ML
12.5 INJECTION, SOLUTION INTRAMUSCULAR; INTRAVENOUS
Status: DISCONTINUED | OUTPATIENT
Start: 2020-09-21 | End: 2020-09-21 | Stop reason: HOSPADM

## 2020-09-21 RX ORDER — PROPOFOL 10 MG/ML
INJECTION, EMULSION INTRAVENOUS AS NEEDED
Status: DISCONTINUED | OUTPATIENT
Start: 2020-09-21 | End: 2020-09-21 | Stop reason: HOSPADM

## 2020-09-21 RX ORDER — FENTANYL CITRATE 50 UG/ML
100 INJECTION, SOLUTION INTRAMUSCULAR; INTRAVENOUS ONCE
Status: COMPLETED | OUTPATIENT
Start: 2020-09-21 | End: 2020-09-21

## 2020-09-21 RX ORDER — ROPIVACAINE HYDROCHLORIDE 5 MG/ML
INJECTION, SOLUTION EPIDURAL; INFILTRATION; PERINEURAL
Status: COMPLETED | OUTPATIENT
Start: 2020-09-21 | End: 2020-09-21

## 2020-09-21 RX ORDER — NALBUPHINE HYDROCHLORIDE 10 MG/ML
5 INJECTION, SOLUTION INTRAMUSCULAR; INTRAVENOUS; SUBCUTANEOUS
Status: DISCONTINUED | OUTPATIENT
Start: 2020-09-21 | End: 2020-09-21 | Stop reason: HOSPADM

## 2020-09-21 RX ORDER — TRISODIUM CITRATE DIHYDRATE AND CITRIC ACID MONOHYDRATE 500; 334 MG/5ML; MG/5ML
30 SOLUTION ORAL
Status: COMPLETED | OUTPATIENT
Start: 2020-09-21 | End: 2020-09-21

## 2020-09-21 RX ORDER — SODIUM CHLORIDE, SODIUM LACTATE, POTASSIUM CHLORIDE, CALCIUM CHLORIDE 600; 310; 30; 20 MG/100ML; MG/100ML; MG/100ML; MG/100ML
75 INJECTION, SOLUTION INTRAVENOUS CONTINUOUS
Status: DISCONTINUED | OUTPATIENT
Start: 2020-09-21 | End: 2020-09-21 | Stop reason: HOSPADM

## 2020-09-21 RX ORDER — ROPIVACAINE HYDROCHLORIDE 5 MG/ML
30 INJECTION, SOLUTION EPIDURAL; INFILTRATION; PERINEURAL
Status: COMPLETED | OUTPATIENT
Start: 2020-09-21 | End: 2020-09-21

## 2020-09-21 RX ORDER — OXYCODONE HYDROCHLORIDE 5 MG/1
5 TABLET ORAL
Status: COMPLETED | OUTPATIENT
Start: 2020-09-21 | End: 2020-09-21

## 2020-09-21 RX ORDER — FENTANYL CITRATE 50 UG/ML
INJECTION, SOLUTION INTRAMUSCULAR; INTRAVENOUS
Status: COMPLETED | OUTPATIENT
Start: 2020-09-21 | End: 2020-09-21

## 2020-09-21 RX ORDER — OXYCODONE HYDROCHLORIDE 5 MG/1
5 TABLET ORAL
Qty: 35 TAB | Refills: 0 | Status: SHIPPED | OUTPATIENT
Start: 2020-09-21 | End: 2020-09-28

## 2020-09-21 RX ORDER — SUCCINYLCHOLINE CHLORIDE 20 MG/ML
INJECTION INTRAMUSCULAR; INTRAVENOUS AS NEEDED
Status: DISCONTINUED | OUTPATIENT
Start: 2020-09-21 | End: 2020-09-21 | Stop reason: HOSPADM

## 2020-09-21 RX ORDER — SODIUM CHLORIDE, SODIUM LACTATE, POTASSIUM CHLORIDE, CALCIUM CHLORIDE 600; 310; 30; 20 MG/100ML; MG/100ML; MG/100ML; MG/100ML
25 INJECTION, SOLUTION INTRAVENOUS CONTINUOUS
Status: DISCONTINUED | OUTPATIENT
Start: 2020-09-21 | End: 2020-09-21 | Stop reason: HOSPADM

## 2020-09-21 RX ORDER — ROCURONIUM BROMIDE 10 MG/ML
INJECTION, SOLUTION INTRAVENOUS AS NEEDED
Status: DISCONTINUED | OUTPATIENT
Start: 2020-09-21 | End: 2020-09-21

## 2020-09-21 RX ORDER — DEXAMETHASONE SODIUM PHOSPHATE 4 MG/ML
INJECTION, SOLUTION INTRA-ARTICULAR; INTRALESIONAL; INTRAMUSCULAR; INTRAVENOUS; SOFT TISSUE
Status: COMPLETED | OUTPATIENT
Start: 2020-09-21 | End: 2020-09-21

## 2020-09-21 RX ORDER — ONDANSETRON 2 MG/ML
4 INJECTION INTRAMUSCULAR; INTRAVENOUS ONCE
Status: DISCONTINUED | OUTPATIENT
Start: 2020-09-21 | End: 2020-09-21 | Stop reason: HOSPADM

## 2020-09-21 RX ADMIN — CEFAZOLIN SODIUM 2 G: 2 SOLUTION INTRAVENOUS at 11:16

## 2020-09-21 RX ADMIN — LIDOCAINE HYDROCHLORIDE 2 ML: 10 INJECTION, SOLUTION EPIDURAL; INFILTRATION; INTRACAUDAL; PERINEURAL at 09:33

## 2020-09-21 RX ADMIN — DEXAMETHASONE SODIUM PHOSPHATE 4 MG: 4 INJECTION, SOLUTION INTRAMUSCULAR; INTRAVENOUS at 09:33

## 2020-09-21 RX ADMIN — MIDAZOLAM HYDROCHLORIDE 2 MG: 2 INJECTION, SOLUTION INTRAMUSCULAR; INTRAVENOUS at 09:32

## 2020-09-21 RX ADMIN — FENTANYL CITRATE 100 MCG: 50 INJECTION, SOLUTION INTRAMUSCULAR; INTRAVENOUS at 09:33

## 2020-09-21 RX ADMIN — DEXAMETHASONE SODIUM PHOSPHATE 4 MG: 4 INJECTION, SOLUTION INTRAMUSCULAR; INTRAVENOUS at 09:32

## 2020-09-21 RX ADMIN — FENTANYL CITRATE 100 MCG: 50 INJECTION, SOLUTION INTRAMUSCULAR; INTRAVENOUS at 09:32

## 2020-09-21 RX ADMIN — SODIUM CHLORIDE, SODIUM LACTATE, POTASSIUM CHLORIDE, AND CALCIUM CHLORIDE 25 ML/HR: 600; 310; 30; 20 INJECTION, SOLUTION INTRAVENOUS at 08:54

## 2020-09-21 RX ADMIN — FENTANYL CITRATE 50 MCG: 50 INJECTION, SOLUTION INTRAMUSCULAR; INTRAVENOUS at 11:10

## 2020-09-21 RX ADMIN — KETOROLAC TROMETHAMINE 15 MG: 15 INJECTION, SOLUTION INTRAMUSCULAR; INTRAVENOUS at 13:01

## 2020-09-21 RX ADMIN — ROPIVACAINE HYDROCHLORIDE 150 MG: 5 INJECTION, SOLUTION EPIDURAL; INFILTRATION; PERINEURAL at 09:33

## 2020-09-21 RX ADMIN — FAMOTIDINE 20 MG: 20 TABLET ORAL at 08:54

## 2020-09-21 RX ADMIN — OXYCODONE 5 MG: 5 TABLET ORAL at 15:05

## 2020-09-21 RX ADMIN — SUCCINYLCHOLINE CHLORIDE 120 MG: 20 INJECTION, SOLUTION INTRAMUSCULAR; INTRAVENOUS at 11:10

## 2020-09-21 RX ADMIN — ONDANSETRON 4 MG: 2 INJECTION INTRAMUSCULAR; INTRAVENOUS at 13:01

## 2020-09-21 RX ADMIN — LIDOCAINE HYDROCHLORIDE 100 MG: 20 INJECTION, SOLUTION EPIDURAL; INFILTRATION; INTRACAUDAL; PERINEURAL at 11:10

## 2020-09-21 RX ADMIN — PROPOFOL 200 MG: 10 INJECTION, EMULSION INTRAVENOUS at 11:10

## 2020-09-21 RX ADMIN — MIDAZOLAM 2 MG: 1 INJECTION INTRAMUSCULAR; INTRAVENOUS at 09:33

## 2020-09-21 RX ADMIN — SODIUM CHLORIDE, SODIUM LACTATE, POTASSIUM CHLORIDE, AND CALCIUM CHLORIDE: 600; 310; 30; 20 INJECTION, SOLUTION INTRAVENOUS at 12:42

## 2020-09-21 RX ADMIN — SODIUM CITRATE AND CITRIC ACID MONOHYDRATE 30 ML: 500; 334 SOLUTION ORAL at 10:53

## 2020-09-21 RX ADMIN — ROPIVACAINE HYDROCHLORIDE 2 ML: 5 INJECTION, SOLUTION EPIDURAL; INFILTRATION; PERINEURAL at 09:32

## 2020-09-21 NOTE — ANESTHESIA PREPROCEDURE EVALUATION
Relevant Problems   No relevant active problems       Anesthetic History   No history of anesthetic complications            Review of Systems / Medical History  Patient summary reviewed, nursing notes reviewed and pertinent labs reviewed    Pulmonary  Within defined limits              Comments: Chews tobacco   Neuro/Psych         Psychiatric history    Comments: Lots of Psych hx, takes mood stabilizers Cardiovascular                  Exercise tolerance: >4 METS     GI/Hepatic/Renal  Within defined limits              Endo/Other  Within defined limits      Arthritis    Comments: Shoulder L done last year, R shoulder today   Other Findings              Physical Exam    Airway  Mallampati: II  TM Distance: > 6 cm  Neck ROM: normal range of motion   Mouth opening: Normal     Cardiovascular    Rhythm: regular  Rate: normal         Dental  No notable dental hx       Pulmonary  Breath sounds clear to auscultation               Abdominal  GI exam deferred       Other Findings            Anesthetic Plan    ASA: 3  Anesthesia type: general      Post-op pain plan if not by surgeon: peripheral nerve block single    Induction: Intravenous  Anesthetic plan and risks discussed with: Patient

## 2020-09-21 NOTE — DISCHARGE INSTRUCTIONS
DISCHARGE SUMMARY from Nurse    PATIENT INSTRUCTIONS:    After general anesthesia or intravenous sedation, for 24 hours or while taking prescription Narcotics:  · Limit your activities  · Do not drive and operate hazardous machinery  · Do not make important personal or business decisions  · Do  not drink alcoholic beverages  · If you have not urinated within 8 hours after discharge, please contact your surgeon on call. Report the following to your surgeon:  · Excessive pain, swelling, redness or odor of or around the surgical area  · Temperature over 100.5  · Nausea and vomiting lasting longer than 4 hours or if unable to take medications  · Any signs of decreased circulation or nerve impairment to extremity: change in color, persistent  numbness, tingling, coldness or increase pain  · Any questions    What to do at Home:  Recommended activity: Activity as tolerated and no driving for today. *  Please give a list of your current medications to your Primary Care Provider. *  Please update this list whenever your medications are discontinued, doses are      changed, or new medications (including over-the-counter products) are added. *  Please carry medication information at all times in case of emergency situations. These are general instructions for a healthy lifestyle:    No smoking/ No tobacco products/ Avoid exposure to second hand smoke  Surgeon General's Warning:  Quitting smoking now greatly reduces serious risk to your health. Obesity, smoking, and sedentary lifestyle greatly increases your risk for illness    A healthy diet, regular physical exercise & weight monitoring are important for maintaining a healthy lifestyle    You may be retaining fluid if you have a history of heart failure or if you experience any of the following symptoms:  Weight gain of 3 pounds or more overnight or 5 pounds in a week, increased swelling in our hands or feet or shortness of breath while lying flat in bed. Please call your doctor as soon as you notice any of these symptoms; do not wait until your next office visit. The discharge information has been reviewed with the patient. The patient verbalized understanding. Discharge medications reviewed with the patient and appropriate educational materials and side effects teaching were provided. ___________________________________________________________________________________________________________________________________    Patient Education        Rotator Cuff Repair: What to Expect at [facility] cuff repair surgery is done to fix a tear in the rotator cuff. It can also include cleaning the space between the rotator cuff tendons and the shoulder blade. This is called subacromial smoothing. You will feel tired for several days. Your shoulder will be swollen. And you may notice that your skin is a different color near the cut (incision). Your hand and arm may also be swollen. This is normal and will start to get better in a few days. It will be several months before you have complete use of your shoulder and arm. When you have healed from surgery, you will need to build your strength and the motion of your joint with rehabilitation (rehab) exercises. In time, your shoulder will likely be stronger, less painful, and more flexible than it was before the surgery. This care sheet gives you a general idea about how long it will take for you to recover. But each person recovers at a different pace. Follow the steps below to get better as quickly as possible. How can you care for yourself at home? Activity    · Rest when you feel tired. Getting enough sleep will help you recover. Do not lie flat or sleep on your side. Raise your upper body on two or three pillows, or sleep in a reclining chair.     · Try to walk each day. Start by walking a little more than you did the day before. Bit by bit, increase the amount you walk.  Walking boosts blood flow and helps prevent pneumonia and constipation.     · Your arm will be in a sling or other device to prevent it from moving for 4 to 8 weeks. ? Always use the sling when you walk or stand. ? If you sit or lie down, you can loosen the sling, but don't remove it. This lets your elbow straighten without moving the shoulder. You can also support your arm on a pillow. ? Remove the sling only to do prescribed exercises or to shower.     · You will not have complete use of your affected arm for 3 to 4 months after surgery. ? You can use your affected arm for writing, eating, or drinking, but move it only at the elbow or wrist. Do not use it for anything else except prescribed exercises until the sling has been removed. ? When the sling has been removed, you can do activities that don't involve lifting, pushing, pulling, or carrying. You may not be able to do overhead lifting for 6 to 12 months.     · If you have a desk job, you will probably be able to go back to work or your normal routine in 1 to 2 weeks. If you have a more active job, you may be away from work for 3 to 4 months or longer. If you work at a job that involves heavy manual labor, lifting your arms above your head, or the use of heavy tools, you may have to think about making changes to your job.     · If you had arthroscopic surgery, you can take a shower 48 to 72 hours after surgery. Remove the sling, and leave your arm by your side. To wash under your armpit, lean over and let the arm fall away from your body. Do not raise your arm. You may want to use a shower stool for a day or two.     · If you had open surgery, do not shower until you see your doctor and he or she okays it. You can wash the incisions with regular soap and water.     · Ask your doctor when you can drive again. This may take about 6 weeks or until you are no longer wearing the sling. Diet    · You can eat your normal diet.  If your stomach is upset, try bland, low-fat foods like plain rice, broiled chicken, toast, and yogurt. Drink plenty of fluids.     · You may notice that your bowel movements are not regular right after your surgery. This is common. Try to avoid constipation and straining with bowel movements. You may want to take a fiber supplement every day. If you have not had a bowel movement after a couple of days, ask your doctor about taking a mild laxative. Medicines    · Your doctor will tell you if and when you can restart your medicines. He or she will also give you instructions about taking any new medicines.     · If you take aspirin or some other blood thinner, ask your doctor if and when to start taking it again. Make sure that you understand exactly what your doctor wants you to do.     · Take pain medicines exactly as directed. ? If the doctor gave you a prescription medicine for pain, take it as prescribed. Use pain medicine when you first notice pain, before it becomes severe. It's easier to prevent pain early than to stop it after it gets bad.  ? If you are not taking a prescription pain medicine, ask your doctor if you can take an over-the-counter medicine.     · If your doctor prescribed antibiotics, take them as directed. Do not stop taking them just because you feel better. You need to take the full course of antibiotics.     · If you think your pain medicine is making you sick to your stomach:  ? Take your medicine after meals (unless your doctor has told you not to). ? Ask your doctor for a different pain medicine. Incision care    · If you had arthroscopic surgery, you may remove the bandage over your cut (incision) 24 to 48 hours after the surgery. Keep the bandage clean and dry.     · If you had open surgery, do not remove your bandage until you see your doctor and he or she okays it.  Keep the bandage clean and dry.     · If your incision is open to the air, keep the area clean and dry.     · If you have strips of tape on the incision, leave the tape on for a week or until it falls off. Exercise    · Shoulder rehabilitation is a series of exercises you do after your surgery. This helps you get back your shoulder's range of motion and strength. You will work with your doctor and physical therapist to plan this exercise program. Rehab may not start until 6 weeks after the surgery. To get the best results, you need to do the exercises correctly and as often and for as long as your doctor tells you. Ice    · To reduce swelling and pain, put ice or a cold pack on your shoulder for 10 to 20 minutes at a time. Do this every 1 to 2 hours. Put a thin cloth between the ice and your skin. Follow-up care is a key part of your treatment and safety. Be sure to make and go to all appointments, and call your doctor if you are having problems. It's also a good idea to know your test results and keep a list of the medicines you take. When should you call for help? Call 911 anytime you think you may need emergency care. For example, call if:    · You passed out (lost consciousness).     · You have severe trouble breathing.     · You have sudden chest pain and shortness of breath, or you cough up blood. Call your doctor now or seek immediate medical care if:    · You have numbness, tingling, or a bluish color in your fingers or hand.     · You have severe nausea or vomiting.     · You have pain that does not go away after you take pain medicine.     · You have loose stitches, or your incision comes open.     · Your incision bleeds through your first bandage or is still bleeding 3 days after your surgery.     · You have signs of infection, such as:  ? Increased pain, swelling, warmth, or redness. ? Red streaks leading from the incision. ? Pus draining from the incision. ? A fever. Watch closely for any changes in your health, and be sure to contact your doctor if:    · You do not have a bowel movement after taking a laxative. Where can you learn more?   Go to http://www.gray.com/  Enter U613 in the search box to learn more about \"Rotator Cuff Repair: What to Expect at Home. \"  Current as of: March 2, 2020               Content Version: 12.6  © 4761-4531 [employer]. Care instructions adapted under license by [employer] (which disclaims liability or warranty for this information). If you have questions about a medical condition or this instruction, always ask your healthcare professional. [address] any warranty or liability for your use of this information.

## 2020-09-21 NOTE — BRIEF OP NOTE
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 Procedure(s):  RIGHT SHOULDER ARTHROSCOPIC ROTATOR CUFF REPAIR/BICEPS TENODESIS/DISTAL CLAVICLE RESECTION/SUBACROMIAL DECOMPRESSION/ARTHREX/SPIDER/TMAX/NERVE BLOCK    Surgeon(s):  Elsie Weber MD    Surgical Assistant: Physician Assistant: JOHN Sarah    Anesthesia: General     Estimated Blood Loss (mL): Minimal    Complications: None    Specimens: * No specimens in log *     Implants:   Implant Name Type Inv.  Item Serial No.  Lot No. LRB No. Used Action   ANCHOR C SWIVELOCK 4.75MM - [id number]  Arvis Marisa SWIVELOCK 4.75MM  [employer] [id number] Right 1 Implanted   ANCHOR SUT L19.1MM DIA8MM BIOCOMP SWIVELOCK TENODESIS - [id number]  ANCHOR SUT L19.1MM DIA8MM BIOCOMP SWIVELOCK TENODESIS  [employer] [id number] Right 1 Implanted   ANCHOR SUT L14.7MM DIA5.5MM 2 NO2 TIGERTAIL FULL THRD - [id number]  ANCHOR SUT L14.7MM DIA5.5MM 2 NO2 TIGERTAIL FULL THRD  [employer] [id number] Right 2 Implanted   ANCHOR C SWIVELOCK 4.75MM - [id number]  Robyn Motts 4.75MM  [employer] [id number] Right 2 Implanted       Drains: * No LDAs found *    Findings: Right rotator cuff retear, impingement, subacromial scar tissue, subscapularis tear, biceps long head tendon tear, AC DJD    Electronically Signed by Chacho Khoury MD on 9/21/2020 at 1:06 PM

## 2020-09-21 NOTE — ANESTHESIA POSTPROCEDURE EVALUATION
Procedure(s):  RIGHT SHOULDER ARTHROSCOPIC ROTATOR CUFF REPAIR/BICEPS TENODESIS/DISTAL CLAVICLE RESECTION/SUBACROMIAL DECOMPRESSION/ARTHREX/SPIDER/TMAX/NERVE BLOCK.    general    Anesthesia Post Evaluation      Multimodal analgesia: multimodal analgesia used between 6 hours prior to anesthesia start to PACU discharge  Patient location during evaluation: bedside  Patient participation: complete - patient participated  Level of consciousness: awake  Pain management: adequate  Airway patency: patent  Anesthetic complications: no  Cardiovascular status: stable  Respiratory status: acceptable  Hydration status: acceptable  Post anesthesia nausea and vomiting:  controlled      INITIAL Post-op Vital signs:   Vitals Value Taken Time   /60 9/21/2020  2:00 PM   Temp 36.5 °C (97.7 °F) 9/21/2020  1:26 PM   Pulse 65 9/21/2020  2:01 PM   Resp 12 9/21/2020  2:01 PM   SpO2 93 % 9/21/2020  2:01 PM   Vitals shown include unvalidated device data.

## 2020-09-21 NOTE — INTERVAL H&P NOTE
Update History & Physical 
 
The Patient's History and Physical of September 18, 2020  
 was reviewed with the patient and I examined the patient. There was no change. The surgical site was confirmed by the patient and me. Plan:  The risk, benefits, expected outcome, and alternative to the recommended procedure have been discussed with the patient. Patient understands and wants to proceed with the procedure.  
 
Electronically signed by Neil Santizo MD on 9/21/2020 at 10:59 AM

## 2020-09-22 ENCOUNTER — TELEPHONE (OUTPATIENT)
Dept: ORTHOPEDIC SURGERY | Age: 55
End: 2020-09-22

## 2020-09-22 DIAGNOSIS — G89.18 POST-OP PAIN: Primary | ICD-10-CM

## 2020-09-22 RX ORDER — HYDROCODONE BITARTRATE AND ACETAMINOPHEN 7.5; 325 MG/1; MG/1
1 TABLET ORAL
Qty: 40 TAB | Refills: 0 | Status: SHIPPED | OUTPATIENT
Start: 2020-09-22 | End: 2020-10-02

## 2020-09-22 NOTE — TELEPHONE ENCOUNTER
Post op  Patient called for  . Patient said he had sx done on his right shoulder yesterday by Dr. Jerald Abel. Patient said the Oxycodone is not working. That it is making him itch. Kriyari on [email or web address] tel. [phone]    Patient tel. [phone].

## 2020-09-22 NOTE — OP NOTES
[address] [place] [address]   OPERATIVE REPORT    Name:  Escobar Burk  MR#:   [id number]  :  1965  ACCOUNT #:  [de-identified]  DATE OF SERVICE:  2020    PREOPERATIVE DIAGNOSES:  1. Right shoulder rotator cuff tear. 2.  Right shoulder long head biceps tendinopathy and tearing. 3.  Right shoulder acromioclavicular joint osteoarthritis. 4.  Right shoulder impingement. POSTOPERATIVE DIAGNOSES:  1. Right shoulder supraspinatus rotator cuff re-tear and subscapularis rotator cuff tear. 2.  Right shoulder long head biceps tearing and tendinopathy. 3.  Right shoulder acromioclavicular joint osteoarthritis. 4.  Right shoulder impingement. PROCEDURES PERFORMED:  1. Right shoulder arthroscopic rotator cuff repair, upper border of subscapularis, entire supraspinatus. 2.  Right shoulder arthroscopic biceps tenodesis. 3.  Right shoulder arthroscopic distal clavicle excision. 4.  Right shoulder arthroscopic subacromial decompression and subacromial debridement. SURGEON:  Alicia Quach MD.    Grace Alarcon PA-C. Tonya Alarcon PA-C, was medically necessary for the procedure. She assisted in patient positioning, rotator cuff repair, biceps tenodesis, distal clavicle excision and decompression as well as wound closure, placement of dressing and sling, and transfer of the patient to the PACU. ANESTHESIA:  General with nerve block. IV FLUIDS:  700 mL of LR.    COMPLICATIONS:  None. SPECIMENS REMOVED:  None. IMPLANTS:  Arthrex suture anchors. ESTIMATED BLOOD LOSS:  Minimal.    INDICATIONS FOR THE PROCEDURE:  The patient is a 50-year-old male who presented to the office with right shoulder pain. He had previously had a right shoulder surgery. MRI revealed a likely rotator cuff re-tear as well as biceps tendinopathy, impingement, and an AC joint arthritis.   After discussing the treatment options at length, he elected to undergo the procedure as described. DESCRIPTION OF THE PROCEDURE:  The patient was identified in the preoperative holding area. The right shoulder was marked as the operative extremity after confirmation with the patient. After discussing the risks and benefits of the procedure, informed consent was confirmed. Anesthesia performed a nerve block. The patient was then taken to the operating room. He was moved from the stretcher to the OR table. General anesthesia was induced and he was intubated. He was brought into the beach chair position. The right arm was prepped and draped in normal sterile fashion. A time-out was performed. Antibiotics were given. Surgery began with a posterior portal placement. The arthroscope was brought into the glenohumeral joint through a stab incision. Glenohumeral joint inspection revealed a recurrent tear of the supraspinatus. There was a tear of the upper border of the subscapularis with minimal retraction. There was also tearing of the long head of the biceps tendon. An anterior portal was made under spinal needle localization. The biceps tendon was tenotomized. The labrum was debrided. The glenohumeral joint was also debrided anteriorly around the subscapularis as well as the rotator cuff tear. A FiberTape stitch was passed into the subscapularis upper border and then fixed to the upper border of the lesser tuberosity with a SwiveLock anchor after preparing the lesser tuberosity bone bed with a shaver. The scope was then brought to the subacromial space. There was extensive scar tissue in the subacromial space, which was debrided using a shaver. Working anteriorly in the shoulder, the biceps tendon was unroofed from the bicipital groove. It was externalized. The proximal 2 cm of the tendon were excised. A whipstitch was placed in the biceps. In the bicipital groove, a guidewire was placed and then a  hole was drilled over the guidewire.   The biceps was then tenodesed in the bicipital groove with a SwiveLock suture anchor. Attention was then turned to the subacromial space. The undersurface of the acromion was outlined using the Vapr and removed of any scar tissue. The anterolateral aspect of the acromion was co-planed with a distal clavicle completing the decompression. Through the anterior portal, the lateral border of the clavicle and AC joint were exposed. Approximately 7 mm of the distal clavicle was then excised using a bur. This completed the distal clavicle resection. Attention was then turned to the rotator cuff tear. The supraspinatus was noted to be torn. The tear edges were defined further with the use of a shaver and electrocautery. The footprint of the greater tuberosity was debrided to bleeding bone. Two suture anchors double loaded were placed at the articular margin anteriorly and posteriorly at the greater tuberosity. The suture tails were then passed through the supraspinatus rotator cuff tear from posterior to anterior. They were then tied. One tail from each suture limb was then brought to a separate lateral row anchor, one anterior and one posterior completing the repair. All loose bony and soft tissue debris was then removed from the subacromial space. Final pictures were taken. The scope and instruments were removed. The portal sites were closed. A sterile dressing was placed over the shoulder. The drapes were taken down. The arm was placed into a sling. The patient was awakened from anesthesia, extubated, and taken to PACU in stable condition with a plan for discharge home.         Deacon Jones MD      [id number]  D:  09/21/2020 13:18  T:  09/21/2020 21:39  JOB #:  [id number]

## 2020-09-22 NOTE — TELEPHONE ENCOUNTER
He can take 2 tablets every 4 hours to get the pain under control. He can take some OTC benadryl to help with the itching or we can change the medication but he may still have the itching with the new medication.

## 2020-09-22 NOTE — TELEPHONE ENCOUNTER
Patient contacted and he states that last time he had shoulder surgery, they gave him hydrocodone 7.5 and it helped his pain better than the oxycodone and it didn't make him itch.

## 2020-09-23 NOTE — TELEPHONE ENCOUNTER
Post op arm:    Pt states he cant get his medication    Unknown if prior Trinh Peel is needed.  Asked him to have his pharmacy to contact for details

## 2020-09-29 ENCOUNTER — VIRTUAL VISIT (OUTPATIENT)
Dept: FAMILY MEDICINE CLINIC | Age: 55
End: 2020-09-29
Payer: MEDICAID

## 2020-09-29 DIAGNOSIS — K64.4 EXTERNAL HEMORRHOID: Primary | ICD-10-CM

## 2020-09-29 PROCEDURE — 99442 PR PHYS/QHP TELEPHONE EVALUATION 11-20 MIN: CPT | Performed by: EMERGENCY MEDICINE

## 2020-09-29 RX ORDER — POLYETHYLENE GLYCOL 3350 17 G/17G
17 POWDER, FOR SOLUTION ORAL DAILY
Qty: 14 EACH | Refills: 2 | Status: SHIPPED | OUTPATIENT
Start: 2020-09-29 | End: 2021-05-05

## 2020-09-29 RX ORDER — HYDROCORTISONE 25 MG/G
CREAM TOPICAL 4 TIMES DAILY
Qty: 30 G | Refills: 1 | Status: SHIPPED | OUTPATIENT
Start: 2020-09-29 | End: 2021-05-05

## 2020-09-29 NOTE — PROGRESS NOTES
Pablito Blum  1965     HISTORY OF PRESENT ILLNESS  Pablito Blum is a 53 y.o. male who presents today for evaluation S/P Right shoulder arthroscopic rotator cuff re-tear repair, biceps tenodesis, distal clavicle excision and subacromial decompression on 9/21/20. Patient has not been going to PT. Describes pain as a 7/10. Has been taking roxicodone for pain. Still has night pain. He has keep compliant with his sling and restrictions. Patient denies any fever, chills, chest pain, shortness of breath or calf pain. There are no new illness or injuries to report since last seen in the office. PHYSICAL EXAM:   Visit Vitals  /81   Pulse 68   Temp (!) 96.6 °F (35.9 °C)   Resp 15   Ht 5' 9\" (1.753 m)   Wt 209 lb 12.8 oz (95.2 kg)   BMI 30.98 kg/m²      The patient is a well-developed, well-nourished male in no acute distress. The patient is alert and oriented times three. The patient appears to be well groomed. Mood and affect are normal.  ORTHOPEDIC EXAM of right shoulder:  Inspection: swelling present,  Bruising present  Incision, clean, dry, intact, sutures in place  Passive glenohumeral abduction 0-20 degrees in the sling otherwise not assessed  Stability: Stable  Strength: n/a  2+ distal pulses    IMPRESSION:  S/P Right shoulder arthroscopic rotator cuff re-tear repair, biceps tenodesis, distal clavicle excision and subacromial decompression. PLAN:   Pt doing well post operatively  Incisions cleaned. Sutures removed and steri strips applied  Surgery was discussed at length today. Continue wearing sling 4 weeks post op. WIll give exercises and PT at next visit. Stressed to patient that nothing causes an increase in pain. Pt given a refill of pain medication. Roxicodone 5 mg Patient given pain medication for short term acute pain relief. Goal is to treat patient according to above plan and to ultimately have patient off all pain medications once appropriate.  If chronic pain management is required beyond what is expected for current orthopedic problem, will refer patient to pain management.   was reviewed and will be reviewed with every medication refill request.   RTC 3 weeks    [facility] Elizondo and Spine Specialist

## 2020-09-29 NOTE — PROGRESS NOTES
Consent: Barbara Cameron, who was seen by audio only and/or his healthcare decision maker, is aware that this patient-initiated, Telehealth audio encounter on 9/29/2020 is a billable service, with coverage as determined by his insurance carrier. He is aware that he may receive a bill and has provided verbal consent to proceed: Yes. The patient was at work outside and I was at the offices of the [address] [place] [address] no one else participated in the service. ASSESSMENT and PLAN    ICD-10-CM ICD-9-CM    1. External hemorrhoid  K64.4 455.3 hydrocortisone (ANUSOL-HC) 2.5 % rectal cream      polyethylene glycol (MIRALAX) 17 gram packet    Anusol HC cream  Miralax stool softener  recommend Naprosyn 500 bid  consider NTG paste if not improved       Follow-up and Dispositions    · Return if symptoms worsen or fail to improve. Health Maintenance Due   Topic Date Due    Shingrix Vaccine Age 49> (1 of 2) 12/06/2014    Flu Vaccine (1) 09/01/2020     712  Subjective:   Barbara Cameron is a 53 y.o. male who was seen for establishing care. He has been seen previously for fatty liver depression with anxiety, right groin pain, prior rotator cuff repair, mouth lesion and other medical problems. he was previously seen by another provider in our practice for groin pain in August2020. His last blood draw revealed a BMP that was normal.  His last CBC was a year ago and was normal.    History of rotary cuff repair   He feels improved except for the bruising at the surgical site. Feet had swelling with negative duplex noted  Hemorrhoid  He had a burst hemorrhoid big as a marble,but painful when defecating. The patient states that it is been present for least 1 week. He is tried nothing for relief of the symptoms and is unaware of any aggravating factors. He is due to have a colonoscopy next month.           Current Outpatient Medications   Medication Sig    HYDROcodone-acetaminophen (Norco) 7.5-325 mg per tablet Take 1 Tab by mouth every six (6) hours as needed for Pain for up to 10 days. Max Daily Amount: 4 Tabs.  mirtazapine (REMERON) 30 mg tablet     dextroamphetamine-amphetamine (AdderalL) 10 mg tablet Take 10 mg by mouth.  Latuda 60 mg tab tablet     escitalopram oxalate (LEXAPRO) 20 mg tablet     gabapentin (Neurontin) 800 mg tablet Take 1 Tab by mouth daily (after breakfast). Max Daily Amount: 800 mg. Indications: neuropathic pain    amitriptyline (ELAVIL) 25 mg tablet Take 3 Tabs by mouth nightly.  gabapentin (NEURONTIN) 400 mg capsule Take 1 Cap by mouth three (3) times daily. Max Daily Amount: 1,200 mg. No current facility-administered medications for this visit. Allergies   Allergen Reactions    Motrin [Ibuprofen] Hives    Motrin [Ibuprofen] Rash     does not have a problem list on file. Past Surgical History:   Procedure Laterality Date    HX HERNIA REPAIR      HX HERNIA REPAIR      Inguinal hernia- right side    HX KNEE ARTHROSCOPY Right     HX ORTHOPAEDIC Left 2017    lacerations to fingers and had pins, left thumb    HX ORTHOPAEDIC      Right knee surgery    HX OTHER SURGICAL  2017    Left hand surgery x 3    HX ROTATOR CUFF REPAIR Right     HX ROTATOR CUFF REPAIR Left 10/21/2019    HX ROTATOR CUFF REPAIR  2000    Right shoulder     Relationships   Social connections    Talks on phone: Not on file    Gets together: Not on file    Attends Jewish service: Not on file    Active member of club or organization: Not on file    Attends meetings of clubs or organizations: Not on file    Relationship status: Not on file     family history includes COPD in his mother; Heart Disease in his mother; Other in his mother. Review of Systems   Constitutional: Negative for chills, fever and malaise/fatigue. Respiratory: Negative for cough, shortness of breath and wheezing. Cardiovascular: Positive for leg swelling. Negative for chest pain. Gastrointestinal: Negative for abdominal pain, blood in stool, constipation, diarrhea and heartburn. Hemorrhoid. Genitourinary: Negative for dysuria and urgency. Musculoskeletal: Negative for joint pain and myalgias. Neurological: Negative for dizziness, sensory change and focal weakness. Endo/Heme/Allergies: Does not bruise/bleed easily. Psychiatric/Behavioral: The patient is not nervous/anxious. Results for orders placed or performed during the hospital encounter of 09/24/20   POC CHEM8   Result Value Ref Range    Sodium 142 136 - 145 mEq/L    Potassium 4.1 3.5 - 4.9 mEq/L    Chloride 98 98 - 107 mEq/L    CO2, TOTAL 31 21 - 32 mmol/L    Glucose 99 74 - 106 mg/dL    BUN 12 7 - 25 mg/dl    Creatinine 0.8 0.6 - 1.3 mg/dl    HCT 43 40 - 54 %    HGB 14.6 12.4 - 17.2 gm/dl    CALCIUM,IONIZED 4.80 4.40 - 5.40 mg/dL     No results found for any visits on 09/29/20. Marcos Jacome   was evaluated through a patient-initiated, synchronous (real-time) audio only encounter, and/or her healthcare decision maker, is aware that it is a billable service, with coverage as determined by her insurance carrier. She provided verbal consent to proceed: Yes. She has not had a related appointment within my department in the past 7 days or scheduled within the next 24 hours. Total Time: minutes: 11-20 minutes    Sariah Calero MD     We discussed the expected course, resolution and complications of the diagnosis(es) in detail. Medication risks, benefits, costs, interactions, and alternatives were discussed as indicated. I advised him to contact the office if his condition worsens, changes or fails to improve as anticipated. He expressed understanding with the diagnosis(es) and plan. Marcos Jacome is a 53 y.o. male being evaluated by a audio visit encounter for concerns as above. A caregiver was present when appropriate.  Due to this being a TeleHealth encounter (During LWEJY-29 public health emergency), evaluation of the following organ systems was limited: Vitals/Constitutional/EENT/Resp/CV/GI//MS/Neuro/Skin/Heme-Lymph-Imm. Pursuant to the emergency declaration under the [facility], [facility] waiver authority and the [employer] Act, this Virtual  Visit was conducted, with patient's (and/or legal guardian's) consent, to reduce the patient's risk of exposure to COVID-19 and provide necessary medical care. Jordin Pineda MD    This note was done with the assistance of dragon speech software.   Some inadvertent errors or omissions may be present

## 2020-09-30 ENCOUNTER — OFFICE VISIT (OUTPATIENT)
Dept: ORTHOPEDIC SURGERY | Age: 55
End: 2020-09-30
Payer: MEDICAID

## 2020-09-30 VITALS
SYSTOLIC BLOOD PRESSURE: 113 MMHG | HEART RATE: 68 BPM | WEIGHT: 209.8 LBS | TEMPERATURE: 96.6 F | BODY MASS INDEX: 31.07 KG/M2 | DIASTOLIC BLOOD PRESSURE: 81 MMHG | HEIGHT: 69 IN | RESPIRATION RATE: 15 BRPM

## 2020-09-30 DIAGNOSIS — M75.111 NONTRAUMATIC INCOMPLETE TEAR OF RIGHT ROTATOR CUFF: ICD-10-CM

## 2020-09-30 DIAGNOSIS — G89.18 POST-OP PAIN: Primary | ICD-10-CM

## 2020-09-30 DIAGNOSIS — Z98.890 STATUS POST ARTHROSCOPY OF RIGHT SHOULDER: ICD-10-CM

## 2020-09-30 PROCEDURE — 99024 POSTOP FOLLOW-UP VISIT: CPT | Performed by: ORTHOPAEDIC SURGERY

## 2020-09-30 RX ORDER — OXYCODONE HYDROCHLORIDE 5 MG/1
5 TABLET ORAL
Qty: 28 TAB | Refills: 0 | Status: SHIPPED | OUTPATIENT
Start: 2020-09-30 | End: 2020-10-01 | Stop reason: SDUPTHER

## 2020-10-01 DIAGNOSIS — G89.18 POST-OP PAIN: ICD-10-CM

## 2020-10-01 RX ORDER — OXYCODONE HYDROCHLORIDE 5 MG/1
5 TABLET ORAL
Qty: 28 TAB | Refills: 0 | Status: SHIPPED | OUTPATIENT
Start: 2020-10-01 | End: 2020-10-08

## 2020-10-07 ENCOUNTER — TELEPHONE (OUTPATIENT)
Dept: ORTHOPEDIC SURGERY | Age: 55
End: 2020-10-07

## 2020-10-07 DIAGNOSIS — G89.18 POST-OP PAIN: Primary | ICD-10-CM

## 2020-10-07 RX ORDER — OXYCODONE HYDROCHLORIDE 5 MG/1
5 TABLET ORAL
Qty: 28 TAB | Refills: 0 | Status: SHIPPED | OUTPATIENT
Start: 2020-10-07 | End: 2020-10-14

## 2020-10-07 NOTE — TELEPHONE ENCOUNTER
POST OP PT:     REQUESTING REFILL FOR   OXYCODONE    PHARMACY: [address] [place] [address]    PT X#[phone]

## 2020-10-19 ENCOUNTER — TELEPHONE (OUTPATIENT)
Dept: ORTHOPEDIC SURGERY | Age: 55
End: 2020-10-19

## 2020-10-19 DIAGNOSIS — G89.18 POST-OP PAIN: Primary | ICD-10-CM

## 2020-10-19 NOTE — TELEPHONE ENCOUNTER
Post op  Rt shoulder    Pt requesting refill for pain medication hydrocodone;    Pt has f/u for 10/27/2020    Pharmacy: [facility]#[phone]

## 2020-10-20 RX ORDER — HYDROCODONE BITARTRATE AND ACETAMINOPHEN 7.5; 325 MG/1; MG/1
1 TABLET ORAL
Qty: 21 TAB | Refills: 0 | Status: SHIPPED | OUTPATIENT
Start: 2020-10-20 | End: 2020-10-27 | Stop reason: SDUPTHER

## 2020-10-21 DIAGNOSIS — Z12.11 ENCOUNTER FOR SCREENING COLONOSCOPY: Primary | ICD-10-CM

## 2020-10-21 RX ORDER — POLYETHYLENE GLYCOL 3350 17 G/17G
POWDER, FOR SOLUTION ORAL
Qty: 238 G | Refills: 0 | Status: SHIPPED | OUTPATIENT
Start: 2020-10-21 | End: 2021-05-05

## 2020-10-21 RX ORDER — BISACODYL 5 MG
TABLET, DELAYED RELEASE (ENTERIC COATED) ORAL
Qty: 4 TAB | Refills: 0 | Status: SHIPPED | OUTPATIENT
Start: 2020-10-21 | End: 2021-05-05

## 2020-10-23 ENCOUNTER — HOSPITAL ENCOUNTER (OUTPATIENT)
Dept: LAB | Age: 55
Discharge: HOME | End: 2020-10-23
Payer: MEDICAID

## 2020-10-23 PROCEDURE — 87635 SARS-COV-2 COVID-19 AMP PRB: CPT

## 2020-10-24 LAB — SARS-COV-2, COV2NT: NOT DETECTED

## 2020-10-27 ENCOUNTER — TELEPHONE (OUTPATIENT)
Dept: ORTHOPEDIC SURGERY | Age: 55
End: 2020-10-27

## 2020-10-27 ENCOUNTER — ANESTHESIA EVENT (OUTPATIENT)
Dept: ENDOSCOPY | Age: 55
End: 2020-10-27
Payer: MEDICAID

## 2020-10-27 ENCOUNTER — OFFICE VISIT (OUTPATIENT)
Dept: ORTHOPEDIC SURGERY | Age: 55
End: 2020-10-27
Payer: MEDICAID

## 2020-10-27 VITALS
SYSTOLIC BLOOD PRESSURE: 115 MMHG | WEIGHT: 215 LBS | TEMPERATURE: 97.5 F | HEART RATE: 84 BPM | BODY MASS INDEX: 31.84 KG/M2 | RESPIRATION RATE: 18 BRPM | DIASTOLIC BLOOD PRESSURE: 75 MMHG | HEIGHT: 69 IN

## 2020-10-27 DIAGNOSIS — Z98.890 STATUS POST ARTHROSCOPY OF RIGHT SHOULDER: ICD-10-CM

## 2020-10-27 DIAGNOSIS — M75.111 NONTRAUMATIC INCOMPLETE TEAR OF RIGHT ROTATOR CUFF: Primary | ICD-10-CM

## 2020-10-27 DIAGNOSIS — G89.18 POST-OP PAIN: Primary | ICD-10-CM

## 2020-10-27 PROCEDURE — 99024 POSTOP FOLLOW-UP VISIT: CPT | Performed by: ORTHOPAEDIC SURGERY

## 2020-10-27 RX ORDER — HYDROCODONE BITARTRATE AND ACETAMINOPHEN 7.5; 325 MG/1; MG/1
1 TABLET ORAL
Qty: 21 TAB | Refills: 0 | Status: SHIPPED | OUTPATIENT
Start: 2020-10-27 | End: 2020-11-03

## 2020-10-27 NOTE — TELEPHONE ENCOUNTER
Verified patient's name and date of birth. Let patient know that Tonya sent in a rx to his pharmacy.

## 2020-10-27 NOTE — TELEPHONE ENCOUNTER
Patient called stating that he forgot to ask about pain medication and was wondering if he can get a prescription.  Please advise patient at [phone]

## 2020-10-27 NOTE — PROGRESS NOTES
Alessia Burnett  1965     HISTORY OF PRESENT ILLNESS  Alessia Burnett is a 53 y.o. male who presents today for evaluation S/P Right shoulder arthroscopic rotator cuff re-tear repair, biceps tenodesis, distal clavicle excision and subacromial decompression on 9/21/20. Patient has not been going to PT. Describes pain as a 4/10. Has been taking roxicodone for pain. Still has night pain. He has keep compliant with his sling and restrictions. Patient denies any fever, chills, chest pain, shortness of breath or calf pain. There are no new illness or injuries to report since last seen in the office. PHYSICAL EXAM:   Visit Vitals  /75 (BP 1 Location: Left arm, BP Patient Position: Sitting)   Pulse 84   Temp 97.5 °F (36.4 °C)   Resp 18   Ht 5' 9\" (1.753 m)   Wt 215 lb (97.5 kg)   BMI 31.75 kg/m²      The patient is a well-developed, well-nourished male in no acute distress. The patient is alert and oriented times three. The patient appears to be well groomed. Mood and affect are normal.  ORTHOPEDIC EXAM of right shoulder:  Inspection: swelling not present,  Bruising not present  Incisions well healed  Passive glenohumeral abduction 0-40 degrees, 50 PFF, 10 PER  Stability: Stable  Strength: n/a  2+ distal pulses    IMPRESSION:  S/P Right shoulder arthroscopic rotator cuff re-tear repair, biceps tenodesis, distal clavicle excision and subacromial decompression. PLAN:   Pt doing well post operatively  He is stiff on exam today but that is expected at this point. D/C sling today. Given exercises and PT order today. He will get this started on this now. Stressed to patient that nothing causes an increase in pain. Pt not given a refill of pain medication.   RTC 4 weeks    [facility] and Spine Specialist

## 2020-10-28 ENCOUNTER — ANESTHESIA (OUTPATIENT)
Dept: ENDOSCOPY | Age: 55
End: 2020-10-28
Payer: MEDICAID

## 2020-10-28 ENCOUNTER — HOSPITAL ENCOUNTER (OUTPATIENT)
Age: 55
Setting detail: OUTPATIENT SURGERY
Discharge: HOME | End: 2020-10-28
Attending: COLON & RECTAL SURGERY | Admitting: COLON & RECTAL SURGERY
Payer: MEDICAID

## 2020-10-28 VITALS
TEMPERATURE: 98.4 F | OXYGEN SATURATION: 97 % | HEART RATE: 63 BPM | WEIGHT: 205 LBS | HEIGHT: 69 IN | SYSTOLIC BLOOD PRESSURE: 105 MMHG | DIASTOLIC BLOOD PRESSURE: 71 MMHG | BODY MASS INDEX: 30.36 KG/M2 | RESPIRATION RATE: 17 BRPM

## 2020-10-28 LAB
AMPHET UR QL SCN: POSITIVE
BARBITURATES UR QL SCN: NEGATIVE
BENZODIAZ UR QL: NEGATIVE
CANNABINOIDS UR QL SCN: NEGATIVE
COCAINE UR QL SCN: NEGATIVE
HDSCOM,HDSCOM: ABNORMAL
METHADONE UR QL: NEGATIVE
OPIATES UR QL: POSITIVE
PCP UR QL: NEGATIVE

## 2020-10-28 PROCEDURE — 88305 TISSUE EXAM BY PATHOLOGIST: CPT

## 2020-10-28 PROCEDURE — 45380 COLONOSCOPY AND BIOPSY: CPT | Performed by: COLON & RECTAL SURGERY

## 2020-10-28 PROCEDURE — 2709999900 HC NON-CHARGEABLE SUPPLY: Performed by: COLON & RECTAL SURGERY

## 2020-10-28 PROCEDURE — 76060000032 HC ANESTHESIA 0.5 TO 1 HR: Performed by: COLON & RECTAL SURGERY

## 2020-10-28 PROCEDURE — 74011000250 HC RX REV CODE- 250: Performed by: ANESTHESIOLOGY

## 2020-10-28 PROCEDURE — 36415 COLL VENOUS BLD VENIPUNCTURE: CPT

## 2020-10-28 PROCEDURE — 77030021593 HC FCPS BIOP ENDOSC BSC -A: Performed by: COLON & RECTAL SURGERY

## 2020-10-28 PROCEDURE — 80307 DRUG TEST PRSMV CHEM ANLYZR: CPT

## 2020-10-28 PROCEDURE — 74011250637 HC RX REV CODE- 250/637: Performed by: NURSE ANESTHETIST, CERTIFIED REGISTERED

## 2020-10-28 PROCEDURE — 74011250636 HC RX REV CODE- 250/636: Performed by: ANESTHESIOLOGY

## 2020-10-28 PROCEDURE — 74011250636 HC RX REV CODE- 250/636: Performed by: NURSE ANESTHETIST, CERTIFIED REGISTERED

## 2020-10-28 PROCEDURE — 00812 ANES LWR INTST SCR COLSC: CPT | Performed by: ANESTHESIOLOGY

## 2020-10-28 PROCEDURE — 76040000007: Performed by: COLON & RECTAL SURGERY

## 2020-10-28 RX ORDER — FAMOTIDINE 20 MG/1
20 TABLET, FILM COATED ORAL ONCE
Status: COMPLETED | OUTPATIENT
Start: 2020-10-28 | End: 2020-10-28

## 2020-10-28 RX ORDER — PROPOFOL 10 MG/ML
INJECTION, EMULSION INTRAVENOUS AS NEEDED
Status: DISCONTINUED | OUTPATIENT
Start: 2020-10-28 | End: 2020-10-28 | Stop reason: HOSPADM

## 2020-10-28 RX ORDER — LIDOCAINE HYDROCHLORIDE 20 MG/ML
INJECTION, SOLUTION EPIDURAL; INFILTRATION; INTRACAUDAL; PERINEURAL AS NEEDED
Status: DISCONTINUED | OUTPATIENT
Start: 2020-10-28 | End: 2020-10-28 | Stop reason: HOSPADM

## 2020-10-28 RX ORDER — SODIUM CHLORIDE, SODIUM LACTATE, POTASSIUM CHLORIDE, CALCIUM CHLORIDE 600; 310; 30; 20 MG/100ML; MG/100ML; MG/100ML; MG/100ML
50 INJECTION, SOLUTION INTRAVENOUS CONTINUOUS
Status: DISCONTINUED | OUTPATIENT
Start: 2020-10-28 | End: 2020-10-28 | Stop reason: HOSPADM

## 2020-10-28 RX ADMIN — PROPOFOL 30 MG: 10 INJECTION, EMULSION INTRAVENOUS at 14:25

## 2020-10-28 RX ADMIN — FAMOTIDINE 20 MG: 20 TABLET ORAL at 13:20

## 2020-10-28 RX ADMIN — PROPOFOL 30 MG: 10 INJECTION, EMULSION INTRAVENOUS at 14:28

## 2020-10-28 RX ADMIN — SODIUM CHLORIDE, SODIUM LACTATE, POTASSIUM CHLORIDE, AND CALCIUM CHLORIDE 50 ML/HR: 600; 310; 30; 20 INJECTION, SOLUTION INTRAVENOUS at 13:20

## 2020-10-28 RX ADMIN — PROPOFOL 50 MG: 10 INJECTION, EMULSION INTRAVENOUS at 14:14

## 2020-10-28 RX ADMIN — PROPOFOL 30 MG: 10 INJECTION, EMULSION INTRAVENOUS at 14:19

## 2020-10-28 RX ADMIN — PROPOFOL 50 MG: 10 INJECTION, EMULSION INTRAVENOUS at 14:15

## 2020-10-28 RX ADMIN — PROPOFOL 30 MG: 10 INJECTION, EMULSION INTRAVENOUS at 14:22

## 2020-10-28 RX ADMIN — SODIUM CHLORIDE, SODIUM LACTATE, POTASSIUM CHLORIDE, AND CALCIUM CHLORIDE: 600; 310; 30; 20 INJECTION, SOLUTION INTRAVENOUS at 14:09

## 2020-10-28 RX ADMIN — LIDOCAINE HYDROCHLORIDE 80 MG: 20 INJECTION, SOLUTION EPIDURAL; INFILTRATION; INTRACAUDAL; PERINEURAL at 14:14

## 2020-10-28 RX ADMIN — PROPOFOL 30 MG: 10 INJECTION, EMULSION INTRAVENOUS at 14:31

## 2020-10-28 NOTE — H&P
HPI: Mitul Alarcon is a 53 y.o. male presenting with chief complain of need for crc screen    Past Medical History:   Diagnosis Date    Depression     and anxiety    Foot drop, left foot     . Seen by Podiatry in 2019    Headache     Psychotic disorder ([address]     anxiety       Past Surgical History:   Procedure Laterality Date    HX HERNIA REPAIR      HX HERNIA REPAIR      Inguinal hernia- right side    HX KNEE ARTHROSCOPY Right     HX ORTHOPAEDIC Left 2017    lacerations to fingers and had pins, left thumb    HX ORTHOPAEDIC      Right knee surgery    HX OTHER SURGICAL  2017    Left hand surgery x 3    HX ROTATOR CUFF REPAIR [facility]    [facility]     HX ROTATOR CUFF REPAIR Left 10/21/2019    HX ROTATOR CUFF REPAIR  2000    Right shoulder    [place] ANESTH,SURGERY OF SHOULDER Right        Family History   Problem Relation Age of Onset    Heart Disease Mother     Other Mother         Lung disease.  COPD Mother        Social History     Socioeconomic History    Marital status:      Spouse name: Not on file    Number of children: Not on file    Years of education: Not on file    Highest education level: Not on file   Tobacco Use    Smoking status: Former Smoker     Years: 2.00     Last attempt to quit: 2000     Years since quittin.8    Smokeless tobacco: Current User    Tobacco comment: Starting Chewing to bacco in .    Substance and Sexual Activity    Alcohol use: Not Currently     Comment: states he quit drinking alchohol 2 months ago    Drug use: Not Currently     Types: Marijuana    Sexual activity: Not Currently   Social History Narrative    ** Merged History Encounter **            Review of Systems - neg    Outpatient Medications Marked as Taking for the 10/28/20 encounter [facility] Encounter)   Medication Sig Dispense Refill    HYDROcodone-acetaminophen (Norco) 7.5-325 mg per tablet Take 1 Tab by mouth every eight (8) hours as needed for Pain for up to 7 days. Max Daily Amount: 3 Tabs. 21 Tab 0    busPIRone (BUSPAR) 10 mg tablet Take 10 mg by mouth two (2) times a day.  bisacodyL (Dulcolax, bisacodyl,) 5 mg EC tablet Take tablets as directed for bowel prep. 4 Tab 0    [DISCONTINUED] HYDROcodone-acetaminophen (Norco) 7.5-325 mg per tablet Take 1 Tab by mouth every eight (8) hours as needed for Pain for up to 7 days. Max Daily Amount: 3 Tabs. 21 Tab 0    hydrocortisone (ANUSOL-HC) 2.5 % rectal cream Insert  into rectum four (4) times daily. 30 g 1    mirtazapine (REMERON) 30 mg tablet       dextroamphetamine-amphetamine (AdderalL) 10 mg tablet Take 10 mg by mouth three (3) times daily.  Latuda 60 mg tab tablet       escitalopram oxalate (LEXAPRO) 20 mg tablet       gabapentin (Neurontin) 800 mg tablet Take 1 Tab by mouth daily (after breakfast). Max Daily Amount: 800 mg. Indications: neuropathic pain 90 Tab 1    amitriptyline (ELAVIL) 25 mg tablet Take 3 Tabs by mouth nightly. 270 Tab 1       Allergies   Allergen Reactions    Motrin [Ibuprofen] Hives    Motrin [Ibuprofen] Rash       Vitals:    10/22/20 0920 10/28/20 1309   BP:  105/70   Pulse:  64   Resp:  16   Temp:  98.4 °F (36.9 °C)   SpO2:  99%   Weight: 93 kg (205 lb) 93 kg (205 lb)   Height: 5' 9\" (1.753 m) 5' 9\" (1.753 m)       Physical Exam  Constitutional:       Appearance: He is well-developed. HENT:      Head: Normocephalic and atraumatic. Eyes:      Conjunctiva/sclera: Conjunctivae normal.   Abdominal:      General: There is no distension. Palpations: Abdomen is soft. There is no mass. Tenderness: There is no abdominal tenderness. Musculoskeletal: Normal range of motion. Lymphadenopathy:      Cervical: No cervical adenopathy. Skin:     General: Skin is warm and dry. Neurological:      Sensory: No sensory deficit. Psychiatric:         Speech: Speech normal.         Assessment / Plan    colonoscopy    The diagnoses and plan were discussed with the patient.  All questions answered. Plan of care agreed to by all concerned.

## 2020-10-28 NOTE — PROCEDURES
[facility]  [address], [address] [facility],[address]   [place], [address]  [phone]                    Colonoscopy Procedure Note      Santo Waldrop  1965  [id number]                Date of Procedure: 10/28/2020    Preoperative diagnosis: Colon cancer Screening:   Z12.11    Postoperative diagnosis: colon polyps (hepatic flexure; sigmoid)    :  Leopold Mcclintock, MD    Assistant(s): Endoscopy Technician-1: Kinsey Montiel  Endoscopy RN-1: Sangeetha Espinal RN    Sedation: MAC    Complications: None    Implants: None    Procedure Details:  Prior to the procedure, a history and physical were performed. The patients medications, allergies and sensitivities were reviewed and all questions were answered. After informed consent was obtained for the procedure, with all risks and benefits of procedure explained. The patient was taken to the endoscopy suite and placed in the left lateral decubitus position. Patient identification and proposed procedure were verified prior to the procedure by the nurse and I. After sequential anesthesia administered by anesthesiologist, a digital rectal exam was performed and was normal.  The Olympus video colonoscope was introduced through the anus and advanced to cecum, which was identified by the ileocecal valve and appendiceal orifice. The quality of preparation was good. The colonoscope was slowly withdrawn and the mucosa examined for any abnormalities. Cecal withdrawal time was greater than 6 minutes. The patient tolerated the procedure well. There were no complications. Findings/Interventions:   Polyps - #1, 5 mm in size, located in the hepatic flexure, removed by cold biopsy and sent for pathology, - #2, 5 mm in size, located in the sigmoid, removed by cold biopsy and sent for pathology    EBL: none    Recommendations: -Repeat colonoscopy in 5 years.    NO aspirin for 5 days     Discharge Disposition:  Home  Leopold Mcclintock, MD  10/28/2020 2:38 PM

## 2020-10-28 NOTE — DISCHARGE INSTRUCTIONS
FOLLOW UP VISIT Appointment in: Other (Specify) No aspirin or ibuprofen (e.g. Aleve, Motrin, Advil) for 5 days. Repeat colonoscopy in 5 year(      Colonoscopy: What to Expect at [address] [place] [address]  After you have a colonoscopy, you will stay at the clinic for 1 to 2 hours until the medicines wear off. Then you can go home. But you will need to arrange for a ride. Your doctor will tell you when you can eat and do your other usual activities. Your doctor will talk to you about when you will need your next colonoscopy. Your doctor can help you decide how often you need to be checked. This will depend on the results of your test and your risk for colorectal cancer. After the test, you may be bloated or have gas pains. You may need to pass gas. If a biopsy was done or a polyp was removed, you may have streaks of blood in your stool (feces) for a few days. This care sheet gives you a general idea about how long it will take for you to recover. But each person recovers at a different pace. Follow the steps below to get better as quickly as possible. How can you care for yourself at home? Activity  · Rest when you feel tired. · You can do your normal activities when it feels okay to do so. Diet  · Follow your doctor's directions for eating. · Unless your doctor has told you not to, drink plenty of fluids. This helps to replace the fluids that were lost during the colon prep. · Do not drink alcohol. Medicines  · If polyps were removed or a biopsy was done during the test, your doctor may tell you not to take aspirin or other anti-inflammatory medicines for a few days. These include ibuprofen (Advil, Motrin) and naproxen (Aleve). Other instructions  · For your safety, do not drive or operate machinery until the medicine wears off and you can think clearly.  Your doctor may tell you not to drive or operate machinery until the day after your test.  · Do not sign legal documents or make major decisions until the medicine wears off and you can think clearly. The anesthesia can make it hard for you to fully understand what you are agreeing to. Follow-up care is a key part of your treatment and safety. Be sure to make and go to all appointments, and call your doctor if you are having problems. It's also a good idea to know your test results and keep a list of the medicines you take. When should you call for help? Call 911 anytime you think you may need emergency care. For example, call if:  · You passed out (lost consciousness). · You pass maroon or bloody stools. · You have severe belly pain. Call your doctor now or seek immediate medical care if:  · Your stools are black and tarlike. · Your stools have streaks of blood, but you did not have a biopsy or any polyps removed. · You have belly pain, or your belly is swollen and firm. · You vomit. · You have a fever. · You are very dizzy. Watch closely for changes in your health, and be sure to contact your doctor if you have any problems. Where can you learn more? Go to TouristWay.be  Enter E264 in the search box to learn more about \"Colonoscopy: What to Expect at Home. \"   © 6763-3358 Healthwise, Incorporated. Care instructions adapted under license by [employer] (which disclaims liability or warranty for this information). This care instruction is for use with your licensed healthcare professional. If you have questions about a medical condition or this instruction, always ask your healthcare professional. [place] [address] any warranty or liability for your use of this information. Content Version: 95.9.049772; Current as of: November 14, 2014      DISCHARGE SUMMARY from Nurse     POST-PROCEDURE INSTRUCTIONS:    Call your Physician if you:  ? Observe any excess bleeding. ? Develop a temperature over 100.5o F.  ? Experience abdominal, shoulder or chest pain. ?  Notice any signs of decreased circulation or nerve impairment to an extremity such as a change in color, persistent numbness, tingling, coldness or increase in pain. ? Vomit blood or you have nausea and vomiting lasting longer than 4 hours. ? Are unable to take medications. ? Are unable to urinate within 8 hours after discharge following general anesthesia or intravenous sedation. For the next 24 hours after receiving general anesthesia or intravenous sedation, or while taking prescription Narcotics, limit your activities:  ? Do NOT drive a motor vehicle, operate hazard machinery or power tools, or perform tasks that require coordination. The medication you received during your procedure may have some effect on your mental awareness. ? Do NOT make important personal or business decisions. The medication you received during your procedure may have some effect on your mental awareness. ? Do NOT drink alcoholic beverages. These drinks do not mix well with the medications that have been given to you. ? Upon discharge from the hospital, you must be accompanied by a responsible adult. ? Resume your diet as directed by your physician. ? Resume medications as your physician has prescribed. ? Please give a list of your current medications to your Primary Care Provider. ? Please update this list whenever your medications are discontinued, doses are changed, or new medications (including over-the-counter products) are added. ? Please carry medication information at all times in case of emergency situations. These are general instructions for a healthy lifestyle:    No smoking/ No tobacco products/ Avoid exposure to second hand smoke.  Surgeon General's Warning:  Quitting smoking now greatly reduces serious risk to your health. Obesity, smoking, and a sedentary lifestyle greatly increase your risk for illness.    A healthy diet, regular physical exercise & weight monitoring are important for maintaining a healthy lifestyle   You may be retaining fluid if you have a history of heart failure or if you experience any of the following symptoms:  Weight gain of 3 pounds or more overnight or 5 pounds in a week, increased swelling in our hands or feet or shortness of breath while lying flat in bed. Please call your doctor as soon as you notice any of these symptoms; do not wait until your next office visit. Recognize signs and symptoms of STROKE:  F  -  Face looks uneven  A  -  Arms unable to move or move unevenly  S  -  Speech slurred or non-existent  T  -  Time to call 911 - as soon as signs and symptoms begin - DO NOT go back to bed or wait to see If you get better - TIME IS BRAIN. Colorectal Screening   Colorectal cancer almost always develops from precancerous polyps (abnormal growths) in the colon or rectum. Screening tests can find precancerous polyps, so that they can be removed before they turn into cancer. Screening tests can also find colorectal cancer early, when treatment works best.  [facility] Speak with your physician about when you should begin screening and how often you should be tested. Additional Information    If you have questions, please call [phone]. Remember, [employer] is NOT to be used for urgent needs. For medical emergencies, dial 911. Educational references and/or instructions provided during this visit included:    see attachments      APPOINTMENTS:    Please make a follow-up appointment with your physician. Discharge information has been reviewed with the patient and responsible party. The patient and responsible party verbalized understanding. Patient Education        Colon Polyps: Care Instructions  Your Care Instructions     Colon polyps are growths in the colon or the rectum. The cause of most colon polyps is not known, and most people who get them do not have any problems. But a certain kind can turn into cancer.  For this reason, regular testing for colon polyps is important for people as they get older. It is also important for anyone who has an increased risk for colon cancer. Polyps are usually found through routine colon cancer screening tests. Although most colon polyps are not cancerous, they are usually removed and then tested for cancer. Screening for colon cancer saves lives because the cancer can usually be cured if it is caught early. If you have a polyp that is the type that can turn into cancer, you may need more tests to examine your entire colon. The doctor will remove any other polyps that he or she finds, and you will be tested more often. Follow-up care is a key part of your treatment and safety. Be sure to make and go to all appointments, and call your doctor if you are having problems. It's also a good idea to know your test results and keep a list of the medicines you take. How can you care for yourself at home? Regular exams to look for colon polyps are the best way to prevent polyps from turning into colon cancer. These can include stool tests, sigmoidoscopy, colonoscopy, and CT colonography. Talk with your doctor about a testing schedule that is right for you. To prevent polyps  There is no home treatment that can prevent colon polyps. But these steps may help lower your risk for cancer. · Stay active. Being active can help you get to and stay at a healthy weight. Try to exercise on most days of the week. Walking is a good choice. · Eat well. Choose a variety of vegetables, fruits, legumes (such as peas and beans), fish, poultry, and whole grains. · Do not smoke. If you need help quitting, talk to your doctor about stop-smoking programs and medicines. These can increase your chances of quitting for good. · If you drink alcohol, limit how much you drink. Limit alcohol to 2 drinks a day for men and 1 drink a day for women. When should you call for help?    Call your doctor now or seek immediate medical care if:    · You have severe belly pain.     · Your stools are maroon or very bloody. Watch closely for changes in your health, and be sure to contact your doctor if:    · You have a fever.     · You have nausea or vomiting.     · You have a change in bowel habits (new constipation or diarrhea).     · Your symptoms get worse or are not improving as expected. Where can you learn more? Go to http://www.marie.com/  Enter C571 in the search box to learn more about \"Colon Polyps: Care Instructions. \"  Current as of: April 29, 2020               Content Version: 12.6  © 0868-8540 [employer]. Care instructions adapted under license by [employer] (which disclaims liability or warranty for this information). If you have questions about a medical condition or this instruction, always ask your healthcare professional. [address] any warranty or liability for your use of this information.

## 2020-10-28 NOTE — ANESTHESIA POSTPROCEDURE EVALUATION
Procedure(s):  COLONOSCOPY w/polypectomies. MAC    Anesthesia Post Evaluation      Multimodal analgesia: multimodal analgesia used between 6 hours prior to anesthesia start to PACU discharge  Patient location during evaluation: bedside  Patient participation: complete - patient participated  Level of consciousness: awake  Pain score: 0  Airway patency: patent  Anesthetic complications: no  Cardiovascular status: acceptable  Respiratory status: acceptable  Hydration status: acceptable  Post anesthesia nausea and vomiting:  none      INITIAL Post-op Vital signs:   Vitals Value Taken Time   /66 10/28/2020  2:47 PM   Temp     Pulse 73 10/28/2020  2:49 PM   Resp 15 10/28/2020  2:49 PM   SpO2 97 % 10/28/2020  2:49 PM   Vitals shown include unvalidated device data.

## 2020-10-28 NOTE — ANESTHESIA PREPROCEDURE EVALUATION
Relevant Problems   No relevant active problems       Anesthetic History   No history of anesthetic complications            Review of Systems / Medical History  Patient summary reviewed, nursing notes reviewed and pertinent labs reviewed    Pulmonary  Within defined limits        Undiagnosed apnea         Neuro/Psych         Psychiatric history     Cardiovascular                  Exercise tolerance: >4 METS     GI/Hepatic/Renal  Within defined limits              Endo/Other        Obesity     Other Findings              Physical Exam    Airway  Mallampati: II  TM Distance: 4 - 6 cm  Neck ROM: normal range of motion        Cardiovascular               Dental         Pulmonary                 Abdominal         Other Findings            Anesthetic Plan    ASA: 3  Anesthesia type: MAC          Induction: Intravenous  Anesthetic plan and risks discussed with: Patient

## 2020-11-04 ENCOUNTER — HOSPITAL ENCOUNTER (OUTPATIENT)
Dept: PHYSICAL THERAPY | Age: 55
Discharge: HOME | End: 2020-11-04
Payer: MEDICAID

## 2020-11-04 PROCEDURE — 97161 PT EVAL LOW COMPLEX 20 MIN: CPT

## 2020-11-04 NOTE — PROGRESS NOTES
PT DAILY TREATMENT NOTE  10-18    Patient Name: Pedro Weeks  Date:2020  : 1965  [x]  Patient  Verified  Payor: Rajiv Trujillo / Plan: [id number] / Product Type: Managed Care Medicaid /    In time: 3:51  Out time: 4:34  Total Treatment Time (min): 43  Visit #: 1 of 10    Treatment Area: Shoulder pain [M25.519]    SUBJECTIVE  Pain Level (0-10 scale): 6  Any medication changes, allergies to medications, adverse drug reactions, diagnosis change, or new procedure performed?: [x] No    [] Yes (see summary sheet for update)  Subjective functional status/changes:   [] No changes reported  See POC    OBJECTIVE    21 min [x]Eval                  []Re-Eval     10 min Therapeutic Exercise:  [] See flow sheet : HEP instruction and demonstration   Rationale: increase ROM and increase strength to improve the patients ability to tolerate ADLs    12 min Self Care/Home Management: []  See flow sheet : pt education regarding anatomy and physiology of the UEs and how it relates to the pt's condition, pt education on using ice for 15-20 mins as needed to decrease pain/symptoms, pt education on avoiding AROM of the right shoulder/elbow at this time per protocol to avoid pain and injury. Rationale: increase ROM, increase strength and decrease pain/symptoms  to improve the patients ability to tolerate functional tasks. With   [x] TE   [] TA   [] neuro   [x] Other: Self Care/Home management Patient Education: [x] Review HEP    [] Progressed/Changed HEP based on:   [] positioning   [] body mechanics   [] transfers   [] heat/ice application    [] other:      Other Objective/Functional Measures: See evaluation. Pain Level (0-10 scale) post treatment: 6    ASSESSMENT/Changes in Function: Pt given HEP handout to perform. Pt understood exercises in HEP handout. Pt demonstrated decreased PROM, muscle tightness and impaired posture.  Pt given extensive pt education on being in the PROM phase of the protocol at this time and to avoid AROM of the right shoulder/elbow. Pt would benefit from physical therapy to improve the above impairments to help the pt return to performing ADLs and functional activities. Patient will continue to benefit from skilled PT services to modify and progress therapeutic interventions, address functional mobility deficits, address ROM deficits, address strength deficits, analyze and address soft tissue restrictions, analyze and cue movement patterns, analyze and modify body mechanics/ergonomics, assess and modify postural abnormalities and instruct in home and community integration to attain remaining goals. [x]  See Plan of Care  []  See progress note/recertification  []  See Discharge Summary         Progress towards goals / Updated goals:  Short Term Goals: To be accomplished in 2 treatments:  1. Pt will report compliance and independence to [facility] to help the pt manage their pain and symptoms. Eval: established   Long Term Goals: To be accomplished in 10 treatments:  1. Pt will report an improvement in at worst pain to 6/10 to improve ability to perform ADLs. Eval: 10/10 at worst  2. Pt will report being able to sleep for 3 hours or more at a time to improve sleep quality. Eval: reports getting 1-2 hours of sleep at a time  3. Pt will increase PROM right shoulder flex/ABD to 140 degs, ER to 60 degs, IR to 55 degs to improve the pt's ability to progress through protocol. Eval: flex 105 degs,  degs, ER 50 degs (at 90/90 ABD), IR 46 degs (at 90/90 ABD).     PLAN  [x]  Upgrade activities as tolerated     [x]  Continue plan of care  [x]  Update interventions per flow sheet       []  Discharge due to:_  []  Other:_      Kika Eduardo, PT 11/4/2020  4:56 PM    Future Appointments   Date Time Provider Abdullahi Horn   11/6/2020  7:30 AM Bijal Jimenez, PTA [facility]   11/9/2020  6:00 PM Lucero Alvarado, PT [facility]   11/13/2020  7:30 AM Bijal Jimenez, PTA [facility]   11/16/2020  7:30 AM Mariela Kobe, PTA [facility]   11/20/2020  7:30 AM Mariela Kobe, PTA [facility]   11/30/2020 10:30 AM Mariela Bullock, PTA [facility]   12/4/2020 11:15 AM Joaquin Monreal, PT [facility]   12/4/2020  2:00 PM JOHN Katz [facility]

## 2020-11-04 NOTE — PROGRESS NOTES
[facility]  [address] [place] [address]  [phone] fax    Plan of Care/ Statement of Necessity for Physical Therapy Services  Patient name: Alessia Burnett Start of Care: 2020   Referral source: Vidal Grimaldo MD : 1965    Medical Diagnosis: Shoulder pain [M25.519]  Payor: Jonny Garnica / Plan: [facility]hav[facility] / Product Type: Managed Care Medicaid /  Onset Date: DOS 2020    Treatment Diagnosis: right shoulder pain   Prior Hospitalization: see medical history Provider#: [id number]   Medications: Verified on Patient summary List    Comorbidities: hx right shoulder surgery ~20 years ago, left shoulder surgery 2019   Prior Level of Function: Independent with ADLs      The Plan of Care and following information is based on the information from the initial evaluation. Assessment/ key information:   Pt is a 53year old male who presents to therapy today with right shoulder pain s/p RCR, bicep tenodesis, distal clavicle excision, and SAD DOS 2020 . Pt denies any complications with the surgery and MD note states he was d/c'ed from his sling. Pt demonstrated decreased PROM, muscle tightness and impaired posture. Pt given extensive pt education on being in the PROM phase of the protocol at this time and to avoid AROM of the right shoulder/elbow. Pt would benefit from physical therapy to improve the above impairments to help the pt return to performing ADLs and functional activities.      Evaluation Complexity History MEDIUM  Complexity : 1-2 comorbidities / personal factors will impact the outcome/ POC ; Examination LOW Complexity : 1-2 Standardized tests and measures addressing body structure, function, activity limitation and / or participation in recreation  ;Presentation LOW Complexity : Stable, uncomplicated  ;Clinical Decision Making MEDIUM Complexity : FOTO score of 26-74  Overall Complexity Rating: LOW   Problem List: pain affecting function, decrease ROM, decrease strength, edema affecting function, decrease ADL/ functional abilitiies, decrease activity tolerance, decrease flexibility/ joint mobility and decrease transfer abilities   Treatment Plan may include any combination of the following: Therapeutic exercise, Therapeutic activities, Neuromuscular re-education, Physical agent/modality, Manual therapy, Patient education, Self Care training, Functional mobility training and Home safety training  Patient / Family readiness to learn indicated by: asking questions, trying to perform skills and interest  Persons(s) to be included in education: patient (P)  Barriers to Learning/Limitations: None  Patient Goal (s): strength/mobility  Patient Self Reported Health Status: good  Rehabilitation Potential: good    Short Term Goals: To be accomplished in 2 treatments:  1. Pt will report compliance and independence to HEP to help the pt manage their pain and symptoms. Eval: established   Long Term Goals: To be accomplished in 10 treatments:  1. Pt will report an improvement in at worst pain to 6/10 to improve ability to perform ADLs. Eval: 10/10 at worst  2. Pt will report being able to sleep for 3 hours or more at a time to improve sleep quality. Eval: reports getting 1-2 hours of sleep at a time  3. Pt will increase PROM right shoulder flex/ABD to 140 degs, ER to 60 degs, IR to 55 degs to improve the pt's ability to progress through protocol. Eval: flex 105 degs,  degs, ER 50 degs (at 90/90 ABD), IR 46 degs (at 90/90 ABD). Frequency / Duration: Patient to be seen 2 times per week for 10 treatments.     Patient/ Caregiver education and instruction: Diagnosis, prognosis, self care, activity modification and exercises   [x]  Plan of care has been reviewed with CORBY Morales, PT 11/4/2020 4:56 PM  _____________________________________________________________________  I certify that the above Therapy Services are being furnished while the patient is under my care. I agree with the treatment plan and certify that this therapy is necessary.     Physician's Signature:____________________  Date:__________Time:______    Please sign and return to [facility]  [address] [place] [address]  [phone] fax

## 2020-11-06 ENCOUNTER — HOSPITAL ENCOUNTER (OUTPATIENT)
Dept: PHYSICAL THERAPY | Age: 55
Discharge: HOME | End: 2020-11-06
Payer: MEDICAID

## 2020-11-06 ENCOUNTER — TELEPHONE (OUTPATIENT)
Dept: SURGERY | Age: 55
End: 2020-11-06

## 2020-11-06 ENCOUNTER — APPOINTMENT (OUTPATIENT)
Dept: PHYSICAL THERAPY | Age: 55
End: 2020-11-06

## 2020-11-06 PROCEDURE — 97110 THERAPEUTIC EXERCISES: CPT

## 2020-11-06 PROCEDURE — 97112 NEUROMUSCULAR REEDUCATION: CPT

## 2020-11-06 PROCEDURE — 97530 THERAPEUTIC ACTIVITIES: CPT

## 2020-11-06 NOTE — PROGRESS NOTES
PT DAILY TREATMENT NOTE     Patient Name: Bettie Navas  Date:2020  : 1965  [x]  Patient  Verified  Payor: Karolina Cisse / Plan: [place] [employer] [place] / Product Type: Managed Care Medicaid /    In time:730  Out time:815  Total Treatment Time (min): 45  Visit #: 2 of 10    Treatment Area: Shoulder pain [M25.519]    SUBJECTIVE  Pain Level (0-10 scale): 5/10  Any medication changes, allergies to medications, adverse drug reactions, diagnosis change, or new procedure performed?: [x] No    [] Yes (see summary sheet for update)  Subjective functional status/changes:   [] No changes reported  Pt stated that his shoulder is hurting today    OBJECTIVE    Modality rationale: decrease inflammation and decrease pain to improve the patients ability to increase ease with ADLs   Min Type Additional Details    [] Estim:  []Unatt       []IFC  []Premod                        []Other:  []w/ice   []w/heat  Position:  Location:    [] Estim: []Att    []TENS instruct  []NMES                    []Other:  []w/US   []w/ice   []w/heat  Position:  Location:    []  Traction: [] Cervical       []Lumbar                       [] Prone          []Supine                       []Intermittent   []Continuous Lbs:  [] before manual  [] after manual    []  Ultrasound: []Continuous   [] Pulsed                           []1MHz   []3MHz W/cm2:  Location:    []  Iontophoresis with dexamethasone         Location: [] Take home patch   [] In clinic   10 [x]  Ice     []  heat  []  Ice massage  []  Laser   []  Anodyne Position:seated  Location:right sh    []  Laser with stim  []  Other:  Position:  Location:    []  Vasopneumatic Device Pressure:       [] lo [] med [] hi   Temperature: [] lo [] med [] hi   [x] Skin assessment post-treatment:  [x]intact []redness- no adverse reaction    []redness - adverse reaction:     18 min Therapeutic Exercise:  [x] See flow sheet :   Rationale: increase ROM to improve the patients ability to increase ease with ADLs    8 min Therapeutic Activity:  [x]  See flow sheet :gripper and scap squeezes   Rationale: increase strength, improve coordination and increase proprioception  to improve the patients ability to increase ease with holding objects and perform functional activities     9 min Neuromuscular Re-education:  [x]  See flow sheet :PROM with oscillations and clocks   Rationale: increase strength, improve coordination and increase proprioception  to improve the patients ability to increase ease with functional tasks    With   [x] TE   [] TA   [] neuro   [] other: Patient Education: [x] Review HEP    [] Progressed/Changed HEP based on:   [] positioning   [] body mechanics   [] transfers   [] heat/ice application    [] other:      Other Objective/Functional Measures:   Had slight difficulty with supine wand flex  Had good PROM for all motions with flex most limited     Pain Level (0-10 scale) post treatment: 5/10    ASSESSMENT/Changes in Function:   Initiated therex today per flow sheet. Pt put forth good effort with all exercises. Pt reported compliance with HEP    Patient will continue to benefit from skilled PT services to modify and progress therapeutic interventions, address functional mobility deficits, address ROM deficits, analyze and cue movement patterns, analyze and modify body mechanics/ergonomics, assess and modify postural abnormalities and instruct in home and community integration to attain remaining goals. [x]  See Plan of Care  []  See progress note/recertification  []  See Discharge Summary         Progress towards goals / Updated goals:  Short Term Goals: To be accomplished in 2 treatments:  1. Pt will report compliance and independence to HEP to help the pt manage their pain and symptoms. Goal met. 11/6/20    Long Term Goals: To be accomplished in 10 treatments:  1. Pt will report an improvement in at worst pain to 6/10 to improve ability to perform ADLs.   Eval: 10/10 at worst  2. Pt will report being able to sleep for 3 hours or more at a time to improve sleep quality. Eval: reports getting 1-2 hours of sleep at a time  3. Pt will increase PROM right shoulder flex/ABD to 140 degs, ER to 60 degs, IR to 55 degs to improve the pt's ability to progress through protocol. Eval: flex 105 degs,  degs, ER 50 degs (at 90/90 ABD), IR 46 degs (at 90/90 ABD).     PLAN  []  Upgrade activities as tolerated     [x]  Continue plan of care  []  Update interventions per flow sheet       []  Discharge due to:_  []  Other:_      Christina Boles PTA 11/6/2020  6:39 AM    Future Appointments   Date Time Provider Abdullahi Horn   11/6/2020  7:30 AM Carlos Lesches, PTA [facility]   11/9/2020  6:00 PM Jason Cain, PT [facility]   11/13/2020  7:30 AM Carlos Lesches, PTA [facility]   11/16/2020  7:30 AM Carlos Lesches, PTA [facility]   11/20/2020  7:30 AM Carlos Lesches, PTA [facility]   11/30/2020 10:30 AM Carlos Lesches, PTA [facility]   12/4/2020 11:15 AM Jason Cain, PT [facility]   12/4/2020  2:00 PM JOHN Wellington [facility] BS AMB

## 2020-11-06 NOTE — TELEPHONE ENCOUNTER
----- Message from David Pérez MD sent at 10/30/2020  2:22 PM EDT -----  Benign polyp(s). Repeat colonoscopy in 5 year(s) as planned. Notified patient of pathology results. Tickler placed in recall for 5 years. Patient understands.

## 2020-11-09 ENCOUNTER — HOSPITAL ENCOUNTER (OUTPATIENT)
Dept: PHYSICAL THERAPY | Age: 55
Discharge: HOME | End: 2020-11-09
Payer: MEDICAID

## 2020-11-09 PROCEDURE — 97530 THERAPEUTIC ACTIVITIES: CPT

## 2020-11-09 PROCEDURE — 97112 NEUROMUSCULAR REEDUCATION: CPT

## 2020-11-09 PROCEDURE — 97110 THERAPEUTIC EXERCISES: CPT

## 2020-11-09 NOTE — PROGRESS NOTES
PT DAILY TREATMENT NOTE     Patient Name: Tim Bear  Date:2020  : 1965  [x]  Patient  Verified  Payor: Maia Duranrik / Plan: [employer] MEDICAID / Product Type: Managed Care Medicaid /    In time: 5:59  Out time: 6:46  Total Treatment Time (min): 52  Visit #: 3 of 10    Treatment Area: Shoulder pain [M25.519]    SUBJECTIVE  Pain Level (0-10 scale): 510  Any medication changes, allergies to medications, adverse drug reactions, diagnosis change, or new procedure performed?: [x] No    [] Yes (see summary sheet for update)  Subjective functional status/changes:   [] No changes reported  Pt. Reports he continues to have pain in his shoulder when he lifts his arm. He also reports popping sensation in shoulder.      OBJECTIVE    Modality rationale: decrease inflammation and decrease pain to improve the patients ability to increase ease of ADLs   Min Type Additional Details    [] Estim:  []Unatt       []IFC  []Premod                        []Other:  []w/ice   []w/heat  Position:  Location:    [] Estim: []Att    []TENS instruct  []NMES                    []Other:  []w/US   []w/ice   []w/heat  Position:  Location:    []  Traction: [] Cervical       []Lumbar                       [] Prone          []Supine                       []Intermittent   []Continuous Lbs:  [] before manual  [] after manual    []  Ultrasound: []Continuous   [] Pulsed                           []1MHz   []3MHz W/cm2:  Location:    []  Iontophoresis with dexamethasone         Location: [] Take home patch   [] In clinic   10 [x]  Ice     []  heat  []  Ice massage  []  Laser   []  Anodyne Position: seated  Location: right shoulder    []  Laser with stim  []  Other:  Position:  Location:    []  Vasopneumatic Device Pressure:       [] lo [] med [] hi   Temperature: [] lo [] med [] hi   [x] Skin assessment post-treatment:  [x]intact []redness- no adverse reaction    []redness - adverse reaction:     14 min Therapeutic Exercise: [x] See flow sheet :   Rationale: increase ROM and increase strength to improve the patients ability to increase ease of ADLs    8 min Therapeutic Activity:  [x]  See flow sheet :pt. Educated on avoiding active shoulder movements, education on self massage at home. Rationale: increase ROM and increase strength  to improve the patients ability to increase ease of ADLs     10 min Neuromuscular Re-education:  [x]  See flow sheet : lower trap facilitation, and GH clocks   Rationale: improve coordination  to improve the patients ability to increase ease of reaching    5 min Manual Therapy:   Trigger point release to infraspinatus    The manual therapy interventions were performed at a separate and distinct time from the therapeutic activities interventions. Rationale: decrease pain and increase ROM to increase ease of ADL          With   [x] TE   [] TA   [] neuro   [] other: Patient Education: [x] Review HEP    [] Progressed/Changed HEP based on:   [] positioning   [] body mechanics   [] transfers   [] heat/ice application    [] other:      Other Objective/Functional Measures:   Right shoulder PROM flex: 112 degrees abd: 110 degrees  Pt. Has pain with most activities  He was educated on avoiding pain and active right shoulder movements  He was educated on self massage with ball on wall or theracane     Pain Level (0-10 scale) post treatment:  4/10    ASSESSMENT/Changes in Function: pt. Is making slow progress towards goals. He continues to have moderate pain in right shoulder. He also continues to demonstrate decreased right shoulder PROM in all directions.      Patient will continue to benefit from skilled PT services to modify and progress therapeutic interventions, address functional mobility deficits, address ROM deficits, address strength deficits, analyze and address soft tissue restrictions, analyze and cue movement patterns, analyze and modify body mechanics/ergonomics and assess and modify postural abnormalities to attain remaining goals. Progress towards goals / Updated goals:  Short Term Goals: To be accomplished in 2 treatments:  1. Pt will report compliance and independence to HEP to help the pt manage their pain and symptoms.             Goal met. 11/6/20     Long Term Goals: To be accomplished in 10 treatments:  1. Pt will report an improvement in at worst pain to 6/10 to improve ability to perform ADLs. Eval: 10/10 at worst  Not met: pt. Continues to have increased pain (11/9/20)  2. Pt will report being able to sleep for 3 hours or more at a time to improve sleep quality. Eval: reports getting 1-2 hours of sleep at a time  3. Pt will increase PROM right shoulder flex/ABD to 140 degs, ER to 60 degs, IR to 55 degs to improve the pt's ability to progress through protocol. Eval: flex 105 degs,  degs, ER 50 degs (at 90/90 ABD), IR 46 degs (at 90/90 ABD).     PLAN  []  Upgrade activities as tolerated     [x]  Continue plan of care  []  Update interventions per flow sheet       []  Discharge due to:_  []  Other:_      Danika Lynch PT 11/9/2020  8:16 AM    Future Appointments   Date Time Provider Abdullahi Lovelli   11/9/2020  6:00 PM Zuri Drake [facility]   11/13/2020  7:30 AM Deepali Ahmadi, PTA [facility]   11/16/2020  7:30 AM Deepali Ahmadi, PTA [facility]   11/20/2020  7:30 AM Deepali Neth, PTA [facility]   11/30/2020 10:30 AM Deepali Ahmadi, PTA [facility]   12/4/2020 11:15 AM Cesar Olson PT [facility]   12/4/2020  2:00 PM JOHN Duggan [facility] BS AMB

## 2020-11-13 ENCOUNTER — HOSPITAL ENCOUNTER (OUTPATIENT)
Dept: PHYSICAL THERAPY | Age: 55
Discharge: HOME | End: 2020-11-13
Payer: MEDICAID

## 2020-11-13 PROCEDURE — 97112 NEUROMUSCULAR REEDUCATION: CPT

## 2020-11-13 PROCEDURE — 97110 THERAPEUTIC EXERCISES: CPT

## 2020-11-13 PROCEDURE — 97530 THERAPEUTIC ACTIVITIES: CPT

## 2020-11-13 NOTE — PROGRESS NOTES
PT DAILY TREATMENT NOTE     Patient Name: Alessia Burnett  Date:2020  : 1965  [x]  Patient  Verified  Payor: Jonny Garnica / Plan: [facility] MEDICAID / Product Type: Managed Care Medicaid /    In time:730  Out time:820  Total Treatment Time (min): 50  Visit #: 4 of 10    Treatment Area: Shoulder pain [M25.519]    SUBJECTIVE  Pain Level (0-10 scale): 5/10  Any medication changes, allergies to medications, adverse drug reactions, diagnosis change, or new procedure performed?: [x] No    [] Yes (see summary sheet for update)  Subjective functional status/changes:   [] No changes reported  Pt stated that he is about the same    OBJECTIVE    Modality rationale: decrease inflammation and decrease pain to improve the patients ability to increase ease with ADLs   Min Type Additional Details    [] Estim:  []Unatt       []IFC  []Premod                        []Other:  []w/ice   []w/heat  Position:  Location:    [] Estim: []Att    []TENS instruct  []NMES                    []Other:  []w/US   []w/ice   []w/heat  Position:  Location:    []  Traction: [] Cervical       []Lumbar                       [] Prone          []Supine                       []Intermittent   []Continuous Lbs:  [] before manual  [] after manual    []  Ultrasound: []Continuous   [] Pulsed                           []1MHz   []3MHz W/cm2:  Location:    []  Iontophoresis with dexamethasone         Location: [] Take home patch   [] In clinic   10 [x]  Ice     []  heat  []  Ice massage  []  Laser   []  Anodyne Position: seated  Location:right sh    []  Laser with stim  []  Other:  Position:  Location:    []  Vasopneumatic Device Pressure:       [] lo [] med [] hi   Temperature: [] lo [] med [] hi   [x] Skin assessment post-treatment:  [x]intact []redness- no adverse reaction    []redness - adverse reaction:     20 min Therapeutic Exercise:  [x]?  See flow sheet :   Rationale: increase ROM and increase strength to improve the patients ability to increase ease of ADLs     10 min Therapeutic Activity:  [x]? See flow sheet :pt. Educated on avoiding active shoulder movements, education on self massage at home. Rationale: increase ROM and increase strength  to improve the patients ability to increase ease of ADLs     10 min Neuromuscular Re-education:  [x]? See flow sheet : PROM with oscillations, lower trap facilitation, and GH clocks   Rationale: improve coordination  to improve the patients ability to increase ease of reaching    With   [x] TE   [] TA   [] neuro   [] other: Patient Education: [x] Review HEP    [] Progressed/Changed HEP based on:   [] positioning   [] body mechanics   [] transfers   [] heat/ice application    [] other:      Other Objective/Functional Measures:   Had no difficulty with exercises  No complaint of pain during session   PROM is improving     Pain Level (0-10 scale) post treatment: 5/10    ASSESSMENT/Changes in Function:   Pt is slowly progressing toward goals per protocol. PROM is improving for all motions. Pt report cont difficulty with sleeping, but stated that it has improved slightly. Pt cont with 5/10 pain    Patient will continue to benefit from skilled PT services to modify and progress therapeutic interventions, address functional mobility deficits, address ROM deficits, address strength deficits, analyze and cue movement patterns, analyze and modify body mechanics/ergonomics, assess and modify postural abnormalities and instruct in home and community integration to attain remaining goals. [x]  See Plan of Care  []  See progress note/recertification  []  See Discharge Summary         Progress towards goals / Updated goals:  Short Term Goals: To be accomplished in 2 treatments:  1. Pt will report compliance and independence to HEP to help the pt manage their pain and symptoms.             Goal met. 11/6/20     Long Term Goals: To be accomplished in 10 treatments:  1.  Pt will report an improvement in at worst pain to 6/10 to improve ability to perform ADLs. Eval: 10/10 at worst  Not met: pt. Continues to have increased pain (11/9/20)  2. Pt will report being able to sleep for 3 hours or more at a time to improve sleep quality. Eval: reports getting 1-2 hours of sleep at a time  3. Pt will increase PROM right shoulder flex/ABD to 140 degs, ER to 60 degs, IR to 55 degs to improve the pt's ability to progress through protocol. Eval: flex 105 degs,  degs, ER 50 degs (at 90/90 ABD), IR 46 degs (at 90/90 ABD).     PLAN  []  Upgrade activities as tolerated     [x]  Continue plan of care  []  Update interventions per flow sheet       []  Discharge due to:_  []  Other:_      Elida Aquino PTA 11/13/2020  6:25 AM    Future Appointments   Date Time Provider Abdullahi Horn   11/13/2020  7:30 AM Alex Krishna PTA [facility]   11/16/2020  7:30 AM Alex Krishna PTA [facility]   11/20/2020  7:30 AM Alex Krishna, PTA [facility]   11/30/2020 10:30 AM Alex Krishna PTA [facility]   12/4/2020 11:15 AM Jessica Butcher, PT [facility]   12/4/2020  2:00 PM JOHN Wheeler [facility] BS AMB

## 2020-11-16 ENCOUNTER — HOSPITAL ENCOUNTER (OUTPATIENT)
Dept: PHYSICAL THERAPY | Age: 55
Discharge: HOME | End: 2020-11-16
Payer: MEDICAID

## 2020-11-16 PROCEDURE — 97530 THERAPEUTIC ACTIVITIES: CPT

## 2020-11-16 PROCEDURE — 97110 THERAPEUTIC EXERCISES: CPT

## 2020-11-16 PROCEDURE — 97112 NEUROMUSCULAR REEDUCATION: CPT

## 2020-11-16 NOTE — PROGRESS NOTES
PT DAILY TREATMENT NOTE     Patient Name: Nelly Maria  Date:2020  : 1965  [x]  Patient  Verified  Payor: Lynnette Castillo / Plan: [facility] MEDICAID / Product Type: Managed Care Medicaid /    In time:730  Out time:815  Total Treatment Time (min): 45  Visit #: 5 of 10    Treatment Area: Shoulder pain [M25.519]    SUBJECTIVE  Pain Level (0-10 scale): 4-5/10  Any medication changes, allergies to medications, adverse drug reactions, diagnosis change, or new procedure performed?: [x] No    [] Yes (see summary sheet for update)  Subjective functional status/changes:   [] No changes reported  Pt stated that he still has quite a bit of pain with movement    OBJECTIVE    Modality rationale: decrease inflammation and decrease pain to improve the patients ability to increase ease with ADLs   Min Type Additional Details    [] Estim:  []Unatt       []IFC  []Premod                        []Other:  []w/ice   []w/heat  Position:  Location:    [] Estim: []Att    []TENS instruct  []NMES                    []Other:  []w/US   []w/ice   []w/heat  Position:  Location:    []  Traction: [] Cervical       []Lumbar                       [] Prone          []Supine                       []Intermittent   []Continuous Lbs:  [] before manual  [] after manual    []  Ultrasound: []Continuous   [] Pulsed                           []1MHz   []3MHz W/cm2:  Location:    []  Iontophoresis with dexamethasone         Location: [] Take home patch   [] In clinic   10 [x]  Ice     []  heat  []  Ice massage  []  Laser   []  Anodyne Position:seated  Location:right sh    []  Laser with stim  []  Other:  Position:  Location:    []  Vasopneumatic Device Pressure:       [] lo [] med [] hi   Temperature: [] lo [] med [] hi   [x] Skin assessment post-treatment:  [x]intact []redness- no adverse reaction    []redness - adverse reaction:     15 min Therapeutic Exercise:  [x]? ? See flow sheet :   Rationale: increase ROM and increase strength to improve the patients ability to increase ease of ADLs     10 min Therapeutic Activity:  [x]? ?  See flow sheet :pt. Educated on avoiding active shoulder movements, education on self massage at home.    Rationale: increase ROM and increase strength  to improve the patients ability to increase ease of ADLs     10 min Neuromuscular Re-education:  [x]? ?  See flow sheet : PROM with oscillations, lower trap facilitation, and GH clocks   Rationale: improve coordination  to improve the patients ability to increase ease of reaching    With   [x] TE   [] TA   [] neuro   [] other: Patient Education: [x] Review HEP    [] Progressed/Changed HEP based on:   [] positioning   [] body mechanics   [] transfers   [] heat/ice application    [] other:      Other Objective/Functional Measures:   Had no difficulty with exercises  Reported slight pain with table slides in the ant delt  PROM is improving for all motions     Pain Level (0-10 scale) post treatment: 3/10    ASSESSMENT/Changes in Function:   Pt is progressing well toward goals per protocol. PROM is improving for all motions. Pt cont with decreased strength for all motions in the right shoulder. Pain level cont to be 5/10 and increases with movement. Pt cont to report difficulty with sleeping    Patient will continue to benefit from skilled PT services to modify and progress therapeutic interventions, address functional mobility deficits, address ROM deficits, address strength deficits, analyze and cue movement patterns, analyze and modify body mechanics/ergonomics, assess and modify postural abnormalities and instruct in home and community integration to attain remaining goals. [x]  See Plan of Care  []  See progress note/recertification  []  See Discharge Summary         Progress towards goals / Updated goals:  Short Term Goals: To be accomplished in 2 treatments:  1.  Pt will report compliance and independence to HEP to help the pt manage their pain and symptoms.             Goal met. 11/6/20     Long Term Goals: To be accomplished in 10 treatments:  1. Pt will report an improvement in at worst pain to 6/10 to improve ability to perform ADLs. Eval: 10/10 at worst  Not met: pt. Continues to have increased pain (11/9/20)  2. Pt will report being able to sleep for 3 hours or more at a time to improve sleep quality. Eval: reports getting 1-2 hours of sleep at a time  3. Pt will increase PROM right shoulder flex/ABD to 140 degs, ER to 60 degs, IR to 55 degs to improve the pt's ability to progress through protocol. Eval: flex 105 degs,  degs, ER 50 degs (at 90/90 ABD), IR 46 degs (at 90/90 ABD).     PLAN  []  Upgrade activities as tolerated     [x]  Continue plan of care  []  Update interventions per flow sheet       []  Discharge due to:_  []  Other:_      Alessia Curry PTA 11/16/2020  6:36 AM    Future Appointments   Date Time Provider Abdullahi Horn   11/16/2020  7:30 AM Penelope Farr PTA [facility]   11/20/2020  7:30 AM Penelope Farr PTA [facility]   11/30/2020 10:30 AM Penelope Farr, PTA [facility]   12/4/2020 11:15 AM Ashley Ray, PT [facility]   12/4/2020  2:00 PM JOHN Oh [facility] BS AMB

## 2020-11-20 ENCOUNTER — HOSPITAL ENCOUNTER (OUTPATIENT)
Dept: PHYSICAL THERAPY | Age: 55
Discharge: HOME | End: 2020-11-20
Payer: MEDICAID

## 2020-11-20 PROCEDURE — 97112 NEUROMUSCULAR REEDUCATION: CPT

## 2020-11-20 PROCEDURE — 97530 THERAPEUTIC ACTIVITIES: CPT

## 2020-11-20 PROCEDURE — 97110 THERAPEUTIC EXERCISES: CPT

## 2020-11-20 NOTE — PROGRESS NOTES
PT DAILY TREATMENT NOTE     Patient Name: Flower Álvarez  Date:2020  : 1965  [x]  Patient  Verified  Payor: Victoria [employer] / [place]: [place] / Product Type: Managed Care Medicaid /    In time:730  Out time:821  Total Treatment Time (min): 51  Visit #: 6 of 10    Treatment Area: Shoulder pain [M25.519]    SUBJECTIVE  Pain Level (0-10 scale): 510  Any medication changes, allergies to medications, adverse drug reactions, diagnosis change, or new procedure performed?: [x] No    [] Yes (see summary sheet for update)  Subjective functional status/changes:   [] No changes reported  Pt stated that he has not slept well for the past few days. OBJECTIVE    Modality rationale: decrease inflammation and decrease pain to improve the patients ability to increase ease with ADLs   Min Type Additional Details    [] Estim:  []Unatt       []IFC  []Premod                        []Other:  []w/ice   []w/heat  Position:  Location:    [] Estim: []Att    []TENS instruct  []NMES                    []Other:  []w/US   []w/ice   []w/heat  Position:  Location:    []  Traction: [] Cervical       []Lumbar                       [] Prone          []Supine                       []Intermittent   []Continuous Lbs:  [] before manual  [] after manual    []  Ultrasound: []Continuous   [] Pulsed                           []1MHz   []3MHz W/cm2:  Location:    []  Iontophoresis with dexamethasone         Location: [] Take home patch   [] In clinic   10 [x]  Ice     []  heat  []  Ice massage  []  Laser   []  Anodyne Position:seated  Location:right sh    []  Laser with stim  []  Other:  Position:  Location:    []  Vasopneumatic Device Pressure:       [] lo [] med [] hi   Temperature: [] lo [] med [] hi   [x] Skin assessment post-treatment:  [x]intact []redness- no adverse reaction    []redness - adverse reaction:     21 min Therapeutic Exercise:  [x]? ?? See flow sheet :   Rationale: increase ROM and increase strength to improve the patients ability to increase ease of ADLs     10 min Therapeutic Activity:  [x]? ??  See flow sheet :pt. Educated on avoiding active shoulder movements, education on self massage at home.    Rationale: increase ROM and increase strength  to improve the patients ability to increase ease of ADLs     10 min Neuromuscular Re-education:  [x]? ??  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 Pt cont to report difficulty with sleeping. PROM is improving for all motions. Pt cont to report moderate to severe pain in the right shoulder    Patient will continue to benefit from skilled PT services to modify and progress therapeutic interventions, address functional mobility deficits, address ROM deficits, address strength deficits, analyze and cue movement patterns, analyze and modify body mechanics/ergonomics, assess and modify postural abnormalities and instruct in home and community integration to attain remaining goals. [x]  See Plan of Care  []  See progress note/recertification  []  See Discharge Summary         Progress towards goals / Updated goals:  Short Term Goals: To be accomplished in 2 treatments:  1. Pt will report compliance and independence to HEP to help the pt manage their pain and symptoms.             Goal met. 11/6/20     Long Term Goals: To be accomplished in 10 treatments:  1. Pt will report an improvement in at worst pain to 6/10 to improve ability to perform ADLs. Eval: 10/10 at worst  Not met: pt. Continues to have increased pain (11/9/20)  2. Pt will report being able to sleep for 3 hours or more at a time to improve sleep quality. Eval: reports getting 1-2 hours of sleep at a time  3. Pt will increase PROM right shoulder flex/ABD to 140 degs, ER to 60 degs, IR to 55 degs to improve the pt's ability to progress through protocol. Eval: flex 105 degs,  degs, ER 50 degs (at 90/90 ABD), IR 46 degs (at 90/90 ABD).     PLAN  []  Upgrade activities as tolerated     [x]  Continue plan of care  []  Update interventions per flow sheet       []  Discharge due to:_  []  Other:_      Alessia Curry PTA 11/20/2020  6:27 AM    Future Appointments   Date Time Provider Abdullahi Horn   11/20/2020  7:30 AM Penelope Farr PTA [facility]   11/30/2020 10:30 AM Penelope Farr PTA [facility]   12/4/2020 11:15 AM Ashley Ray, PT [facility]   12/4/2020  2:00 PM JOHN Oh [facility] BS AMB

## 2020-11-30 ENCOUNTER — HOSPITAL ENCOUNTER (OUTPATIENT)
Dept: PHYSICAL THERAPY | Age: 55
Discharge: HOME | End: 2020-11-30
Payer: MEDICAID

## 2020-11-30 PROCEDURE — 97110 THERAPEUTIC EXERCISES: CPT

## 2020-11-30 PROCEDURE — 97530 THERAPEUTIC ACTIVITIES: CPT

## 2020-11-30 PROCEDURE — 97112 NEUROMUSCULAR REEDUCATION: CPT

## 2020-11-30 NOTE — PROGRESS NOTES
PT DAILY TREATMENT NOTE     Patient Name: Aaron Barrera  Date:2020  : 1965  [x]  Patient  Verified  Payor: González Push / [place]: [place] / Product Type: Managed Care Medicaid /    In time:11:15A  Out time: 12:07P  Total Treatment Time (min): 52  Visit #: 7 of 10    Treatment Area: Shoulder pain [M25.519]    SUBJECTIVE  Pain Level (0-10 scale): 6/10  Any medication changes, allergies to medications, adverse drug reactions, diagnosis change, or new procedure performed?: [x] No    [] Yes (see summary sheet for update)  Subjective functional status/changes:   [] No changes reported     Patient reports his shoulder is painful today. He did not sleep well last night due to the pain.      OBJECTIVE    Modality rationale: decrease inflammation and decrease pain to improve the patients ability to perform ADLs with increased ease   Min Type Additional Details    [] Estim:  []Unatt       []IFC  []Premod                        []Other:  []w/ice   []w/heat  Position:  Location:    [] Estim: []Att    []TENS instruct  []NMES                    []Other:  []w/US   []w/ice   []w/heat  Position:  Location:    []  Traction: [] Cervical       []Lumbar                       [] Prone          []Supine                       []Intermittent   []Continuous Lbs:  [] before manual  [] after manual    []  Ultrasound: []Continuous   [] Pulsed                           []1MHz   []3MHz W/cm2:  Location:    []  Iontophoresis with dexamethasone         Location: [] Take home patch   [] In clinic   10 [x]  Ice     []  heat  []  Ice massage  []  Laser   []  Anodyne Position: seated   Location: right shoulder     []  Laser with stim  []  Other:  Position:  Location:    []  Vasopneumatic Device Pressure:       [] lo [] med [] hi   Temperature: [] lo [] med [] hi   [] Skin assessment post-treatment:  []intact []redness- no adverse reaction    []redness - adverse reaction:         23 min Therapeutic Exercise:  [x] See flow sheet :   Rationale: increase ROM and increase strength to improve the patients ability to perform ADLs with increased ease    10 min Therapeutic Activity:  [x]  See flow sheet : gripper, table slides; education on performing passive or active assisted shoulder movements   Rationale: increase ROM and increase strength  to improve the patients ability to perform ADLs with increased ease     9 min Neuromuscular Re-education:  [x]  See flow sheet : PROM with oscillations, scapular mobs   Rationale: improve coordination  to improve the patients ability to reach and perform ADLs with increased ease              With   [] TE   [] TA   [] neuro   [] other: Patient Education: [x] Review HEP    [] Progressed/Changed HEP based on:   [] positioning   [] body mechanics   [] transfers   [] heat/ice application    [] other:      Other Objective/Functional Measures:      Advanced reps with table slide, wrist flex/ext, and wand ER    Pain Level (0-10 scale) post treatment: 4/10    ASSESSMENT/Changes in Function:  Patient continues to report difficulty sleeping and moderate pain in right shoulder. He reports being most comfortable having a pillow near his ribs to help support his right harm. Guarding noted with PROM into abduction and ER. Will continue to progress per protocol. Patient will continue to benefit from skilled PT services to modify and progress therapeutic interventions, address functional mobility deficits, address ROM deficits, address strength deficits, analyze and address soft tissue restrictions, analyze and cue movement patterns, analyze and modify body mechanics/ergonomics, assess and modify postural abnormalities and instruct in home and community integration to attain remaining goals. Progress towards goals / Updated goals:  Short Term Goals: To be accomplished in 2 treatments:  1.  Pt will report compliance and independence to HEP to help the pt manage their pain and symptoms.             Goal met. 11/6/20     Long Term Goals: To be accomplished in 10 treatments:  1. Pt will report an improvement in at worst pain to 6/10 to improve ability to perform ADLs. Eval: 10/10 at worst  Not met: pt. Continues to have increased pain (11/9/20)  2. Pt will report being able to sleep for 3 hours or more at a time to improve sleep quality. Progressing-patient reports his shoulder continues to wake him up every 1-2 some nights (11/30/2020)  3. Pt will increase PROM right shoulder flex/ABD to 140 degs, ER to 60 degs, IR to 55 degs to improve the pt's ability to progress through protocol. Eval: flex 105 degs,  degs, ER 50 degs (at 90/90 ABD), IR 46 degs (at 90/90 ABD).     PLAN  []  Upgrade activities as tolerated     [x]  Continue plan of care  []  Update interventions per flow sheet       []  Discharge due to:_  []  Other:_      Toby Robledo PT 11/30/2020  11:16 AM    Future Appointments   Date Time Provider Abdullahi Horn   12/4/2020 11:15 AM Jason Cain PT [facility]   12/4/2020  2:00 PM JOHN Wellington [facility] BS AMB

## 2020-12-04 ENCOUNTER — OFFICE VISIT (OUTPATIENT)
Dept: ORTHOPEDIC SURGERY | Age: 55
End: 2020-12-04
Payer: MEDICAID

## 2020-12-04 ENCOUNTER — HOSPITAL ENCOUNTER (OUTPATIENT)
Dept: PHYSICAL THERAPY | Age: 55
Discharge: HOME | End: 2020-12-04
Payer: MEDICAID

## 2020-12-04 VITALS
OXYGEN SATURATION: 98 % | SYSTOLIC BLOOD PRESSURE: 106 MMHG | DIASTOLIC BLOOD PRESSURE: 64 MMHG | HEART RATE: 67 BPM | WEIGHT: 213 LBS | BODY MASS INDEX: 31.55 KG/M2 | HEIGHT: 69 IN | TEMPERATURE: 97.5 F

## 2020-12-04 DIAGNOSIS — M75.111 NONTRAUMATIC INCOMPLETE TEAR OF RIGHT ROTATOR CUFF: Primary | ICD-10-CM

## 2020-12-04 DIAGNOSIS — Z98.890 STATUS POST ARTHROSCOPY OF RIGHT SHOULDER: ICD-10-CM

## 2020-12-04 PROCEDURE — 97110 THERAPEUTIC EXERCISES: CPT

## 2020-12-04 PROCEDURE — 97112 NEUROMUSCULAR REEDUCATION: CPT

## 2020-12-04 PROCEDURE — 97530 THERAPEUTIC ACTIVITIES: CPT

## 2020-12-04 PROCEDURE — 99024 POSTOP FOLLOW-UP VISIT: CPT | Performed by: ORTHOPAEDIC SURGERY

## 2020-12-04 NOTE — PROGRESS NOTES
PT DAILY TREATMENT NOTE     Patient Name: Sang Edward  Date:2020  : 1965  [x]  Patient  Verified  Payor: Wilda Farr / [place] / Product Type: Managed Care Medicaid /    In time:1250  Out time:144  Total Treatment Time (min): 54  Visit #: 8 of 10    Treatment Area: Shoulder pain [M25.519]    SUBJECTIVE  Pain Level (0-10 scale): 4-5/10  Any medication changes, allergies to medications, adverse drug reactions, diagnosis change, or new procedure performed?: [x] No    [] Yes (see summary sheet for update)  Subjective functional status/changes:   [] No changes reported  Pt stated that his shoulder is hurting today    OBJECTIVE    Modality rationale: decrease pain and increase tissue extensibility to improve the patients ability to increase ease with ADls   Min Type Additional Details    [] Estim:  []Unatt       []IFC  []Premod                        []Other:  []w/ice   []w/heat  Position:  Location:    [] Estim: []Att    []TENS instruct  []NMES                    []Other:  []w/US   []w/ice   []w/heat  Position:  Location:    []  Traction: [] Cervical       []Lumbar                       [] Prone          []Supine                       []Intermittent   []Continuous Lbs:  [] before manual  [] after manual    []  Ultrasound: []Continuous   [] Pulsed                           []1MHz   []3MHz W/cm2:  Location:    []  Iontophoresis with dexamethasone         Location: [] Take home patch   [] In clinic   10 [x]  Ice     []  heat  []  Ice massage  []  Laser   []  Anodyne Position:seated  Location:right sh    []  Laser with stim  []  Other:  Position:  Location:    []  Vasopneumatic Device Pressure:       [] lo [] med [] hi   Temperature: [] lo [] med [] hi   [x] Skin assessment post-treatment:  [x]intact []redness- no adverse reaction    []redness - adverse reaction:     27 min Therapeutic Exercise:  [x]?  See flow sheet :   Rationale: increase ROM and increase strength to improve the patients ability to perform ADLs with increased ease     8 min Therapeutic Activity:  [x]? See flow sheet : gripper, table slides; education on performing passive or active assisted shoulder movements   Rationale: increase ROM and increase strength  to improve the patients ability to perform ADLs with increased ease     9 min Neuromuscular Re-education:  [x]? See flow sheet : PROM with oscillations, scapular mobs   Rationale: improve coordination  to improve the patients ability to reach and perform ADLs with increased ease    With   [x] TE   [] TA   [] neuro   [] other: Patient Education: [x] Review HEP    [] Progressed/Changed HEP based on:   [] positioning   [] body mechanics   [] transfers   [] heat/ice application    [] other:      Other Objective/Functional Measures:   FOTO 32     Pain Level (0-10 scale) post treatment: 3/10    ASSESSMENT/Changes in Function:   See progress note    Patient will continue to benefit from skilled PT services to modify and progress therapeutic interventions, address functional mobility deficits, address ROM deficits, address strength deficits, analyze and cue movement patterns, analyze and modify body mechanics/ergonomics, assess and modify postural abnormalities and instruct in home and community integration to attain remaining goals. []  See Plan of Care  [x]  See progress note/recertification  []  See Discharge Summary         Progress towards goals / Updated goals:  Short Term Goals: To be accomplished in 2 treatments:  1. Pt will report compliance and independence to HEP to help the pt manage their pain and symptoms.             Goal met. 11/6/20     Long Term Goals: To be accomplished in 10 treatments:  1. Pt will report an improvement in at worst pain to 6/10 to improve ability to perform ADLs. Not met. Pt cont with 10/10 at worst  2. Pt will report being able to sleep for 3 hours or more at a time to improve sleep quality.   Progressing-patient reports his shoulder continues to wake him up every 1-2 times per night  3. Pt will increase PROM right shoulder flex/ABD to 140 degs, ER to 60 degs, IR to 55 degs to improve the pt's ability to progress through protocol. Progressing.  Flex is 111*, ABD is 140*, ER is 50* and IR is 40*     PLAN  []  Upgrade activities as tolerated     [x]  Continue plan of care  []  Update interventions per flow sheet       []  Discharge due to:_  []  Other:_      Tricia Mendez PTA 12/4/2020  7:37 AM    Future Appointments   Date Time Provider Abdullahi Lovelli   12/4/2020 12:45 PM Chaka Stanley PTA [facility]   12/4/2020  2:00 PM JOHN Yang [facility] BS AMB

## 2020-12-04 NOTE — PROGRESS NOTES
[facility] - [facility] [employer]  [address] [place] [address]  [phone] fax    Physical Therapy Progress Note  Patient name: Jonathon Jordan Start of Care: 2020   Referral source: Carmenza Guidry MD : 1965               Medical Diagnosis: Shoulder pain [M25.519]  Payor: Arjun Armstrong / Plan: Sadie Prader / Product Type: Managed Care Medicaid /  Onset Date: DOS 2020               Treatment Diagnosis: right shoulder pain   Prior Hospitalization: see medical history Provider#: [id number]   Medications: Verified on Patient summary List    Comorbidities: hx right shoulder surgery ~20 years ago, left shoulder surgery 2019   Prior Level of Function: Independent with ADLs        Visits from Start of Care: 8    Missed Visits: 0    Established Goals:         Excellent           Good         Limited           None  [x] Increased ROM   []  [x]  []  []  [] Increased Strength  []  []  []  []  [x] Increased Mobility  []  [x]  []  []   [x] Decreased Pain   []  []  [x]  []  [] Decreased Swelling  []  []  []  []    Key Functional Changes:   Patient is slowly progressing toward goals in therapy. PROM is improving for all motions. Patient continues to have difficulty with sleeping and reports being awoken throughout the night due to shoulder pain. Patient continues with moderate to severe pain in the right shoulder that is 10/10 at maximum. Patient reports compliance with HEP. Patient will continue to benefit from skilled PT services to modify and progress therapeutic interventions, address functional mobility deficits, address ROM deficits, address strength deficits, analyze and cue movement patterns, analyze and modify body mechanics/ergonomics, assess and modify postural abnormalities and instruct in home and community integration to attain remaining goals. Updated Goals: to be achieved in 4 weeks:  1.  Pt will report an improvement in at worst pain to 6/10 to improve ability to perform ADLs. 2. Pt will report being able to sleep for 3 hours or more at a time to improve sleep quality. 3. Pt will increase PROM right shoulder flex/ABD to 140 degs, ER to 60 degs, IR to 55 degs to improve the pt's ability to progress through protocol. 4. Pt will increase FOTO score by 20 points to increase ease with functional tasks and driving    ASSESSMENT/RECOMMENDATIONS:  [x]Continue therapy per initial plan/protocol at a frequency of  2 x per week for 4 weeks  []Continue therapy with the following recommended changes:_____________________      _____________________________________________________________________  []Discontinue therapy progressing towards or have reached established goals  []Discontinue therapy due to lack of appreciable progress towards goals  []Discontinue therapy due to lack of attendance or compliance  []Await Physician's recommendations/decisions regarding therapy  []Other:________________________________________________________________    Thank you for this referral.   Kaylee Che, PTA 12/4/2020 7:40 AM    NOTE TO PHYSICIAN:  PLEASE COMPLETE THE ORDERS BELOW AND   FAX TO [facility]: [phone]  If you are unable to process this request in 24 hours please contact our office: [phone] I have read the above report and request that my patient continue as recommended. ? I have read the above report and request that my patient continue therapy with the following changes/special instructions:____________________________________  ? I have read the above report and request that my patient be discharged from therapy.     Physicians signature: ______________________________Date: ______Time:______

## 2020-12-07 ENCOUNTER — HOSPITAL ENCOUNTER (OUTPATIENT)
Dept: PHYSICAL THERAPY | Age: 55
Discharge: HOME | End: 2020-12-07
Payer: MEDICAID

## 2020-12-07 ENCOUNTER — HOSPITAL ENCOUNTER (OUTPATIENT)
Dept: PHYSICAL THERAPY | Age: 55
End: 2020-12-07
Payer: MEDICAID

## 2020-12-07 PROCEDURE — 97112 NEUROMUSCULAR REEDUCATION: CPT

## 2020-12-07 PROCEDURE — 97110 THERAPEUTIC EXERCISES: CPT

## 2020-12-07 PROCEDURE — 97530 THERAPEUTIC ACTIVITIES: CPT

## 2020-12-07 NOTE — PROGRESS NOTES
PT DAILY TREATMENT NOTE     Patient Name: Flower Álvarez  Date:2020  : 1965  [x]  Patient  Verified  Payor: Victoria Vences / [place]: [place] / Product Type: Managed Care Medicaid /    In time:1115  Out time:1155  Total Treatment Time (min): 40  Visit #: 1 of 8    Treatment Area: Shoulder pain [M25.519]    SUBJECTIVE  Pain Level (0-10 scale): 2/10  Any medication changes, allergies to medications, adverse drug reactions, diagnosis change, or new procedure performed?: [x] No    [] Yes (see summary sheet for update)  Subjective functional status/changes:   [] No changes reported  Pt stated that his pain is minimal today    OBJECTIVE    Modality rationale: decrease inflammation and decrease pain to improve the patients ability to increase ease with ADls   Min Type Additional Details    [] Estim:  []Unatt       []IFC  []Premod                        []Other:  []w/ice   []w/heat  Position:  Location:    [] Estim: []Att    []TENS instruct  []NMES                    []Other:  []w/US   []w/ice   []w/heat  Position:  Location:    []  Traction: [] Cervical       []Lumbar                       [] Prone          []Supine                       []Intermittent   []Continuous Lbs:  [] before manual  [] after manual    []  Ultrasound: []Continuous   [] Pulsed                           []1MHz   []3MHz W/cm2:  Location:    []  Iontophoresis with dexamethasone         Location: [] Take home patch   [] In clinic   10 [x]  Ice     []  heat  []  Ice massage  []  Laser   []  Anodyne Position: seated  Location:right sh    []  Laser with stim  []  Other:  Position:  Location:    []  Vasopneumatic Device Pressure:       [] lo [] med [] hi   Temperature: [] lo [] med [] hi   [x] Skin assessment post-treatment:  [x]intact []redness- no adverse reaction    []redness - adverse reaction:     14 min Therapeutic Exercise:  [x]? ? See flow sheet :   Rationale: increase ROM and increase strength to improve the patients ability to perform ADLs with increased ease     8 min Therapeutic Activity:  [x]? ?  See flow sheet : gripper, wall slides; education on performing passive or active assisted shoulder movements   Rationale: increase ROM and increase strength  to improve the patients ability to perform ADLs with increased ease     8 min Neuromuscular Re-education:  [x]? ?  See flow sheet : PROM with oscillations, scapular mobs   Rationale: improve coordination  to improve the patients ability to reach and perform ADLs with increased ease    With   [] TE   [] TA   [] neuro   [] other: Patient Education: [x] Review HEP    [] Progressed/Changed HEP based on:   [] positioning   [] body mechanics   [] transfers   [] heat/ice application    [] other:      Other Objective/Functional Measures: Tolerated new exercises with minimal difficulty  Had slight increase in pain with wall slides and SL abd     Pain Level (0-10 scale) post treatment: 4/10    ASSESSMENT/Changes in Function:   Pt is slowly progressing toward goals per protocol. Pt began AROM exercises per MD script. Cont to have difficulty with performing some ADLs. Pt cont to report difficulty with sleeping through the night. PROM is improving as well as AAROM    Patient will continue to benefit from skilled PT services to modify and progress therapeutic interventions, address functional mobility deficits, address ROM deficits, address strength deficits, analyze and cue movement patterns, analyze and modify body mechanics/ergonomics, assess and modify postural abnormalities and instruct in home and community integration to attain remaining goals. []  See Plan of Care  []  See progress note/recertification  []  See Discharge Summary         Progress towards goals / Updated goals:  1. Pt will report an improvement in at worst pain to 6/10 to improve ability to perform ADLs. 2. Pt will report being able to sleep for 3 hours or more at a time to improve sleep quality.   3. Pt will increase PROM right shoulder flex/ABD to 140 degs, ER to 60 degs, IR to 55 degs to improve the pt's ability to progress through protocol.   4. Pt will increase FOTO score by 20 points to increase ease with functional tasks and driving    PLAN  []  Upgrade activities as tolerated     [x]  Continue plan of care  []  Update interventions per flow sheet       []  Discharge due to:_  []  Other:_      Elida Aquino, PTA 12/7/2020  12:01 PM    Future Appointments   Date Time Provider Abdullahi Horn   12/11/2020  8:15 AM Alex Krishna, PTA [facility]   12/14/2020 11:15 AM Alex Krishna, PTA [facility]   12/18/2020  2:15 PM Jessica Butcher, PT [facility]   12/21/2020  8:15 AM Alex Krishna, PTA [facility]   12/24/2020  9:00 AM Jessica Guadalupe, PT [facility]   12/28/2020  8:15 AM Alex Krishna, PTA [facility]   12/31/2020  9:00 AM Jessica re, PT [facility]   1/15/2021  3:00 PM JOHN Wheeler [facility] BS AMB

## 2020-12-11 ENCOUNTER — HOSPITAL ENCOUNTER (OUTPATIENT)
Dept: PHYSICAL THERAPY | Age: 55
Discharge: HOME | End: 2020-12-11
Payer: MEDICAID

## 2020-12-11 PROCEDURE — 97530 THERAPEUTIC ACTIVITIES: CPT

## 2020-12-11 PROCEDURE — 97112 NEUROMUSCULAR REEDUCATION: CPT

## 2020-12-11 PROCEDURE — 97110 THERAPEUTIC EXERCISES: CPT

## 2020-12-11 NOTE — PROGRESS NOTES
PT DAILY TREATMENT NOTE     Patient Name: Phoebe Wayne  Date:2020  : 1965  [x]  Patient  Verified  Payor: Jessica Hernandez / [place] / Product Type: Managed Care Medicaid /    In time:815  Out time:857  Total Treatment Time (min): 42  Visit #: 2 of 8    Treatment Area: Shoulder pain [M25.519]    SUBJECTIVE  Pain Level (0-10 scale): 5/10  Any medication changes, allergies to medications, adverse drug reactions, diagnosis change, or new procedure performed?: [x] No    [] Yes (see summary sheet for update)  Subjective functional status/changes:   [] No changes reported  Pt stated that he tripped over a cable the other day and braced himself with both UE and scraped his shin.  Has neck pain also from the fall    OBJECTIVE    Modality rationale: decrease inflammation and decrease pain to improve the patients ability to increase ease with ADls   Min Type Additional Details    [] Estim:  []Unatt       []IFC  []Premod                        []Other:  []w/ice   []w/heat  Position:  Location:    [] Estim: []Att    []TENS instruct  []NMES                    []Other:  []w/US   []w/ice   []w/heat  Position:  Location:    []  Traction: [] Cervical       []Lumbar                       [] Prone          []Supine                       []Intermittent   []Continuous Lbs:  [] before manual  [] after manual    []  Ultrasound: []Continuous   [] Pulsed                           []1MHz   []3MHz W/cm2:  Location:    []  Iontophoresis with dexamethasone         Location: [] Take home patch   [] In clinic   10 [x]  Ice     []  heat  []  Ice massage  []  Laser   []  Anodyne Position: seated  Location:right sh    []  Laser with stim  []  Other:  Position:  Location:    []  Vasopneumatic Device Pressure:       [] lo [] med [] hi   Temperature: [] lo [] med [] hi   [x] Skin assessment post-treatment:  [x]intact []redness- no adverse reaction    []redness - adverse reaction:     16 min Therapeutic Exercise:  [x]? ?? See flow sheet :   Rationale: increase ROM and increase strength to improve the patients ability to perform ADLs with increased ease     8 min Therapeutic Activity:  [x]? ??  See flow sheet : gripper, wall slides; education on performing passive or active assisted shoulder movements   Rationale: increase ROM and increase strength  to improve the patients ability to perform ADLs with increased ease     8 min Neuromuscular Re-education:  [x]? ??  See flow sheet : PROM with oscillations, scapular mobs   Rationale: improve coordination  to improve the patients ability to reach and perform ADLs with increased ease    With   [x] TE   [] TA   [] neuro   [] other: Patient Education: [x] Review HEP    [] Progressed/Changed HEP based on:   [] positioning   [] body mechanics   [] transfers   [] heat/ice application    [] other:      Other Objective/Functional Measures:   Reported burning in the shoulder after 18 reps of scap squeeze  Was able to reach rung #28 on the wall ladder  No complaint of pain with wand exercises today  Will add exercises next visit per flow sheet     Pain Level (0-10 scale) post treatment: 4/10    ASSESSMENT/Changes in Function:   Pt is making slow progress toward goals. Pt cont with moderate pain in the right shoulder. Pt suffer fall and had increased pain today. AROM is improving for all motions. Pt reports increased ease with most ADLs, but cont to complain of increased pain    Patient will continue to benefit from skilled PT services to modify and progress therapeutic interventions, address functional mobility deficits, address ROM deficits, address strength deficits, analyze and cue movement patterns, analyze and modify body mechanics/ergonomics, assess and modify postural abnormalities and instruct in home and community integration to attain remaining goals.      []  See Plan of Care  [x]  See progress note/recertification  []  See Discharge Summary         Progress towards goals / Updated goals:  1. Pt will report an improvement in at worst pain to 6/10 to improve ability to perform ADLs. 2. Pt will report being able to sleep for 3 hours or more at a time to improve sleep quality. 3. Pt will increase PROM right shoulder flex/ABD to 140 degs, ER to 60 degs, IR to 55 degs to improve the pt's ability to progress through protocol.   4. Pt will increase FOTO score by 20 points to increase ease with functional tasks and driving    PLAN  []  Upgrade activities as tolerated     [x]  Continue plan of care  []  Update interventions per flow sheet       []  Discharge due to:_  []  Other:_      Hertford Covert, PTA 12/11/2020  7:27 AM    Future Appointments   Date Time Provider Abdullahi Horn   12/11/2020  8:15 AM Felisa Abdul, PTA [facility]   12/14/2020 11:15 AM Felisa Abdul, PTA [facility]   12/18/2020  2:15 PM Elbharti Lombardo, PT [facility]   12/21/2020  8:15 AM Lorenda Franko, PTA [facility]   12/24/2020  9:00 AM Elfredia Lombardo, PT [facility]   12/28/2020  8:15 AM Felisa Abdul, PTA [facility]   12/31/2020  9:00 AM Elbharti Lombardo, PT [facility]   1/15/2021  3:00 PM JOHN Hernandez [facility] BS AMB

## 2020-12-14 ENCOUNTER — HOSPITAL ENCOUNTER (OUTPATIENT)
Dept: PHYSICAL THERAPY | Age: 55
Discharge: HOME | End: 2020-12-14
Payer: MEDICAID

## 2020-12-14 PROCEDURE — 97112 NEUROMUSCULAR REEDUCATION: CPT

## 2020-12-14 PROCEDURE — 97530 THERAPEUTIC ACTIVITIES: CPT

## 2020-12-14 PROCEDURE — 97110 THERAPEUTIC EXERCISES: CPT

## 2020-12-18 ENCOUNTER — HOSPITAL ENCOUNTER (OUTPATIENT)
Dept: PHYSICAL THERAPY | Age: 55
Discharge: HOME | End: 2020-12-18
Payer: MEDICAID

## 2020-12-18 PROCEDURE — 97530 THERAPEUTIC ACTIVITIES: CPT

## 2020-12-18 PROCEDURE — 97110 THERAPEUTIC EXERCISES: CPT

## 2020-12-18 PROCEDURE — 97112 NEUROMUSCULAR REEDUCATION: CPT

## 2020-12-18 NOTE — PROGRESS NOTES
PT DAILY TREATMENT NOTE     Patient Name: Mainor Pérez  Date:2020  : 1965  [x]  Patient  Verified  Payor: Ethel Mcleod / Plan: [facility] MEDICAID / Product Type: Managed Care Medicaid /    In time:215  Out time:300  Total Treatment Time (min): 45  Visit #: 4 of 8    Treatment Area: Shoulder pain [M25.519]    SUBJECTIVE  Pain Level (0-10 scale): 4/10  Any medication changes, allergies to medications, adverse drug reactions, diagnosis change, or new procedure performed?: [x] No    [] Yes (see summary sheet for update)  Subjective functional status/changes:   [] No changes reported  Pt stated that he is still waking up at night. He arm and hand was numb this morning    OBJECTIVE    Modality rationale: decrease inflammation and decrease pain to improve the patients ability to increase ease with ADLs   Min Type Additional Details    [] Estim:  []Unatt       []IFC  []Premod                        []Other:  []w/ice   []w/heat  Position:  Location:    [] Estim: []Att    []TENS instruct  []NMES                    []Other:  []w/US   []w/ice   []w/heat  Position:  Location:    []  Traction: [] Cervical       []Lumbar                       [] Prone          []Supine                       []Intermittent   []Continuous Lbs:  [] before manual  [] after manual    []  Ultrasound: []Continuous   [] Pulsed                           []1MHz   []3MHz W/cm2:  Location:    []  Iontophoresis with dexamethasone         Location: [] Take home patch   [] In clinic   10 [x]  Ice     []  heat  []  Ice massage  []  Laser   []  Anodyne Position: seated  Location:right sh    []  Laser with stim  []  Other:  Position:  Location:    []  Vasopneumatic Device Pressure:       [] lo [] med [] hi   Temperature: [] lo [] med [] hi   [x] Skin assessment post-treatment:  [x]intact []redness- no adverse reaction    []redness - adverse reaction:     18 min Therapeutic Exercise:  [x]? ???? See flow sheet :   Rationale: increase ROM and increase strength to improve the patients ability to perform ADLs with increased ease     9 min Therapeutic Activity:  [x]? ????  See flow sheet : gripper, wall slides; education on performing passive or active assisted shoulder movements   Rationale: increase ROM and increase strength  to improve the patients ability to perform ADLs with increased ease     8 min Neuromuscular Re-education:  [x]? ????  See flow sheet :    Rationale: improve coordination  to improve the patients ability to reach and perform ADLs with increased ease    With   [x] TE   [] TA   [] neuro   [] other: Patient Education: [x] Review HEP    [] Progressed/Changed HEP based on:   [] positioning   [] body mechanics   [] transfers   [] heat/ice application    [] other:      Other Objective/Functional Measures:   Had much less difficulty with exercises today  Cont to be challenged with wand and S/L exercises  Range is improving with pulleys and wall slides     Pain Level (0-10 scale) post treatment: 4/10    ASSESSMENT/Changes in Function:   Pt is making slow progress toward goals. Pt cont with moderate pain in the right shoulder. Pt cont to perform work that puts excessive stress on the rotator cuff and bicep muscles. Pt cont to report difficulty with sleeping and reported numbness in the right UE and hand today    Patient will continue to benefit from skilled PT services to modify and progress therapeutic interventions, address functional mobility deficits, address ROM deficits, address strength deficits, analyze and cue movement patterns, analyze and modify body mechanics/ergonomics, assess and modify postural abnormalities and instruct in home and community integration to attain remaining goals. []  See Plan of Care  [x]  See progress note/recertification  []  See Discharge Summary         Progress towards goals / Updated goals:  1. Pt will report an improvement in at worst pain to 6/10 to improve ability to perform ADLs.   2. Pt will report being able to sleep for 3 hours or more at a time to improve sleep quality. 3. Pt will increase PROM right shoulder flex/ABD to 140 degs, ER to 60 degs, IR to 55 degs to improve the pt's ability to progress through protocol.   4. Pt will increase FOTO score by 20 points to increase ease with functional tasks and driving    PLAN  []  Upgrade activities as tolerated     [x]  Continue plan of care  []  Update interventions per flow sheet       []  Discharge due to:_  []  Other:_      Osvaldo Rogel PTA 12/18/2020  7:29 AM    Future Appointments   Date Time Provider Abdullahi Horn   12/18/2020  2:15 PM Haley Fernandes [facility]   12/21/2020  8:15 AM Shakila Up PTA [id number]   12/24/2020  9:00 AM Carissa Aponte, ROMERO [facility]   12/28/2020  8:15 AM Shakila Up PTA [facility]   12/31/2020  9:00 AM Carissa Aponte PT [facility]   1/15/2021  3:00 PM JOHN Quiñones [facility] BS AMB

## 2020-12-21 ENCOUNTER — HOSPITAL ENCOUNTER (OUTPATIENT)
Dept: PHYSICAL THERAPY | Age: 55
Discharge: HOME | End: 2020-12-21
Payer: MEDICAID

## 2020-12-21 PROCEDURE — 97112 NEUROMUSCULAR REEDUCATION: CPT

## 2020-12-21 PROCEDURE — 97530 THERAPEUTIC ACTIVITIES: CPT

## 2020-12-21 PROCEDURE — 97110 THERAPEUTIC EXERCISES: CPT

## 2020-12-21 NOTE — PROGRESS NOTES
PT DAILY TREATMENT NOTE     Patient Name: Santo Waldrop  Date:2020  : 1965  [x]  Patient  Verified  Payor: Wai Haley / Plan: Luis Chan / Product Type: Managed Care Medicaid /    In time:815  Out time:845  Total Treatment Time (min): 30  Visit #: 5 of 8    Treatment Area: Shoulder pain [M25.519]    SUBJECTIVE  Pain Level (0-10 scale): 10  Any medication changes, allergies to medications, adverse drug reactions, diagnosis change, or new procedure performed?: [x] No    [] Yes (see summary sheet for update)  Subjective functional status/changes:   [] No changes reported  Pt stated that he is about the same    OBJECTIVE    13 min Therapeutic Exercise:  [x]?????? See flow sheet :   Rationale: increase ROM and increase strength to improve the patients ability to perform ADLs with increased ease     9 min Therapeutic Activity:  [x]??????  See flow sheet : gripper, wall slides; education on performing passive or active assisted shoulder movements   Rationale: increase ROM and increase strength  to improve the patients ability to perform ADLs with increased ease     8 min Neuromuscular Re-education:  [x]??????  See flow sheet :    Rationale: improve coordination  to improve the patients ability to reach and perform ADLs with increased ease    With   [x] TE   [] TA   [] neuro   [] other: Patient Education: [x] Review HEP    [] Progressed/Changed HEP based on:   [] positioning   [] body mechanics   [] transfers   [] heat/ice application    [] other:      Other Objective/Functional Measures:   Had no difficulty with added weights today  Was able to perform S/L abd today without increased pain     Pain Level (0-10 scale) post treatment: 3/10    ASSESSMENT/Changes in Function:   Pt is slowly progressing toward goals. Pt has now entered the strengthening phase of therapy. Pt cont to have difficulty with sleeping.  AROM and strength are slowly improving    Patient will continue to benefit from skilled PT services to modify and progress therapeutic interventions, address functional mobility deficits, address ROM deficits, address strength deficits, analyze and cue movement patterns, analyze and modify body mechanics/ergonomics, assess and modify postural abnormalities and instruct in home and community integration to attain remaining goals. []  See Plan of Care  [x]  See progress note/recertification  []  See Discharge Summary         Progress towards goals / Updated goals:  1. Pt will report an improvement in at worst pain to 6/10 to improve ability to perform ADLs. 2. Pt will report being able to sleep for 3 hours or more at a time to improve sleep quality. 3. Pt will increase PROM right shoulder flex/ABD to 140 degs, ER to 60 degs, IR to 55 degs to improve the pt's ability to progress through protocol.   4. Pt will increase FOTO score by 20 points to increase ease with functional tasks and driving    PLAN  []  Upgrade activities as tolerated     [x]  Continue plan of care  []  Update interventions per flow sheet       []  Discharge due to:_  []  Other:_      Nikunj Maciel PTA 12/21/2020  6:28 AM    Future Appointments   Date Time Provider Abdullahi Horn   12/21/2020  8:15 AM Alec Ray PTA [facility]   12/24/2020  9:00 AM Raven Ledezma, ROMERO [facility]   12/28/2020  8:15 AM Alec Ray PTA [facility]   12/31/2020  9:00 AM Raven Ledezma PT [facility]   1/15/2021  3:00 PM JOHN Alaniz [facility] BS AMB

## 2020-12-24 ENCOUNTER — HOSPITAL ENCOUNTER (OUTPATIENT)
Dept: PHYSICAL THERAPY | Age: 55
Discharge: HOME | End: 2020-12-24
Payer: MEDICAID

## 2020-12-24 PROCEDURE — 97530 THERAPEUTIC ACTIVITIES: CPT

## 2020-12-24 PROCEDURE — 97110 THERAPEUTIC EXERCISES: CPT

## 2020-12-24 PROCEDURE — 97112 NEUROMUSCULAR REEDUCATION: CPT

## 2020-12-24 NOTE — PROGRESS NOTES
PT DAILY TREATMENT NOTE     Patient Name: Marilyn Brandon  Date:2020  : 1965  [x]  Patient  Verified  Payor: Pamela Fuchs / Plan: [id number] [employer] / Product Type: Managed Care Medicaid /    In time: 9:00  Out time: 9:42  Total Treatment Time (min): 42  Visit #: 6 of 8    Treatment Area: Shoulder pain [M25.519]    SUBJECTIVE  Pain Level (0-10 scale):  3/10  Any medication changes, allergies to medications, adverse drug reactions, diagnosis change, or new procedure performed?: [x] No    [] Yes (see summary sheet for update)  Subjective functional status/changes:   [x] No changes reported  Pt. Reports he continues to have difficulty with sleeping because of his shoulder pain.      OBJECTIVE    24 min Therapeutic Exercise:  [x] See flow sheet :   Rationale: increase ROM and increase strength to improve the patients ability to increase ease of reaching    10 min Therapeutic Activity:  []  See flow sheet : ROM measurements, education on performing side lying AROM for HEP   Rationale: increase ROM and increase strength  to improve the patients ability to increase ease of ADLs     8 min Neuromuscular Re-education:  []  See flow sheet : scapula stability during exercises, ball on wall proprioception    Rationale: increase strength and improve coordination  to improve the patients ability to increase ease of reaching          With   [] TE   [] TA   [] neuro   [] other: Patient Education: [x] Review HEP    [] Progressed/Changed HEP based on:   [] positioning   [] body mechanics   [] transfers   [] heat/ice application    [] other:       Other Objective/Functional Measures:   Right shoulder PROM flex: 114 degrees abd: 138 degrees ER: 80 degrees IR: 45 degrees   Right shoulder AROM flex: 84 abd: 64 behind back reaching: SIJ  Unable to perform abduction in side lying with 1# so performed with 0#  Side lying flexion limited to ~90 degrees    Pain Level (0-10 scale) post treatment: 4/10    ASSESSMENT/Changes in Function:  Pt. Is progressing slowly towards goals. He continues to have difficulty with sleeping. He also continues to have decreased PROM mobility with empty end feel secondary to pain. He continues to have decreased right shoulder shoulder strength. Patient will continue to benefit from skilled PT services to modify and progress therapeutic interventions, address functional mobility deficits, address ROM deficits, address strength deficits, analyze and address soft tissue restrictions, analyze and cue movement patterns and analyze and modify body mechanics/ergonomics to attain remaining goals. Progress towards goals / Updated goals:  1. Pt will report an improvement in at worst pain to 6/10 to improve ability to perform ADLs. 2. Pt will report being able to sleep for 3 hours or more at a time to improve sleep quality. 3. Pt will increase PROM right shoulder flex/ABD to 140 degs, ER to 60 degs, IR to 55 degs to improve the pt's ability to progress through protocol. Not met: Right shoulder PROM flex: 114 degrees abd: 138 degrees ER: 80 degrees IR: 45 degrees (12/24/20)   4.  Pt will increase FOTO score by 20 points to increase ease with functional tasks and driving    PLAN  []  Upgrade activities as tolerated     [x]  Continue plan of care  []  Update interventions per flow sheet       []  Discharge due to:_  []  Other:_      Constantine Camargo PT 12/24/2020  7:49 AM    Future Appointments   Date Time Provider Abdullahi Horn   12/24/2020  9:00 AM Keely Elizalde PT [facility]   12/28/2020  8:15 AM Lorenzo Combs PTA [facility]   12/31/2020  9:00 AM Keely Elizalde PT [facility]   1/15/2021  3:00 PM JOHN Roa [facility] BS AMB

## 2020-12-28 ENCOUNTER — HOSPITAL ENCOUNTER (OUTPATIENT)
Dept: PHYSICAL THERAPY | Age: 55
End: 2020-12-28
Payer: MEDICAID

## 2020-12-31 ENCOUNTER — HOSPITAL ENCOUNTER (OUTPATIENT)
Dept: PHYSICAL THERAPY | Age: 55
Discharge: HOME | End: 2020-12-31
Payer: MEDICAID

## 2020-12-31 PROCEDURE — 97110 THERAPEUTIC EXERCISES: CPT

## 2020-12-31 PROCEDURE — 97530 THERAPEUTIC ACTIVITIES: CPT

## 2020-12-31 PROCEDURE — 97112 NEUROMUSCULAR REEDUCATION: CPT

## 2020-12-31 NOTE — PROGRESS NOTES
PT DAILY TREATMENT NOTE     Patient Name: Alan Spaulding  Date:2020  : 1965  [x]  Patient  Verified  Payor: Nile Pack / [place] [employer] / Product Type: Managed Care Medicaid /    In time: 8:50  Out time: 9:37  Total Treatment Time (min): 47  Visit #: 7 of 8    Treatment Area: Shoulder pain [M25.519]    SUBJECTIVE  Pain Level (0-10 scale): 3/10  Any medication changes, allergies to medications, adverse drug reactions, diagnosis change, or new procedure performed?: [x] No    [] Yes (see summary sheet for update)  Subjective functional status/changes:   [] No changes reported  Pt. Reports he did ok after last visit. 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 OBJECTIVE    31 min Therapeutic Exercise:  [x] See flow sheet :   Rationale: increase ROM and increase strength to improve the patients ability to increase ease of ADLs    8 min Therapeutic Activity:  [x]  See flow sheet : cabinet reaching, pt.  Education    Rationale: increase ROM and increase strength  to improve the patients ability to increase ease of reaching     8 min Neuromuscular Re-education:  []  See flow sheet : scapula stability during exercises, ball on wall proprioception   Rationale: increase ROM and increase strength  to improve the patients ability to increase ease of ADLs          With   [x] TE   [] TA   [] neuro   [] other: Patient Education: [x] Review HEP    [] Progressed/Changed HEP based on:   [] positioning   [] body mechanics   [] transfers   [] heat/ice application    [] other:      Other Objective/Functional Measures:   Complained of painful popping with attempting scaption AROM so this was held today  Painful popping also with [place] IR past neutral position so perform not going past that point  He was educated on avoiding activities that increase his pain and need to gradually increase resistance instead of lifting heavy  He continues to demonstrate decreased right shoulder mobility. Pain Level (0-10 scale) post treatment: 4/10    ASSESSMENT/Changes in Function: pt. Is progressing slowly towards goals. He continues to have decreased right shoulder mobility and strength. He continues to have pain in right shoulder and some difficulty with sleeping. He reports compliance with HEP. Patient will continue to benefit from skilled PT services to modify and progress therapeutic interventions, address functional mobility deficits, address ROM deficits, address strength deficits, analyze and address soft tissue restrictions, analyze and cue movement patterns and analyze and modify body mechanics/ergonomics to attain remaining goals. Progress towards goals / Updated goals:  1. Pt will report an improvement in at worst pain to 6/10 to improve ability to perform ADLs. 2. Pt will report being able to sleep for 3 hours or more at a time to improve sleep quality. Pt. Continues to report difficulty sleeping (12/31/20)  3. Pt will increase PROM right shoulder flex/ABD to 140 degs, ER to 60 degs, IR to 55 degs to improve the pt's ability to progress through protocol. Not met: Right shoulder PROM flex: 114 degrees abd: 138 degrees ER: 80 degrees IR: 45 degrees (12/24/20)       4.  Pt will increase FOTO score by 20 points to increase ease with functional tasks and driving    PLAN  []  Upgrade activities as tolerated     [x]  Continue plan of care  []  Update interventions per flow sheet       []  Discharge due to:_  []  Other:_      Armin Gan, PT 12/31/2020  7:52 AM    Future Appointments   Date Time Provider Abdullahi Horn   12/31/2020  9:00 AM Jamshid Ridley, PT [facility]   1/4/2021  8:15 AM [place] Sos, PTA [facility]   1/8/2021  9:00 AM Jamshid Ridley, PT [facility]   1/12/2021  8:15 AM [place] Sos, PTA [id number] [facility]   1/15/2021  9:00 AM Jamshid Ridley, PT [facility]   1/15/2021  3:00 PM JOHN Quezada [facility] BS AMB 1/18/2021  8:15 AM Ellen Villafuerte, PTA [facility]   1/22/2021  9:00 AM Arpit Lancaster, PT [facility]   1/25/2021  9:45 AM Arpit Lancaster, PT [email or web address] [facility]   1/28/2021  9:00 AM Arpit Lancaster, PT [facility]

## 2021-01-04 ENCOUNTER — HOSPITAL ENCOUNTER (OUTPATIENT)
Dept: PHYSICAL THERAPY | Age: 56
Discharge: HOME | End: 2021-01-04
Payer: MEDICAID

## 2021-01-04 PROCEDURE — 97110 THERAPEUTIC EXERCISES: CPT

## 2021-01-04 PROCEDURE — 97530 THERAPEUTIC ACTIVITIES: CPT

## 2021-01-04 PROCEDURE — 97112 NEUROMUSCULAR REEDUCATION: CPT

## 2021-01-04 NOTE — PROGRESS NOTES
[facility] - Fritz Gonzalez  [address] [facility]  [phone] fax    Physical Therapy Progress Note  Patient name: Darlene Seals Start of Care: 2020   Referral source: Santi Kingsley MD : 1965               Medical Diagnosis: Shoulder pain [M25.519]  Payor: Michael Medina / Plan: Krissy Soriano / Product Type: Managed Care Medicaid /  Onset Date: DOS 2020               Treatment Diagnosis: right shoulder pain   Prior Hospitalization: see medical history Provider#: [id number]   Medications: Verified on Patient summary List    Comorbidities: hx right shoulder surgery ~20 years ago, left shoulder surgery 2019   Prior Level of Function: Independent with ADLs        Visits from Start of Care: 16    Missed Visits: 2    Established Goals:         Excellent           Good         Limited           None  [x] Increased ROM   []  [x]  []  []  [] Increased Strength  []  []  []  []  [x] Increased Mobility  []  [x]  []  []   [x] Decreased Pain   []  []  [x]  []  [] Decreased Swelling  []  []  []  []    Key Functional Changes:   Patient is progressing well toward goals in therapy. Pain level has decreased with activity with 6/10 being the worst and 0/10 at rest. Patient continues to report being awoken multiple times during the night due to pain in the right shoulder. Patient stated that he is able to get 3 hours of sleep at a time 1-2 times per week. PROM in the right shoulder has increased for all motions: flex: 170 degrees abd: 145 degrees ER: 80 degrees IR: 60 degrees. Right shoulder AROM continues to be limited flex: 84 degrees abd: 64 degrees and behind back reaching to SIJ. FOTO score has increased 22 points which indicates increased functional ability. Skilled PT is medically necessary in order to improve right shoulder mobility and strength for increased ease of ADLs and improved quality of life.      Updated Goals: to be achieved in 4 weeks: 1. Pt will report an improvement in at worst pain to 4/10 to improve ability to perform ADLs. 2. Pt will report being able to sleep for 6 hours or more at a time to improve sleep quality. 3.  Pt will increase AROM right shoulder flex/ABD to 150*, ER to 60* and IR to 60*. 4. Pt will increased strength for flex, abd, IR and ER to 4/5 to increase ease with performing functional tasks and driving    ASSESSMENT/RECOMMENDATIONS:  [x]Continue therapy per initial plan/protocol at a frequency of  2 x per week for 4 weeks  []Continue therapy with the following recommended changes:_____________________      _____________________________________________________________________  []Discontinue therapy progressing towards or have reached established goals  []Discontinue therapy due to lack of appreciable progress towards goals  []Discontinue therapy due to lack of attendance or compliance  []Await Physician's recommendations/decisions regarding therapy  []Other:________________________________________________________________    Thank you for this referral.   Vicente Pacheco, PTA 1/4/2021 12:18 PM    NOTE TO PHYSICIAN:  [address] TO [facility]: ([phone]  If you are unable to process this request in 24 hours please contact our office: [phone] I have read the above report and request that my patient continue as recommended. ? I have read the above report and request that my patient continue therapy with the following changes/special instructions:____________________________________  ? I have read the above report and request that my patient be discharged from therapy.     Physicians signature: ______________________________Date: ______Time:______

## 2021-01-04 NOTE — PROGRESS NOTES
PT DAILY TREATMENT NOTE     Patient Name: Chayito Cain  Date:2021  : 1965  [x]  Patient  Verified  Payor: Shireen Marino / Plan: [place] [employer] [employer] / Product Type: Managed Care Medicaid /    In time:815  Out time:855  Total Treatment Time (min): 40  Visit #: 8 of 8    Treatment Area: Shoulder pain [M25.519]    SUBJECTIVE  Pain Level (0-10 scale): 3/10  Any medication changes, allergies to medications, adverse drug reactions, diagnosis change, or new procedure performed?: [x] No    [] Yes (see summary sheet for update)  Subjective functional status/changes:   [] No changes reported  Pt stated that he had more pain yesterday    OBJECTIVE    24 min Therapeutic Exercise:  [x]? See flow sheet :   Rationale: increase ROM and increase strength to improve the patients ability to increase ease of ADLs     8 min Therapeutic Activity:  [x]? See flow sheet : cabinet reaching, pt. Education    Rationale: increase ROM and increase strength  to improve the patients ability to increase ease of reaching     8 min Neuromuscular Re-education:  []?   See flow sheet : scapula stability during exercises, ball on wall proprioception   Rationale: increase ROM and increase strength  to improve the patients ability to increase ease of ADLs    With   [x] TE   [] TA   [] neuro   [] other: Patient Education: [x] Review HEP    [] Progressed/Changed HEP based on:   [] positioning   [] body mechanics   [] transfers   [] heat/ice application    [] other:      Other Objective/Functional Measures:   FOTO 54     Pain Level (0-10 scale) post treatment: 4/10    ASSESSMENT/Changes in Function:   See progress note    Patient will continue to benefit from skilled PT services to modify and progress therapeutic interventions, address functional mobility deficits, address ROM deficits, address strength deficits, analyze and cue movement patterns, analyze and modify body mechanics/ergonomics, assess and modify postural abnormalities and instruct in home and community integration to attain remaining goals. []  See Plan of Care  [x]  See progress note/recertification  []  See Discharge Summary         Progress towards goals / Updated goals:  1. Pt will report an improvement in at worst pain to 6/10 to improve ability to perform ADLs. Goal met. Pt reports 6/10 pain at worst  2. Pt will report being able to sleep for 3 hours or more at a time to improve sleep quality. Progressing. Pt reports ability to sleep greater than 3 hours 1-2 times per week  3. Pt will increase PROM right shoulder flex/ABD to 140 degs, ER to 60 degs, IR to 55 degs to improve the pt's ability to progress through protocol. progressing: Right shoulder PROM flex: 170 degrees abd: 145 degrees ER: 80 degrees IR: 60 degrees     4. Pt will increase FOTO score by 20 points to increase ease with functional tasks and driving  Goal met.  Increased from 32 to 54    PLAN  []  Upgrade activities as tolerated     [x]  Continue plan of care  []  Update interventions per flow sheet       []  Discharge due to:_  []  Other:_      Clara Da Silva PTA 1/4/2021  7:18 AM    Future Appointments   Date Time Provider Abdullahi Horn   1/4/2021  8:15 AM Valencia Hutchins PTA [facility]   1/8/2021  9:00 AM Isaac Snyder, ROMERO [facility]   1/12/2021  8:15 AM Valencia Hutchins PTA [facility]   1/15/2021  3:00 PM JOHN Lazo [facility] BS AMB   1/15/2021  4:30 PM Isaac Snyder PT [facility]   1/18/2021  8:15 AM Valencia Hutchins PTA [facility]   1/22/2021  9:00 AM Isaac Snyder PT [facility]   1/25/2021  9:45 AM Isaac Snyder, PT [facility]   1/28/2021  9:00 AM Isaac Snyder, PT [facility]

## 2021-01-08 ENCOUNTER — HOSPITAL ENCOUNTER (OUTPATIENT)
Dept: PHYSICAL THERAPY | Age: 56
Discharge: HOME | End: 2021-01-08
Payer: MEDICAID

## 2021-01-08 PROCEDURE — 97110 THERAPEUTIC EXERCISES: CPT

## 2021-01-08 PROCEDURE — 97530 THERAPEUTIC ACTIVITIES: CPT

## 2021-01-08 PROCEDURE — 97112 NEUROMUSCULAR REEDUCATION: CPT

## 2021-01-08 NOTE — PROGRESS NOTES
PT DAILY TREATMENT NOTE     Patient Name: Marci Holman  Date:2021  : 1965  [x]  Patient  Verified  Payor: Nadia Quintana / Plan: [id number] / Product Type: Managed Care Medicaid /    In time: 9:00  Out time: 9:39  Total Treatment Time (min): 39  Visit #: 1 of 8    Treatment Area: Shoulder pain [M25.519]    SUBJECTIVE  Pain Level (0-10 scale):  3/10  Any medication changes, allergies to medications, adverse drug reactions, diagnosis change, or new procedure performed?: [x] No    [] Yes (see summary sheet for update)  Subjective functional status/changes:   [] No changes reported  Pt. Reports he has been doing well with exercises. He had pain at night the other day after using his arm a lot. OBJECTIVE    23 min Therapeutic Exercise:  [x] See flow sheet :   Rationale: increase ROM and increase strength to improve the patients ability to increase ease of ADLs    8 min Therapeutic Activity:  [x]  See flow sheet : cabinet reaching, pt. Education on avoiding painful activities    Rationale: increase ROM, increase strength and improve coordination  to improve the patients ability to increase ease of reaching     8 min Neuromuscular Re-education:  [x]  See flow sheet : scapula stability during exercises, ball on wall proprioception    Rationale: increase ROM and increase strength  to improve the patients ability to increase ease of ADLs          With   [x] TE   [] TA   [] neuro   [] other: Patient Education: [x] Review HEP    [] Progressed/Changed HEP based on:   [] positioning   [] body mechanics   [] transfers   [] heat/ice application    [] other:      Other Objective/Functional Measures:   Pt. Tolerated increased resistance during therex today  He continues to have some pain in mid range scaption  Pt. Continues to have decreased ER strength     Pain Level (0-10 scale) post treatment:  3/10    ASSESSMENT/Changes in Function:  Pt. Is progressing slowly towards goals.  He demonstrates improving right shoulder mobility and strength during therex. He continues to try too much activity at home sometimes which results in increased pain. He also continues to have difficulty with sleeping secondary to shoulder pain. Patient will continue to benefit from skilled PT services to modify and progress therapeutic interventions, address functional mobility deficits, address ROM deficits, address strength deficits, analyze and address soft tissue restrictions, analyze and cue movement patterns, analyze and modify body mechanics/ergonomics and assess and modify postural abnormalities to attain remaining goals. Progress towards goals / Updated goals:  1. Pt will report an improvement in at worst pain to 4/10 to improve ability to perform ADLs. 2. Pt will report being able to sleep for 6 hours or more at a time to improve sleep quality. Not met: continues to have difficulty with sleeping (1/8/20)  3. Pt will increase AROM right shoulder flex/ABD to 150*, ER to 60* and IR to 60*.   4. Pt will increased strength for flex, abd, IR and ER to 4/5 to increase ease with performing functional tasks and driving    PLAN  []  Upgrade activities as tolerated     [x]  Continue plan of care  []  Update interventions per flow sheet       []  Discharge due to:_  []  Other:_      Kat Blue, PT 1/8/2021  7:52 AM    Future Appointments   Date Time Provider Abdullahi Horn   1/8/2021  9:00 AM Forrestine Karol, PT [facility]   1/12/2021  8:15 AM Debbie Julian, PTA [id number] [facility]   1/15/2021  3:00 PM JOHN Reynoso [facility] BS AMB   1/15/2021  4:30 PM Forrestine Karol, PT [id number] [facility]   1/18/2021  8:15 AM Debbie Julian, PTA [id number] [facility]   1/22/2021  9:00 AM Forrestine Karol, PT [id number] [facility]   1/25/2021  9:45 AM Forrestine Karol, PT [facility]   1/28/2021  9:00 AM Forrestine Karol, PT [facility]

## 2021-01-12 ENCOUNTER — HOSPITAL ENCOUNTER (OUTPATIENT)
Dept: PHYSICAL THERAPY | Age: 56
Discharge: HOME | End: 2021-01-12
Payer: MEDICAID

## 2021-01-12 PROCEDURE — 97530 THERAPEUTIC ACTIVITIES: CPT

## 2021-01-12 PROCEDURE — 97110 THERAPEUTIC EXERCISES: CPT

## 2021-01-12 PROCEDURE — 97112 NEUROMUSCULAR REEDUCATION: CPT

## 2021-01-12 NOTE — PROGRESS NOTES
PT DAILY TREATMENT NOTE     Patient Name: Juan A Lynn  Date:2021  : 1965  [x]  Patient  Verified  Payor: [place] / Plan: 1500 / Product Type: Medicaid /    In time:815  Out time:847  Total Treatment Time (min): 32  Visit #: 2 of 8    Treatment Area: Shoulder pain [M25.519]    SUBJECTIVE  Pain Level (0-10 scale): 3/10  Any medication changes, allergies to medications, adverse drug reactions, diagnosis change, or new procedure performed?: [x] No    [] Yes (see summary sheet for update)  Subjective functional status/changes:   [] No changes reported  Pt stated that his pain is not too bad today    OBJECTIVE    16 min Therapeutic Exercise:  [x]? See flow sheet :   Rationale: increase ROM and increase strength to improve the patients ability to increase ease of ADLs     8 min Therapeutic Activity:  [x]? See flow sheet : cabinet reaching, pt. Education on avoiding painful activities    Rationale: increase ROM, increase strength and improve coordination  to improve the patients ability to increase ease of reaching     8 min Neuromuscular Re-education:  [x]? See flow sheet : scapula stability during exercises, ball on wall proprioception    Rationale: increase ROM and increase strength  to improve the patients ability to increase ease of ADLs    With   [x] TE   [] TA   [] neuro   [] other: Patient Education: [x] Review HEP    [] Progressed/Changed HEP based on:   [] positioning   [] body mechanics   [] transfers   [] heat/ice application    [] other:      Other Objective/Functional Measures:   Had difficulty with 1# standing flex and was unable to perform abd  Had increased pain with most exercises  No difficulty with placing 1# weight on shelf shoulder height     Pain Level (0-10 scale) post treatment: 3/10    ASSESSMENT/Changes in Function:   Pt is slowly progressing toward goals. Strength in the right shoulder is slowly improving.  Cont to report some difficulty with performing ADLs and work duties. Pt cont to report disturbed sleep. Pain level cont to be 3/10    Patient will continue to benefit from skilled PT services to modify and progress therapeutic interventions, address functional mobility deficits, address ROM deficits, address strength deficits, analyze and cue movement patterns, analyze and modify body mechanics/ergonomics, assess and modify postural abnormalities and instruct in home and community integration to attain remaining goals. []  See Plan of Care  [x]  See progress note/recertification  []  See Discharge Summary         Progress towards goals / Updated goals:  1. Pt will report an improvement in at worst pain to 4/10 to improve ability to perform ADLs. 2. Pt will report being able to sleep for 6 hours or more at a time to improve sleep quality. Not met: continues to have difficulty with sleeping (1/8/20)  3.  Pt will increase AROM right shoulder flex/ABD to 150*, ER to 60* and IR to 60*.   4. Pt will increased strength for flex, abd, IR and ER to 4/5 to increase ease with performing functional tasks and driving    PLAN  []  Upgrade activities as tolerated     [x]  Continue plan of care  []  Update interventions per flow sheet       []  Discharge due to:_  []  Other:_      Rupinder Badillo PTA 1/12/2021  6:22 AM    Future Appointments   Date Time Provider Abdullahi Horn   1/12/2021  8:15 AM Kaden Benson PTA [facility]   1/15/2021  4:30 PM Ethelene Ink, PT [facility]   1/18/2021  8:15 AM Kaden Benson PTA [facility]   1/22/2021  9:00 AM Ethelene Ink, PT [facility]   1/22/2021  3:00 PM Carolina Spatz, PA [facility] BS AMB   1/25/2021  9:45 AM Ethelene Ink, PT [id number] [facility]   1/28/2021  9:00 AM Ethelene Ink, PT [facility]   1/28/2021 10:00 AM ZAC Rock AMB

## 2021-01-15 ENCOUNTER — HOSPITAL ENCOUNTER (OUTPATIENT)
Dept: PHYSICAL THERAPY | Age: 56
Discharge: HOME | End: 2021-01-15
Payer: MEDICAID

## 2021-01-15 PROCEDURE — 97530 THERAPEUTIC ACTIVITIES: CPT

## 2021-01-15 PROCEDURE — 97110 THERAPEUTIC EXERCISES: CPT

## 2021-01-15 NOTE — PROGRESS NOTES
PT DAILY TREATMENT NOTE     Patient Name: Mandy Lew  Date:1/15/2021  : 1965  [x]  Patient  Verified  Payor: [place] / Plan: 1500  / Product Type: Medicaid /    In time:4:39  Out time:5:10  Total Treatment Time (min): 31  Visit #: 3 of 8    Treatment Area: Shoulder pain [M25.519]    SUBJECTIVE  Pain Level (0-10 scale): 3/10  Any medication changes, allergies to medications, adverse drug reactions, diagnosis change, or new procedure performed?: [x] No    [] Yes (see summary sheet for update)  Subjective functional status/changes:   [] No changes reported  Pt was pleasant and reported mild pain, said he was late d/t traffic     OBJECTIVE       21 min Therapeutic Exercise:  [] See flow sheet :stregnthening RTC, scapula    Rationale: increase ROM, increase strength and increase proprioception to improve the patients ability to carry out ADL's    10 min Therapeutic Activity:  []  See flow sheet : Pt education    Rationale: increase ROM  to improve the patients ability to carry out ADL's pain free            With   [x] TE   [] TA   [] neuro   [] other: Patient Education: [x] Review HEP    [] Progressed/Changed HEP based on:   [] positioning   [] body mechanics   [] transfers   [] heat/ice application    [] other:      Other Objective/Functional Measures:   Pt responded well to scapular stability exercises  Reported slight decrease in pain following activity   Quadruped position ball roll outs for flexion ROM       Pain Level (0-10 scale) post treatment: 3/10    ASSESSMENT/Changes in Function:   Pt was pleasant and responded well to therapy reporting slightly decreased pain following exercise. Overall, pt is still limited in R shoulder ROM in all planes and has failed to feel goals. Pt reports increased pain with resistance and displayed 2/5 strength in ER supine in scaption.     Patient will continue to benefit from skilled PT services to modify and progress therapeutic interventions, address functional mobility deficits, address ROM deficits and address strength deficits to attain remaining goals. Progress towards goals / Updated goals:  1. Pt will report an improvement in at worst pain to 4/10 to improve ability to perform ADLs.  MET. 3/10  2. Pt will report being able to sleep for 6 hours or more at a time to improve sleep quality. Not met: continues to have difficulty with sleeping (1/8/20), reports getting about 2-3 hours now, pt normal is 4-5 hours a night. 3.  Pt will increase AROM right shoulder flex/ABD to 150*, ER to 60* and IR to 60*. [id number]  32-40 IR  40-45 ER  4.  Pt will increased strength for flex, abd, IR and ER to 4/5 to increase ease with performing functional     PLAN  []  Upgrade activities as tolerated     [x]  Continue plan of care  []  Update interventions per flow sheet       []  Discharge due to:_  []  Other:_      Radha Barry 1/15/2021  4:31 PM    Future Appointments   Date Time Provider Abdullahi Horn   1/18/2021  8:15 AM Lissa White, PTA [facility]   1/22/2021  9:00 AM Lissa White PTA [facility]   1/22/2021  3:00 PM JOHN Coto [facility] BS AMB   1/25/2021  9:45 AM Cha Kulkarni PTA [facility]   1/28/2021  9:00 AM Natalya Almeida, PT [id number] [facility]   1/28/2021 10:00 AM ZAC Harris BS AMB

## 2021-01-18 ENCOUNTER — APPOINTMENT (OUTPATIENT)
Dept: PHYSICAL THERAPY | Age: 56
End: 2021-01-18
Payer: MEDICAID

## 2021-01-19 ENCOUNTER — HOSPITAL ENCOUNTER (OUTPATIENT)
Dept: PHYSICAL THERAPY | Age: 56
Discharge: HOME | End: 2021-01-19
Payer: MEDICAID

## 2021-01-19 PROCEDURE — 97112 NEUROMUSCULAR REEDUCATION: CPT

## 2021-01-19 PROCEDURE — 97530 THERAPEUTIC ACTIVITIES: CPT

## 2021-01-19 PROCEDURE — 97110 THERAPEUTIC EXERCISES: CPT

## 2021-01-19 NOTE — PROGRESS NOTES
PT DAILY TREATMENT NOTE     Patient Name: Juan A Lynn  Date:2021  : 1965  [x]  Patient  Verified  Payor: [place] / Plan: 1500 / Product Type: Medicaid /    In time:7:35  Out time:8:20  Total Treatment Time (min): 45  Visit #: 4 of 8    Treatment Area: Shoulder pain [M25.519]    SUBJECTIVE  Pain Level (0-10 scale): 4/10  Any medication changes, allergies to medications, adverse drug reactions, diagnosis change, or new procedure performed?: [x] No    [] Yes (see summary sheet for update)  Subjective functional status/changes:   [] No changes reported  Pt reports that he he felt a tightening in his shoulder during the K[employer] carry last visit. He felt a throbbing in his shoulder 0.5-1 hours after therapy and it has been throbbing since and disrupting sleep. His MD told him at his last follow up that he needs to back off with his activities and avoid over-exertion of right shoulder. He has had popping in right shoulder since surgery.        OBJECTIVE    Modality rationale: decrease inflammation and decrease pain to improve the patients ability to tolerate daily tasks    Min Type Additional Details    [] Estim:  []Unatt       []IFC  []Premod                        []Other:  []w/ice   []w/heat  Position:  Location:    [] Estim: []Att    []TENS instruct  []NMES                    []Other:  []w/US   []w/ice   []w/heat  Position:  Location:    []  Traction: [] Cervical       []Lumbar                       [] Prone          []Supine                       []Intermittent   []Continuous Lbs:  [] before manual  [] after manual    []  Ultrasound: []Continuous   [] Pulsed                           []1MHz   []3MHz W/cm2:  Location:    []  Iontophoresis with dexamethasone         Location: [] Take home patch   [] In clinic   10 [x]  Ice     []  heat  []  Ice massage  []  Laser   []  Anodyne Position: seated  Location: right shoulder     []  Laser with stim  []  Other: Position:  Location:    []  Vasopneumatic Device Pressure:       [] lo [] med [] hi   Temperature: [] lo [] med [] hi   [x] Skin assessment post-treatment:  [x]intact []redness- no adverse reaction    []redness - adverse reaction:       13 min Therapeutic Exercise:  [x] See flow sheet :   Rationale: increase ROM and increase strength to improve the patients ability to improve ease of reaching and light lifting with ADLs     14 min Therapeutic Activity:  [x]  See flow sheet : reaching interventions with wall slides, supine wand, and sidelying abduction/ER    Rationale: increase ROM and increase strength  to improve the patients ability to improve ability with reaching with ADLs      8 min Neuromuscular Re-education:  [x]  See flow sheet : scapular stabilization    Rationale: increase strength and increase proprioception  to improve the patients ability to improve scapulohumeral rhythm for increased ease of reaching with ADLs     4 minutes Manual: Gentle TPR right infraspinatus  Rationale: to reduce pain, increase ROM, and increase tissue extensibility for increased sleeping tolerance and improved functional use of right UE.               With   [] TE   [x] TA   [] neuro   [] other: Patient Education: [x] Review HEP    [] Progressed/Changed HEP based on:   [] positioning   [] body mechanics   [] transfers   [] heat/ice application    [x] other: reviewed importance of avoiding over-exertion and performing all interventions in pain-free range     Other Objective/Functional Measures:     TTP Right Biceps  Tenderness and trigger points right infraspinatus, gentle TPR referred pain to anterior shoulder where pt reports throbbing pain  Reduced throbbing pain following manual      Pain in right infraspinatus with sidelying ER, therapist assisted with concentric portion and cued pt for increased eccentric control and pt was able to complete with less pain    Reduced weight with [place] ER  Reduced pain reported following therapy interventions prior to ice     Pain Level (0-10 scale) post treatment: 3/10    ASSESSMENT/Changes in Function:     Pt is making slow progress towards updated goals in therapy. He reports increased pain levels following progressed interventions at last therapy sessions. Trigger points in infraspinatus that referred pain to anterior shoulder are indicative of trigger points/myofascial restrictions contributing to increased pain. Decreased intensity, reps, and resistance with therapy interventions per flow sheet this visit to attempt to allow for reduced pain levels. Educated patient regarding importance of avoiding over-exertion outside of therapy and completing all tasks in pain-free range. Will continue to address strength, ROM, and flexibility deficits in order to increase functional use of right UE. Patient will continue to benefit from skilled PT services to modify and progress therapeutic interventions, address ROM deficits, address strength deficits, analyze and address soft tissue restrictions, analyze and cue movement patterns, assess and modify postural abnormalities and instruct in home and community integration to attain remaining goals. Progress towards goals / Updated goals:  1. Pt will report an improvement in at worst pain to 4/10 to improve ability to perform ADLs.  MET. 3/10  2. Pt will report being able to sleep for 6 hours or more at a time to improve sleep quality. Not met: continues to have difficulty with sleeping (1/8/20), reports getting about 2-3 hours now, pt normal is 4-5 hours a night. Not met. Current sleeping tolerance 1-2 hours before waking d/t pain 1/19/21   3.  Pt will increase AROM right shoulder flex/ABD to 150*, ER to 60* and IR to 60*. [id number]-135PROM  32-40 IR  40-45 ER  4.  Pt will increased strength for flex, abd, IR and ER to 4/5 to increase ease with performing functional     PLAN  [x]  Upgrade activities as tolerated     [x]  Continue plan of care  []  Update interventions per flow sheet       []  Discharge due to:_  []  Other:_      Jeff Sewell, PT 1/19/2021  7:38 AM    Future Appointments   Date Time Provider Abdullahi Justina   1/22/2021  9:00 AM Iris Hews, PTA [facility]   1/22/2021  3:00 PM JOHN Reynoso [facility] BS AMB   1/25/2021  9:45 AM Livingston Clamp, PTA [facility]   1/28/2021  9:00 AM Forrestine Karol, PT [facility]   1/28/2021 10:00 AM ZAC Benavides BS AMB   2/1/2021  8:15 AM Forrestine Karol, PT [id number]   2/4/2021  8:15 AM Forrestine Karol, PT [facility]   2/8/2021  8:15 AM Forrestine Karol, PT [facility]   2/11/2021  8:15 AM Forrestine Karol, PT [facility]   2/15/2021  9:00 AM Forrestine Karol, PT [facility]   2/18/2021  8:15 AM Forrestine Karol, PT [facility]   2/22/2021  8:15 AM Forrestine Karol, PT [facility]   2/25/2021  8:15 AM Forrestine Karol, PT [facility]   3/1/2021  8:15 AM Forrestine Karol, PT [facility]   3/4/2021  8:15 AM Forrestine Karol, PT [facility]

## 2021-01-21 ENCOUNTER — VIRTUAL VISIT (OUTPATIENT)
Dept: ORTHOPEDIC SURGERY | Age: 56
End: 2021-01-21
Payer: MEDICAID

## 2021-01-21 DIAGNOSIS — M54.12 CERVICAL RADICULOPATHY: Primary | ICD-10-CM

## 2021-01-21 DIAGNOSIS — M54.2 CERVICAL PAIN: ICD-10-CM

## 2021-01-21 PROCEDURE — 99443 PR PHYS/QHP TELEPHONE EVALUATION 21-30 MIN: CPT | Performed by: NURSE PRACTITIONER

## 2021-01-21 RX ORDER — AMITRIPTYLINE HYDROCHLORIDE 75 MG/1
75 TABLET, FILM COATED ORAL
Qty: 90 TAB | Refills: 1 | Status: SHIPPED | OUTPATIENT
Start: 2021-01-21 | End: 2021-07-21

## 2021-01-21 RX ORDER — GABAPENTIN 800 MG/1
800 TABLET ORAL
Qty: 90 TAB | Refills: 1 | Status: SHIPPED | OUTPATIENT
Start: 2021-01-21 | End: 2021-05-05

## 2021-01-21 NOTE — PROGRESS NOTES
Jim King is a 63 y.o. male who was seen by synchronous (real-time) audio-video technology on 1/21/2021. He has a history of neck pain and left arm pain. He was seen by Dr. Alexandra Pineda and referred to Dr. Gogo Contreras for cervical injections. He states he did have 1 cervical injection and it helped tremendously. He is starting to have pain back in that same area and he wishes to have more injections. He states he is taking Neurontin 400 mg 2 tabs in the morning which seems effective for him instead of the 400 mg 3 times a day. He is taking amitriptyline 75 mg at bedtime. It is prescribed 75 mg at bedtime. He states he has increased pain at night. Since we last saw him he underwent a left rotator cuff repair by Dr. Cassidy Alba and did well following that surgery. He also sees Dr. Akira Black for his carpal tunnel syndrome and needs to have surgery for that but has not had it yet. Today, he states things are going as good as expected. He has had two shoulder surgerys in the past year. He states his neck pain is intermittent. He has occasional headaches. Denies bladder/bowel dysfunction, saddle paresthesia, weakness, gait disturbance, or other neurological deficit. Pt at this time desires to  continue with current care/proceed with medication evaluation. ASSESSMENT  63 y.o. male with neck pain. Diagnoses and all orders for this visit:    1. Cervical radiculopathy    2. Cervical pain           IMPRESSION/PLAN    1) Pt was given information on neck exercises. 2) cont gabapentin  3) cont elavil   4) Mr. Lucrecia Arvizu has a reminder for a \"due or due soon\" health maintenance. I have asked that he contact his primary care provider, Annamaria James NP, for follow-up on this health maintenance.   5) We have informed patient to notify us for immediate appointment if he has any worsening neurogical symptoms or if an emergency situation presents, then call 911  6) Pt will follow-up in 6 months for med fu.  7) discussed PM referral for chronic pain    Risks and benefits of ongoing  therapy have been reviewed with the patient.  is appropriate. No pain behaviors. Denies thoughts of harming self or others. Pt has a good risk to benefit ratio which allows the pt to function in a home environment without side effects. Assessment & Plan:   Diagnoses and all orders for this visit:    1. Cervical radiculopathy    2. Cervical pain          Follow-up and Dispositions    · Return in about 6 months (around 7/21/2021) for deon . Subjective:   Severa Minor was seen for No chief complaint on file. PAST MEDICAL HISTORY  Past Medical History:   Diagnosis Date    Depression     and anxiety    Foot drop, left foot     October, 2018. Seen by Podiatry in 2019    Headache     Psychotic disorder (HCC)     anxiety        MEDICATIONS  Current Outpatient Medications   Medication Sig Dispense Refill    amitriptyline (ELAVIL) 25 mg tablet TAKE 3 TABLETS BY MOUTH EVERY NIGHT AT BEDTIME 270 Tab 0    mirtazapine (REMERON) 30 mg tablet TAKE 1 TAB BY MOUTH NIGHTLY. 90 Tab 0    busPIRone (BUSPAR) 10 mg tablet Take 10 mg by mouth two (2) times a day.  bisacodyL (Dulcolax, bisacodyl,) 5 mg EC tablet Take tablets as directed for bowel prep. 4 Tab 0    polyethylene glycol (Miralax) 17 gram/dose powder Take as directed for bowel preparation 238 g 0    hydrocortisone (ANUSOL-HC) 2.5 % rectal cream Insert  into rectum four (4) times daily. 30 g 1    polyethylene glycol (MIRALAX) 17 gram packet Take 1 Packet by mouth daily. 14 Each 2    dextroamphetamine-amphetamine (AdderalL) 10 mg tablet Take 10 mg by mouth three (3) times daily.  Latuda 60 mg tab tablet       escitalopram oxalate (LEXAPRO) 20 mg tablet       gabapentin (Neurontin) 800 mg tablet Take 1 Tab by mouth daily (after breakfast). Max Daily Amount: 800 mg.  Indications: neuropathic pain 90 Tab 1       ALLERGIES  Allergies   Allergen Reactions    Motrin [Ibuprofen] Hives    Motrin [Ibuprofen] Rash       SOCIAL HISTORY    Social History     Socioeconomic History    Marital status:      Spouse name: Not on file    Number of children: Not on file    Years of education: Not on file    Highest education level: Not on file   Occupational History    Not on file   Social Needs    Financial resource strain: Not on file    Food insecurity     Worry: Not on file     Inability: Not on file    Transportation needs     Medical: Not on file     Non-medical: Not on file   Tobacco Use    Smoking status: Former Smoker     Years: 2.00     Quit date:      Years since quittin.0    Smokeless tobacco: Current User    Tobacco comment: Starting Chewing to bacco in .    Substance and Sexual Activity    Alcohol use: Not Currently     Comment: states he quit drinking alchohol 2 months ago    Drug use: Not Currently     Types: Marijuana    Sexual activity: Not Currently   Lifestyle    Physical activity     Days per week: Not on file     Minutes per session: Not on file    Stress: Not on file   Relationships    Social connections     Talks on phone: Not on file     Gets together: Not on file     Attends Mu-ism service: Not on file     Active member of club or organization: Not on file     Attends meetings of clubs or organizations: Not on file     Relationship status: Not on file    Intimate partner violence     Fear of current or ex partner: Not on file     Emotionally abused: Not on file     Physically abused: Not on file     Forced sexual activity: Not on file   Other Topics Concern    Not on file   Social History Narrative    ** Merged History Encounter **            Pain Scale: /10    Pain Assessment  2020   Location of Pain Shoulder   Location Modifiers Right   Severity of Pain 3   Quality of Pain Sharp;Dull;Aching   Quality of Pain Comment -   Duration of Pain Persistent   Frequency of Pain Constant   Aggravating Factors (No Data) Aggravating Factors Comment movement   Limiting Behavior -   Relieving Factors Rest   Relieving Factors Comment -   Result of Injury -   Work-Related Injury -   How long out of work? -   Type of Injury -         ROS      Objective: There were no vitals taken for this visit. [INSTRUCTIONS:  \"[x]\" Indicates a positive item  \"[]\" Indicates a negative item  -- DELETE ALL ITEMS NOT EXAMINED]    Constitutional: [] Appears well-developed and well-nourished [x] No apparent distress      [] Abnormal -     Mental status: [x] Alert and awake  [x] Oriented to person/place/time [x] Able to follow commands    [] Abnormal -     Eyes:   EOM    []  Normal    [] Abnormal -   Sclera  []  Normal    [] Abnormal -          Discharge []  None visible   [] Abnormal -     HENT: [] Normocephalic, atraumatic  [] Abnormal -     Neck: [] No visualized mass [] Abnormal -     Pulmonary/Chest: [] Respiratory effort normal   [] No visualized signs of difficulty breathing or respiratory distress        [] Abnormal -      Musculoskeletal:   [] Normal gait with no signs of ataxia         [] Normal range of motion of neck        [] Abnormal -       Neurological:        [] No Facial Asymmetry (Cranial nerve 7 motor function) (limited exam due to video visit)          [] No gaze palsy        [] Abnormal -          Skin:        [] No significant exanthematous lesions or discoloration noted on facial skin         [] Abnormal -                   . Psychiatric:       [x] Normal Affect [] Abnormal -        [x] No Hallucinations        Due to this being a TeleHealth evaluation, many elements of the physical examination are unable to be assessed. We discussed the expected course, resolution and complications of the diagnosis(es) in detail. Medication risks, benefits, costs, interactions, and alternatives were discussed as indicated.   I advised him to contact the office if his condition worsens, changes or fails to improve as anticipated. He expressed understanding with the diagnosis(es) and plan. Consent: Leobardo Toney, who was seen by synchronous (real-time) audio-video technology, and/or his healthcare decision maker, is aware that this patient-initiated, Telehealth encounter on 1/21/2021 is a billable service, with coverage as determined by his insurance carrier. He is aware that he may receive a bill and has provided verbal consent to proceed: Yes. Leobardo Toney is a 63 y.o. male who was evaluated by a video visit encounter for concerns as above. Patient identification was verified prior to start of the visit. A caregiver was present when appropriate. Due to this being a TeleHealth encounter (During NOV-52 public health emergency), evaluation of the following organ systems was limited: Vitals/Constitutional/EENT/Resp/CV/GI//MS/Neuro/Skin/Heme-Lymph-Imm. Pursuant to the emergency declaration under the [address] [place] [address], [facility] waiver authority and the [employer] Act, this Virtual  Visit was conducted, with patient's consent, to reduce the patient's risk of exposure to COVID-19 and provide continuity of care for an established patient. Services were provided through real time synchronous discussion virtually to substitute for in-person clinic visit. Patient verbally consented to this type of visit and that they might get a bill from the billing of this visit. This service was provided through telephone ,  the patient was located at home and  the provider was located at home. There was  Only the patient participating in the service. Start time 4672  End [id number].      Venkatesh Snider NP

## 2021-01-22 ENCOUNTER — HOSPITAL ENCOUNTER (OUTPATIENT)
Dept: PHYSICAL THERAPY | Age: 56
Discharge: HOME | End: 2021-01-22
Payer: MEDICAID

## 2021-01-22 ENCOUNTER — OFFICE VISIT (OUTPATIENT)
Dept: ORTHOPEDIC SURGERY | Age: 56
End: 2021-01-22
Payer: MEDICAID

## 2021-01-22 VITALS
RESPIRATION RATE: 16 BRPM | HEART RATE: 72 BPM | WEIGHT: 212 LBS | DIASTOLIC BLOOD PRESSURE: 77 MMHG | BODY MASS INDEX: 31.4 KG/M2 | SYSTOLIC BLOOD PRESSURE: 116 MMHG | HEIGHT: 69 IN | TEMPERATURE: 97 F | OXYGEN SATURATION: 97 %

## 2021-01-22 DIAGNOSIS — Z98.890 STATUS POST ARTHROSCOPY OF RIGHT SHOULDER: ICD-10-CM

## 2021-01-22 DIAGNOSIS — M75.111 NONTRAUMATIC INCOMPLETE TEAR OF RIGHT ROTATOR CUFF: Primary | ICD-10-CM

## 2021-01-22 PROCEDURE — 99213 OFFICE O/P EST LOW 20 MIN: CPT | Performed by: ORTHOPAEDIC SURGERY

## 2021-01-22 PROCEDURE — 97110 THERAPEUTIC EXERCISES: CPT

## 2021-01-22 PROCEDURE — 97112 NEUROMUSCULAR REEDUCATION: CPT

## 2021-01-22 PROCEDURE — 97530 THERAPEUTIC ACTIVITIES: CPT

## 2021-01-22 NOTE — PROGRESS NOTES
PT DAILY TREATMENT NOTE     Patient Name: Severa Minor  Date:2021  : 1965  [x]  Patient  Verified  Payor: [place] / Plan: 1500  / Product Type: Medicaid /    In time:9:50A  Out time:10:35A  Total Treatment Time (min): 45  Visit #: 5 of 8      Treatment Area: Shoulder pain [M25.519]    SUBJECTIVE  Pain Level (0-10 scale): 4/10  Any medication changes, allergies to medications, adverse drug reactions, diagnosis change, or new procedure performed?: [x] No    [] Yes (see summary sheet for update)  Subjective functional status/changes:   [] No changes reported    Patient reports his shoulder feels better overall but has been bothering him for about 8 days. He continues to have a throbbing pain in his shoulder with active movement. He has been icing at home. His pain will keep him up at night. He has a follow up appt with his physician today.      OBJECTIVE    Modality rationale: decrease inflammation and decrease pain to improve the patients ability to perform ADLs with increased ease   Min Type Additional Details    [] Estim:  []Unatt       []IFC  []Premod                        []Other:  []w/ice   []w/heat  Position:  Location:    [] Estim: []Att    []TENS instruct  []NMES                    []Other:  []w/US   []w/ice   []w/heat  Position:  Location:    []  Traction: [] Cervical       []Lumbar                       [] Prone          []Supine                       []Intermittent   []Continuous Lbs:  [] before manual  [] after manual    []  Ultrasound: []Continuous   [] Pulsed                           []1MHz   []3MHz W/cm2:  Location:    []  Iontophoresis with dexamethasone         Location: [] Take home patch   [] In clinic   10 [x]  Ice     []  heat  []  Ice massage  []  Laser   []  Anodyne Position: seated  Location: right shoulder    []  Laser with stim  []  Other:  Position:  Location:    []  Vasopneumatic Device Pressure:       [] lo [] med [] hi Temperature: [] lo [] med [] hi   [x] Skin assessment post-treatment:  [x]intact []redness- no adverse reaction    []redness - adverse reaction:       10 min Therapeutic Exercise:  [x] See flow sheet :   Rationale: increase ROM and increase strength to improve the patients ability to perform ADLs with increased ease    12 min Therapeutic Activity:  [x]  See flow sheet : wall slides, wand flex, sidelying activities   Rationale: increase ROM and increase strength  to improve the patients ability to perform ADLs with increased ease     8 min Neuromuscular Re-education:  [x]  See flow sheet :   Rationale: increase strength, improve coordination and increase proprioception  to improve the patients ability to perform ADLs with increased ease    5 min Manual Therapy:  STM/gentle TPR to right UT, infraspinatus; gentle SOR   The manual therapy interventions were performed at a separate and distinct time from the therapeutic activities interventions.   Rationale: decrease pain, increase ROM, increase tissue extensibility and decrease trigger points to perform               With   [x] TE   [] TA   [] neuro   [] other: Patient Education: [x] Review HEP    [] Progressed/Changed HEP based on:   [] positioning   [] body mechanics   [] transfers   [] heat/ice application    [x] other: performing activities to tolerance, limiting over exertion with right shoulder     Other Objective/Functional Measures:     Trigger points to right infraspinatus, TTP of suboccipitals  Patient able to perform wall slides with right shoulder flex ROM WNL  Unable to tolerate sidelying abd, performed in supine with improved tolerance and active range  Sidelying ER performed with manual concentric assist with verbal cues for eccentric control    Mild decrease in pain reported post session    Pain Level (0-10 scale) post treatment: 3/10    ASSESSMENT/Changes in Function: Patient continues to make slow progress towards goals and demonstrated decreased tolerance to activities in therapy. Continued with decreased intensity, reps, and resistance with activities this visit to improve patient tolerance to session. Patient educated on performing activities to tolerance and limiting over-exertion with activities this session. Patient also encouraged to report all current concerns/changes in symptoms to physician today. Patient will continue to benefit from skilled PT services to modify and progress therapeutic interventions, address functional mobility deficits, address ROM deficits, address strength deficits, analyze and address soft tissue restrictions, analyze and cue movement patterns, analyze and modify body mechanics/ergonomics and assess and modify postural abnormalities to attain remaining goals. Progress towards goals / Updated goals:  1. Pt will report an improvement in at worst pain to 4/10 to improve ability to perform ADLs.  MET. 3/10  2. Pt will report being able to sleep for 6 hours or more at a time to improve sleep quality. Not met: continues to have difficulty with sleeping (1/8/20), reports getting about 2-3 hours now, pt normal is 4-5 hours a night.    Not met. Current sleeping tolerance 1-2 hours before waking d/t pain 1/19/21   3.  Pt will increase AROM right shoulder flex/ABD to 150*, ER to 60* and IR to 60*. [id number]-135PROM  32-40 IR  40-45 ER  4.  Pt will increased strength for flex, abd, IR and ER to 4/5 to increase ease with performing functional        PLAN  [x]  Upgrade activities as tolerated     [x]  Continue plan of care  []  Update interventions per flow sheet       []  Discharge due to:_  []  Other:_      Dewey Shipley, PT 1/22/2021  9:48 AM    Future Appointments   Date Time Provider Abdullahi Honr   1/22/2021  3:00 PM Ashleigh Santana, [address] [facility] BS AMB   1/25/2021  9:45 [facility]   1/28/2021  9:00 AM [facility]   1/28/2021 10:00 AM Danielle Foots Johnnye Oppenheim, NP [facility] BS AMB   2/1/2021  8:15 AM Nolene Kida, PT [id number]   2/4/2021  8:15 AM Nolene Kida, PT [facility]   2/8/2021  8:15 AM Nolene Kida, PT [facility]   2/11/2021  8:15 AM Nolene Kida, PT [facility]   2/15/2021  9:00 AM Nolene Kida, PT [facility]   2/18/2021  8:15 AM Nolene Kida, PT [facility]   2/22/2021  8:15 AM Nolene Kida, PT [facility]   2/25/2021  8:15 AM Nolene Kida, PT [facility]   3/1/2021  8:15 AM Nolene Kida, PT [facility]   3/4/2021  8:15 AM Nolene Kida, PT [facility]   7/22/2021 10:20 AM Arianne Luque, [facility] BS AMB

## 2021-01-22 NOTE — PROGRESS NOTES
Ibrahima Siegel  1965     HISTORY OF PRESENT ILLNESS  Ibrahima Siegel is a 55 y.o. male who presents today for evaluation S/P Right shoulder arthroscopic rotator cuff re-tear repair, biceps tenodesis, distal clavicle excision and subacromial decompression on 9/21/20. Patient has been going to PT. Describes pain as a 3/10. Has been taking nothing for pain. Still has night pain. He has keep compliant with his exercises and restrictions. He has worked in PT and is making progress with his motion. He has muscle tension and pain at night at his neck and shoulder. He is working on knots in PT and it helps his pain. He was given gabapentin and a muscle relaxer from the spine center that he is going to try. Patient denies any fever, chills, chest pain, shortness of breath or calf pain. There are no new illness or injuries to report since last seen in the office.    PHYSICAL EXAM:   Visit Vitals  /77 (BP 1 Location: Left arm, BP Patient Position: Sitting)   Pulse 72   Temp 97 °F (36.1 °C) (Temporal)   Resp 16   Ht 5' 9\" (1.753 m)   Wt 212 lb (96.2 kg)   SpO2 97%   BMI 31.31 kg/m²      The patient is a well-developed, well-nourished male in no acute distress.  The patient is alert and oriented times three. The patient appears to be well groomed. Mood and affect are normal.  ORTHOPEDIC EXAM of right shoulder:  Inspection: swelling not present,  Bruising not present  Incisions well healed  Passive glenohumeral abduction 0-80 degrees, 180 PFF, 120 AFF, 60 PER, IR lower lumbar  Stability: Stable  Strength: gentle rotator cuff testing with weakness   2+ distal pulses    IMPRESSION:  S/P Right shoulder arthroscopic rotator cuff re-tear repair, biceps tenodesis, distal clavicle excision and subacromial decompression.    PLAN:   Pt doing well post operatively  His motion is much better since last visit.  He will continue exercises and PT ordered today. He will continue PT and advancing per protocol.  Stressed to  patient that nothing causes an increase in pain. He will try the muscle relaxer at night he was given by ZAC Church at the spine center to help with muscle tightness and tension at night. I think that is wear his pain is coming from. He is making progress in PT so muscular pain and knots are not uncommon at this point. Pt not given a refill of pain medication.   RTC 6 weeks    TANNA Cuba [facility]

## 2021-01-25 ENCOUNTER — HOSPITAL ENCOUNTER (OUTPATIENT)
Dept: PHYSICAL THERAPY | Age: 56
Discharge: HOME | End: 2021-01-25
Payer: MEDICAID

## 2021-01-25 PROCEDURE — 97530 THERAPEUTIC ACTIVITIES: CPT

## 2021-01-25 PROCEDURE — 97110 THERAPEUTIC EXERCISES: CPT

## 2021-01-25 PROCEDURE — 97112 NEUROMUSCULAR REEDUCATION: CPT

## 2021-01-25 NOTE — PROGRESS NOTES
PT DAILY TREATMENT NOTE 11    Patient Name: Stefanie Weber  Date:2021  : 1965  [x]  Patient  Verified  Payor: [place] [place] / Plan: [id number] / Product Type: Medicaid /    In time:9:20  Out time:10:17  Total Treatment Time (min): 62  Visit #: 6 of 8    Treatment Area: Shoulder pain [M25.519]    SUBJECTIVE  Pain Level (0-10 scale): 3  Any medication changes, allergies to medications, adverse drug reactions, diagnosis change, or new procedure performed?: [x] No    [] Yes (see summary sheet for update)  Subjective functional status/changes:   [] No changes reported  Pt reports still having some pain when he's trying to sleep    OBJECTIVE    Modality rationale: decrease pain to improve the patients ability to perform daily tasks   Min Type Additional Details    [] Estim:  []Unatt       []IFC  []Premod                        []Other:  []w/ice   []w/heat  Position:  Location:    [] Estim: []Att    []TENS instruct  []NMES                    []Other:  []w/US   []w/ice   []w/heat  Position:  Location:    []  Traction: [] Cervical       []Lumbar                       [] Prone          []Supine                       []Intermittent   []Continuous Lbs:  [] before manual  [] after manual    []  Ultrasound: []Continuous   [] Pulsed                           []1MHz   []3MHz W/cm2:  Location:    []  Iontophoresis with dexamethasone         Location: [] Take home patch   [] In clinic   10 [x]  Ice     []  heat  []  Ice massage  []  Laser   []  Anodyne Position: seated  Location: right shoulder    []  Laser with stim  []  Other:  Position:  Location:    []  Vasopneumatic Device Pressure:       [] lo [] med [] hi   Temperature: [] lo [] med [] hi   [] Skin assessment post-treatment:  []intact []redness- no adverse reaction    []redness  adverse reaction:       22 min Therapeutic Exercise:  [x] See flow sheet :   Rationale: increase ROM and increase strength to improve the patients ability to perform ADLs    10 min Therapeutic Activity:  [x]  See flow sheet :   Rationale: increase ROM, increase strength, improve coordination and increase proprioception  to improve the patients ability to increase ease with daily tasks     10 min Neuromuscular Re-education:  [x]  See flow sheet :   Rationale: increase strength and increase proprioception  to improve the patients ability to perform functional tasks    5 min Manual Therapy:  TPR to right infra, UT, and teres   The manual therapy interventions were performed at a separate and distinct time from the therapeutic activities interventions. Rationale: decrease pain, increase ROM and increase tissue extensibility to improve functional mobility        With   [] TE   [] TA   [] neuro   [] other: Patient Education: [x] Review HEP    [] Progressed/Changed HEP based on:   [] positioning   [] body mechanics   [] transfers   [] heat/ice application    [] other:      Other Objective/Functional Measures:  Req's assist to perform S/L shoulder abd      Pain Level (0-10 scale) post treatment: 0    ASSESSMENT/Changes in Function: Cont's to report \"burning\" pain at infra that decr'd following manual therapy. Demo's good scap mobility but noted ms tension @ infra, UT, teres and bicep. Performed flex and scap to 90* with good technique, no shoulder hike observed. Patient will continue to benefit from skilled PT services to modify and progress therapeutic interventions, address functional mobility deficits, address ROM deficits, address strength deficits, analyze and address soft tissue restrictions, analyze and cue movement patterns, analyze and modify body mechanics/ergonomics and assess and modify postural abnormalities to attain remaining goals. []  See Plan of Care  []  See progress note/recertification  []  See Discharge Summary         Progress towards goals / Updated goals:  1.  Pt will report an improvement in at worst pain to 4/10 to improve ability to perform ADLs.  MET. 3/10  2. Pt will report being able to sleep for 6 hours or more at a time to improve sleep quality. Not met: continues to have difficulty with sleeping (1/8/20), reports getting about 2-3 hours now, pt normal is 4-5 hours a night.    Not met.  Current sleeping tolerance 1-2 hours before waking d/t pain 1/19/21   3.  Pt will increase AROM right shoulder flex/ABD to 150*, ER to 60* and IR to 60*. [id number]-135PROM  32-40 IR  40-45 ER  4.  Pt will increased strength for flex, abd, IR and ER to 4/5 to increase ease with performing functional     PLAN  []  Upgrade activities as tolerated     []  Continue plan of care  []  Update interventions per flow sheet       []  Discharge due to:_  []  Other:_      Alley Oliveira, CORBY 1/25/2021  9:22 AM    Future Appointments   Date Time Provider Abdullahi Horn   1/28/2021  9:00 AM Von Late, PT [id number] [facility]   1/28/2021 10:00 AM Onannalisee Kwame, NP [facility] BS AMB   2/1/2021  8:15 AM Von Late, PT [id number] [facility]   2/4/2021  8:15 AM Von Late, PT [id number] [facility]   2/8/2021  8:15 AM Von Late, PT [facility]CP[facility]   2/11/2021  8:15 AM Von Late, PT [facility]   2/15/2021  9:00 AM Von Late, PT [facility]   2/18/2021  8:15 AM Von Late, PT [facility]   2/22/2021  8:15 AM Von Late, PT [facility]   2/25/2021  8:15 AM Von Late, PT [facility]CP[facility]   3/1/2021  8:15 AM Von Late, PT [facility]CP[facility]   3/4/2021  8:15 AM Von Late, PT [facility]   3/5/2021  3:15 PM JOHN Tamayo [facility] BS AMB   7/22/2021 10:20 AM Teresa Luque, ZAC [facility] BS AMB

## 2021-01-28 ENCOUNTER — APPOINTMENT (OUTPATIENT)
Dept: PHYSICAL THERAPY | Age: 56
End: 2021-01-28
Payer: MEDICAID

## 2021-01-28 ENCOUNTER — VIRTUAL VISIT (OUTPATIENT)
Dept: FAMILY MEDICINE CLINIC | Age: 56
End: 2021-01-28
Payer: MEDICAID

## 2021-01-28 DIAGNOSIS — H43.399 SPOTS IN FRONT OF THE EYE: ICD-10-CM

## 2021-01-28 DIAGNOSIS — F32.A DEPRESSION, UNSPECIFIED DEPRESSION TYPE: ICD-10-CM

## 2021-01-28 DIAGNOSIS — D22.9 MULTIPLE NEVI: ICD-10-CM

## 2021-01-28 DIAGNOSIS — M21.372 FOOT DROP, LEFT FOOT: Primary | ICD-10-CM

## 2021-01-28 PROCEDURE — 99213 OFFICE O/P EST LOW 20 MIN: CPT | Performed by: NURSE PRACTITIONER

## 2021-01-28 RX ORDER — AMITRIPTYLINE HYDROCHLORIDE 75 MG/1
75 TABLET, FILM COATED ORAL
Qty: 90 TAB | Refills: 1 | Status: CANCELLED | OUTPATIENT
Start: 2021-01-28 | End: 2021-07-27

## 2021-01-28 NOTE — PROGRESS NOTES
[place] is a 63 y.o. male who was seen by synchronous (real-time) audio-video technology on 1/28/2021 for Foot Pain and Eye Problem    Assessment & Plan:   Diagnoses and all orders for this visit:    1. Foot drop, left foot  -     REFERRAL TO PODIATRY    2. Spots in front of the eye  -     REFERRAL TO OPHTHALMOLOGY    3. Depression, unspecified depression type    4. Multiple nevi  -     REFERRAL TO DERMATOLOGY            Subjective: The patient presents for an Audio-visual teleconference appointment for  Complaints of left foot pain. Per the patient he carries a medical diagnosis for foot drop. He notes he has had foot drop in the past but now is complaining of heel pain. He notes that there is a bulge in the heel region of his left foot which causes him to be off balance. He also notes that he has intermittent pain in that left heel region. He is requesting a referral to podiatry. He also presents today requesting a referral to ophthalmologist.  He notes he seeing spots in front of his eyes. He denies any eye pain or redness. He has had no previous visits to the ophthalmologist.  He also notes that he has difficulty reading. He is also requesting referral to dermatologist for multiple pruritic nevi to his back region. Prior to Admission medications    Medication Sig Start Date End Date Taking? Authorizing Provider   gabapentin (Neurontin) 800 mg tablet Take 1 Tab by mouth daily (after breakfast). Max Daily Amount: 800 mg. Indications: neuropathic pain 1/21/21  Yes Akosua Luque NP   amitriptyline (ELAVIL) 75 mg tablet Take 1 Tab by mouth nightly for 180 days. 1/21/21 7/20/21 Yes Akosua Luque NP   mirtazapine (REMERON) 30 mg tablet TAKE 1 TAB BY MOUTH NIGHTLY. 11/25/20  Yes Ellyn Rascon NP   busPIRone (BUSPAR) 10 mg tablet Take 10 mg by mouth two (2) times a day.  10/14/20  Yes Provider, Historical   dextroamphetamine-amphetamine (AdderalL) 10 mg tablet Take 10 mg by mouth three (3) times daily. Yes Provider, Historical   Latuda 60 mg tab tablet  7/21/20  Yes Provider, Historical   escitalopram oxalate (LEXAPRO) 20 mg tablet  7/8/20  Yes Provider, Historical   bisacodyL (Dulcolax, bisacodyl,) 5 mg EC tablet Take tablets as directed for bowel prep. 10/21/20   Reji Davison MD   polyethylene glycol (Miralax) 17 gram/dose powder Take as directed for bowel preparation 10/21/20   Reji Davison MD   hydrocortisone (ANUSOL-HC) 2.5 % rectal cream Insert  into rectum four (4) times daily. 9/29/20   Marbella Del Angel MD   polyethylene glycol (MIRALAX) 17 gram packet Take 1 Packet by mouth daily. 9/29/20   Marbella Del Angel MD     There is no problem list on file for this patient. There are no active problems to display for this patient. Current Outpatient Medications   Medication Sig Dispense Refill    gabapentin (Neurontin) 800 mg tablet Take 1 Tab by mouth daily (after breakfast). Max Daily Amount: 800 mg. Indications: neuropathic pain 90 Tab 1    amitriptyline (ELAVIL) 75 mg tablet Take 1 Tab by mouth nightly for 180 days. 90 Tab 1    mirtazapine (REMERON) 30 mg tablet TAKE 1 TAB BY MOUTH NIGHTLY. 90 Tab 0    busPIRone (BUSPAR) 10 mg tablet Take 10 mg by mouth two (2) times a day.  dextroamphetamine-amphetamine (AdderalL) 10 mg tablet Take 10 mg by mouth three (3) times daily.  Latuda 60 mg tab tablet       escitalopram oxalate (LEXAPRO) 20 mg tablet       bisacodyL (Dulcolax, bisacodyl,) 5 mg EC tablet Take tablets as directed for bowel prep. 4 Tab 0    polyethylene glycol (Miralax) 17 gram/dose powder Take as directed for bowel preparation 238 g 0    hydrocortisone (ANUSOL-HC) 2.5 % rectal cream Insert  into rectum four (4) times daily. 30 g 1    polyethylene glycol (MIRALAX) 17 gram packet Take 1 Packet by mouth daily.  14 Each 2     Allergies   Allergen Reactions    Motrin [Ibuprofen] Hives    Motrin [Ibuprofen] Rash     Past Medical History:   Diagnosis Date   • Depression     and anxiety   • Foot drop, left foot     October, 2018.  Seen by Podiatry in 2019   • Headache    • Psychotic disorder (HCC)     anxiety       ROS    Constitutional: No apparent distress noted  General- negative for fever, chills or fatigue  Eyes-visualizing black spots in front of his eyes  CV- denies chest pain, palpitation  Pul: negative cough or SOB  GI: negative nausea, flank pain, diarrhea, constipation  Urinary:- No dysuria or polyuria  MS-left foot pain  Neuro- negative headache, dizziness or weakness  Skin-multiple nevi  Psych- denies any anxiety or depression    Objective:   No flowsheet data found.     [INSTRUCTIONS:  \"[x]\" Indicates a positive item  \"[]\" Indicates a negative item  -- DELETE ALL ITEMS NOT EXAMINED]    Constitutional: [x] Appears well-developed and well-nourished [x] No apparent distress      [] Abnormal -     Mental status: [x] Alert and awake  [x] Oriented to person/place/time [x] Able to follow commands    [] Abnormal -     Eyes:   EOM    [x]  Normal    [] Abnormal -   Sclera  [x]  Normal    [] Abnormal -          Discharge [x]  None visible   [] Abnormal -     HENT: [x] Normocephalic, atraumatic  [] Abnormal -   [x] Mouth/Throat: Mucous membranes are moist    External Ears [x] Normal  [] Abnormal -    Neck: [x] No visualized mass [] Abnormal -     Pulmonary/Chest: [x] Respiratory effort normal   [x] No visualized signs of difficulty breathing or respiratory distress        [] Abnormal -      Musculoskeletal:   [x] Normal gait with no signs of ataxia         [x] Normal range of motion of neck        [] Abnormal -     Neurological:        [x] No Facial Asymmetry (Cranial nerve 7 motor function) (limited exam due to video visit)          [x] No gaze palsy        [] Abnormal -          Skin:        [x] No significant exanthematous lesions or discoloration noted on facial skin         [] Abnormal -            Psychiatric:       [x] Normal Affect []  Abnormal -        [x] No Hallucinations    Other pertinent observable physical exam findings:-        We discussed the expected course, resolution and complications of the diagnosis(es) in detail. Medication risks, benefits, costs, interactions, and alternatives were discussed as indicated. I advised him to contact the office if his condition worsens, changes or fails to improve as anticipated. He expressed understanding with the diagnosis(es) and plan. Stefanie Weber, who was evaluated through a patient-initiated, synchronous (real-time) audio-video encounter, and/or his healthcare decision maker, is aware that it is a billable service, with coverage as determined by his insurance carrier. He provided verbal consent to proceed: Yes, and patient identification was verified. It was conducted pursuant to the emergency declaration under the [address] [place] [address] authority and the [employer] General Act. A caregiver was present when appropriate. Ability to conduct physical exam was limited. I was at home. The patient was at home.       Josie Garzon NP

## 2021-01-28 NOTE — PROGRESS NOTES
Derick Go presents today for   Chief Complaint   Patient presents with    Foot Pain    Eye Problem       Virtual/telephone visit    Depression Screening:  3 most recent PHQ Screens 1/28/2021   PHQ Not Done -   Little interest or pleasure in doing things More than half the days   Feeling down, depressed, irritable, or hopeless Nearly every day   Total Score PHQ 2 5   Trouble falling or staying asleep, or sleeping too much Nearly every day   Feeling tired or having little energy More than half the days   Poor appetite, weight loss, or overeating Several days   Feeling bad about yourself - or that you are a failure or have let yourself or your family down Nearly every day   Trouble concentrating on things such as school, work, reading, or watching TV Nearly every day   Moving or speaking so slowly that other people could have noticed; or the opposite being so fidgety that others notice Nearly every day   Thoughts of being better off dead, or hurting yourself in some way Not at all   PHQ 9 Score 20   How difficult have these problems made it for you to do your work, take care of your home and get along with others Extremely difficult       Learning Assessment:  No flowsheet data found. Health Maintenance reviewed and discussed and ordered per Provider. Health Maintenance Due   Topic Date Due    COVID-19 Vaccine (1 of 2) 12/06/1980    Shingrix Vaccine Age 50> (1 of 2) 12/06/2014    Flu Vaccine (1) 09/01/2020   . Coordination of Care:  1. Have you been to the ER, urgent care clinic since your last visit? Hospitalized since your last visit? no    2. Have you seen or consulted any other health care providers outside of the [address] [place] [address] since your last visit? Include any pap smears or colon screening.  no

## 2021-01-29 ENCOUNTER — HOSPITAL ENCOUNTER (OUTPATIENT)
Dept: PHYSICAL THERAPY | Age: 56
Discharge: HOME | End: 2021-01-29
Payer: MEDICAID

## 2021-01-29 PROCEDURE — 97530 THERAPEUTIC ACTIVITIES: CPT

## 2021-01-29 PROCEDURE — 97112 NEUROMUSCULAR REEDUCATION: CPT

## 2021-01-29 PROCEDURE — 97110 THERAPEUTIC EXERCISES: CPT

## 2021-01-29 NOTE — PROGRESS NOTES
PT DAILY TREATMENT NOTE     Patient Name: Darlene Seals  Date:2021  : 1965  [x]  Patient  Verified  Payor: Michael Medina / Plan: [address] / Product Type: Managed Care Medicaid /    In time:8:15  Out time:9:10  Total Treatment Time (min): 55  Visit #: 7 of 8    Treatment Area: Shoulder pain [M25.519]    SUBJECTIVE  Pain Level (0-10 scale): 10  Any medication changes, allergies to medications, adverse drug reactions, diagnosis change, or new procedure performed?: [x] No    [] Yes (see summary sheet for update)  Subjective functional status/changes:   [] No changes reported  Pt reported his shoulder has been killing him this morning, woke up with it a 10/10 but it has gotten better since he got up and stretched.      OBJECTIVE    Modality rationale: decrease pain to improve the patients ability to carry out ADL's   Min Type Additional Details    [] Estim:  []Unatt       []IFC  []Premod                        []Other:  []w/ice   []w/heat  Position:  Location:    [] Estim: []Att    []TENS instruct  []NMES                    []Other:  []w/US   []w/ice   []w/heat  Position:  Location:    []  Traction: [] Cervical       []Lumbar                       [] Prone          []Supine                       []Intermittent   []Continuous Lbs:  [] before manual  [] after manual    []  Ultrasound: []Continuous   [] Pulsed                           []1MHz   []3MHz W/cm2:  Location:    []  Iontophoresis with dexamethasone         Location: [] Take home patch   [] In clinic   10 [x]  Ice     []  heat  []  Ice massage  []  Laser   []  Anodyne Position:Seated   Location: shoulder     []  Laser with stim  []  Other:  Position:  Location:    []  Vasopneumatic Device Pressure:       [] lo [] med [] hi   Temperature: [] lo [] med [] hi   [] Skin assessment post-treatment:  []intact []redness- no adverse reaction    []redness  adverse reaction:       22 min Therapeutic Exercise:  [x] See flow sheet : SA wall slides, OTB D1 extension supine    Rationale: increase ROM, increase strength and improve coordination to improve the patients ability to carry out ADL's    10 min Therapeutic Activity:  [x]  See flow sheet : pt education and pt positioning for pain. Rationale: increase ROM and decrease pain. to improve the patients ability to carry out ADL's    5 min Manual Therapy:  Trigger point release in supraspinatus   The manual therapy interventions were performed at a separate and distinct time from the therapeutic activities interventions. Rationale: decrease pain, increase ROM and increase tissue extensibility to carry out ADL's.    8 min: neuromuscluar re-education: scapula stability during exercises  Rationale: increase ROM and strength for increased ease of ADLs. With   [x] TE   [] TA   [] neuro   [] other: Patient Education: [x] Review HEP    [] Progressed/Changed HEP based on:   [x] positioning   [x] body mechanics   [] transfers   [] heat/ice application    [] other:      Other Objective/Functional Measures:   Pt came in today with increased pain, reporting he slept very poorly last night and the right shoulder was giving him a lot of trouble. Pt tolerated exercise well  OTB D1 extension supine     Pain Level (0-10 scale) post treatment: 4/10    ASSESSMENT/Changes in Function: Pt presented with continued decreased ROM and increased pain with PROM and activation of muscle. Pt reports he is doing a lot at home and may be doing too much to allow shoulder to recovery between therapy sessions. Reported he tried swinging a hammer with the R arm and it brought on pain right away. Patient will continue to benefit from skilled PT services to modify and progress therapeutic interventions, address functional mobility deficits, address ROM deficits and address strength deficits to attain remaining goals. Progress towards goals / Updated goals:  1.  Pt will report an improvement in at worst pain to 4/10 to improve ability to perform ADLs.  MET. 3/10  2. Pt will report being able to sleep for 6 hours or more at a time to improve sleep quality. Not met: continues to have difficulty with sleeping (1/8/20), reports getting about 2-3 hours now, pt normal is 4-5 hours a night.    Not met.  Current sleeping tolerance 1-2 hours before waking d/t pain 1/19/21   3.  Pt will increase AROM right shoulder flex/ABD to 150*, ER to 60* and IR to 60*. [id number]-135PROM  32-40 IR  40-45 ER  4.  Pt will increased strength for flex, abd, IR and ER to 4/5 to increase ease with performing functional        PLAN  []  Upgrade activities as tolerated     [x]  Continue plan of care  []  Update interventions per flow sheet       []  Discharge due to:_  []  Other:_      Rubenchasitywarren Bar 1/29/2021  8:19 AM    Future Appointments   Date Time Provider Abdullahi Horn   2/1/2021  8:15 AM Jas Solares PT [id number]   2/4/2021  8:15 AM Jas Solares PT [facility]   2/8/2021  8:15 AM Jas Solares PT [id number] [facility]   2/11/2021  8:15 AM Jas Solares PT [id number] [facility]   2/15/2021  9:00 AM Jas Solares PT [facility]   2/18/2021  8:15 AM Jas Solares PT [facility]CP[facility]   2/22/2021  8:15 AM Jas Solares PT [facility]CP[facility]   2/25/2021  8:15 AM Jas Solares PT [id number] [facility]   3/1/2021  8:15 AM Jas Solares PT [id number] [facility]   3/4/2021  8:15 AM Jas Solares, PT [facility]   3/5/2021  3:15 PM JOHN Ritter [facility] BS AMB   7/22/2021 10:20 AM Malissa Luque NP [facility] BS AMB

## 2021-02-01 ENCOUNTER — HOSPITAL ENCOUNTER (OUTPATIENT)
Dept: PHYSICAL THERAPY | Age: 56
Discharge: HOME | End: 2021-02-01
Payer: MEDICAID

## 2021-02-01 PROCEDURE — 97530 THERAPEUTIC ACTIVITIES: CPT

## 2021-02-01 PROCEDURE — 97110 THERAPEUTIC EXERCISES: CPT

## 2021-02-01 NOTE — PROGRESS NOTES
PT DAILY TREATMENT NOTE     Patient Name: Ibrahima Siegel  Date:2021  : 1965  [x]  Patient  Verified  Payor: [employer] MEDICA[employer] / [facility] / Product Type: Managed Care Medicaid /    In time:8:15  Out time:8:59  Total Treatment Time (min): 44  Visit #: 8 of 8    Treatment Area: Shoulder pain [M25.519]    SUBJECTIVE  Pain Level (0-10 scale): 4/10  Any medication changes, allergies to medications, adverse drug reactions, diagnosis change, or new procedure performed?: [x] No    [] Yes (see summary sheet for update)  Subjective functional status/changes:   [] No changes reported  Pt reports he made some biscuits over the weekend and the stirring and moving the dough really flared up his shoulder.      OBJECTIVE       22 min Therapeutic Exercise:  _ See flow sheet :UE strengthening, scapular stretching.    Rationale: increase ROM and increase strength to improve the patient’s ability to carry out ADL's    22 min Therapeutic Activity:  []  See flow sheet : Updated HEP, pt education on activity modification    Rationale: increase ROM and decrease pain  to improve the patient’s ability to carry out ADL's           With   [x] TE   [x] TA   [] neuro   [] other: Patient Education: [x] Review HEP    [] Progressed/Changed HEP based on:   [] positioning   [] body mechanics   [] transfers   [] heat/ice application    [] other:      Other Objective/Functional Measures:   Seated:  113 flexion 120 PROM   95 abduction, 100 PROM   52 ER, 52 PROM   42 IR, 43 PROM      Pt continues to complain about severe pain when at home, is not sleeping well and can barely do any ADL's d/t pain. Pt reports at times his shoulder feels like he is fresh out of surgery.   Pt educated on rest at home and doing HEP more, updated HEP given.    Pain Level (0-10 scale) post treatment: 3/10    ASSESSMENT/Changes in Function:      []  See Plan of Care  [x]  See progress note/recertification  []  See Discharge Summary    Progress towards goals / Updated goals:  1. Pt will report an improvement in at worst pain to 4/10 to improve ability to perform ADLs.  MET. 3/10  Not Met: says its way worse than 4/10 when doing ADL's (2/1/21)    2. Pt will report being able to sleep for 6 hours or more at a time to improve sleep quality. Not met: continues to have difficulty with sleeping (1/8/20), reports getting about 2-3 hours now, pt normal is 4-5 hours a night.    Not met.  Current sleeping tolerance 1-2 hours before waking d/t pain 2/1/21     3.  Pt will increase AROM right shoulder flex/ABD to 150*, ER to 60* and IR to 60*. Seated:  113 flexion 120 PROM   95 abduction, 100 PROM   52 ER, 52 PROM   42 IR, 43 PROM   2/1/21     4.  Pt will increased strength for flex, abd, IR and ER to 4/5 to increase ease with performing functional   Not Met: 2/5 global, poor effort (2/1/21)     PLAN  []  Upgrade activities as tolerated     [x]  Continue plan of care  []  Update interventions per flow sheet       []  Discharge due to:_  []  Other:_      Aristides Dinero 2/1/2021  8:11 AM    Future Appointments   Date Time Provider Abdullahi Horn   2/1/2021  8:15 AM Jas Solares PT [facility]CP[facility]   2/4/2021  8:15 AM Jas Solares PT [facility]   2/8/2021  8:15 AM Jas Solares PT [facility]   2/11/2021  8:15 AM Jas Solares PT GHWOIPA [facility]   2/15/2021  9:00 AM Jas Solares PT [facility]   2/18/2021  8:15 AM Jas Solares PT [facility]   2/22/2021  8:15 AM Jas Solares PT [facility]   2/25/2021  8:15 AM Jas Solares, PT [facility]   3/1/2021  8:15 AM Jas Solares, PT [facility]   3/4/2021  8:15 AM Jas Solares, PT [facility]   3/5/2021  3:15 PM JOHN Becerra [facility] BS AMB   7/22/2021 10:20 AM Malissa Luque NP [facility] BS AMB

## 2021-02-01 NOTE — PROGRESS NOTES
[facility] - [place] [facility]  [address]  [phone] fax       Physical Therapy Progress Note  Patient name: Ibrahima MARIA DEACONESS of Care: 2020   Referral Angeline Yanes MD : 1965               Medical Diagnosis: Shoulder pain [M25.519]  Payor: [employer] MEDICAID / Plan: [employer] Type: Managed Care Medicaid /  Onset Date: DOS 2020               Treatment Diagnosis: right shoulder pain   Prior Hospitalization: see medical history Provider#: [id number]   Medications: Verified on Patient summary List    Comorbidities: hx right shoulder surgery ~20 years ago, left shoulder surgery 2019   Prior Level of Function: Independent with ADLs         Visits from Start of Care: 24                                              Missed Visits: 2      Established Goals:         Excellent           Good         Limited           None  [] Increased ROM   []  []  [x]  []  [] Increased Strength  []  []  [x]  []  [] Increased Mobility  []  []  [x]  []   [] Decreased Pain   []  []  [x]  []  [] Decreased Swelling  []  [x]  []  []    Key Functional Changes: Pt is progressing slowly toward therapy goals. Pt pain is still intermittent with it ranging from minimal pain following therapy to severe pain at home. Pt states his pain is at its worst after sitting for long periods of time and not moving it, such as when waking up in the morning. Pt reported he is still struggling to get much sleep at night d/t the shoulder pain, getting roughly 2 hours per night. Pt stated his normal sleep schedule is around 4-5 hours a night. On this date, pt AROM is 113* in flexion, 95* in abduction, 54* External rotation, and 42* Internal rotation, all limited by pain. When assessing muscle strength, pt was able to produce 2/5 globally on right shoulder with poor effort given.  Pt was educated on continued HEP and showed pt that he feels much better after therapy and he needs to be doing more at home to keep shoulder moving and increase ROM/strength. Pt shows  motivation to increase function by asking questions. Pt will benefit from skilled physical therapy services to increase right shoulder mobility and strength for improved quality of life and decreased effort to complete ADL's    Updated Goals: to be achieved in 4 weeks:  1. Pt will report being 100% compliant with HEP, doing exercises 2-3 times per day to increase function and decrease pain. 2. Pt will report  < 3/10 pain at home following ADL's to increase pt functional capacity. 3. Pt will increase Right shoulder ER/IR strength to 4/5 to increase functional mobility and increase ease of ADL's  4. Pt will increase Right shoulder flexion strength to 4/5 to increase ease of ADL's and improve quality of life. 5. Pt will increase AROM Of right shoulder flex/ABD to 150*, ER to 60* and IR to 60*. ASSESSMENT/RECOMMENDATIONS:  [x]Continue therapy per initial plan/protocol at a frequency of  2 x per week for 4 weeks  []Continue therapy with the following recommended changes:_____________________      _____________________________________________________________________  []Discontinue therapy progressing towards or have reached established goals  []Discontinue therapy due to lack of appreciable progress towards goals  []Discontinue therapy due to lack of attendance or compliance  []Await Physician's recommendations/decisions regarding therapy  []Other:________________________________________________________________    Thank you for this referral.   Kimberly Kunz 2/1/2021 12:53 PM    NOTE TO PHYSICIAN:  [address] TO [facility] Physical Therapy: ([phone]  If you are unable to process this request in 24 hours please contact our office: [phone] I have read the above report and request that my patient continue as recommended.   ? I have read the above report and request that my patient continue therapy with the following changes/special instructions:____________________________________  ? I have read the above report and request that my patient be discharged from therapy.     Physicians signature: ______________________________Date: ______Time:______

## 2021-02-04 ENCOUNTER — HOSPITAL ENCOUNTER (OUTPATIENT)
Dept: PHYSICAL THERAPY | Age: 56
Discharge: HOME | End: 2021-02-04
Payer: MEDICAID

## 2021-02-04 PROCEDURE — 97110 THERAPEUTIC EXERCISES: CPT

## 2021-02-04 PROCEDURE — 97112 NEUROMUSCULAR REEDUCATION: CPT

## 2021-02-04 PROCEDURE — 97530 THERAPEUTIC ACTIVITIES: CPT

## 2021-02-04 NOTE — PROGRESS NOTES
PT DAILY TREATMENT NOTE     Patient Name: Misael Em  Date:2021  : 1965  [x]  Patient  Verified  Payor: Pola Spencer / [facility] [place] / Product Type: Managed Care Medicaid /    In time:8:12  Out time:8:56  Total Treatment Time (min): 44  Visit #: 1 of 8      Treatment Area: Shoulder pain [M25.519]    SUBJECTIVE  Pain Level (0-10 scale): 3/10  Any medication changes, allergies to medications, adverse drug reactions, diagnosis change, or new procedure performed?: [x] No    [] Yes (see summary sheet for update)  Subjective functional status/changes:   [] No changes reported  Pt reports his shoulder is feeling better since last visit, reported doing HEP since last visit. OBJECTIVE    21 min Therapeutic Exercise:  [x] See flow sheet : [place] exercise    Rationale: increase ROM, increase strength and improve coordination to improve the patients ability to carry out ADLs    10 min Therapeutic Activity:  []  See flow sheet : pt education on TPR, HEP review,    Rationale: increase ROM, increase strength and improve coordination  to improve the patients ability to carry out ADL's                           8 min Neuromuscular Re-education:  [x]? ?  See flow sheet : scapula stability during exercises, proprioception with shoulder  in supine. Rationale: increase ROM and increase strength  to improve the patients ability to increase ease of ADLs                  5 min Manual Therapy:  Grade II, III, IV shoulder inferior mob. ,TPR in levator scap and infraspinatus. The manual therapy interventions were performed at a separate and distinct time from the therapeutic activities interventions.   Rationale: decrease pain, increase ROM and increase tissue extensibility to carry out ADL's          With   [x] TE   [] TA   [] neuro   [] other: Patient Education: [x] Review HEP    [] Progressed/Changed HEP based on:   [] positioning   [] body mechanics   [] transfers   [] heat/ice application    [] other:      Other Objective/Functional Measures:   Grade II, III, IV shoulder inferior mob., PROM with contract relax. Pt stated he has been doing the HEP  [place] ER/IR at 90/90 isometric, lateral walking 5 steps out, 3 trips. 2 sets. Bent over table scaption, thumb up, 2x10     Pain Level (0-10 scale) post treatment: 4/10    ASSESSMENT/Changes in Function:   Pt continues to have constant pain and ROM is limited by pain over tissue limitation. Patient will continue to benefit from skilled PT services to modify and progress therapeutic interventions, address functional mobility deficits, address ROM deficits and address strength deficits to attain remaining goals. Progress towards goals / Updated goals:  1. Pt will report being 100% compliant with HEP, doing exercises 2-3 times per day to increase function and decrease pain. 2. Pt will report  < 3/10 pain at home following ADL's to increase pt functional capacity. 3. Pt will increase Right shoulder ER/IR strength to 4/5 to increase functional mobility and increase ease of ADL's  4. Pt will increase Right shoulder flexion strength to 4/5 to increase ease of ADL's and improve quality of life.    5. Pt will increase AROM Of right shoulder flex/ABD to 150*, ER to 60* and IR to 60*.       PLAN  []  Upgrade activities as tolerated     [x]  Continue plan of care  []  Update interventions per flow sheet       []  Discharge due to:_  []  Other:_      Joshua Broaden 2/4/2021  8:10 AM    Future Appointments   Date Time Provider Abdullahi Horn   2/4/2021  8:15 AM Isak Zendejas PT [facility]   2/8/2021  8:15 AM Isak Zendejas, PT SEFEPVD [facility]   2/11/2021  8:15 AM Isak Zendejas, PT CHNYVTA [facility]   2/15/2021  9:00 AM Isak Zendejas, PT SQRPOFS [facility]   2/18/2021  8:15 AM Isak Zendejas, PT [id number] [facility]   2/22/2021  8:15 AM Isak Zendejas, PT CZLDJCP [facility]   2/25/2021  8:15 AM Isak Zendejas PT [facility] 3/1/2021  8:15 AM Juan Francisco Graves, PT [facility]   3/4/2021  8:15 AM Juan Francisco Graves, PT [facility]   3/5/2021  3:15 PM JOHN Baires [facility] BS AMB   7/22/2021 10:20 AM Mitzy Luque NP [facility] BS AMB

## 2021-02-08 ENCOUNTER — APPOINTMENT (OUTPATIENT)
Dept: PHYSICAL THERAPY | Age: 56
End: 2021-02-08
Payer: MEDICAID

## 2021-02-09 ENCOUNTER — HOSPITAL ENCOUNTER (OUTPATIENT)
Dept: PHYSICAL THERAPY | Age: 56
End: 2021-02-09
Payer: MEDICAID

## 2021-02-09 ENCOUNTER — HOSPITAL ENCOUNTER (OUTPATIENT)
Dept: PHYSICAL THERAPY | Age: 56
Discharge: HOME | End: 2021-02-09
Payer: MEDICAID

## 2021-02-09 PROCEDURE — 97110 THERAPEUTIC EXERCISES: CPT

## 2021-02-09 PROCEDURE — 97530 THERAPEUTIC ACTIVITIES: CPT

## 2021-02-09 PROCEDURE — 97112 NEUROMUSCULAR REEDUCATION: CPT

## 2021-02-09 NOTE — PROGRESS NOTES
PT DAILY TREATMENT NOTE 11    Patient Name: Bibi Rouse  Date:2021  : 1965  [x]  Patient  Verified  Payor: Yoel Clinton / [place] [address] / Product Type: Managed Care Medicaid /    In time:9:46  Out time:10:26  Total Treatment Time (min): 40  Visit #: 2 of 8    Treatment Area: Shoulder pain [M25.519]    SUBJECTIVE  Pain Level (0-10 scale): 3/10  Any medication changes, allergies to medications, adverse drug reactions, diagnosis change, or new procedure performed?: [x] No    [] Yes (see summary sheet for update)  Subjective functional status/changes:   [] No changes reported  Pt reports 50% improvement since start of care. He is still having a lot of pain at night that is disrupting sleep. He was raking over the weekend but had pain. OBJECTIVE    5 minutes UBE to Warm Up - No Charge    13 min Therapeutic Exercise:  [x] See flow sheet :   Rationale: increase ROM and increase strength to improve the patients ability to improve ease of reaching/liftign with ADLs     8 min Therapeutic Activity:  [x]  See flow sheet : reaching interventions with sidelying abduction/ER, wall slide with lift    Rationale: increase ROM and increase strength  to improve the patients ability to improve ability with reaching with ADLs      9 min Neuromuscular Re-education:  [x]  See flow sheet :scapular stabilization with wall push up plus, 1/2 prone row/T/Y   Rationale: increase strength and increase proprioception  to improve the patients ability to improve ability with reaching with ADLs     5 min Manual Therapy:  Grade 3 inferior/posterior GHJ mobs, TPR right infraspinatus/UT/levator   The manual therapy interventions were performed at a separate and distinct time from the therapeutic activities interventions.   Rationale: decrease pain, increase ROM, increase tissue extensibility and decrease trigger points to improve ease of reaching with ADLs         With   [] TE   [] TA   [] neuro [] other: Patient Education: [x] Review HEP    [] Progressed/Changed HEP based on:   [] positioning   [] body mechanics   [] transfers   [] heat/ice application    [] other:      Other Objective/Functional Measures:     5 minutes UBE to warm up - no charge    Discomfort and tightness reported with all interventions, discomfort always subsided upon lowering   Challenged with counterclockwise ball stabs more so than other directions    Right shoulder ER AAROM with supine wand: 35*    Multiple trigger points in right infraspinatus, UT, and levator musculature, addressed manually    Slightly increased pain following therapy interventions that was relieved with manual TPR      Pain Level (0-10 scale) post treatment: 3/10 \"better\"    ASSESSMENT/Changes in Function:     Pt is making slow progress towards updated goals in therapy. Strength/ROM of right shoulder continue to be behind expected progression. He continues to be challenged with therapy program.  Will continue to address strength, ROM, and flexibility deficits in order to increase functional use of right UE and allow for return to PLOF. Patient will continue to benefit from skilled PT services to modify and progress therapeutic interventions, address ROM deficits, address strength deficits, analyze and address soft tissue restrictions, analyze and cue movement patterns, analyze and modify body mechanics/ergonomics, assess and modify postural abnormalities and instruct in home and community integration to attain remaining goals. Progress towards goals / Updated goals:  1. Pt will report being 100% compliant with HEP, doing exercises 2-3 times per day to increase function and decrease pain. 2. Pt will report  < 3/10 pain at home following ADL's to increase pt functional capacity. Progressing. Max pain 4/10, average/least pain 3/10 2/9/21   3.  Pt will increase Right shoulder ER/IR strength to 4/5 to increase functional mobility and increase ease of ADL's  4. Pt will increase Right shoulder flexion strength to 4/5 to increase ease of ADL's and improve quality of life.    5. Pt will increase AROM Of right shoulder flex/ABD to 150*, ER to 60* and IR to 60*.     PLAN  []  Upgrade activities as tolerated     []  Continue plan of care  []  Update interventions per flow sheet       []  Discharge due to:_  []  Other:_      Deshaun Peres, PT 2/9/2021  10:00 AM    Future Appointments   Date Time Provider Abdullahi Horn   2/11/2021  8:15 AM Claude Luther, PT [facility]   2/15/2021  9:00 AM Claude Luther, PT [facility]   2/18/2021  8:15 AM Claude Luther, PT [facility]   2/22/2021  8:15 AM Claude Luther, PT [facility]   2/24/2021  8:30 AM Melissa Wellington, NP [facility] BS AMB   2/25/2021  8:15 AM Claude Luther, PT [id number] [facility]   3/1/2021  8:15 AM Claude Luther, PT [id number] [facility]   3/4/2021  8:15 AM Claude Luther, PT [facility]   3/5/2021  3:15 PM JOHN Courtney [facility] BS AMB   7/22/2021 10:20 AM Bella Luque, ZAC [facility] BS AMB

## 2021-02-11 ENCOUNTER — HOSPITAL ENCOUNTER (OUTPATIENT)
Dept: PHYSICAL THERAPY | Age: 56
Discharge: HOME | End: 2021-02-11
Payer: MEDICAID

## 2021-02-11 PROCEDURE — 97530 THERAPEUTIC ACTIVITIES: CPT

## 2021-02-11 PROCEDURE — 97110 THERAPEUTIC EXERCISES: CPT

## 2021-02-11 PROCEDURE — 97112 NEUROMUSCULAR REEDUCATION: CPT

## 2021-02-11 NOTE — PROGRESS NOTES
PT DAILY TREATMENT NOTE     Patient Name: Janeth Clinton  Date:2021  : 1965  [x]  Patient  Verified  Payor: Krista Woodward / [place] [facility] [employer] [facility] / Product Type: Managed Care Medicaid /    In time:8:11  Out time:8:56  Total Treatment Time (min): 45  Visit #: 3 of 8      Treatment Area: Shoulder pain [M25.519]    SUBJECTIVE  Pain Level (0-10 scale): 3/10  Any medication changes, allergies to medications, adverse drug reactions, diagnosis change, or new procedure performed?: [x] No    [] Yes (see summary sheet for update)  Subjective functional status/changes:   [] No changes reported  Pt feeling alright, reported he slept better last night. OBJECTIVE    13 min Therapeutic Exercise:  [x]? See flow sheet :   Rationale: increase ROM and increase strength to improve the patients ability to improve ease of reaching/liftign with ADLs      12 min Therapeutic Activity:  [x]? See flow sheet : Pt education    Rationale: increase ROM and increase strength  to improve the patients ability to improve ability with reaching with ADLs      15 min Neuromuscular Re-education:  [x]? See flow sheet :scapular stabilization, 1/2 prone row, T   Rationale: increase strength and increase proprioception  to improve the patients ability to improve ability with reaching with ADLs      5 min Manual Therapy:  Grade 3 inferior/posterior GHJ mobs   The manual therapy interventions were performed at a separate and distinct time from the therapeutic activities interventions.   Rationale: decrease pain, increase ROM, increase tissue extensibility and decrease trigger points to improve ease of reaching with ADLs                                          With   [x] TE   [] TA   [] neuro   [] other: Patient Education: [x] Review HEP    [] Progressed/Changed HEP based on:   [] positioning   [] body mechanics   [] transfers   [] heat/ice application    [] other:      Other Objective/Functional Measures:   111* flexion, 120* abduction. 45* ER, 40* IR. Manual therapy with mob. Grade III/IV. Pt reported \"burning\" pain in anterior shoulder when in flexion/ER stretch. Reduced ROM on stretch. Added bicep curls #2 DB. Pt continues to complain of the feeling of \"the shoulder going to fall out\". Added over table row + shoulder horizontal abduction. Pain Level (0-10 scale) post treatment: 3/10    ASSESSMENT/Changes in Function:   Pt continues to make slow progress toward therapy goals. Reporting he is sleeping better however does a lot of activity through pain at home that worsens condition. Patient will continue to benefit from skilled PT services to modify and progress therapeutic interventions, address functional mobility deficits, address ROM deficits and address strength deficits to attain remaining goals. Progress towards goals / Updated goals:  1. Pt will report being 100% compliant with HEP, doing exercises 2-3 times per day to increase function and decrease pain. 2. Pt will report  < 3/10 pain at home following ADL's to increase pt functional capacity. Progressing. Max pain 4/10, average/least pain 3/10 2/9/21   3. Pt will increase Right shoulder ER/IR strength to 4/5 to increase functional mobility and increase ease of ADL's  4. Pt will increase Right shoulder flexion strength to 4/5 to increase ease of ADL's and improve quality of life. 5. Pt will increase AROM Of right shoulder flex/ABD to 150*, ER to 60* and IR to 60*.   Not Met: 111* flexion, 120* abduction.  45* ER, 40* IR.  (2/11/21)    PLAN  []  Upgrade activities as tolerated     [x]  Continue plan of care  []  Update interventions per flow sheet       []  Discharge due to:_  []  Other:_      Gita Gonzalez 2/11/2021  8:16 AM    Future Appointments   Date Time Provider Abdullahi Horn   2/15/2021  9:00 AM Joana Dandy, PT [facility]CP[facility]   2/18/2021  8:15 AM Joana Dandy, PT [facility]CP[facility]   2/22/2021  8:15 AM Evalina Demetrius, PT [facility]CP[facility]   2/24/2021  8:30 AM Agnes Aleman, ZAC [facility] BS AMB   2/25/2021  8:15 AM Evalina Raspbluis, PT [id number] [facility]   3/1/2021  8:15 AM Evalina Demetrius, PT MMCPTPB [facility]   3/4/2021  8:15 AM Evalina Dirkpbluis, PT [id number] [facility]   3/5/2021  3:15 PM JOHN Genao [facility] BS AMB   7/22/2021 10:20 AM Beverly Luque, ZAC [facility] BS AMB

## 2021-02-15 ENCOUNTER — HOSPITAL ENCOUNTER (OUTPATIENT)
Dept: PHYSICAL THERAPY | Age: 56
Discharge: HOME | End: 2021-02-15
Payer: MEDICAID

## 2021-02-15 PROCEDURE — 97112 NEUROMUSCULAR REEDUCATION: CPT

## 2021-02-15 PROCEDURE — 97530 THERAPEUTIC ACTIVITIES: CPT

## 2021-02-15 PROCEDURE — 97110 THERAPEUTIC EXERCISES: CPT

## 2021-02-15 NOTE — PROGRESS NOTES
PT DAILY TREATMENT NOTE     Patient Name: Bibi Rouse  Date:2/15/2021  : 1965  [x]  Patient  Verified  Payor: Yoel Clinton / [place] [address] / Product Type: Managed Care Medicaid /    In time:9:00  Out time:9:42  Total Treatment Time (min): 42  Visit #: 4 of 8    Treatment Area: Shoulder pain [M25.519]    SUBJECTIVE  Pain Level (0-10 scale): 3/10  Any medication changes, allergies to medications, adverse drug reactions, diagnosis change, or new procedure performed?: [x] No    [] Yes (see summary sheet for update)  Subjective functional status/changes:   [] No changes reported  Pt reported he is feeling good. Didn't do much over the weekend. OBJECTIVE      24 min Therapeutic Exercise:  [x] See flow sheet : scapular stability, bicep curls,    Rationale: increase ROM and increase strength to improve the patients ability to carry out ADL's    8 min Therapeutic Activity:  []  See flow sheet : pt education on TP self release. Rationale: increase ROM, increase strength and improve coordination  to improve the patients ability to carry out ADL's     10 min Neuromuscular Re-education:  []  See flow sheet : rhythmic stabilization    Rationale: improve coordination and increase proprioception  to improve the patients ability to carry out ADL's          With   [] TE   [] TA   [] neuro   [] other: Patient Education: [x] Review HEP    [] Progressed/Changed HEP based on:   [] positioning   [] body mechanics   [] transfers   [] heat/ice application    [] other:      Other Objective/Functional Measures:     Pt reported doing a little less strengthening stuff last time was good. Less pain that night. rhythmic stability in supine, 90* flexion. 125* scaption AAROM     Added over table row + shoulder horizontal abduction. Pain Level (0-10 scale) post treatment: 2/10    ASSESSMENT/Changes in Function:   Pt continues to show slow progress toward goals.  Decreased intensity of exercise and pt reported decreased symptoms at home. Pt. Continues to demonstrate decreased right shoulder AROM with pain at end ranges. He also continues to demonstrate decreased right shoulder strength. Patient will continue to benefit from skilled PT services to modify and progress therapeutic interventions, address functional mobility deficits, address ROM deficits and address strength deficits to attain remaining goals. Progress towards goals / Updated goals:  1. Pt will report being 100% compliant with HEP, doing exercises 2-3 times per day to increase function and decrease pain. 2. Pt will report  < 3/10 pain at home following ADL's to increase pt functional capacity. Progressing.  Max pain 4/10, average/least pain 3/10 2/9/21   3. Pt will increase Right shoulder ER/IR strength to 4/5 to increase functional mobility and increase ease of ADL's  4. Pt will increase Right shoulder flexion strength to 4/5 to increase ease of ADL's and improve quality of life. 5. Pt will increase AROM Of right shoulder flex/ABD to 150*, ER to 60* and IR to 60*.   Not Met: 111* flexion, 120* abduction.  45* ER, 40* IR.  (2/11/21) 125* scaption (2/15/21)       PLAN  []  Upgrade activities as tolerated     [x]  Continue plan of care  []  Update interventions per flow sheet       []  Discharge due to:_  []  Other:_      Alem Escobedo 2/15/2021  9:01 AM    Future Appointments   Date Time Provider Abdullahi Horn   2/18/2021  8:15 AM Ender Romo, PT [facility]   2/22/2021  8:15 AM Evalina Rasendy, PT [facility]   2/24/2021  8:30 AM Agnes Aleman, ZAC [facility] BS AMB   2/25/2021  8:15 AM Evalina Demetrius, PT SHERRY [facility]   3/1/2021  8:15 AM Evalina Demetrius, PT CHANO [facility]   3/4/2021  8:15 AM Evalina Rasendy, PT [facility]   3/5/2021  3:15 PM JOHN Genao [facility] BS AMB   7/22/2021 10:20 AM Beverly Luque NP [facility] BS AMB

## 2021-02-18 ENCOUNTER — HOSPITAL ENCOUNTER (OUTPATIENT)
Dept: PHYSICAL THERAPY | Age: 56
Discharge: HOME | End: 2021-02-18
Payer: MEDICAID

## 2021-02-18 PROCEDURE — 97530 THERAPEUTIC ACTIVITIES: CPT

## 2021-02-18 PROCEDURE — 97110 THERAPEUTIC EXERCISES: CPT

## 2021-02-18 PROCEDURE — 97112 NEUROMUSCULAR REEDUCATION: CPT

## 2021-02-18 NOTE — PROGRESS NOTES
PT DAILY TREATMENT NOTE     Patient Name: Lennox Sailor  Date:2021  : 1965  [x]  Patient  Verified  Payor: Hung Howe / [place] [address] / Product Type: Managed Care Medicaid /    In time:8:15  Out time:8:47  Total Treatment Time (min): 32  Visit #: 5 of 8    Treatment Area: Shoulder pain [M25.519]    SUBJECTIVE  Pain Level (0-10 scale): 10  Any medication changes, allergies to medications, adverse drug reactions, diagnosis change, or new procedure performed?: [x] No    [] Yes (see summary sheet for update)  Subjective functional status/changes:   [] No changes reported  Pt reports increased neck pain on this date. Took medication before treatment session. OBJECTIVE    8 min Therapeutic Exercise:  [x]? See flow sheet : SA wall slides,     Rationale: increase ROM and increase strength to improve the patients ability to carry out ADL's     10 min Therapeutic Activity:  []? See flow sheet : pt education on TP self release. HEP review     Rationale: increase ROM, increase strength and improve coordination  to improve the patients ability to carry out ADL's     14 min Neuromuscular Re-education:  []? See flow sheet : rhythmic stabilization, slow reversals. Rationale: improve coordination and increase proprioception  to improve the patients ability to carry out ADL's                                                                   With   [] TE   [x] TA   [] neuro   [] other: Patient Education: [x] Review HEP    [] Progressed/Changed HEP based on:   [] positioning   [x] body mechanics   [] transfers   [] heat/ice application    [] other:      Other Objective/Functional Measures: Focused more on mobility on this date. Had to cut session short d/t pt having doctor appointment at 9:00am  PNF slow reversals in flexion, abd, ER. rhythmic stabilization in different ranges of flexion.      Pain Level (0-10 scale) post treatment: 3/10    ASSESSMENT/Changes in Function:   Pt continues to have increases pain with little progress in ROM. Pt has sharp pain at incision site with empty end feel d/t pain. Will assess how increased PNF exercise helped with the patients pain and ROM deficients on next visit. Patient will continue to benefit from skilled PT services to modify and progress therapeutic interventions, address functional mobility deficits, address ROM deficits and address strength deficits to attain remaining goals. Progress towards goals / Updated goals:  1. Pt will report being 100% compliant with HEP, doing exercises 2-3 times per day to increase function and decrease pain. 2. Pt will report  < 3/10 pain at home following ADL's to increase pt functional capacity. Progressing.  Max pain 4/10, average/least pain 3/10 2/9/21   3. Pt will increase Right shoulder ER/IR strength to 4/5 to increase functional mobility and increase ease of ADL's  4. Pt will increase Right shoulder flexion strength to 4/5 to increase ease of ADL's and improve quality of life. 5. Pt will increase AROM Of right shoulder flex/ABD to 150*, ER to 60* and IR to 60*.   Not Met: 111* flexion, 120* abduction.  45* ER, 40* IR.  (2/11/21) 125* scaption (2/15/21)    PLAN  []  Upgrade activities as tolerated     [x]  Continue plan of care  []  Update interventions per flow sheet       []  Discharge due to:_  []  Other:_      Jordan Spruce 2/18/2021  8:13 AM    Future Appointments   Date Time Provider Abdullahi Horn   2/18/2021  8:15 AM Ethelene Ink, PT [facility]   2/22/2021  8:15 AM Ethelene Ink, PT [facility]   2/24/2021  8:30 AM James Delacruz NP [facility] BS AMB   2/25/2021  8:15 AM Ethelene Ink, PT [id number] [facility]   3/1/2021  8:15 AM Ethelene Ink, PT [id number] [facility]   3/4/2021  8:15 AM Ethelene Ink, PT [facility]   3/5/2021  3:15 PM Carolina Spatz, PA [facility] BS AMB   7/22/2021 10:20 AM Duarte Luque NP [facility] BS AMB

## 2021-02-22 ENCOUNTER — HOSPITAL ENCOUNTER (OUTPATIENT)
Dept: PHYSICAL THERAPY | Age: 56
Discharge: HOME | End: 2021-02-22
Payer: MEDICAID

## 2021-02-22 PROCEDURE — 97112 NEUROMUSCULAR REEDUCATION: CPT

## 2021-02-22 PROCEDURE — 97110 THERAPEUTIC EXERCISES: CPT

## 2021-02-22 PROCEDURE — 97530 THERAPEUTIC ACTIVITIES: CPT

## 2021-02-22 NOTE — PROGRESS NOTES
PT DAILY TREATMENT NOTE     Patient Name: Akira Garibay  Date:2021  : 1965  [x]  Patient  Verified  Payor: Clarita Bains / Plan: [address] [place] [id number] / Product Type: Managed Care Medicaid /    In time:8:15  Out time:8:58  Total Treatment Time (min): 43  Visit #: 6 of 8      Treatment Area: Shoulder pain [M25.519]    SUBJECTIVE  Pain Level (0-10 scale): 3/10  Any medication changes, allergies to medications, adverse drug reactions, diagnosis change, or new procedure performed?: [x] No    [] Yes (see summary sheet for update)  Subjective functional status/changes:   [] No changes reported  Pt reported nothing has changed feels the same     OBJECTIVE     15 min Therapeutic Exercise:  [x]? ? See flow sheet : SA wall slides, portia rows    Rationale: increase ROM and increase strength to improve the patients ability to carry out ADL's     8 min Therapeutic Activity:  [x]? ?  See flow sheet : flexion against the wall with towel, pt education. Rationale: increase ROM and improve coordination  to improve the patients ability to carry out ADL's     20 min Neuromuscular Re-education:  [x]? ?  See flow sheet : rhythmic stabilization, slow reversals.     Rationale: improve coordination and increase proprioception  to improve the patients ability to carry out ADL's                                                                   With   [] TE   [] TA   [] neuro   [] other: Patient Education: [x] Review HEP    [] Progressed/Changed HEP based on:   [] positioning   [] body mechanics   [] transfers   [] heat/ice application    [] other:      Other Objective/Functional Measures:   PNF slow reversals in flexion/extension  rhythmic stabilization in different ranges of flexion. 120*flexion, 100* abduction  50* ER, 40*IR    Pain Level (0-10 scale) post treatment: 3/10    ASSESSMENT/Changes in Function:   Pt continues to slowly progress toward therapy goals.  Pt continues to have empty end feel with PROM d/t pain. Pt struggles with stretching d/t increased pain. Continue with PNF exercises on next visit. Patient will continue to benefit from skilled PT services to modify and progress therapeutic interventions, address functional mobility deficits, address ROM deficits and address strength deficits to attain remaining goals. Progress towards goals / Updated goals:  1. Pt will report being 100% compliant with HEP, doing exercises 2-3 times per day to increase function and decrease pain. 2. Pt will report  < 3/10 pain at home following ADL's to increase pt functional capacity. Progressing.  Max pain 4/10, average/least pain 3/10 2/22/21   3. Pt will increase Right shoulder ER/IR strength to 4/5 to increase functional mobility and increase ease of ADL's  4. Pt will increase Right shoulder flexion strength to 4/5 to increase ease of ADL's and improve quality of life. Not Met: 2+/5 (2/22/21)  5.  Pt will increase AROM Of right shoulder flex/ABD to 150*, ER to 60* and IR to 60*.   Not Met:  120*flexion, 100* abduction 50* ER, 40*IR (2/22/21)    PLAN  []  Upgrade activities as tolerated     [x]  Continue plan of care  []  Update interventions per flow sheet       []  Discharge due to:_  []  Other:_      Angelique Tony 2/22/2021  8:12 AM    Future Appointments   Date Time Provider Abdullahi Horn   2/22/2021  8:15 AM Claude Luther, PT [facility]   2/24/2021  8:30 AM Melissa Wellington NP [facility] BS AMB   2/25/2021  8:15 AM Claude Luther, PT [id number] [facility]   3/1/2021  8:15 AM Claude Luther, PT [id number] [facility]   3/4/2021  8:15 AM Claude Luther, PT OLETOZZ [facility]   3/5/2021  3:15 PM JOHN Courtney BS AMB   7/22/2021 10:20 AM Bella Luque NP [facility] BS AMB

## 2021-02-25 ENCOUNTER — HOSPITAL ENCOUNTER (OUTPATIENT)
Dept: PHYSICAL THERAPY | Age: 56
Discharge: HOME | End: 2021-02-25
Payer: MEDICAID

## 2021-02-25 PROCEDURE — 97110 THERAPEUTIC EXERCISES: CPT

## 2021-02-25 PROCEDURE — 97112 NEUROMUSCULAR REEDUCATION: CPT

## 2021-02-25 PROCEDURE — 97530 THERAPEUTIC ACTIVITIES: CPT

## 2021-02-25 NOTE — PROGRESS NOTES
PT DAILY TREATMENT NOTE     Patient Name: Ibrahima Siegel  Date:2021  : 1965  [x]  Patient  Verified  Payor: [employer] / Plan: [facility] [employer] FAMILY CARE / Product Type: Managed Care Medicaid /    In time:8:13  Out time:8:57  Total Treatment Time (min): 44  Visit #: 7 of 8  Treatment Area: Shoulder pain [M25.519]    SUBJECTIVE  Pain Level (0-10 scale): 10  Any medication changes, allergies to medications, adverse drug reactions, diagnosis change, or new procedure performed?: [x] No    [] Yes (see summary sheet for update)  Subjective functional status/changes:   [] No changes reported  Pt reported his pain that was increased on Monday has decreased however he still continues to have sharp pain in right shoulder with certain movements.     OBJECTIVE   18 min Therapeutic Exercise:  _??? See flow sheet : SA wall slides, horz table slides, open books    Rationale: increase ROM and increase strength to improve the patient’s ability to carry out ADL's     10 min Therapeutic Activity:  [x]???  See flow sheet : flexion against the wall with towel, pt education.    Rationale: increase ROM and improve coordination  to improve the patient’s ability to carry out ADL's     16 min Neuromuscular Re-education:  [x]???  See flow sheet : rhythmic stabilization, slow reversals. Rhythmic initiation    Rationale: improve coordination and increase proprioception  to improve the patient’s ability to carry out ADL's                                                     With   [x] TE   [] TA   [] neuro   [] other: Patient Education: [x] Review HEP    [] Progressed/Changed HEP based on:   [] positioning   [] body mechanics   [] transfers   [] heat/ice application    [] other:      Other Objective/Functional Measures:   Added open books and grade Il/lll thoracic mobilization.   Pt complained of rhomboid TTP.      Pain Level (0-10 scale) post treatment: 3/10    ASSESSMENT/Changes in Function:   Pt continues to  show slow progress toward therapy goals. Pt shows decreased pain on some days but when advancing POC pt responds poorly with regression to symptom flair up. Pt main concern is pain over incision site on lateral deltoid and TTP over supraspinatus. Will continue to trial different ways to progress pt for increased strengthening of pt without causing flair up of symptoms. Patient will continue to benefit from skilled PT services to modify and progress therapeutic interventions, address functional mobility deficits, address ROM deficits and address strength deficits to attain remaining goals. Progress towards goals / Updated goals:  1. Pt will report being 100% compliant with HEP, doing exercises 2-3 times per day to increase function and decrease pain. 2. Pt will report  < 3/10 pain at home following ADL's to increase pt functional capacity. Progressing.  Max pain 4/10, average/least pain 3/10 2/22/21   3. Pt will increase Right shoulder ER/IR strength to 4/5 to increase functional mobility and increase ease of ADL's  4. Pt will increase Right shoulder flexion strength to 4/5 to increase ease of ADL's and improve quality of life. Not Met: 2+/5 (2/22/21)  5.  Pt will increase AROM Of right shoulder flex/ABD to 150*, ER to 60* and IR to 60*.   Not Met:  120*flexion, 100* abduction 50* ER, 40*IR (2/22/21)    PLAN  []  Upgrade activities as tolerated     [x]  Continue plan of care  []  Update interventions per flow sheet       []  Discharge due to:_  []  Other:_      Severa Fender, SPT 2/25/2021  8:12 AM    Future Appointments   Date Time Provider Abdullahi Horn   2/25/2021  8:15 AM Red Devil Sic, PT MMCPTPB [facility]   3/1/2021  8:15 AM Red Devil Sic, PT IWEIMRT [facility]   3/4/2021  8:15 AM Red Devil Sic, PT JOHNATHON [facility]   3/5/2021  7:00 AM ZAC Siegel BS AMB   3/5/2021  3:15 PM Colen Eisenmenger, PA [facility] BS AMB   7/22/2021 10:20 AM ZAC [facility] BS AMB

## 2021-03-01 ENCOUNTER — HOSPITAL ENCOUNTER (OUTPATIENT)
Dept: PHYSICAL THERAPY | Age: 56
Discharge: HOME | End: 2021-03-01
Payer: MEDICAID

## 2021-03-01 PROCEDURE — 97110 THERAPEUTIC EXERCISES: CPT

## 2021-03-01 PROCEDURE — 97112 NEUROMUSCULAR REEDUCATION: CPT

## 2021-03-01 PROCEDURE — 97530 THERAPEUTIC ACTIVITIES: CPT

## 2021-03-01 NOTE — PROGRESS NOTES
[facility] - Garth Acuña  [address] [facility] [facility]  [phone] fax    Physical Therapy Progress Note  Patient name: Ibrahima MARIA DEACONESS of Care: 11/4/2020   Referral Noemi Fairbanks MD [facility]: [id number]               Medical Diagnosis: Shoulder pain [M25.519]  Payor: [employer] / Plan: [place] / Finomial Type: Managed Care Medicaid /  Onset Date: DOS 9/21/2020               Treatment Diagnosis: right shoulder pain   Prior Hospitalization: see medical history Provider#: [id number]   Medications: Verified on Patient summary List    Comorbidities: hx right shoulder surgery ~20 years ago, left shoulder surgery 2019   Prior Level of Function: Independent with ADLs        Visits from Start of Care: 32    Missed Visits: 0    Established Goals:         Excellent           Good         Limited           None  [x] Increased ROM   []  [x]  [x]  []  [x] Increased Strength  []  []  [x]  []  [x] Increased Mobility  []  []  [x]  []   [x] Decreased Pain   []  []  [x]  []  [] Decreased Swelling  []  []  []  []    Key Functional Changes: Pt continues to show lack of progress in ROM and pain. No key improvements noted since last progress note. On this date. Shoulder:  Flexion: 125*  Abduction 114*  ER: 51*  IR: 30*  Empty end feel with PROM d/t pain with Flexion/abduciton/ER. Updated Goals: to be achieved in 4 weeks:  1. Pt will be compliant with % of the time for 1 time a day to improvement shoulder function for light ADL's.  2. Pt will report  < 3/10 pain at home following ADL's to increase pt functional capacity.   3. Pt will increase Right shoulder ER/IR strength to 4/5 to increase functional mobility and increase ease of ADL's  4. Pt will increase Right shoulder flexion strength to 4/5 to increase ease of ADL's and improve quality of life.   5. Pt will increase AROM Of right shoulder flex/ABD to 130*, ER to 60* and IR to 60*.       ASSESSMENT/RECOMMENDATIONS:  Pt is making slow progress toward therapy goals. Pt continues to complain of shoulder pain throughout day, especially after doing heavy activities around the home. Pt states he \"can't just do nothing\", and continues to use right UE to perform heavy ADL's that increase the pt pain for several days following. Pt reported this past weekend using shoulder to throw tow strap over the bed of a trunk and having extreme pain following. Pt shows minimal progress in therapy goals with no significant increases in strength or ROM with right UE. POC was changed to incorporate more AROM and muscle activation exercise with no change to pain levels. Pt current ROM:Flexion: 125*, Abduction 114*, ER: 51*, IR: 30*. Strength 3/5 global to right shoulder. Pt will benefit from continued skilled physical therapy services to increase right UE ROM, increase strength, increase functional mobility, and pt education on activity modification to relief symptoms for tissue healing. [x]Continue therapy per initial plan/protocol at a frequency of  2 x per week for 4 weeks  []Continue therapy with the following recommended changes:_____________________      _____________________________________________________________________  []Discontinue therapy progressing towards or have reached established goals  []Discontinue therapy due to lack of appreciable progress towards goals  []Discontinue therapy due to lack of attendance or compliance  []Await Physician's recommendations/decisions regarding therapy  []Other:________________________________________________________________    Thank you for this referral.   Kelly Martinez, SPT 3/1/2021 8:57 AM    NOTE TO PHYSICIAN:  [address] [facility] [phone]  If you are unable to process this request in 24 hours please contact our office: [phone]  I have read the above report and request that my patient continue as recommended. ? I have read the above report and request that my patient continue therapy with the following changes/special instructions:____________________________________  ? I have read the above report and request that my patient be discharged from therapy.     Physicians signature: ______________________________Date: ______Time:______     Chris Leonardo MD

## 2021-03-01 NOTE — PROGRESS NOTES
PT DAILY TREATMENT NOTE     Patient Name: Wayne Huggins  Date:3/1/2021  : 1965  [x]  Patient  Verified  Payor: Gladys Li / [place] [facility] [employer] [facility] CARE / Product Type: Managed Care Medicaid /    In time:8:14  Out time:8:55  Total Treatment Time (min): 41  Visit #: 8 of 8    Treatment Area: Shoulder pain [M25.519]    SUBJECTIVE  Pain Level (0-10 scale): 3/10  Any medication changes, allergies to medications, adverse drug reactions, diagnosis change, or new procedure performed?: [x] No    [] Yes (see summary sheet for update)  Subjective functional status/changes:   [] No changes reported  Pt reports the weekend was good, little bit of pain but \"its getting better\". OBJECTIVE    10 min Therapeutic Exercise:  _??? See flow sheet : SA wall slides,    Rationale: increase ROM and increase strength to improve the patients ability to carry out ADL's     20 min Therapeutic Activity:  [x]? ???  See flow sheet : flexion against the wall with towel, pt education. FOTO   Rationale: increase ROM and improve coordination  to improve the patients ability to carry out ADL's     11 min Neuromuscular Re-education:  [x]? ???  See flow sheet : rhythmic stabilization, slow reversals. Rhythmic initiation in varies ROM in flexion. Rationale: improve coordination and increase proprioception  to improve the patients ability to carry out ADL's                                              With   [x] TE   [] TA   [] neuro   [] other: Patient Education: [x] Review HEP    [] Progressed/Changed HEP based on:   [] positioning   [] body mechanics   [] transfers   [] heat/ice application    [] other:      Other Objective/Functional Measures: SEE flow sheet. Pain Level (0-10 scale) post treatment: 4/10    ASSESSMENT/Changes in Function:   Pt continues to report doing manual labor at home that increases symptoms that causes him inflated pain for several hours following activity.      []  See Plan of Care  [x] See progress note/recertification  []  See Discharge Summary         Progress towards goals / Updated goals:  1. Pt will report being 100% compliant with HEP, doing exercises 2-3 times per day to increase function and decrease pain. Not Met: pt reports having to do less HEP d/t pain following heavy ADL's, ongoing for several weeks.  (3/1/21)  2. Pt will report  < 3/10 pain at home following ADL's to increase pt functional capacity.   Not Met: 5/10 following ADL's (3/1/21)  3. Pt will increase Right shoulder ER/IR strength to 4/5 to increase functional mobility and increase ease of ADL's  Not Met: 3/5 (3/1/21)  4. Pt will increase Right shoulder flexion strength to 4/5 to increase ease of ADL's and improve quality of life.   Not Met: 3/5 (3/1/21)  5.  Pt will increase AROM Of right shoulder flex/ABD to 150*, ER to 60* and IR to 60*.   Not Met:  125*flexion, 114* abduction 51* ER, 30*IR (3/1/21_    PLAN  []  Upgrade activities as tolerated     [x]  Continue plan of care  []  Update interventions per flow sheet       []  Discharge due to:_  []  Other:_      Olya Villela, SPT 3/1/2021  8:12 AM    Future Appointments   Date Time Provider Abdullahi Horn   3/1/2021  8:15 AM Mini Cameron, PT [id number] [facility]   3/4/2021  8:15 AM Mini Cameron PT [id number] [facility]   3/5/2021  7:00 AM ZAC Cota BS AMB   3/5/2021  3:15 PM JOHN Spears BS AMB   7/22/2021 10:20 AM Brandie Luque NP [facility] BS AMB

## 2021-03-04 ENCOUNTER — HOSPITAL ENCOUNTER (OUTPATIENT)
Dept: PHYSICAL THERAPY | Age: 56
Discharge: HOME | End: 2021-03-04
Payer: MEDICAID

## 2021-03-04 PROCEDURE — 97110 THERAPEUTIC EXERCISES: CPT

## 2021-03-04 PROCEDURE — 97530 THERAPEUTIC ACTIVITIES: CPT

## 2021-03-04 PROCEDURE — 97112 NEUROMUSCULAR REEDUCATION: CPT

## 2021-03-04 NOTE — PROGRESS NOTES
PT DAILY TREATMENT NOTE     Patient Name: Nemesio Pollock  Date:3/4/2021  : 1965  [x]  Patient  Verified  Payor: [place]ICA[place] / Plan: 1500 / Product Type: Medicaid /    In time:8:15  Out time:9:00  Total Treatment Time (min): 45  Visit #: 1 of 8    Treatment Area: Shoulder pain [M25.519]    SUBJECTIVE  Pain Level (0-10 scale): 3/10  Any medication changes, allergies to medications, adverse drug reactions, diagnosis change, or new procedure performed?: [x] No    [] Yes (see summary sheet for update)  Subjective functional status/changes:   [] No changes reported  Pt stated the shoulder was throbbing for 3 hours following last session. Even after continued decrease in intensity of therapy. OBJECTIVE       20 min Therapeutic Exercise:  _??? See flow sheet : SA wall slides, AAROM with dowel. Rationale: increase ROM and increase strength to improve the patients ability to carry out ADL's     17 min Therapeutic Activity:  [x]? ????  See flow sheet : flexion against the wall with towel, pt education. Updated HEP   Rationale: increase ROM and improve coordination  to improve the patients ability to carry out ADL's     8 min Neuromuscular Re-education:  [x]? ????  See flow sheet : muscle isometrics in varies ROM. Rationale: improve coordination and increase proprioception  to improve the patients ability to carry out ADL's                                                    With   [] TE   [x] TA   [] neuro   [] other: Patient Education: [x] Review HEP    [] Progressed/Changed HEP based on:   [] positioning   [] body mechanics   [] transfers   [] heat/ice application    [] other:      Other Objective/Functional Measures: Added 90 rest breaks in between each exercise. Pain Level (0-10 scale) post treatment: 3/10    ASSESSMENT/Changes in Function:   Pt continues to report increased pain at home for several hours after therapy.  On this date switching POC to involve more pt directed exercise rather than manual and PNF intervention. Also updating HEP. Assess pt pain level and response to difference on next visit. Pt is also seeing the doctor on the 5th. Patient will continue to benefit from skilled PT services to modify and progress therapeutic interventions, address functional mobility deficits, address ROM deficits and address strength deficits to attain remaining goals. Progress towards goals / Updated goals:  1. Pt will be compliant with % of the time for 1 time a day to improvement shoulder function for light ADL's.  2. Pt will report  < 3/10 pain at home following ADL's to increase pt functional capacity.   3. Pt will increase Right shoulder ER/IR strength to 4/5 to increase functional mobility and increase ease of ADL's  4. Pt will increase Right shoulder flexion strength to 4/5 to increase ease of ADL's and improve quality of life.   5. Pt will increase AROM Of right shoulder flex/ABD to 130*, ER to 60* and IR to 60*.        PLAN  []  Upgrade activities as tolerated     [x]  Continue plan of care  []  Update interventions per flow sheet       []  Discharge due to:_  []  Other:_      LORI Lentz 3/4/2021  8:15 AM    Future Appointments   Date Time Provider Abdullahi Horn   3/5/2021  7:00 AM ZAC Lorenzo BS AMB   3/5/2021  3:15 PM JOHN Walker [facility] BS AMB   7/22/2021 10:20 AM Kaitlin Biswas NP [facility] BS AMB

## 2021-03-04 NOTE — PROGRESS NOTES
Lynette Alba  1965     HISTORY OF PRESENT ILLNESS  Lynette Alba is a 63 y.o. male who presents today for evaluation S/P Right shoulder arthroscopic rotator cuff re-tear repair, biceps tenodesis, distal clavicle excision and subacromial decompression on 9/21/20. Patient has been going to PT. Describes pain as a 3/10. Has been taking nothing for pain. Still has night pain. He has keep compliant with his exercises and restrictions. He has worked in [employer] and is making progress with his motion. He has muscle tension and pain at night at his neck and shoulder. He is working on knots in PT and it helps his pain. He was given gabapentin and a muscle relaxer from the spine center that do help his hand numbness. Patient denies any fever, chills, chest pain, shortness of breath or calf pain. There are no new illness or injuries to report since last seen in the office. PHYSICAL EXAM:   Visit Vitals  /85 (BP 1 Location: Left upper arm, BP Patient Position: Sitting, BP Cuff Size: Large adult)   Pulse 74   Temp 98.4 °F (36.9 °C) (Temporal)   Resp 16   Ht 5' 8\" (1.727 m)   Wt 212 lb (96.2 kg)   SpO2 97%   BMI 32.23 kg/m²      The patient is a well-developed, well-nourished male in no acute distress. The patient is alert and oriented times three. The patient appears to be well groomed. Mood and affect are normal.  ORTHOPEDIC EXAM of right shoulder:  Inspection: swelling not present,  Bruising not present  Incisions well healed  Passive glenohumeral abduction 0-80 degrees, 180 PFF, 180 AFF, 70 PER, IR lower lumbar  Stability: Stable  Strength: gentle rotator cuff testing with weakness   2+ distal pulses    IMPRESSION:  S/P Right shoulder arthroscopic rotator cuff re-tear repair, biceps tenodesis, distal clavicle excision and subacromial decompression. PLAN:   Pt doing well post operatively  His motion is much better since last visit.  He still has some weakness with strength testing will work on this in PT.  He will continue exercises and PT ordered today. He will continue PT and advancing per protocol. Another order placed today. Stressed to patient that nothing causes an increase in pain. He will try the muscle relaxer at night he was given by ZAC Church at the spine center to help with muscle tightness and tension at night. I think that is wear his pain is coming from. He is making progress in PT so muscular pain and knots are not uncommon at this point. Pt not given a refill of pain medication.   RTC 2 months if he continues to do well will have him come back PRN    Williams Purple, PA-C [facility]

## 2021-03-05 ENCOUNTER — OFFICE VISIT (OUTPATIENT)
Dept: FAMILY MEDICINE CLINIC | Age: 56
End: 2021-03-05
Payer: MEDICAID

## 2021-03-05 ENCOUNTER — OFFICE VISIT (OUTPATIENT)
Dept: ORTHOPEDIC SURGERY | Age: 56
End: 2021-03-05

## 2021-03-05 VITALS
DIASTOLIC BLOOD PRESSURE: 81 MMHG | HEIGHT: 68 IN | SYSTOLIC BLOOD PRESSURE: 124 MMHG | TEMPERATURE: 97.6 F | RESPIRATION RATE: 16 BRPM | HEART RATE: 67 BPM | OXYGEN SATURATION: 98 % | BODY MASS INDEX: 32.28 KG/M2 | WEIGHT: 213 LBS

## 2021-03-05 VITALS
SYSTOLIC BLOOD PRESSURE: 138 MMHG | DIASTOLIC BLOOD PRESSURE: 85 MMHG | RESPIRATION RATE: 16 BRPM | HEIGHT: 68 IN | HEART RATE: 74 BPM | BODY MASS INDEX: 32.13 KG/M2 | WEIGHT: 212 LBS | OXYGEN SATURATION: 97 % | TEMPERATURE: 98.4 F

## 2021-03-05 DIAGNOSIS — Z13.6 ENCOUNTER FOR LIPID SCREENING FOR CARDIOVASCULAR DISEASE: ICD-10-CM

## 2021-03-05 DIAGNOSIS — D22.9 MULTIPLE NEVI: ICD-10-CM

## 2021-03-05 DIAGNOSIS — F41.8 DEPRESSION WITH ANXIETY: ICD-10-CM

## 2021-03-05 DIAGNOSIS — M54.12 CERVICAL RADICULOPATHY: Primary | ICD-10-CM

## 2021-03-05 DIAGNOSIS — Z12.5 SCREENING PSA (PROSTATE SPECIFIC ANTIGEN): ICD-10-CM

## 2021-03-05 DIAGNOSIS — M75.111 NONTRAUMATIC INCOMPLETE TEAR OF RIGHT ROTATOR CUFF: Primary | ICD-10-CM

## 2021-03-05 DIAGNOSIS — Z98.890 STATUS POST ARTHROSCOPY OF RIGHT SHOULDER: ICD-10-CM

## 2021-03-05 DIAGNOSIS — Z13.220 ENCOUNTER FOR LIPID SCREENING FOR CARDIOVASCULAR DISEASE: ICD-10-CM

## 2021-03-05 PROBLEM — F10.10 ETOH ABUSE: Status: ACTIVE | Noted: 2021-03-05

## 2021-03-05 PROBLEM — F32.A ANXIETY AND DEPRESSION: Status: ACTIVE | Noted: 2021-03-05

## 2021-03-05 PROBLEM — F41.9 ANXIETY AND DEPRESSION: Status: ACTIVE | Noted: 2021-03-05

## 2021-03-05 PROCEDURE — 99213 OFFICE O/P EST LOW 20 MIN: CPT | Performed by: NURSE PRACTITIONER

## 2021-03-05 PROCEDURE — 99213 OFFICE O/P EST LOW 20 MIN: CPT | Performed by: ORTHOPAEDIC SURGERY

## 2021-03-05 NOTE — PROGRESS NOTES
Janeth Clinton presents today for   Chief Complaint   Patient presents with    Physical    Depression       Is someone accompanying this pt? no    Is the patient using any DME equipment during OV? no    Depression Screening:  3 most recent PHQ Screens 3/5/2021   PHQ Not Done -   Little interest or pleasure in doing things Nearly every day   Feeling down, depressed, irritable, or hopeless Nearly every day   Total Score PHQ 2 6   Trouble falling or staying asleep, or sleeping too much Nearly every day   Feeling tired or having little energy Nearly every day   Poor appetite, weight loss, or overeating Not at all   Feeling bad about yourself - or that you are a failure or have let yourself or your family down Nearly every day   Trouble concentrating on things such as school, work, reading, or watching TV Nearly every day   Moving or speaking so slowly that other people could have noticed; or the opposite being so fidgety that others notice Not at all   Thoughts of being better off dead, or hurting yourself in some way Not at all   PHQ 9 Score 18   How difficult have these problems made it for you to do your work, take care of your home and get along with others Very difficult       Learning Assessment:  No flowsheet data found. Health Maintenance reviewed and discussed and ordered per Provider. Health Maintenance Due   Topic Date Due    COVID-19 Vaccine (1 of 2) Never done    Shingrix Vaccine Age 50> (1 of 2) Never done   . Coordination of Care:  1. Have you been to the ER, urgent care clinic since your last visit? Hospitalized since your last visit? no    2. Have you seen or consulted any other health care providers outside of the [address] [place] [address] since your last visit? Include any pap smears or colon screening.  no

## 2021-03-05 NOTE — PROGRESS NOTES
Misael Em is a 63 y.o. male presenting today for Depression    HPI:  Misael Em presents to the office today for routine follow-up care. He carries a medical diagnosis for cervical radiculopathy, anxiety and depression. Per the patient he has not had any alcohol for several months and he is also stopped tobacco abuse. He notes he continues to feel depressed and his medications are managed by psychiatry. He denies any chest pain, palpitation or lower extremity edema. He is negative for cough, fever, loss of smell or taste or GI upset. ROS    ROS:  History obtained from the patient intake forms which are reviewed with the patient  · General: negative for - chills, fever, weight changes or malaise  · HEENT: no sore throat, nasal congestion, vision problems or ear problems  · Respiratory: no cough, shortness of breath, or wheezing  · Cardiovascular: no chest pain, palpitations, or dyspnea on exertion  · Gastrointestinal: no abdominal pain, N/V, change in bowel habits, or black or bloody stools  · Musculoskeletal: no back pain, joint pain, joint stiffness, muscle pain or muscle weakness  · Neurological: no numbness, tingling, headache or dizziness  · Endo:  No polyuria or polydipsia. · : no hematuria, dysuria, frequency, hesitancy, or nocturia. · Psychological: negative for - anxiety, depression, sleep disturbances, suicidal or homicidal ideations    Allergies   Allergen Reactions    Motrin [Ibuprofen] Hives    Motrin [Ibuprofen] Rash       Current Outpatient Medications   Medication Sig Dispense Refill    gabapentin (Neurontin) 800 mg tablet Take 1 Tab by mouth daily (after breakfast). Max Daily Amount: 800 mg. Indications: neuropathic pain 90 Tab 1    amitriptyline (ELAVIL) 75 mg tablet Take 1 Tab by mouth nightly for 180 days. 90 Tab 1    busPIRone (BUSPAR) 10 mg tablet Take 10 mg by mouth two (2) times a day.       dextroamphetamine-amphetamine (AdderalL) 10 mg tablet Take 10 mg by mouth three (3) times daily.  Latuda 60 mg tab tablet 80 mg.      escitalopram oxalate (LEXAPRO) 20 mg tablet       mirtazapine (REMERON) 30 mg tablet TAKE 1 TABLET BY MOUTH NIGHTLY 30 Tab 2    bisacodyL (Dulcolax, bisacodyl,) 5 mg EC tablet Take tablets as directed for bowel prep. 4 Tab 0    polyethylene glycol (Miralax) 17 gram/dose powder Take as directed for bowel preparation 238 g 0    hydrocortisone (ANUSOL-HC) 2.5 % rectal cream Insert  into rectum four (4) times daily. 30 g 1    polyethylene glycol (MIRALAX) 17 gram packet Take 1 Packet by mouth daily. 14 Each 2       Past Medical History:   Diagnosis Date    Depression     and anxiety    Foot drop, left foot     October, 2018.   Seen by Podiatry in 2019    Headache     Psychotic disorder ([address]     anxiety       Past Surgical History:   Procedure Laterality Date    COLONOSCOPY N/A 10/28/2020    COLONOS[facility] w/polypectomies [facility]COPY    HX HERNIA REPAIR      HX HERNIA REPAIR      Inguinal hernia- right side    HX KNEE ARTHROSCOPY Right     HX ORTHOPAEDIC Left 2017    lacerations to fingers and had pins, left thumb    HX ORTHOPAEDIC      Right knee surgery    HX OTHER SURGICAL  2017    Left hand surgery x 3    [facility]X [facility] CUFF [facility]     HX ROTATOR CUFF REPAIR Left 10/21/2019    HX ROTATOR CUFF REPAIR  2000    Right shoulder    [place] ANESTH,SURGERY OF SHOULDER Right        Social History     Socioeconomic History    Marital status:      Spouse name: Not on file    Number of children: Not on file    Years of education: Not on file    Highest education level: Not on file   Occupational History    Not on file   Social Needs    Financial resource strain: Not on file    Food insecurity     Worry: Not on file     Inability: Not on file    Transportation needs     Medical: Not on file     Non-medical: Not on file   Tobacco Use    Smoking status: Former Smoker Years: 2.00     Quit date:      Years since quittin.2    Smokeless tobacco: Current User    Tobacco comment: Starting Chewing to bacco in . Substance and Sexual Activity    Alcohol use: Not Currently     Comment: states he quit drinking alchohol 2 months ago    Drug use: Not Currently     Types: Marijuana    Sexual activity: Not Currently   Lifestyle    Physical activity     Days per week: Not on file     Minutes per session: Not on file    Stress: Not on file   Relationships    Social connections     Talks on phone: Not on file     Gets together: Not on file     Attends [employer] service: Not on file     Active member of club or organization: Not on file     Attends meetings of clubs or organizations: Not on file     Relationship status: Not on file    Intimate partner violence     Fear of current or ex partner: Not on file     Emotionally abused: Not on file     Physically abused: Not on file     Forced sexual activity: Not on file   Other Topics Concern    Not on file   Social History Narrative    ** Merged History Encounter **          Vitals:    21 0709   BP: 124/81   Pulse: 67   Resp: 16   Temp: 97.6 °F (36.4 °C)   TempSrc: Oral   SpO2: 98%   Weight: 213 lb (96.6 kg)   Height: 5' 8\" (1.727 m)   PainSc:   4       Physical Exam  Vitals signs and nursing note reviewed. Constitutional:       Appearance: Normal appearance. Neck:      Musculoskeletal: Neck supple. Cardiovascular:      Rate and Rhythm: Normal rate and regular rhythm. Pulses: Normal pulses. Heart sounds: Normal heart sounds. Pulmonary:      Effort: Pulmonary effort is normal.      Breath sounds: Normal breath sounds. Abdominal:      General: Bowel sounds are normal.   Musculoskeletal:         General: No swelling or tenderness. Skin:     General: Skin is warm and dry. Neurological:      General: No focal deficit present. Mental Status: He is alert.          Office Visit on 2021 Component Date Value Ref Range Status    Triglyceride 03/12/2021 172* 40 - 149 mg/dL Final    HDL Cholesterol 03/12/2021 57  >=40 mg/dL Final    Cholesterol, total 03/12/2021 149  110 - 200 mg/dL Final    CHOLESTEROL/HDL 03/12/2021 2.6  0.0 - 5.0 Final    Non-HDL Cholesterol 03/12/2021 92  <130 mg/dL Final    LDL, calculated 03/12/2021 58  50 - 99 mg/dL Final    VLDL, calculated 03/12/2021 34* 8 - 30 mg/dL Final    LDL/HDL Ratio 03/12/2021 1.0   Final    Comment: Test includes cholesterol, HDL cholesterol, triglycerides and LDL. Cholesterol Recommended NCEP guidelines in mg/dL:  Less than 200            Desirable  200 - 239                Borderline High  Greater than or  = 240   High  Please Note:  Total Chol/HDL Ratio                   Men     Women  1/2 Avg. Risk    3.4     3.3      Avg. Risk    5.0     4.4  2X  Avg. Risk    9.6     7.1  3X  Avg. Risk   23.4    11.0      Glucose 03/12/2021 96  70 - 99 mg/dL Final    BUN 03/12/2021 19  6 - 22 mg/dL Final    Creatinine 03/12/2021 0.9  0.5 - 1.2 mg/dL Final    Sodium 03/12/2021 144  133 - 145 mmol/L Final    Potassium 03/12/2021 4.4  3.5 - 5.5 mmol/L Final    Chloride 03/12/2021 105  98 - 110 mmol/L Final    CO2 03/12/2021 30  20 - 32 mmol/L Final    AST (SGOT) 03/12/2021 16  10 - 37 U/L Final    ALT (SGPT) 03/12/2021 14  5 - 40 U/L Final    Alk. phosphatase 03/12/2021 99  25 - 115 U/L Final    Bilirubin, total 03/12/2021 0.3  0.2 - 1.2 mg/dL Final    Calcium 03/12/2021 8.7  8.4 - 10.5 mg/dL Final    Protein, total 03/12/2021 6.0* 6.4 - 8.3 g/dL Final    Albumin 03/12/2021 4.0  3.5 - 5.0 g/dL Final    A-G Ratio 03/12/2021 2.0  1.1 - 2.6 ratio Final    Globulin 03/12/2021 2.0  2.0 - 4.0 g/dL Final    Anion gap 03/12/2021 9.0  3.0 - 15.0 mmol/L Final    Comment: Anion Gap calculation based on electrolyte reference ranges.   Test includes Albumin, Alkaline Phosphatase, ALT, AST, BUN, Calcium, CO2,  Chloride, Creatinine, Glucose, Potassium, Sodium, Total Bilirubin and Total  Protein. Estimated GFR results are reported in mL/min/1.73 sq.m. by the MDRD equation. This eGFR is validated for stable chronic renal failure patients. This   equation  is unreliable in acute illness or patients with normal renal function.  GFRAA 03/12/2021 >60.0  >60.0 Final    GFRNA 03/12/2021 >60.0  >60.0 Final    Prostate Specific Ag 03/12/2021 0.510  <=3.100 ng/mL Final    WBC 03/12/2021 6.4  4.0 - 11.0 K/uL Final    RBC 03/12/2021 4.87  3.80 - 5.80 M/uL Final    HGB 03/12/2021 14.8  13.1 - 17.2 g/dL Final    HCT 03/12/2021 46.0  39.3 - 51.6 % Final    MCV 03/12/2021 95  80 - 95 fL Final    MCH 03/12/2021 30  26 - 34 pg Final    MCHC 03/12/2021 32  31 - 36 g/dL Final    RDW 03/12/2021 11.9  10.0 - 15.5 % Final    PLATELET 04/55/2525 685  140 - 440 K/uL Final    MPV 03/12/2021 10.1  9.0 - 13.0 fL Final    NEUTROPHILS 03/12/2021 59  40 - 75 % Final    Lymphocytes 03/12/2021 29  20 - 45 % Final    MONOCYTES 03/12/2021 10  3 - 12 % Final    EOSINOPHILS 03/12/2021 1  0 - 6 % Final    BASOPHILS 03/12/2021 1  0 - 2 % Final    ABS. NEUTROPHILS 03/12/2021 3.8  1.8 - 7.7 K/uL Final    ABSOLUTE LYMPHOCYTE COUNT 03/12/2021 1.9  1.0 - 4.8 K/uL Final    ABS. MONOCYTES 03/12/2021 0.6  0.1 - 1.0 K/uL Final    ABS. EOSINOPHILS 03/12/2021 0.1  0.0 - 0.5 K/uL Final    ABS. BASOPHILS 03/12/2021 0.0  0.0 - 0.2 K/uL Final       .  Results for orders placed or performed in visit on 03/05/21   LIPID PANEL   Result Value Ref Range    Triglyceride 172 (H) 40 - 149 mg/dL    HDL Cholesterol 57 >=40 mg/dL    Cholesterol, total 149 110 - 200 mg/dL    CHOLESTEROL/HDL 2.6 0.0 - 5.0    Non-HDL Cholesterol 92 <130 mg/dL    LDL, calculated 58 50 - 99 mg/dL    VLDL, calculated 34 (H) 8 - 30 mg/dL    LDL/HDL Ratio 1.0     Narrative    Unless additionally indicated, test performed at: [facility], [address] [place] [address]. PH: [phone].    METABOLIC PANEL, COMPREHENSIVE   Result Value Ref Range    Glucose 96 70 - 99 mg/dL    BUN 19 6 - 22 mg/dL    Creatinine 0.9 0.5 - 1.2 mg/dL    Sodium 144 133 - 145 mmol/L    Potassium 4.4 3.5 - 5.5 mmol/L    Chloride 105 98 - 110 mmol/L    CO2 30 20 - 32 mmol/L    AST (SGOT) 16 10 - 37 U/L    ALT (SGPT) 14 5 - 40 U/L    Alk. phosphatase 99 25 - 115 U/L    Bilirubin, total 0.3 0.2 - 1.2 mg/dL    Calcium 8.7 8.4 - 10.5 mg/dL    Protein, total 6.0 (L) 6.4 - 8.3 g/dL    Albumin 4.0 3.5 - 5.0 g/dL    A-G Ratio 2.0 1.1 - 2.6 ratio    Globulin 2.0 2.0 - 4.0 g/dL    Anion gap 9.0 3.0 - 15.0 mmol/L    GFRAA >60.0 >60.0    GFRNA >60.0 >60.0    Narrative    Unless additionally indicated, test performed at: [facility]. PH: [phone]. PSA SCREENING (SCREENING)   Result Value Ref Range    Prostate Specific Ag 0.510 <=3.100 ng/mL    Narrative    Unless additionally indicated, test performed at: [facility]. PH: [phone]. CBC WITH AUTOMATED DIFF   Result Value Ref Range    WBC 6.4 4.0 - 11.0 K/uL    RBC 4.87 3.80 - 5.80 M/uL    HGB 14.8 13.1 - 17.2 g/dL    HCT 46.0 39.3 - 51.6 %    MCV 95 80 - 95 fL    MCH 30 26 - 34 pg    MCHC 32 31 - 36 g/dL    RDW 11.9 10.0 - 15.5 %    PLATELET 435 730 - 709 K/uL    MPV 10.1 9.0 - 13.0 fL    NEUTROPHILS 59 40 - 75 %    Lymphocytes 29 20 - 45 %    MONOCYTES 10 3 - 12 %    EOSINOPHILS 1 0 - 6 %    BASOPHILS 1 0 - 2 %    ABS. NEUTROPHILS 3.8 1.8 - 7.7 K/uL    ABSOLUTE LYMPHOCYTE COUNT 1.9 1.0 - 4.8 K/uL    ABS. MONOCYTES 0.6 0.1 - 1.0 K/uL    ABS. EOSINOPHILS 0.1 0.0 - 0.5 K/uL    ABS. BASOPHILS 0.0 0.0 - 0.2 K/uL    Narrative    Unless additionally indicated, test performed at: [facility], [address] [place] [address]. PH: [phone].    MONITOR 14-DRUG CLASS PROFILE ([facility])    Narrative    Unless additionally indicated, test performed at: [facility], [address] [place] [address]. PH: [phone]. Assessment / Plan:      ICD-10-CM ICD-9-CM    1. Cervical radiculopathy  M54.12 723.4 14-DRUG SCREEN, UR   2. Depression with anxiety  F41.8 300.4    3. Screening PSA (prostate specific antigen)  Z12.5 V76.44 PSA SCREENING (SCREENING)   4. Encounter for lipid screening for cardiovascular disease  Z13.220 V77.91 CBC WITH AUTOMATED DIFF    Z13.6 V81.2 LIPID PANEL      METABOLIC PANEL, COMPREHENSIVE   5. Multiple nevi  D22.9 216.9 REFERRAL TO DERMATOLOGY     Labs ordered  Screen for prostate cancer  Cervical radiculopathy-we will continue gabapentin. Urine drug screen today. Follow-up with psychiatry for continued depression  Follow-up and Dispositions    · Return in about 6 months (around 9/5/2021). I asked the patient if he  had any questions and answered his  questions. The patient stated that he understands the treatment plan and agrees with the treatment plan    This document was created with a voice activated dictation system and may contain transcription errors.

## 2021-03-10 ENCOUNTER — HOSPITAL ENCOUNTER (OUTPATIENT)
Dept: PHYSICAL THERAPY | Age: 56
Discharge: HOME | End: 2021-03-10
Payer: MEDICAID

## 2021-03-10 PROCEDURE — 97110 THERAPEUTIC EXERCISES: CPT

## 2021-03-10 PROCEDURE — 97112 NEUROMUSCULAR REEDUCATION: CPT

## 2021-03-10 PROCEDURE — 97530 THERAPEUTIC ACTIVITIES: CPT

## 2021-03-10 NOTE — PROGRESS NOTES
PT DAILY TREATMENT NOTE     Patient Name: [facility]  Date:3/10/2021  : 1965  [x]  Patient  Verified  Payor: [place] / Plan: 1500 / Product Type: Medicaid /    In time: 5:25  Out time: 6:03  Total Treatment Time (min): 38  Visit #: 2 of 8    Treatment Area: Shoulder pain [M25.519]    SUBJECTIVE  Pain Level (0-10 scale): 3/10  Any medication changes, allergies to medications, adverse drug reactions, diagnosis change, or new procedure performed?: [x] No    [] Yes (see summary sheet for update)  Subjective functional status/changes:   [] No changes reported  Pt. Reports he doesn't feel too bad today. He reports he went to the doctor and wants him to continue 1x a week to keep working on strengthening. OBJECTIVE    20 min Therapeutic Exercise:  [x] See flow sheet :   Rationale: increase ROM and increase strength to improve the patients ability to increase ease of ADLs    8 min Therapeutic Activity:  []  See flow sheet : pt. Education on importance of exercise and self massage techniques during trigger point release to infraspinatus and subscap   Rationale: increase ROM and increase strength  to improve the patients ability to increase ease of reaching     10 min Neuromuscular Re-education:  [x]  See flow sheet : PNF D2, IR/ER stability during portia side steps   Rationale: increase strength and improve coordination  to improve the patients ability to increase ease of working          With   [x] TE   [] TA   [] neuro   [] other: Patient Education: [x] Review HEP    [] Progressed/Changed HEP based on:   [] positioning   [] body mechanics   [] transfers   [] heat/ice application    [] other:      Other Objective/Functional Measures:   Pt. Was challenged with 0.5# resistance during [place] IR/ER  Continues to have multiple trigger points along infraspinatus   Pt.  Tolerated PT well with no reports of increased pain     Pain Level (0-10 scale) post treatment: 3/10    ASSESSMENT/Changes in Function:  Pt. Is progressing slowly towards goals. She continues to have difficulty with reaching across body and into abduction. He has good passive mobility into flexion, abduction, and ER, but continues to have some limitations into IR. He continues to be limited by decreased shoulder strength. Patient will continue to benefit from skilled PT services to modify and progress therapeutic interventions, address functional mobility deficits, address ROM deficits, address strength deficits, analyze and address soft tissue restrictions, analyze and cue movement patterns, analyze and modify body mechanics/ergonomics and assess and modify postural abnormalities to attain remaining goals. Progress towards goals / Updated goals:  1. Pt will be compliant with % of the time for 1 time a day to improvement shoulder function for light ADL's.  2. Pt will report  < 3/10 pain at home following ADL's to increase pt functional capacity.   Not met: 3/10 (3/10/21)  3. Pt will increase Right shoulder ER/IR strength to 4/5 to increase functional mobility and increase ease of ADL's  4. Pt will increase Right shoulder flexion strength to 4/5 to increase ease of ADL's and improve quality of life. 5. Pt will increase AROM Of right shoulder flex/ABD to 130*, ER to 60* and IR to 60*.     PLAN  []  Upgrade activities as tolerated     [x]  Continue plan of care  []  Update interventions per flow sheet       []  Discharge due to:_  []  Other:_      Kat Blue, PT 3/10/2021  5:44 PM    Future Appointments   Date Time Provider Abdullahi Justina   3/12/2021  8:00 AM [facility] BS AMB   4/30/2021  3:00 PM JOHN Reynoso BS AMB   7/22/2021 10:20 AM ZAC Pena BS AMB   8/6/2021  7:00 AM Eladio Blanc NP [facility] BS AMB

## 2021-03-13 LAB
A-G RATIO,AGRAT: 2 RATIO (ref 1.1–2.6)
ABSOLUTE LYMPHOCYTE COUNT, 10803: 1.9 K/UL (ref 1–4.8)
ALBUMIN SERPL-MCNC: 4 G/DL (ref 3.5–5)
ALP SERPL-CCNC: 99 U/L (ref 25–115)
ALT SERPL-CCNC: 14 U/L (ref 5–40)
ANION GAP SERPL CALC-SCNC: 9 MMOL/L (ref 3–15)
AST SERPL W P-5'-P-CCNC: 16 U/L (ref 10–37)
BASOPHILS # BLD: 0 K/UL (ref 0–0.2)
BASOPHILS NFR BLD: 1 % (ref 0–2)
BILIRUB SERPL-MCNC: 0.3 MG/DL (ref 0.2–1.2)
BUN SERPL-MCNC: 19 MG/DL (ref 6–22)
CALCIUM SERPL-MCNC: 8.7 MG/DL (ref 8.4–10.5)
CHLORIDE SERPL-SCNC: 105 MMOL/L (ref 98–110)
CHOLEST SERPL-MCNC: 149 MG/DL (ref 110–200)
CO2 SERPL-SCNC: 30 MMOL/L (ref 20–32)
CREAT SERPL-MCNC: 0.9 MG/DL (ref 0.5–1.2)
EOSINOPHIL # BLD: 0.1 K/UL (ref 0–0.5)
EOSINOPHIL NFR BLD: 1 % (ref 0–6)
ERYTHROCYTE [DISTWIDTH] IN BLOOD BY AUTOMATED COUNT: 11.9 % (ref 10–15.5)
GFRAA, 66117: >60
GFRNA, 66118: >60
GLOBULIN,GLOB: 2 G/DL (ref 2–4)
GLUCOSE SERPL-MCNC: 96 MG/DL (ref 70–99)
GRANULOCYTES,GRANS: 59 % (ref 40–75)
HCT VFR BLD AUTO: 46 % (ref 39.3–51.6)
HDLC SERPL-MCNC: 2.6 MG/DL (ref 0–5)
HDLC SERPL-MCNC: 57 MG/DL
HGB BLD-MCNC: 14.8 G/DL (ref 13.1–17.2)
LDL/HDL RATIO,LDHD: 1
LDLC SERPL CALC-MCNC: 58 MG/DL (ref 50–99)
LYMPHOCYTES, LYMLT: 29 % (ref 20–45)
MCH RBC QN AUTO: 30 PG (ref 26–34)
MCHC RBC AUTO-ENTMCNC: 32 G/DL (ref 31–36)
MCV RBC AUTO: 95 FL (ref 80–95)
MONOCYTES # BLD: 0.6 K/UL (ref 0.1–1)
MONOCYTES NFR BLD: 10 % (ref 3–12)
NEUTROPHILS # BLD AUTO: 3.8 K/UL (ref 1.8–7.7)
NON-HDL CHOLESTEROL, 011976: 92 MG/DL
PLATELET # BLD AUTO: 192 K/UL (ref 140–440)
PMV BLD AUTO: 10.1 FL (ref 9–13)
POTASSIUM SERPL-SCNC: 4.4 MMOL/L (ref 3.5–5.5)
PROT SERPL-MCNC: 6 G/DL (ref 6.4–8.3)
PSA SERPL-MCNC: 0.51 NG/ML
RBC # BLD AUTO: 4.87 M/UL (ref 3.8–5.8)
SODIUM SERPL-SCNC: 144 MMOL/L (ref 133–145)
TRIGL SERPL-MCNC: 172 MG/DL (ref 40–149)
VLDLC SERPL CALC-MCNC: 34 MG/DL (ref 8–30)
WBC # BLD AUTO: 6.4 K/UL (ref 4–11)

## 2021-03-17 LAB
AMPHETAMINE (GC/MS), 737687: 1128 NG/ML
AMPHETAMINE, 737686: NORMAL NG/ML
AMPHETAMINE, 737686: POSITIVE
AMPHETAMINES: POSITIVE
BARBITURATES: NEGATIVE NG/ML
BENZODIAZEPINES, R9BE1T: NEGATIVE NG/ML
BUPRENORPHINE, URINE: NEGATIVE NG/ML
CANNABINOID, DR86L: NEGATIVE NG/ML
COCAINE/METABOLITES, MDS2T: NEGATIVE NG/ML
CREATININE URINE,3297223: 134.2 MG/DL (ref 20–300)
FENTANYL, PMD99: NEGATIVE PG/ML
MEPERIDINE, 761055: NEGATIVE NG/ML
METHADONE, 791633: NEGATIVE NG/ML
METHAMPHETAMINE: NEGATIVE
OPIATE, DR88L: NEGATIVE NG/ML
OXYCODONE/OXYMORPHONE-PM: NEGATIVE NG/ML
PH UR STRIP: 5.9 [PH] (ref 4.5–8.9)
PHENCYCLIDINE: NEGATIVE NG/ML
PLEASE NOTE, TRIC1T: NORMAL
PROPOXYPHENE, 791635: NEGATIVE NG/ML
SPECIFIC GRAVITY, 790387: 1.03
TRAMADOL, 711021: NEGATIVE NG/ML

## 2021-03-18 ENCOUNTER — HOSPITAL ENCOUNTER (OUTPATIENT)
Dept: PHYSICAL THERAPY | Age: 56
Discharge: HOME | End: 2021-03-18
Payer: MEDICAID

## 2021-03-18 PROCEDURE — 97112 NEUROMUSCULAR REEDUCATION: CPT

## 2021-03-18 PROCEDURE — 97530 THERAPEUTIC ACTIVITIES: CPT

## 2021-03-18 PROCEDURE — 97110 THERAPEUTIC EXERCISES: CPT

## 2021-03-18 NOTE — PROGRESS NOTES
PT DAILY TREATMENT NOTE     Patient Name: Leyla Grossman  Date:3/18/2021  : 1965  [x]  Patient  Verified  Payor: [place] / Plan: 1500 / Product Type: Medicaid /    In time: 12:45  Out time: 1:24  Total Treatment Time (min): 39  Visit #: 3 of 8    Treatment Area: Shoulder pain [M25.519]    SUBJECTIVE  Pain Level (0-10 scale):  0/10  Any medication changes, allergies to medications, adverse drug reactions, diagnosis change, or new procedure performed?: [x] No    [] Yes (see summary sheet for update)  Subjective functional status/changes:   [] No changes reported  Pt. Reports he is doing pretty good today and just feels uncomfortable    OBJECTIVE    21 min Therapeutic Exercise:  [x] See flow sheet :   Rationale: increase ROM and increase strength to improve the patients ability to increase ease of reaching    8 min Therapeutic Activity:  [x]  See flow sheet : pt. Education on exercise during trigger point release to infraspinatus and subscap   Rationale: increase strength and improve coordination  to improve the patients ability to increase ease of ADLs     10 min Neuromuscular Re-education:  [x]  See flow sheet : PNF D2, scap control during rows   Rationale: increase strength and improve coordination  to improve the patients ability to increase ease of ADLs          With   [x] TE   [] TA   [] neuro   [] other: Patient Education: [x] Review HEP    [] Progressed/Changed HEP based on:   [] positioning   [] body mechanics   [] transfers   [] heat/ice application    [] other:      Other Objective/Functional Measures:   Pt. Was challenged with 1# resistance for flexion and abduction  He tolerated PT well with no reports of increased pain  He was educated on avoiding increased pain during exercises at home  Pt. Was advised to perform light weights with more repetitions if he goes to the gym.       Pain Level (0-10 scale) post treatment: 0/10    ASSESSMENT/Changes in Function:  Pt. Is progressing slowly towards goals. He continues to demonstrate decreased right shoulder strength with pain at end ranges. He is compliant with his HEP and had some improvement with his pain today. Patient will continue to benefit from skilled PT services to modify and progress therapeutic interventions, address functional mobility deficits, address ROM deficits, address strength deficits, analyze and address soft tissue restrictions, analyze and cue movement patterns and analyze and modify body mechanics/ergonomics to attain remaining goals. Progress towards goals / Updated goals:  1. Pt will be compliant with % of the time for 1 time a day to improvement shoulder function for light ADL's.  2. Pt will report  < 3/10 pain at home following ADL's to increase pt functional capacity.   Not met: 3/10 (3/10/21)  3. Pt will increase Right shoulder ER/IR strength to 4/5 to increase functional mobility and increase ease of ADL's  4. Pt will increase Right shoulder flexion strength to 4/5 to increase ease of ADL's and improve quality of life. Progressed shoulder flexion from 0.5 to 1# during portia flexion (3/18/21)  5. Pt will increase AROM Of right shoulder flex/ABD to 130*, ER to 60* and IR to 60*.     PLAN  []  Upgrade activities as tolerated     [x]  Continue plan of care  []  Update interventions per flow sheet       []  Discharge due to:_  []  Other:_      Yue Cabrales, PT 3/18/2021  8:02 AM    Future Appointments   Date Time Provider Abdullahi Justina   3/18/2021 12:45 PM Chrsitelle Martins PT [facility]   3/25/2021 12:00 PM Christelle Martins PT [facility]   4/30/2021  3:00 PM JOHN Cason BS AMB   7/22/2021 10:20 AM ZAC Ghosh BS AMB   8/6/2021  7:00 AM ZAC Smith BS AMB

## 2021-03-25 ENCOUNTER — HOSPITAL ENCOUNTER (OUTPATIENT)
Dept: PHYSICAL THERAPY | Age: 56
Discharge: HOME | End: 2021-03-25
Payer: MEDICAID

## 2021-03-25 PROCEDURE — 97110 THERAPEUTIC EXERCISES: CPT

## 2021-03-25 PROCEDURE — 97112 NEUROMUSCULAR REEDUCATION: CPT

## 2021-03-25 PROCEDURE — 97530 THERAPEUTIC ACTIVITIES: CPT

## 2021-03-25 NOTE — PROGRESS NOTES
PT DAILY TREATMENT NOTE 11    Patient Name: Gricelda Bowden  Date:3/25/2021  : 1965  [x]  Patient  Verified  Payor: [place] / Plan: 1500 / Product Type: Medicaid /    In time: 12:20  Out time: 12:52  Total Treatment Time (min): 32  Visit #: 4 of 8    Treatment Area: Shoulder pain [M25.519]    SUBJECTIVE  Pain Level (0-10 scale):  3/10  Any medication changes, allergies to medications, adverse drug reactions, diagnosis change, or new procedure performed?: [x] No    [] Yes (see summary sheet for update)  Subjective functional status/changes:   [] No changes reported  Pt. Reports he is sore today. He reports he had some sorness after     OBJECTIVE    15 min Therapeutic Exercise:  [x] See flow sheet :   Rationale: increase ROM and increase strength to improve the patients ability to increase ease of reaching    8 min Therapeutic Activity:  []  See flow sheet : pt. Educated on delayed onset muscle soreness and exercises during trigger point release to infraspinatus and GH inf mob grade 3   Rationale: increase ROM and increase strength  to improve the patients ability to increase ease of ADLs     9 min Neuromuscular Re-education:  [x]  See flow sheet : IR/ER walk out for control and proprioception, scap control during rows   Rationale: increase strength and improve coordination  to improve the patients ability to increase ease of reaching          With   [x] TE   [] TA   [] neuro   [] other: Patient Education: [x] Review HEP    [] Progressed/Changed HEP based on:   [] positioning   [] body mechanics   [] transfers   [] heat/ice application    [] other:      Other Objective/Functional Measures:   Pt. Was 20 minutes late to appointment today  Right shoulder AROM flex: 136 abd: 102 ER at side: 29 degrees   Pt. Tolerated PT well with no reports of increased pain  Pt.  Was educated on continuing with HEP and to work on AROM activities to help muscle soreness    Pain Level (0-10 scale) post treatment: 3/10    ASSESSMENT/Changes in Function:  Pt. Is progressing slowly towards goals. He continues to have decreased left shoulder mobility with pain at end ranges. He also continues to have complaints of moderate pain with higher level activiites and yard work    Patient will continue to benefit from skilled PT services to modify and progress therapeutic interventions, address functional mobility deficits, address ROM deficits, address strength deficits, analyze and address soft tissue restrictions, analyze and cue movement patterns, analyze and modify body mechanics/ergonomics and assess and modify postural abnormalities to attain remaining goals. Progress towards goals / Updated goals:  1. Pt will be compliant with % of the time for 1 time a day to improvement shoulder function for light ADL's.  2. Pt will report  < 3/10 pain at home following ADL's to increase pt functional capacity.   Not met: 3/10 (3/10/21)  3. Pt will increase Right shoulder ER/IR strength to 4/5 to increase functional mobility and increase ease of ADL's  4. Pt will increase Right shoulder flexion strength to 4/5 to increase ease of ADL's and improve quality of life. Progressed shoulder flexion from 0.5 to 1# during portia flexion (3/18/21)  5. Pt will increase AROM Of right shoulder flex/ABD to 130*, ER to 60* and IR to 60*.   Not met: flex: 136 degrees abd: 102 degrees ER at side: 29 degrees (3/25/21)    PLAN  []  Upgrade activities as tolerated     [x]  Continue plan of care  []  Update interventions per flow sheet       []  Discharge due to:_  []  Other:_      Christoph Cronin, PT 3/25/2021  7:58 AM    Future Appointments   Date Time Provider Abdullahi Horn   3/25/2021 12:00 PM Esha Smith, PT [facility]   4/1/2021 12:45 PM Selma Gomes, PTA [facility]   4/30/2021  3:00 PM JOHN Bueno [facility] BS AMB   7/22/2021 10:20 AM Georgina Muñoz NP [facility] BS AMB   8/6/2021  7:00 AM Naomi Martin NP [facility] BS AMB

## 2021-04-01 ENCOUNTER — HOSPITAL ENCOUNTER (OUTPATIENT)
Dept: PHYSICAL THERAPY | Age: 56
Discharge: HOME | End: 2021-04-01
Payer: MEDICAID

## 2021-04-01 PROCEDURE — 97112 NEUROMUSCULAR REEDUCATION: CPT

## 2021-04-01 PROCEDURE — 97530 THERAPEUTIC ACTIVITIES: CPT

## 2021-04-01 PROCEDURE — 97110 THERAPEUTIC EXERCISES: CPT

## 2021-04-01 NOTE — PROGRESS NOTES
[facility]  [address]  [phone] fax    Physical Therapy Progress Note  Patient name: Ibrahima MARIA DEACONESS of Care: 11/4/2020   Referral Mellissa Duane, MD [facility]: 83/8/2850               Medical Diagnosis: Shoulder pain [M25.519]  Payor: [place] [employer] / Plan: zulily / Product Type: Managed Care Medicaid /  Onset Date: DOS 9/21/2020               Treatment Diagnosis: right shoulder pain   Prior Hospitalization: see medical history Provider#: [id number]   Medications: Verified on Patient summary List    Comorbidities: hx right shoulder surgery ~20 years ago, left shoulder surgery 2019   Prior Level of Function: Independent with ADLs           Visits from Start of Care: 37    Missed Visits: 0    Established Goals:         Excellent           Good         Limited           None  [] Increased ROM   []  []  []  []  [] Increased Strength  []  []  []  []  [] Increased Mobility  []  []  []  []   [] Decreased Pain   []  []  []  []  [] Decreased Swelling  []  []  []  []    Key Functional Changes:  Pt. Is progressing slowly towards goals. He is having less pain overall, but continues to have some difficulty with sleeping. He continues to be most limited by decreased right shoulder strength. Right shoulder MMT flex: 4-/5 ER: 4-/5 IR: 4/5. Right shoulder AROM continues to be limited flex: 120 degrees abd: 105 degrees with pain at end ranges. Skilled PT is medically necessary in order to continue to improve right shoulder strength and transition to D/C with HEP. Updated Goals: to be achieved in 3 weeks:  1. Patient will improve right shoulder AROM flex: 130 degrees in order to increase ease of reaching into cabinets at home. 2. Patient will improve right shoulder ER MMT to 4/5 in order to increase ease of ADLs. 3. Patient will demonstrate good understanding of HEP in order to continue to improve following D/C. ASSESSMENT/RECOMMENDATIONS:  [x]Continue therapy per initial plan/protocol at a frequency of  1 x per week for 3 weeks  []Continue therapy with the following recommended changes:_____________________      _____________________________________________________________________  []Discontinue therapy progressing towards or have reached established goals  []Discontinue therapy due to lack of appreciable progress towards goals  []Discontinue therapy due to lack of attendance or compliance  []Await Physician's recommendations/decisions regarding therapy  []Other:________________________________________________________________    Thank you for this referral.   Yuko Cadena, PT 4/1/2021 1:01 PM    NOTE TO PHYSICIAN:  [address]   FAX TO [facility]: [phone]  If you are unable to process this request in 24 hours please contact our office: [phone] I have read the above report and request that my patient continue as recommended. ? I have read the above report and request that my patient continue therapy with the following changes/special instructions:____________________________________  ? I have read the above report and request that my patient be discharged from therapy.     Physicians signature: ______________________________Date: ______Time:______     Gregor Martins MD

## 2021-04-01 NOTE — PROGRESS NOTES
PT DAILY TREATMENT NOTE     Patient Name: Oscar Shields  Date:2021  : 1965  [x]  Patient  Verified  Payor: [place] / Plan: 1500 / Product Type: Medicaid /    In time: 12:44  Out time: 1:27  Total Treatment Time (min): 43  Visit #: 4 of 8    Treatment Area: Shoulder pain [M25.519]    SUBJECTIVE  Pain Level (0-10 scale):  3/10  Any medication changes, allergies to medications, adverse drug reactions, diagnosis change, or new procedure performed?: [x] No    [] Yes (see summary sheet for update)  Subjective functional status/changes:   [] No changes reported  Pt. Reports he had pain last night after waking up with his arm over his head.      OBJECTIVE    25 min Therapeutic Exercise:  [x] See flow sheet :   Rationale: increase ROM and increase strength to improve the patients ability to increase ease of ADLs    10 min Therapeutic Activity:  []  See flow sheet : re-assessment    Rationale: increase strength and improve coordination  to improve the patients ability to increase ease of ADLs     8 min Neuromuscular Re-education:  []  See flow sheet : IR/ER walking   Rationale: increase strength and improve coordination  to improve the patients ability to increase ease of reaching          With   [x] TE   [] TA   [] neuro   [] other: Patient Education: [x] Review HEP    [] Progressed/Changed HEP based on:   [] positioning   [] body mechanics   [] transfers   [] heat/ice application    [] other:      Other Objective/Functional Measures:   Right shoulder MMT ER: 4-/5 IR: 4/5  Right shoulder flex: MMT: 4-/5  Right shoulder AROM flex: 120 abd: 105 degrees    Pain Level (0-10 scale) post treatment: 0/10    ASSESSMENT/Changes in Function:      []  See Plan of Care  [x]  See progress note/recertification  []  See Discharge Summary         Progress towards goals / Updated goals:  See progress note    PLAN  []  Upgrade activities as tolerated     [x]  Continue plan of care  [] Update interventions per flow sheet       []  Discharge due to:_  []  Other:_      Zo Solano, PT 4/1/2021  9:10 AM    Future Appointments   Date Time Provider Abdullahi Horn   4/1/2021 12:45 PM Jennell Paget, PT [facility]   4/30/2021  3:00 PM JOHN Millan [facility] BS AMB   7/22/2021 10:20 AM Bea Goldberg NP [facility] BS AMB   8/6/2021  7:00 AM ZAC Thorne BS AMB

## 2021-04-16 ENCOUNTER — HOSPITAL ENCOUNTER (OUTPATIENT)
Dept: PHYSICAL THERAPY | Age: 56
Discharge: HOME | End: 2021-04-16
Payer: MEDICAID

## 2021-04-16 PROCEDURE — 97162 PT EVAL MOD COMPLEX 30 MIN: CPT

## 2021-04-16 NOTE — PROGRESS NOTES
PT DAILY TREATMENT NOTE/FOOT AND ANKLE EVAL     Patient Name: Bernardo Zavala  Date:2021  : 1965  [x]  Patient  Verified  Payor: Allyn Cooper / Plan: [employer] / Product Type: Managed Care Medicaid /    In time: 9:11A Out time:9:57A  Total Treatment Time (min): 46  Visit #: 1 of 8    Treatment Area: Heel pain [M79.673]    SUBJECTIVE  Pain Level (0-10 scale): 4/10 (5/10 at worst)   [x]constant []intermittent []improving []worsening []no change since onset      Better with: not moving  Worse with: standing, walking     Any medication changes, allergies to medications, adverse drug reactions, diagnosis change, or new procedure performed?: [x] No    [] Yes (see summary sheet for update)  Subjective functional status/changes:     PLOF: patient enjoys going to events/concerts  Limitations to PLOF:   Mechanism of Injury: foot drop of insidious onset in 2018; heel pain beginning of year  Current symptoms/Complaints: throbbing pain to left foot, burning to top of foot, impaired balance; knot and tenderness to heel, difficulty navigating stairs (reports stepping up with ball of foot); right knee pain  Previous Treatment/Compliance: PT for foot drop and balance as well as bilateral shoulder   Living Situation: lives in 1 story home with 3 stairs to enter  Pt Goals: \"to be able to keep my balance\"      OBJECTIVE/EXAMINATION       He had injection to left heel on 3/8. He reports the swelling went away but he still has a knot in that area. He notices increased pain with walking barefoot. He reports frequent loss of balance and burning to the top of his foot since he experienced foot drop; he reports difficulty with balance mainly when he turns or shifts in standing. He reports he uses cane occasionally.     32 min [x]Eval                  []Re-Eval       7 min Therapeutic Exercise:  [x] See flow sheet :   Rationale: increase ROM, increase strength and increase proprioception to improve the patients ability to perform ADLs with increased ease    7 min Therapeutic Activity:  [x]  See flow sheet :   Rationale: increase ROM, increase strength and improve coordination  to improve the patients ability to perform ADLs with increased ease               With   [] TE   [x] TA   [] neuro   [] other: Patient Education: [x] Review HEP    [] Progressed/Changed HEP based on:   [] positioning   [] body mechanics   [] transfers   [] heat/ice application    [x] other: diagnosis, prognosis, POC, purpose and importance of therapy; anatomy and physiology as it relates to current condition, use of SPC for ambulation     Other Objective/Functional Measures:     Physical Therapy Evaluation  - Foot and Ankle    Gait: [x] Normal    [x] Abnormal    [x] Antalgic    [] NWB    Device:  Describe: decreased stance time on left, decreased arm swing    ROM/Strength  [] Unable to assess at this time      AROM        PROM            Strength (1-5)   Left Right Left Right Left  Right   Dorsiflexion -14 -4   3 3   Plantarflexion 42 51   3 4   Inversion 26 35   3 4   Eversion 17 23   3 3   Great Toe Ext         Great Toe Flex           Ankle PF tested in seated  Right knee flex 3/5 ext 4/5  Left knee flex  3/5 ext 3/5    Flexibility: [] Unable to assess at this time  Gastroc:    (L) Tightness [] WNL   [x] Min   [] Mod   [] Severe    (R) Tightness [] WNL   [x] Min   [] Mod   [] Severe  Soleus:    (L) Tightness [] WNL   [] Min   [] Mod   [] Severe    (R) Tightness [] WNL   [] Min   [] Mod   [] Severe  Other:      (L) Tightness [] WNL   [] Min   [] Mod   [] Severe    (R) Tightness [] WNL   [] Min   [] Mod   [] Severe    Palpation:   Location: medial inferior heel  Patient's Pain Response: [] Min   [x] Mod   [] Severe  Location:  Patient's Pain Response: [] Min   [] Mod   [] Severe      Other tests/ comments:    Stairs-reciprocal gait pattern to ascend, no HR, ascends with rearfoot hanging off step, descends step to step leading with right with quarter turn to left    Tandem and SLS-30 sec cristobal (increased pain with SLS on left)    Decreased sensation to light touch to superolateral foot, increased burning with deep pressure which extends to just above cristobal malleioli    Cristobal feet rests in eversion    Hallux valgus deformity left>right    Pain Level (0-10 scale) post treatment: 4/10    ASSESSMENT/Changes in Function: See POC    Patient will continue to benefit from skilled PT services to modify and progress therapeutic interventions, address functional mobility deficits, address ROM deficits, address strength deficits, analyze and address soft tissue restrictions, analyze and cue movement patterns, analyze and modify body mechanics/ergonomics, assess and modify postural abnormalities, address imbalance/dizziness and instruct in home and community integration to attain remaining goals.      [x]  See Plan of Care  []  See progress note/recertification  []  See Discharge Summary         Progress towards goals / Updated goals:  See POC    PLAN  []  Upgrade activities as tolerated     [x]  Continue plan of care  []  Update interventions per flow sheet       []  Discharge due to:_  []  Other:_      Marcela Moulton, PT 4/16/2021  8:36 AM

## 2021-04-16 NOTE — PROGRESS NOTES
[facility]  [place] [facility]  [address] [place] [address]  [phone] fax    Plan of Care/ Statement of Necessity for Physical Therapy Services    Patient name: Torres Duran Start of Care: 2021   Referral source: Rian Cowart DPM : 1965    Medical Diagnosis: Heel pain [M79.673]  Payor: Dick Rehman / Plan: Dedra Band / Product Type: Managed Care Medicaid /  Onset Date: beginning of     Treatment Diagnosis: Left foot pain, impaired balance   Prior Hospitalization: see medical history Provider#: [id number]   Medications: Verified on Patient summary List    Comorbidities: arthritis, right knee pain, history of left foot drop   Prior Level of Function: independent with ADLs, patient enjoys going to events/concerts, lives in 1 story home with 3 stairs to enter     The Plan of Care and following information is based on the information from the initial evaluation. Assessment/ key information: Patient is a 54-year-old male who presents to therapy with chief complaints of left foot pain and impaired balance. Patient reports throbbing pain to left foot/burning to left superior foot and a tender \"knot\" to the bottom of his heel. He reports altered sensation has been persistent since he first experienced foot drop in 2018. Patient reports LOB noted with turning/shifting while standing and walking and increased difficulty navigating stairs. Patient states he had and injection to the left heel on 3/8/21 which helped with the swelling but he still has pain and a knot in that area. Patient reports using a cane intermittently. Exam reveals decreased knee and ankle strength (grossly 3/5 on left with MMT) and decreased left foot/ankle AROM. Patient ambulates with antalgic gait on left. He demonstrates min decrease in gastroc flexibility bilaterally.  Patient TTP to medial inferior heel; he demonstrates decreased sensation to light touch to superolateral foot as well as increased burning with deep pressure which extends to just above bilaterla malleioli. Patient was able to maintain SLS for 30 sec bilaterally with increased left foot pain on left. Patient would benefit from skilled outpatient PT to address above mentioned deficits to return to prior level of function, increase independence with ADLs, and improve overall quality of life. Evaluation Complexity History HIGH Complexity :3+ comorbidities / personal factors will impact the outcome/ POC ; Examination HIGH Complexity : 4+ Standardized tests and measures addressing body structure, function, activity limitation and / or participation in recreation  ;Presentation MEDIUM Complexity : Evolving with changing characteristics  ; Clinical Decision Making MEDIUM Complexity : FOTO score of 26-74  Overall Complexity Rating: MEDIUM  Problem List: pain affecting function, decrease ROM, decrease strength, impaired gait/ balance, decrease ADL/ functional abilitiies, decrease activity tolerance, decrease flexibility/ joint mobility and decrease transfer abilities   Treatment Plan may include any combination of the following: Therapeutic exercise, Therapeutic activities, Neuromuscular re-education, Physical agent/modality, Gait/balance training, Manual therapy, Patient education, Self Care training, Functional mobility training, Home safety training and Stair training  Patient / Family readiness to learn indicated by: asking questions, trying to perform skills and interest  Persons(s) to be included in education: patient (P)  Barriers to Learning/Limitations: None  Patient Goal (s): to be able to keep my balance  Patient Self Reported Health Status: good  Rehabilitation Potential: fair    Short Term Goals: To be accomplished in 1 weeks:   1. Patient will be compliant with initial HEP to optimize therapy outcomes. Long Term Goals: To be accomplished in 4 weeks:   1.  Patient will improve FOTO score by at least 5 points to demonstrate functional improvement. 2. Patient will report no more than \"moderate difficulty\" \"with any of your usual work, housework, or school activities\" with FOTO in order to demonstrate improved tolerance to ADLs,    3. Patient will improve left knee and ankle strength to at least 4/5 with MMT in order to navigate stairs with increased ease. 4. Patient will report no greater than 2/10 pain in left foot in order to improve tolerance to functional tasks. 5. Patient will report at least a 50% increase in overall improvement since start of care in order to improve quality of life. Frequency / Duration: Patient to be seen 2 times per week for 4 weeks. Patient/ CarPatient/ Caregiver education and instruction: Diagnosis, prognosis, self care, activity modification and exercises   [x]  Plan of care has been reviewed with CORBY Latif, PT 4/16/2021 8:36 AM    ________________________________________________________________________    I certify that the above Therapy Services are being furnished while the patient is under my care. I agree with the treatment plan and certify that this therapy is necessary.     [de-identified] Signature:____________Date:_________TIME:________     Sugey Munoz DPM  ** Signature, Date and Time must be completed for valid certification **    Please sign and return to [facility] [address]  [address] [facility] ([place], [place])  [phone] fax

## 2021-04-19 NOTE — PROGRESS NOTES
[facility] Marnie Guthrie  [address] [facility]  [phone] fax    Physical Therapy Discharge Summary    Patient name: Ibrahima Douglas Ao of Care: 2020   Referral Prema Conner MD ZB               Medical Diagnosis: Shoulder pain [M25.519]  Payor: Alex Rendon / Plan: Isidor Keep / Product Type: Managed Care Medicaid /  Onset Date: DOS 2020               Treatment Diagnosis: right shoulder pain   Prior Hospitalization: see medical history Provider#: [id number]   Medications: Verified on Patient summary List    Comorbidities: hx right shoulder surgery ~20 years ago, left shoulder surgery 2019   Prior Level of Function: Independent with ADLs        Visits from Start of Care: 37    Missed Visits: 0    Reporting Period : 21 to 21    Summary of Care:  Goal: Patient will improve right shoulder AROM flex: 130 degrees in order to increase ease of reaching into cabinets at home. Status at last note/certification: flex: 120 degrees  Status at discharge: not met    Goal: Patient will improve right shoulder ER MMT to 4/5 in order to increase ease of ADLs. Status at last note/certification: ER: 4-/5  Status at discharge: not met    Goal: Patient will demonstrate good understanding of HEP in order to continue to improve following D/C. Status at last note/certification: n/a  Status at discharge: not met    Pt. Did not return to PT after last progress note, so goals were unable to be re-assessed. He started PT for another issue and will be D/C at this time.      ASSESSMENT/RECOMMENDATIONS:  [x]Discontinue therapy: []Patient has reached or is progressing toward set goals      []Patient is non-compliant or has abdicated      []Due to lack of appreciable progress towards set goals      X: starting PT for another condition     Renetta Leal, PT 2021 8:23 AM

## 2021-04-20 ENCOUNTER — HOSPITAL ENCOUNTER (OUTPATIENT)
Dept: PHYSICAL THERAPY | Age: 56
Discharge: HOME | End: 2021-04-20
Payer: MEDICAID

## 2021-04-20 PROCEDURE — 97112 NEUROMUSCULAR REEDUCATION: CPT

## 2021-04-20 PROCEDURE — 97530 THERAPEUTIC ACTIVITIES: CPT

## 2021-04-20 PROCEDURE — 97110 THERAPEUTIC EXERCISES: CPT

## 2021-04-20 NOTE — PROGRESS NOTES
PT DAILY TREATMENT NOTE     Patient Name: Edson Castillo  Date:2021  : 1965  [x]  Patient  Verified  Payor: Laxmi Franco / Plan: [id number] [employer] / Product Type: Managed Care Medicaid /    In time:3:45P  Out time:4:26P  Total Treatment Time (min): 41  Visit #: 2 of 8      Treatment Area: Pain of left heel [M79.412]    SUBJECTIVE  Pain Level (0-10 scale): 3/10    Any medication changes, allergies to medications, adverse drug reactions, diagnosis change, or new procedure performed?: [x] No    [] Yes (see summary sheet for update)  Subjective functional status/changes:   [] No changes reported    Patient reported burning following bike. He reported coming in he had numbness/tingling to top of the foot.      OBJECTIVE    17 min Therapeutic Exercise:  [x] See flow sheet :   Rationale: increase ROM, increase strength and improve coordination to improve the patients ability to perform functional tasks with increased ease    13 min Therapeutic Activity:  [x]  See flow sheet : squats, bike, patient education   Rationale: increase ROM, increase strength and increase proprioception  to improve the patients ability to perform functional tasks with increased ease     11 min Neuromuscular Re-education:  [x]  See flow sheet : standing balance, to yoga   Rationale: increase ROM, increase strength, improve coordination and increase proprioception  to improve the patients ability to perform functional tasks with increased ease              With   [] TE   [x] TA   [] neuro   [] other: Patient Education: [x] Review HEP    [] Progressed/Changed HEP based on:   [] positioning   [] body mechanics   [] transfers   [] heat/ice application    [x] other: standing with increased weight into left LE       Other Objective/Functional Measures:     Increased heel pain with placing entire heel in contact with floor in seated   Patient reports burning extends about 4 in superior to ankle  Squats to promote posterior weight shift  Cues to evenly distribute weight through foot with SLS   Challenged with tandem stance on foam and toe yoga  Difficulty with control with resisted inversion eversion    Pain Level (0-10 scale) post treatment: 3/10    ASSESSMENT/Changes in Function: Patient continues to demonstrate and report decreased weight acceptance into left foot with activities and ambulation. He demonstrates decreased use of ankle strategy with SLS on left which is increased with cues to evenly distribute weight through left foot. He demonstrates increased challenged with balancing on uneven surfaces. Patient will continue to benefit from skilled PT services to modify and progress therapeutic interventions, address functional mobility deficits, address ROM deficits, address strength deficits, analyze and address soft tissue restrictions, analyze and cue movement patterns, analyze and modify body mechanics/ergonomics, assess and modify postural abnormalities, address imbalance/dizziness and instruct in home and community integration to attain remaining goals. Progress towards goals / Updated goals:  Short Term Goals: To be accomplished in 1 weeks:               1. Patient will be compliant with initial HEP to optimize therapy outcomes. MET (4/20/21)  Long Term Goals: To be accomplished in 4 weeks:               1. Patient will improve FOTO score by at least 5 points to demonstrate functional improvement. 2. Patient will report no more than \"moderate difficulty\" \"with any of your usual work, housework, or school activities\" with FOTO in order to demonstrate improved tolerance to ADLs,                3. Patient will improve left knee and ankle strength to at least 4/5 with MMT in order to navigate stairs with increased ease. 4. Patient will report no greater than 2/10 pain in left foot in order to improve tolerance to functional tasks.                 5. Patient will report at least a 50% increase in overall improvement since start of care in order to improve quality of life.       PLAN  [x]  Upgrade activities as tolerated     [x]  Continue plan of care  []  Update interventions per flow sheet       []  Discharge due to:_  []  Other:_      Bebeto Head, PT 4/20/2021  10:25 AM    Future Appointments   Date Time Provider Abdullahi Horn   4/20/2021  3:45 PM Jose Messina, PT [facility]   4/22/2021  2:15 PM Imani Bond, PTA [facility]   4/27/2021  2:15 PM Rogerio Lyon, PT [facility]GQME[facility]   4/30/2021  2:15 PM Des Soni, PT [facility]   4/30/2021  3:00 PM JOHN Costa [facility] BS AMB   5/4/2021  3:00 PM Jose Messina, PT [facility]   5/6/2021  3:45 PM Des Soni, PT [facility]   5/11/2021  3:45 PM Jose Messina, PT [facility]   5/13/2021  3:00 PM Renee Sidhu, PTA [facility]   5/18/2021 12:00 PM Jose Messina, PT [facility]   5/20/2021 12:00 PM Jose Messina, PT [facility]   5/25/2021 12:00 PM Jose Messina, PT [facility]   5/27/2021 12:00 PM Jose Messina, PT [facility]   7/22/2021 10:20 AM Abby Blackmon NP [facility] BS AMB   8/6/2021  7:00 AM Bernice aMy NP ABMA-[facility] BS AMB

## 2021-04-22 ENCOUNTER — HOSPITAL ENCOUNTER (OUTPATIENT)
Dept: PHYSICAL THERAPY | Age: 56
Discharge: HOME | End: 2021-04-22
Payer: MEDICAID

## 2021-04-22 PROCEDURE — 97112 NEUROMUSCULAR REEDUCATION: CPT

## 2021-04-22 PROCEDURE — 97110 THERAPEUTIC EXERCISES: CPT

## 2021-04-22 PROCEDURE — 97530 THERAPEUTIC ACTIVITIES: CPT

## 2021-04-22 NOTE — PROGRESS NOTES
PT DAILY TREATMENT NOTE     Patient Name: Shukri Gallegos  Date:2021  : 1965  [x]  Patient  Verified  Payor: Joshua Daly / Plan: [employer] / Product Type: Managed Care Medicaid /    In time:215  Out time:258  Total Treatment Time (min): 43  Visit #: 3 of 8    Treatment Area: Pain of left heel [M79.672]    SUBJECTIVE  Pain Level (0-10 scale): 3/10  Any medication changes, allergies to medications, adverse drug reactions, diagnosis change, or new procedure performed?: [x] No    [] Yes (see summary sheet for update)  Subjective functional status/changes:   [] No changes reported  Pt stated that he is still having burning on the side of his ankle    OBJECTIVE      19 min Therapeutic Exercise:  [x]? See flow sheet :   Rationale: increase ROM, increase strength and improve coordination to improve the patients ability to perform functional tasks with increased ease     13 min Therapeutic Activity:  [x]? See flow sheet : squats, bike, patient education   Rationale: increase ROM, increase strength and increase proprioception  to improve the patients ability to perform functional tasks with increased ease     11 min Neuromuscular Re-education:  [x]? See flow sheet : standing balance, to yoga   Rationale: increase ROM, increase strength, improve coordination and increase proprioception  to improve the patients ability to perform functional tasks with increased ease    With   [x] TE   [] TA   [] neuro   [] other: Patient Education: [x] Review HEP    [] Progressed/Changed HEP based on:   [] positioning   [] body mechanics   [] transfers   [] heat/ice application    [] other:      Other Objective/Functional Measures:   Had some difficulty with toe yoga  No LOB with static balance  Good strength in left ankle with ankle x 4    Pain Level (0-10 scale) post treatment: 3/10    ASSESSMENT/Changes in Function:   Pt is making slow progress toward goals. Pt cont with decreased activity tolerance. Cont to complain of increased pain and burning with prolonged standing and walking. Pt cont to report instability with walking    Patient will continue to benefit from skilled PT services to modify and progress therapeutic interventions, address functional mobility deficits, address ROM deficits, address strength deficits, analyze and cue movement patterns, analyze and modify body mechanics/ergonomics, assess and modify postural abnormalities, address imbalance/dizziness and instruct in home and community integration to attain remaining goals. [x]  See Plan of Care  []  See progress note/recertification  []  See Discharge Summary         Progress towards goals / Updated goals:  Short Term Goals: To be accomplished in 1 weeks:               1. Patient will be compliant with initial HEP to optimize therapy outcomes. MET (4/20/21)  Long Term Goals: To be accomplished in 4 weeks:               1. Patient will improve FOTO score by at least 5 points to demonstrate functional improvement.               2. Patient will report no more than \"moderate difficulty\" \"with any of your usual work, housework, or school activities\" with FOTO in order to demonstrate improved tolerance to ADLs,                3. Patient will improve left knee and ankle strength to at least 4/5 with MMT in order to navigate stairs with increased ease.                4. Patient will report no greater than 2/10 pain in left foot in order to improve tolerance to functional tasks.                5. Patient will report at least a 50% increase in overall improvement since start of care in order to improve quality of life.     PLAN  []  Upgrade activities as tolerated     [x]  Continue plan of care  []  Update interventions per flow sheet       []  Discharge due to:_  []  Other:_      Noel Noonan PTA 4/22/2021  7:27 AM    Future Appointments   Date Time Provider Abdullahi Horn   4/22/2021  2:15 PM Carlee James Hind [facility]   4/27/2021 2:15 PM Rogerio Lyon, PT [facility]   4/30/2021  2:15 PM Jose Messina, PT [facility]ER[facility]   4/30/2021  3:00 PM JOHN Costa BS AMB   5/4/2021  3:00 PM Jose Messina, [address] [facility]YWAMN [facility]   5/6/2021  3:45 PM Des Soni, PT [facility]   5/11/2021  3:45 PM Jose Messina, PT KBWKDSC [facility]   5/13/2021  3:00 PM Renee Sidhu, PTA [facility]   5/18/2021 12:00 PM Jose Messina, PT [facility]   5/20/2021 12:00 PM Jose Messina, [address] [facility]   5/25/2021 12:00 PM Jose Messina, [address] [facility]   5/27/2021 12:00 PM Jose Messina, [address] [facility]   7/22/2021 10:20 AM Abby Blackmon, ZAC [facility] BS AMB   8/6/2021  7:00 AM Eneida Pulse Bernice Leiva, NP [facility] BS AMB

## 2021-04-27 ENCOUNTER — HOSPITAL ENCOUNTER (OUTPATIENT)
Dept: PHYSICAL THERAPY | Age: 56
Discharge: HOME | End: 2021-04-27
Payer: MEDICAID

## 2021-04-27 PROCEDURE — 97530 THERAPEUTIC ACTIVITIES: CPT

## 2021-04-27 PROCEDURE — 97112 NEUROMUSCULAR REEDUCATION: CPT

## 2021-04-27 PROCEDURE — 97110 THERAPEUTIC EXERCISES: CPT

## 2021-04-27 NOTE — PROGRESS NOTES
PT DAILY TREATMENT NOTE     Patient Name: Fer Calderon  Date:2021  : 1965  [x]  Patient  Verified  Payor: Castillo Whiting / Plan: [employer]  / Product Type: Managed Care Medicaid /    In time:2:17 Out time: 3:08  Total Treatment Time (min):  51  Visit #: 4 of 8    Treatment Area: Pain of left heel [M79.672]    SUBJECTIVE  Pain Level (0-10 scale): 4 10  Any medication changes, allergies to medications, adverse drug reactions, diagnosis change, or new procedure performed?: [x] No    [] Yes (see summary sheet for update)  Subjective functional status/changes:   [] No changes reported   Pt reports that he recognizes weight bearing on his left leg and sits on a can of tobacco and wallet. Pt reports burning pain persisting across the top of left foot. He states his right knee still bothers him and he continues to stand on the right to take weight off the left foot. OBJECTIVE      16  min Therapeutic Exercise:  [x]? See flow sheet :   Rationale: increase ROM, increase strength and improve coordination to improve the patients ability to perform functional tasks with increased ease      21 min Therapeutic Activity:  [x]? See flow sheet :heel spur pad, patient education   Rationale: increase ROM, increase strength and increase proprioception  to improve the patients ability to perform functional tasks with increased ease     14  min Neuromuscular Re-education:  [x]?   See flow sheet : standing balance, to yoga   Rationale: increase ROM, increase strength, improve coordination and increase proprioception  to improve the patients ability to perform functional tasks with increased ease    With   [x] TE   [] TA   [] neuro   [] other: Patient Education: [x] Review HEP    [] Progressed/Changed HEP based on:   [] positioning   [] body mechanics   [] transfers   [] heat/ice application    [] other:      Other Objective/Functional Measures:   Significant bilateral arch drop in WB and incr ankle strategy bilaterally in SLS: left>right  Felt donut cut to relieve pressure from bone spurs on left heel  Education to use frozen water bottle to roll out plantar fascia during manual tissue release techniques to left plantar fascia  Palpable spurs at left heel and proximal to first metatarsal head  Education to not sit with items in back pocket and evenly distribute weight in standing  Right upslip corrected with leg lengthening activity  Takes some time for pt to initiate toe movement during toe yoga     Pain Level (0-10 scale) post treatment: 3 /10    ASSESSMENT/Changes in Function:   Pt is progressing slowly in therapy as he has limited control over toes and is continuing to experience significant pain through left foot with burning sensations. Pt continues to weightshift off of involved LE resulting in pelvic obliquity and right knee pain. Pt would benefit from continued therapy to ensure alignment and reduce pain with ambulation to improve left ankle endurance and stability to improve neutral ankle alignment and decreased heel pain. Patient will continue to benefit from skilled PT services to modify and progress therapeutic interventions, address functional mobility deficits, address ROM deficits, address strength deficits, analyze and cue movement patterns, analyze and modify body mechanics/ergonomics, assess and modify postural abnormalities, address imbalance/dizziness and instruct in home and community integration to attain remaining goals. [x]  See Plan of Care  []  See progress note/recertification  []  See Discharge Summary         Progress towards goals / Updated goals:  Short Term Goals: To be accomplished in 1 weeks:               1. Patient will be compliant with initial HEP to optimize therapy outcomes.  MET (4/20/21)  Long Term Goals: To be accomplished in 4 weeks:               3. DSLIFAA will improve FOTO score by at least 5 points to demonstrate functional improvement.               2. Patient will report no more than \"moderate difficulty\" \"with any of your usual work, housework, or school activities\" with FOTO in order to demonstrate improved tolerance to ADLs,                3. Patient will improve left knee and ankle strength to at least 4/5 with MMT in order to navigate stairs with increased ease.                4. Patient will report no greater than 2/10 pain in left foot in order to improve tolerance to functional tasks.                5. Patient will report at least a 50% increase in overall improvement since start of care in order to improve quality of life. PLAN  [x]  Upgrade activities as tolerated     [x]  Continue plan of care  []  Update interventions per flow sheet       []  Discharge due to:_  []  Other:_      NISHA Mcdonald 4/27/2021  7:27 AM    I was present during the entire treatment, directing and participating in the treatment.    YUSUF Jasso    Future Appointments   Date Time Provider Abdullahi Horn   4/27/2021  2:15 PM Gabbie Appl [facility]   4/30/2021  2:15 PM Jose Wu, PT [facility]   4/30/2021  3:00 PM JOHN Gómez [facility] BS AMB   5/4/2021  3:00 PM Zuri Pepe UUNRFPS [facility]   5/6/2021  3:45 PM Ion Davies, PT [facility]   5/11/2021  3:45 PM Jose Wu, PT UDMIFGL [facility]   5/13/2021  3:00 PM Jose Hawley, PTA [facility]   5/18/2021 12:00 PM Jose Wu, PT [facility]   5/20/2021 12:00 PM Joes Hawley, PTA [facility]   5/25/2021 12:00 PM Jose Wu, PT [facility]   5/27/2021 12:00 PM Zuri Pepe [facility]CP[facility]   7/22/2021 10:20 AM ZAC Sanchez BS AMB   8/6/2021  7:00 AM ZAC Izaguirre BS AMB

## 2021-04-29 NOTE — PROGRESS NOTES
Parvin Villegas  1965     HISTORY OF PRESENT ILLNESS  Parvin Villegas is a 63 y.o. male who presents today for evaluation S/P Right shoulder arthroscopic rotator cuff re-tear repair, biceps tenodesis, distal clavicle excision and subacromial decompression on 9/21/20. Patient has been going to PT and finished at this point. Describes pain as a 3/10. Has been taking nothing for pain. Still has night pain. He has keep compliant with his exercises. He has worked in [employer] and is making progress with his motion. Now he will start working on that in the gym. He has muscle tension and pain at night at his neck and shoulder. He was given gabapentin and a muscle relaxer from the spine center that do help his hand numbness. He has been seen by the spine service as well. Patient denies any fever, chills, chest pain, shortness of breath or calf pain. There are no new illness or injuries to report since last seen in the office. PHYSICAL EXAM:   Visit Vitals  Pulse (!) 53   Temp 97.7 °F (36.5 °C)   Resp 16   Ht 5' 8\" (1.727 m)   Wt 217 lb (98.4 kg)   SpO2 99%   BMI 32.99 kg/m²      The patient is a well-developed, well-nourished male in no acute distress. The patient is alert and oriented times three. The patient appears to be well groomed. Mood and affect are normal.  ORTHOPEDIC EXAM of right shoulder:  Inspection: swelling not present,  Bruising not present  Incisions well healed  Passive glenohumeral abduction 0-80 degrees, 180 PFF, 180 AFF, 70 PER, IR lower lumbar  Stability: Stable  Strength: gentle rotator cuff testing with weakness   2+ distal pulses    IMPRESSION:  S/P Right shoulder arthroscopic rotator cuff re-tear repair, biceps tenodesis, distal clavicle excision and subacromial decompression. PLAN:   Pt doing well post operatively  His motion is much better since last visit. He still has some weakness with strength testing he will work on this at home. His pain and function continue to improve. With time this will continue to improve and it can be a full year before everything gets back to normal.  He will continue exercises at home. Stressed to patient that nothing causes an increase in pain. Pt not given a refill of pain medication.   RTC PRN    TANNA [facility]

## 2021-04-30 ENCOUNTER — OFFICE VISIT (OUTPATIENT)
Dept: ORTHOPEDIC SURGERY | Age: 56
End: 2021-04-30
Payer: MEDICAID

## 2021-04-30 ENCOUNTER — HOSPITAL ENCOUNTER (OUTPATIENT)
Dept: PHYSICAL THERAPY | Age: 56
Discharge: HOME | End: 2021-04-30
Payer: MEDICAID

## 2021-04-30 VITALS
RESPIRATION RATE: 16 BRPM | HEART RATE: 53 BPM | OXYGEN SATURATION: 99 % | BODY MASS INDEX: 32.89 KG/M2 | HEIGHT: 68 IN | TEMPERATURE: 97.7 F | WEIGHT: 217 LBS

## 2021-04-30 DIAGNOSIS — Z98.890 STATUS POST ARTHROSCOPY OF RIGHT SHOULDER: ICD-10-CM

## 2021-04-30 DIAGNOSIS — M75.111 NONTRAUMATIC INCOMPLETE TEAR OF RIGHT ROTATOR CUFF: Primary | ICD-10-CM

## 2021-04-30 PROCEDURE — 97110 THERAPEUTIC EXERCISES: CPT

## 2021-04-30 PROCEDURE — 97530 THERAPEUTIC ACTIVITIES: CPT

## 2021-04-30 PROCEDURE — 99213 OFFICE O/P EST LOW 20 MIN: CPT | Performed by: ORTHOPAEDIC SURGERY

## 2021-04-30 NOTE — PROGRESS NOTES
PT DAILY TREATMENT NOTE     Patient Name: Antoinette Borne  Date:2021  : 1965  [x]  Patient  Verified  Payor: Yolanda Whiting / Plan: [id number] [employer] / Product Type: Managed Care Medicaid /    In time:2:17P  Out time:2:43P  Total Treatment Time (min): 26  Visit #: 5 of 8      Treatment Area: Pain of left heel [M79.812]    SUBJECTIVE  Pain Level (0-10 scale): 4/10  Any medication changes, allergies to medications, adverse drug reactions, diagnosis change, or new procedure performed?: [x] No    [] Yes (see summary sheet for update)  Subjective functional status/changes:   [] No changes reported      Patient reports burning in the heel today that feels more than normal. It has been this way over the last couple days. Patient reports his foot bothered him more after wearing the \"donut\" for a few house. He will go to pharmacy to see about getting additional \"donuts\". He reports using the frozen water bottle to the bottom of his foot was more aggravating to the heel spurs.     OBJECTIVE    11 min Therapeutic Exercise:  [x] See flow sheet :   Rationale: increase ROM, increase strength, improve coordination and increase proprioception to improve the patients ability to perform ADLs with increased ease    15 min Therapeutic Activity:  [x]  See flow sheet : patient education   Rationale: increase ROM, increase strength, improve coordination and increase proprioception  to improve the patients ability to perform ADLs with increased ease                 With   [] TE   [] TA   [] neuro   [] other: Patient Education: [x] Review HEP    [] Progressed/Changed HEP based on:   [] positioning   [] body mechanics   [] transfers   [] heat/ice application    [x] other: minimal pressure with rolling over frozen water bottle, increasing time gradually with use of additions to shoes; plantar facsia stretch for home; not sitting with items in back pocket     Other Objective/Functional Measures:     Session abbreviated-patient had to get to MD appt following therapy session  Difficulty initiating toe yoga with great toe, better with lateral toes  Marbles x1-75 sec     Pain Level (0-10 scale) post treatment: 3/10    ASSESSMENT/Changes in Function:  Patient continues to progress slowly towards goals with skilled outpatient PT. He continues to report limited tolerance to weightbearing on left LE along with burning pain in left foot/heel with increased pressure/changes in positioning. He continues to have limited control with intrinsic foot muscles. Patient educated on modifications with activities (including using water bottle for plantar fascia release and adding heel support gradually) to assist with symptom reduction. Patient will continue to benefit from skilled PT services to modify and progress therapeutic interventions, address functional mobility deficits, address ROM deficits, address strength deficits, analyze and address soft tissue restrictions, analyze and cue movement patterns, analyze and modify body mechanics/ergonomics, assess and modify postural abnormalities, address imbalance/dizziness and instruct in home and community integration to attain remaining goals. Progress towards goals / Updated goals:  Short Term Goals: To be accomplished in 1 weeks:               1. Patient will be compliant with initial HEP to optimize therapy outcomes.  MET (4/20/21)  Long Term Goals: To be accomplished in 4 weeks:               9. BTZRTIF will improve FOTO score by at least 5 points to demonstrate functional improvement.               2. Patient will report no more than \"moderate difficulty\" \"with any of your usual work, housework, or school activities\" with FOTO in order to demonstrate improved tolerance to ADLs,                3. Patient will improve left knee and ankle strength to at least 4/5 with MMT in order to navigate stairs with increased ease.                4. Patient will report no greater than 2/10 pain in left foot in order to improve tolerance to functional tasks.                5. Patient will report at least a 50% increase in overall improvement since start of care in order to improve quality of life.     PLAN  []  Upgrade activities as tolerated     [x]  Continue plan of care  []  Update interventions per flow sheet       []  Discharge due to:_  []  Other:_      Blue Cortez, PT 4/30/2021  8:53 AM    Future Appointments   Date Time Provider Abdullahi Horn   4/30/2021  2:15 PM Jose Martínez, PT [facility]CPTP[facility]   4/30/2021  3:00 PM JOHN Tim BS AMB   5/4/2021  3:00 PM Jose Martínez, PT [id number] [facility]   5/6/2021  3:45 PM Narciso Zimmerman, PT [facility]   5/11/2021  3:45 PM Jose Surjit Martínez, PT [id number] [facility]   5/13/2021  3:00 PM Simon Phi, PTA [facility]   5/18/2021 12:00 PM Jose Surjit His, PT [facility]   5/20/2021 12:00 PM Simon Phi, PTA [facility]   5/25/2021 12:00 PM Jose Surjit His, PT [facility]   5/27/2021 12:00 PM Jose Surjit Jews, PT [facility]CP[facility]   7/22/2021 10:20 AM Wesly Correa, ZAC MCCAINMO BS AMB   8/6/2021  7:00 AM Opal Jarquin, ZAC GOMEZ BS AMB

## 2021-05-04 ENCOUNTER — HOSPITAL ENCOUNTER (OUTPATIENT)
Dept: PHYSICAL THERAPY | Age: 56
Discharge: HOME | End: 2021-05-04
Payer: MEDICAID

## 2021-05-04 PROCEDURE — 97112 NEUROMUSCULAR REEDUCATION: CPT

## 2021-05-04 PROCEDURE — 97530 THERAPEUTIC ACTIVITIES: CPT

## 2021-05-04 PROCEDURE — 97110 THERAPEUTIC EXERCISES: CPT

## 2021-05-04 NOTE — PROGRESS NOTES
PT DAILY TREATMENT NOTE     Patient Name: Fba Sumner  Date:2021  : 1965  [x]  Patient  Verified  Payor: Jethro Castro / Plan: [facility] MEDICAID / Product Type: Managed Care Medicaid /    In time:3:08P  Out time:3:44P  Total Treatment Time (min): 36  Visit #: 6 of 8      Treatment Area: Left foot pain [M79.782]    SUBJECTIVE  Pain Level (0-10 scale): 410  Any medication changes, allergies to medications, adverse drug reactions, diagnosis change, or new procedure performed?: [x] No    [] Yes (see summary sheet for update)  Subjective functional status/changes:   [] No changes reported    Patient reports walking on the bare floor hurts. He continues to report decreased tolerance to weightbearing. He has not gotten anymore \"donuts\" for heel support. He reports using the frozen water bottle for the bottom of his foot with less pressure has been helpful.      OBJECTIVE    10 min Therapeutic Exercise:  [x] See flow sheet :   Rationale: increase ROM and increase strength to improve the patients ability to ambulate with increased ease    17 min Therapeutic Activity:  [x]  See flow sheet : patient education, felt arch support-provided by ATC   Rationale: improve coordination, improve balance and increase proprioception  to improve the patients ability to ambulate with increased ease     9 min Neuromuscular Re-education:  [x]  See flow sheet : standing balance, toe yoga   Rationale: increase strength, improve coordination, improve balance and increase proprioception  to improve the patients ability to ambulate with increased ease              With   [] TE   [x] TA   [] neuro   [] other: Patient Education: [x] Review HEP    [] Progressed/Changed HEP based on:   [] positioning   [] body mechanics   [] transfers   [] heat/ice application    [x] other: avoiding walking on bare floor, arch support for running, peeling layers of felt arch support if too high, changing arch supports out with changing shoes; obtaining inserts/supports from shoe store if relief with foam inserts; monitoring of left foot symptoms with use of supports      Other Objective/Functional Measures:     Compressed felt arch support with plan to recommend inserts if positive subjective response  Able to tolerate standing activities today  Increased use of ankle strategy with tandem balance and SLS      Pain Level (0-10 scale) post treatment: 2/10    ASSESSMENT/Changes in Function: Continued education for patient to continue to have support for left foot at all times and to wear supportive shoes/sneakers. Patient reported decreased pain at end of session at lowest reported level at time of sessions since start of care with addition of inserts and following standing activities. Patient will continue to benefit from skilled PT services to modify and progress therapeutic interventions, address functional mobility deficits, address ROM deficits, address strength deficits, analyze and address soft tissue restrictions, analyze and cue movement patterns, analyze and modify body mechanics/ergonomics, assess and modify postural abnormalities, address imbalance/dizziness and instruct in home and community integration to attain remaining goals. Progress towards goals / Updated goals:  Short Term Goals: To be accomplished in 1 weeks:               1. Patient will be compliant with initial HEP to optimize therapy outcomes.  MET (4/20/21)  Long Term Goals: To be accomplished in 4 weeks:               7. ANCGTAH will improve FOTO score by at least 5 points to demonstrate functional improvement.               2. Patient will report no more than \"moderate difficulty\" \"with any of your usual work, housework, or school activities\" with FOTO in order to demonstrate improved tolerance to ADLs,                3. Patient will improve left knee and ankle strength to at least 4/5 with MMT in order to navigate stairs with increased ease.                4. Patient will report no greater than 2/10 pain in left foot in order to improve tolerance to functional tasks.                5. Patient will report at least a 50% increase in overall improvement since start of care in order to improve quality of life.     PLAN  [x]  Upgrade activities as tolerated     [x]  Continue plan of care  []  Update interventions per flow sheet       []  Discharge due to:_  []  Other:_      Deisi Latif, PT 5/4/2021  11:58 AM    Future Appointments   Date Time Provider Abdullahi Horn   5/4/2021  3:00 PM Jose Edward, [place] [facility]   5/6/2021  3:45 PM Adam Mccann, PT [facility]   5/11/2021  3:45 PM Jose Edward, PT USTSZLV [facility]   5/13/2021  3:00 PM Zita Faulkner, PTA [facility]CP[facility]   5/18/2021 12:00 PM Jose Edward, PT [facility]CP[facility]   5/20/2021 12:00 PM Zita Faulkner, PTA [facility]CP[facility]   5/25/2021 12:00 PM Jose Edward, PT [facility]   5/27/2021 12:00 PM Jose Edward, PT [facility]CPTP[facility]   7/22/2021 10:20 AM Bartolo Jolly NP VSMO BS AMB   8/6/2021  7:00 AM ZAC Gross BS AMB

## 2021-05-06 ENCOUNTER — HOSPITAL ENCOUNTER (OUTPATIENT)
Dept: PHYSICAL THERAPY | Age: 56
End: 2021-05-06
Payer: MEDICAID

## 2021-05-10 ENCOUNTER — OFFICE VISIT (OUTPATIENT)
Dept: ORTHOPEDIC SURGERY | Age: 56
End: 2021-05-10
Payer: MEDICAID

## 2021-05-10 VITALS
HEIGHT: 68 IN | TEMPERATURE: 98.2 F | OXYGEN SATURATION: 99 % | WEIGHT: 219.8 LBS | HEART RATE: 60 BPM | BODY MASS INDEX: 33.31 KG/M2

## 2021-05-10 DIAGNOSIS — M79.645 PAIN OF LEFT THUMB: ICD-10-CM

## 2021-05-10 DIAGNOSIS — S61.012A LACERATION OF LEFT THUMB WITHOUT FOREIGN BODY WITHOUT DAMAGE TO NAIL, INITIAL ENCOUNTER: Primary | ICD-10-CM

## 2021-05-10 PROCEDURE — 99214 OFFICE O/P EST MOD 30 MIN: CPT | Performed by: ORTHOPAEDIC SURGERY

## 2021-05-10 PROCEDURE — 73140 X-RAY EXAM OF FINGER(S): CPT | Performed by: ORTHOPAEDIC SURGERY

## 2021-05-10 NOTE — PROGRESS NOTES
Parvin Villegas is a 63 y.o. male right handed unknown employment. Worker's Compensation and legal considerations: none filed. Vitals:    05/10/21 1056   Pulse: 60   Temp: 98.2 °F (36.8 °C)   SpO2: 99%   Weight: 219 lb 12.8 oz (99.7 kg)   Height: 5' 8\" (1.727 m)   PainSc:   5   PainLoc: Finger           Chief Complaint   Patient presents with    Thumb Pain     Left       HPI: Patient presents today with a new problem of left thumb laceration that occurred a few days back. He was given antibiotics and his wound debrided and closed in the ER.    9/16/2009 HPI: Patient comes in today with new complaint of right middle finger mass and pain. He has a history of right volar and dorsal ganglion cyst.  He also has a history of left shoulder rotator cuff repair and is planning to have his right shoulder rotator cuff done next week. Initial HPI: Patient comes in today with complaints of bilateral hand numbness and tingling. He reports that he had a left carpal tunnel diagnosis after an EMG several years ago. He has not had any treatment for it. He also has a cyst on the right wrist that was injected several months ago but did not help. He also reports a history of having digits amputated that were placed back on many years ago on the left hand. He has had chronic pain from scar tissue in his hand. Date of onset: Indeterminate    Injury: Yes: Comment: Left thumb laceration    Prior Treatment:  Yes: Comment: ED debridement and closure    Numbness/ Tingling: Yes: Comment: Bilateral hands left worse than right    ROS: Review of Systems - General ROS: negative  Respiratory ROS: no cough, shortness of breath, or wheezing  Cardiovascular ROS: no chest pain or dyspnea on exertion  Musculoskeletal ROS: positive for - pain in thumb, left  Neurological ROS: Negative  Dermatological ROS: negative    Past Medical History:   Diagnosis Date    Depression     and anxiety    Foot drop, left foot     October, 2018.   Seen by Podiatry in 2019    Headache     Psychotic disorder ([facility] [address]     anxiety       Past Surgical History:   Procedure Laterality Date    COLONOSCOPY N/A 10/28/2020    COLONOSCOPY w/polypectomies performed by Williams Eubanks MD at [facility] ENDOSCOPY    HX HERNIA REPAIR      HX HERNIA REPAIR      Inguinal hernia- right side    HX KNEE ARTHROSCOPY Right     HX ORTHOPAEDIC Left 2017    lacerations to fingers and had pins, left thumb    HX ORTHOPAEDIC      Right knee surgery    HX OTHER SURGICAL  2017    Left hand surgery x 3    HX ROTATOR CUFF REPAIR Right 09/21/2020    [place]     HX ROTATOR CUFF REPAIR Left 10/21/2019    HX ROTATOR CUFF REPAIR  2000    Right shoulder    [place] ANESTH,SURGERY OF SHOULDER Right        Current Outpatient Medications   Medication Sig Dispense Refill    cephALEXin (Keflex) 500 mg capsule Take 1 Cap by mouth four (4) times daily for 5 days. 20 Cap 0    amitriptyline (ELAVIL) 75 mg tablet Take 1 Tab by mouth nightly for 180 days. 90 Tab 1    busPIRone (BUSPAR) 10 mg tablet Take 10 mg by mouth two (2) times a day.  Latuda 60 mg tab tablet 60 mg.      escitalopram oxalate (LEXAPRO) 20 mg tablet          Allergies   Allergen Reactions    Motrin [Ibuprofen] Hives    Motrin [Ibuprofen] Rash         PE:       Physical Exam  Vitals signs and nursing note reviewed. Constitutional:       General: He is not in acute distress. Appearance: Normal appearance. He is not ill-appearing. Neck:      Musculoskeletal: Normal range of motion and neck supple. Cardiovascular:      Pulses: Normal pulses. Pulmonary:      Effort: Pulmonary effort is normal.   Musculoskeletal: Normal range of motion. General: Swelling and tenderness present. No deformity or signs of injury. Right lower leg: No edema. Left lower leg: No edema. Skin:     General: Skin is warm and dry. Capillary Refill: Capillary refill takes less than 2 seconds. Findings: No bruising or erythema. Neurological:      General: No focal deficit present. Mental Status: He is alert and oriented to person, place, and time. Psychiatric:         Mood and Affect: Mood normal.         Behavior: Behavior normal.         Left hand: There is a laceration over the dorsal aspect of the thumb proximal to the level of the IP joint. Patient is able to extend and flex his IP joint with minimal difficulty. NEUROVASCULAR    Examination L R Examination L R   Carpal Comp. + + Pronator Comp. - -   Carpal Tinel + + Pronator Tinel - -   Phalen's + + Pronator Stress - -   Cubital Comp. ? ? Guyon Comp. - -   Cubital Tinel ? ? No Guyon Tinel - -   Elbow Hyperflexion - - Adson's - -   Spurling's - - SC Comp. - -   PCB Median abn - - SC Tinel - -   Radial Tinel - - IC Comp. - -   Digital Tinel - - IC Tinel - -   Radial 2-Pt WNL WNL Ulnar 2-Pt WNL WNL     Radial Pulse: 2+  Capillary Refill: < 2 sec  [place]: Not Performed  [employer]: Not Performed      Imaging:    Plain films of bilateral wrist does not show substantial degenerative changes  Fracture dislocation or any other osseous abnormalities. NCV & EMG Findings:  Evaluation of the left median motor and the right median motor nerves showed prolonged distal onset latency (L4.4, R4.5 ms). The left ulnar motor and the right ulnar motor nerves showed decreased conduction velocity (A Elbow-B Elbow, L38, R42 m/s). The left median sensory and the right median sensory nerves showed prolonged distal peak latency (L4.6, R4.4 ms) and decreased conduction velocity (Wrist-2nd Digit, L30, R32 m/s). The left ulnar sensory nerve showed prolonged distal peak latency (4.1 ms), reduced amplitude (10.5 µV), and decreased conduction velocity (Wrist-5th Digit, 34 m/s). The right ulnar sensory nerve showed prolonged distal peak latency (5.7 ms) and decreased conduction velocity (Wrist-5th Digit, 25 m/s).   The left median/ulnar (palm) comparison nerve showed no response (Median Palm) and no response (Ulnar Palm). All remaining nerves (as indicated in the following tables) were within normal limits. Left vs. Right side comparison data for the ulnar motor nerve indicates abnormal L-R latency difference (0.8 ms). The median sensory nerve indicates abnormal L-R amplitude difference (63.1 %). The ulnar sensory nerve indicates abnormal L-R latency difference (1.6 ms). All remaining left vs. right side differences were within normal limits.       All examined muscles (as indicated in the following table) showed no evidence of electrical instability.          INTERPRETATION  This is an abnormal electrodiagnostic examination. These findings may be consistent with:  1. 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 Moderate median mononeuropathy at the wrist bilaterally (carpal tunnel syndrome)  3. Severe neuropathy focally related to both median and ulnar palmar branches     There is no electrodiagnostic evidence of any cervical radiculopathy, brachial plexopathy, peripheral polyneuropathy, or any other mononeuropathy.     CLINICAL INTERPRETATION  His electrodiagnostic findings in the median and ulnar nerves are consistent with his symptoms of numbness and tingling in both hands.          ICD-10-CM ICD-9-CM    1. Laceration of left thumb without foreign body without damage to nail, initial encounter  S61.012A 883.0    2. Pain of left thumb  M79.645 729.5 AMB POC XRAY, FINGER(S), 2+ VIEWS       Plan:     Discussed wound care and suture removal for his next visit. Also discussed gentle range of motion exercises. Follow-up and Dispositions    · Return in about 1 week (around 5/17/2021) for Suture removal and wound recheck.        Plan was reviewed with patient, who verbalized agreement and understanding of the plan

## 2021-05-11 ENCOUNTER — TELEPHONE (OUTPATIENT)
Dept: ORTHOPEDIC SURGERY | Age: 56
End: 2021-05-11

## 2021-05-11 ENCOUNTER — APPOINTMENT (OUTPATIENT)
Dept: PHYSICAL THERAPY | Age: 56
End: 2021-05-11
Payer: MEDICAID

## 2021-05-11 DIAGNOSIS — M54.12 CERVICAL RADICULOPATHY: Primary | ICD-10-CM

## 2021-05-11 DIAGNOSIS — S61.012A LACERATION OF LEFT THUMB WITHOUT FOREIGN BODY WITHOUT DAMAGE TO NAIL, INITIAL ENCOUNTER: Primary | ICD-10-CM

## 2021-05-11 RX ORDER — HYDROCODONE BITARTRATE AND ACETAMINOPHEN 5; 325 MG/1; MG/1
1 TABLET ORAL
Qty: 12 TAB | Refills: 0 | Status: SHIPPED | OUTPATIENT
Start: 2021-05-11 | End: 2021-05-14

## 2021-05-11 RX ORDER — GABAPENTIN 800 MG/1
800 TABLET ORAL DAILY
Qty: 90 TAB | Refills: 0 | Status: SHIPPED | OUTPATIENT
Start: 2021-05-11 | End: 2021-07-22 | Stop reason: SDUPTHER

## 2021-05-11 NOTE — TELEPHONE ENCOUNTER
Patient called requesting a refill of the prescription gabapentin (Neurontin) 800 mg tablet. He confirmed his pharmacy is [facility] on [address] and he's requesting a call back when completed at [phone].

## 2021-05-11 NOTE — TELEPHONE ENCOUNTER
Patient states Dr. Day Wright was supposed to have sent in a pain med and anti-inflammatory med for him yesterday. Please advise.

## 2021-05-11 NOTE — TELEPHONE ENCOUNTER
Please let him know I sent in the norco but not the anti-inflammatory because of an allergy to ibuprofen listed.

## 2021-05-13 DIAGNOSIS — Z12.5 SCREENING PSA (PROSTATE SPECIFIC ANTIGEN): ICD-10-CM

## 2021-05-13 DIAGNOSIS — Z13.6 ENCOUNTER FOR LIPID SCREENING FOR CARDIOVASCULAR DISEASE: ICD-10-CM

## 2021-05-13 DIAGNOSIS — Z13.220 ENCOUNTER FOR LIPID SCREENING FOR CARDIOVASCULAR DISEASE: ICD-10-CM

## 2021-05-17 ENCOUNTER — OFFICE VISIT (OUTPATIENT)
Dept: ORTHOPEDIC SURGERY | Age: 56
End: 2021-05-17
Payer: MEDICAID

## 2021-05-17 VITALS
TEMPERATURE: 97.1 F | WEIGHT: 221 LBS | HEIGHT: 68 IN | BODY MASS INDEX: 33.49 KG/M2 | HEART RATE: 69 BPM | OXYGEN SATURATION: 99 %

## 2021-05-17 DIAGNOSIS — M67.431 GANGLION CYST OF VOLAR ASPECT OF RIGHT WRIST: ICD-10-CM

## 2021-05-17 DIAGNOSIS — G56.03 BILATERAL CARPAL TUNNEL SYNDROME: ICD-10-CM

## 2021-05-17 DIAGNOSIS — S61.012A LACERATION OF LEFT THUMB WITHOUT FOREIGN BODY WITHOUT DAMAGE TO NAIL, INITIAL ENCOUNTER: Primary | ICD-10-CM

## 2021-05-17 PROCEDURE — 99214 OFFICE O/P EST MOD 30 MIN: CPT | Performed by: ORTHOPAEDIC SURGERY

## 2021-05-17 NOTE — PROGRESS NOTES
Edson Castillo is a 63 y.o. male right handed unknown employment. Worker's Compensation and legal considerations: none filed. Vitals:    05/17/21 1009   Pulse: 69   Temp: 97.1 °F (36.2 °C)   TempSrc: Skin   SpO2: 99%   Weight: 221 lb (100.2 kg)   Height: 5' 8\" (1.727 m)   PainSc:   5   PainLoc: Hand           Chief Complaint   Patient presents with    Thumb Pain     left laceration follow up, stitch removal       HPI: Patient presents today following up on his bilateral hand numbness and tingling that is been longstanding as well as left thumb laceration follow-up. He is here to have his sutures removed today. He also presents with a new problem of ganglion cyst on the volar aspect of his right wrist.    5/11/2021 HPI: Patient presents today with a new problem of left thumb laceration that occurred a few days back. He was given antibiotics and his wound debrided and closed in the ER.    9/16/2019 HPI: Patient comes in today with new complaint of right middle finger mass and pain. He has a history of right volar and dorsal ganglion cyst.  He also has a history of left shoulder rotator cuff repair and is planning to have his right shoulder rotator cuff done next week. Initial HPI: Patient comes in today with complaints of bilateral hand numbness and tingling. He reports that he had a left carpal tunnel diagnosis after an EMG several years ago. He has not had any treatment for it. He also has a cyst on the right wrist that was injected several months ago but did not help. He also reports a history of having digits amputated that were placed back on many years ago on the left hand. He has had chronic pain from scar tissue in his hand.     Date of onset: Indeterminate    Injury: Yes: Comment: Left thumb laceration    Prior Treatment:  Yes: Comment: ED debridement and closure    Numbness/ Tingling: Yes: Comment: Bilateral hands left worse than right    ROS: Review of Systems - General ROS: negative  Respiratory ROS: no cough, shortness of breath, or wheezing  Cardiovascular ROS: no chest pain or dyspnea on exertion  Musculoskeletal ROS: positive for - pain in thumb, left  Neurological ROS: Negative  Dermatological ROS: negative    Past Medical History:   Diagnosis Date    Depression     and anxiety    Foot drop, left foot     October, 2018. Seen by Podiatry in 2019    Headache     Psychotic disorder ([facility] [address]     anxiety       Past Surgical History:   Procedure Laterality Date    COLONOSCOPY N/A 10/28/2020    COLONOSCOPY w/polypectomies performed by Jeannette Lyon MD at [facility] ENDOSCOPY    HX HERNIA REPAIR      HX HERNIA REPAIR      Inguinal hernia- right side    HX KNEE ARTHROSCOPY Right     HX ORTHOPAEDIC Left 2017    lacerations to fingers and had pins, left thumb    HX ORTHOPAEDIC      Right knee surgery    HX OTHER SURGICAL  2017    Left hand surgery x 3    HX ROTATOR CUFF REPAIR Right 09/21/2020    [facility]     HX ROTATOR CUFF REPAIR Left 10/21/2019    HX ROTATOR CUFF REPAIR  2000    Right shoulder    [place] ANESTH,SURGERY OF SHOULDER Right        Current Outpatient Medications   Medication Sig Dispense Refill    gabapentin (NEURONTIN) 800 mg tablet Take 1 Tab by mouth daily. Max Daily Amount: 800 mg. 90 Tab 0    amitriptyline (ELAVIL) 75 mg tablet Take 1 Tab by mouth nightly for 180 days. 90 Tab 1    busPIRone (BUSPAR) 10 mg tablet Take 10 mg by mouth two (2) times a day.  Latuda 60 mg tab tablet 60 mg.      escitalopram oxalate (LEXAPRO) 20 mg tablet          Allergies   Allergen Reactions    Motrin [Ibuprofen] Hives    Motrin [Ibuprofen] Rash         PE:       Physical Exam  Vitals signs and nursing note reviewed. Constitutional:       General: He is not in acute distress. Appearance: Normal appearance. He is not ill-appearing. Neck:      Musculoskeletal: Normal range of motion and neck supple. Cardiovascular:      Pulses: Normal pulses.    Pulmonary:      Effort: Pulmonary effort is normal.   Musculoskeletal: Normal range of motion. General: Tenderness present. No swelling, deformity or signs of injury. Right lower leg: No edema. Left lower leg: No edema. Skin:     General: Skin is warm and dry. Capillary Refill: Capillary refill takes less than 2 seconds. Findings: Erythema present. No bruising. Neurological:      General: No focal deficit present. Mental Status: He is alert and oriented to person, place, and time. Psychiatric:         Mood and Affect: Mood normal.         Behavior: Behavior normal.         Left hand: Laceration with significant improvement in healing over the dorsum of the thumb. There is some erythema proximally. Patient reports improvement in symptoms after sutures removed today. Sensation grossly intact distally and cap refill brisk. Range of motion of the thumb is limited however this is similar to his baseline. NEUROVASCULAR    Examination L R Examination L R   Carpal Comp. + + Pronator Comp. - -   Carpal Tinel + + Pronator Tinel - -   Phalen's + + Pronator Stress - -   Cubital Comp. ? ? Guyon Comp. - -   Cubital Tinel ? ? Guyon Tinel - -   Elbow Hyperflexion - - Adson's - -   Spurling's - - SC Comp. - -   PCB Median abn - - SC Tinel - -   Radial Tinel - - IC Comp. - -   Digital Tinel - - IC Tinel - -   Radial 2-Pt WNL WNL Ulnar 2-Pt WNL WNL     Radial Pulse: 2+  Capillary Refill: < 2 sec  [place]: Not Performed  [employer]: Not Performed      Imaging:    Plain films of bilateral wrist does not show substantial degenerative changes  Fracture dislocation or any other osseous abnormalities. NCV & EMG Findings:  Evaluation of the left median motor and the right median motor nerves showed prolonged distal onset latency (L4.4, R4.5 ms). The left ulnar motor and the right ulnar motor nerves showed decreased conduction velocity (A Elbow-B Elbow, L38, R42 m/s).   The left median sensory and the right median sensory nerves showed prolonged distal peak latency (L4.6, R4.4 ms) and decreased conduction velocity (Wrist-2nd Digit, L30, R32 m/s). The left ulnar sensory nerve showed prolonged distal peak latency (4.1 ms), reduced amplitude (10.5 µV), and decreased conduction velocity (Wrist-5th Digit, 34 m/s). The right ulnar sensory nerve showed prolonged distal peak latency (5.7 ms) and decreased conduction velocity (Wrist-5th Digit, 25 m/s). The left median/ulnar (palm) comparison nerve showed no response (Median Palm) and no response (Ulnar Palm). All remaining nerves (as indicated in the following tables) were within normal limits. Left vs. Right side comparison data for the ulnar motor nerve indicates abnormal L-R latency difference (0.8 ms). The median sensory nerve indicates abnormal L-R amplitude difference (63.1 %). The ulnar sensory nerve indicates abnormal L-R latency difference (1.6 ms). All remaining left vs. right side differences were within normal limits.       All examined muscles (as indicated in the following table) showed no evidence of electrical instability.          INTERPRETATION  This is an abnormal electrodiagnostic examination. These findings may be consistent with:  1. 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 Moderate median mononeuropathy at the wrist bilaterally (carpal tunnel syndrome)  3. Severe neuropathy focally related to both median and ulnar palmar branches     There is no electrodiagnostic evidence of any cervical radiculopathy, brachial plexopathy, peripheral polyneuropathy, or any other mononeuropathy.     CLINICAL INTERPRETATION  His electrodiagnostic findings in the median and ulnar nerves are consistent with his symptoms of numbness and tingling in both hands.          ICD-10-CM ICD-9-CM    1. Laceration of left thumb without foreign body without damage to nail, initial encounter  S61.012A 883.0    2.  Ganglion cyst of volar aspect of right wrist  M67.431 727.41    3. Bilateral carpal tunnel syndrome  G56.03 354.0 AMB SUPPLY ORDER      EMG TWO EXTREMITIES UPPER      NCV/LAT MOTOR PER NERVE UP/RT      NCV/LAT MOTOR PER NERVE UP/LT       Plan:     Given the worsening of symptoms of his bilateral hand numbness as well as the irregularity of his previous EMGs we will repeat the EMGs as well as see him back for possible surgical discussion of his right sided wrist ganglion cyst as well as peripheral mononeuropathies. Follow-up and Dispositions    · Return for EMG review, right wrist x-rays on arrival, and possible surgical discussion.        Plan was reviewed with patient, who verbalized agreement and understanding of the plan

## 2021-05-18 ENCOUNTER — HOSPITAL ENCOUNTER (OUTPATIENT)
Dept: PHYSICAL THERAPY | Age: 56
Discharge: HOME | End: 2021-05-18
Payer: MEDICAID

## 2021-05-18 PROCEDURE — 97530 THERAPEUTIC ACTIVITIES: CPT

## 2021-05-18 NOTE — PROGRESS NOTES
PT DISCHARGE DAILY NOTE AND [id number]    Patient name: Wayne Huggins Start of Care: 2021   Referral source: Seamus Moore DPM : 1965   Medical/Treatment Diagnosis: Pain of left heel [M79.672] Onset Date:beginning 2021          Prior Hospitalization: see medical history Provider#: [id number]   Medications: Verified on Patient Summary List    Comorbidities: arthritis, right knee pain, history of left foot drop  Prior Level of Function: independent with ADLs, patient enjoys going to events/concerts, lives in 1 story home with 3 stairs to enter          Visits from Start of Care: 7    Missed Visits: 3    Reporting Period : 21 to 21    Date:2021  : 1965  [x]  Patient  Verified  Payor: Gladys Li / Plan: Marta Varela / Product Type: Managed Care Medicaid /    In time: 12:00P  Out time:12:35P  Total Treatment Time (min): 35  Visit #: 7 of 8    SUBJECTIVE  Pain Level (0-10 scale): 0/10  Any medication changes, allergies to medications, adverse drug reactions, diagnosis change, or new procedure performed?: [x] No    [] Yes (see summary sheet for update)  Subjective functional status/changes:   [] No changes reported    Patient has been away from therapy for two weeks due to injury of hand. Patient reports the inserts have helped. He returns to referring physician on Thursday. He has an appt setup with a physician for custom inserts. Patient reports the burning is still persistent. He reports he now has shooting pain up the left leg; he notices this more with driving and with sitting with knee in more flexed position. He denies any change in balance at this time; he is more careful when walking now. He still has more burning pain/difficulty with stairs. Massage helps some at this time. He reports the inserts help but he keeps them in one pair of boots; when he changes to boots that do not have the inserts, he has more pain.         OBJECTIVE    35 min Therapeutic Activity:  [x]  See flow sheet :   Rationale: increase ROM, increase strength, improve coordination, improve balance and increase proprioception  to improve the patients ability to perform ADLs with increased ease                With   [] TE   [x] TA   [] neuro   [] other: Patient Education: [x] Review HEP    [] Progressed/Changed HEP based on:   [] positioning   [] body mechanics   [] transfers   [] heat/ice application    [x] other:      Other Objective/Functional Measures:     FOTO=65  Left knee MMT: ext 4/5, flex 3+/5   Left ankle MMT: DF, PF, IV 3/5, EV 4/5    Pain Level (0-10 scale) post treatment: 4/10    Summary of Care:  Goal: Patient will be compliant with initial HEP to optimize therapy outcomes. Status at last note/certification: New goal-established at evaluation  Status at discharge: MET    Goal: Patient will improve FOTO score by at least 5 points to demonstrate functional improvement. Status at last note/certification: New [id number]  Status at discharge: MET-FOTO=65    Goal: Patient will report no more than \"moderate difficulty\" \"with any of your usual work, housework, or school activities\" with FOTO in order to demonstrate improved tolerance to ADLs. Status at last note/certification: New goal-\"quite a bit of difficulty\"  Status at discharge: NOT MET-not asked with FOTO, patient reports \"quite a bit of difficulty\"    Goal: Patient will improve left knee and ankle strength to at least 4/5 with MMT in order to navigate stairs with increased ease. Status at last note/certification: New goal-Left knee MMT: ext 3/5, flex 3/5 ; Left ankle MMT: 3/5 all directions  Status at discharge: NOT MET-Left knee MMT: ext 4/5, flex 3+/5   Left ankle MMT: DF, PF, IV 3/5, EV 4/5    Goal: Patient will report no greater than 2/10 pain in left foot in order to improve tolerance to functional tasks.    Status at last note/certification: New goal-patient reports pain max 5/10  Status at discharge:NOT MET-patient reports pain max 4/10     Goal: Patient will report at least a 50% increase in overall improvement since start of care in order to improve quality of life. Status at last note/certification: New goal  Status at discharge: NOT MET-patient reports 10% improvement    ASSESSMENT/Changes in Function: Patient has made limited progress with skilled outpatient PT meeting 2/6 initial goals. Patient reports he feels the burning pain in his foot is contributing to impaired balance as he avoids placing weight on the heel of his foot when standing/walking to avoid pain. See above for additional current subjective information. Activities in therapy trialed to promote weight shifting onto heel and static standing balance along with general LE strengthening. Patient educated on performing desensitization activities at home to assist with altered sensation to left foot. Patient also educated on use of \"donut\" cutout in shoes for pressure relief to left heel as well as use of frozen water bottle for plantar fascial stretching and overall pain relief. He was also educated on wear of supportive and comfortable footwear (sneakers rather than boots). He demonstrates min improvement in left LE strength at this time as well as difficulty initiating active great toe extension. Patient with positive subjective response to arch supports/inserts provided in therapy but demonstrates limited compliance with wear as he does not places inserts in different shoes when he wears a different pair. Patient's tolerance to activity and balance continues to be most limited by reports of \"burning\" pain to top of left foot and left heel pain. At this time patient is appropriate for discharge from skilled outpatient PT.      Thank you for this referral!      PLAN  [x]Discontinue therapy: []Patient has reached or is progressing toward set goals      []Patient is non-compliant or has abdicated      [x]Due to lack of appreciable progress towards set goals    Elfredia Mohs, PT 5/18/2021  12:41 PM    NOTE TO PHYSICIAN:  Please complete the following and fax to: [facility] at [phone]  Retain this original for your records. If you are unable to process this request in   24 hours, please contact our office.      [] I have read the above report and request that my patient continue therapy with the following changes/special instructions:  [] I have read the above report and request that my patient be discharged from therapy    Physician's Signature:_____________________ Date:___________Time:__________

## 2021-05-19 NOTE — PROGRESS NOTES
Physical Therapy Discharge Instructions      [facility] [address]  [address] [facility]  [phone] fax    Patient: Vicky Maria  : 1965      Continue Home Exercise Program as prescribed      Continue with    [x] Ice  as needed      [] Heat           Follow up with MD:     [x] Upon completion of therapy     [] As needed      Recommendations:     [x]   Return to activity with home program    []   Return to activity with the following modifications:       []Post Rehab Program    []Join Independent aquatic program     []Return to/join local gym        Additional Comments: Discuss obtaining custom inserts with referring physician.  If symptoms do not improve with wear patient educated on possible referral to orthopedic specialist.           Sobia Ga, PT 2021 12:27 PM

## 2021-05-20 ENCOUNTER — APPOINTMENT (OUTPATIENT)
Dept: PHYSICAL THERAPY | Age: 56
End: 2021-05-20
Payer: MEDICAID

## 2021-05-24 DIAGNOSIS — G56.03 BILATERAL CARPAL TUNNEL SYNDROME: ICD-10-CM

## 2021-05-25 ENCOUNTER — APPOINTMENT (OUTPATIENT)
Dept: PHYSICAL THERAPY | Age: 56
End: 2021-05-25
Payer: MEDICAID

## 2021-05-26 ENCOUNTER — OFFICE VISIT (OUTPATIENT)
Dept: ORTHOPEDIC SURGERY | Age: 56
End: 2021-05-26
Payer: MEDICAID

## 2021-05-26 VITALS
RESPIRATION RATE: 18 BRPM | OXYGEN SATURATION: 99 % | TEMPERATURE: 98.4 F | HEIGHT: 68 IN | WEIGHT: 217 LBS | HEART RATE: 64 BPM | BODY MASS INDEX: 32.89 KG/M2 | DIASTOLIC BLOOD PRESSURE: 60 MMHG | SYSTOLIC BLOOD PRESSURE: 104 MMHG

## 2021-05-26 DIAGNOSIS — R94.131 ABNORMAL EMG: ICD-10-CM

## 2021-05-26 DIAGNOSIS — R20.0 NUMBNESS AND TINGLING IN BOTH HANDS: Primary | ICD-10-CM

## 2021-05-26 DIAGNOSIS — R20.0 NUMBNESS AND TINGLING IN BOTH HANDS: ICD-10-CM

## 2021-05-26 DIAGNOSIS — R20.2 NUMBNESS AND TINGLING IN BOTH HANDS: Primary | ICD-10-CM

## 2021-05-26 DIAGNOSIS — R20.2 NUMBNESS AND TINGLING IN BOTH HANDS: ICD-10-CM

## 2021-05-26 PROCEDURE — 95886 MUSC TEST DONE W/N TEST COMP: CPT | Performed by: PHYSICAL MEDICINE & REHABILITATION

## 2021-05-26 PROCEDURE — 95912 NRV CNDJ TEST 11-12 STUDIES: CPT | Performed by: PHYSICAL MEDICINE & REHABILITATION

## 2021-05-26 NOTE — PROGRESS NOTES
[address]  [address], [address]  [address]  Phone: [phone]  Fax: [phone]        Vale Neal  : 1965  PCP: Cookie Izaguirre NP  2021    ELECTROMYOGRAPHY AND NERVE CONDUCTION STUDIES    Horton Bamberger was referred by Dr. Moy Morillo for electrodiagnostic evaluation of bilateral hand numbness and tingling. NCV & EMG Findings:  Evaluation of the right median motor nerve showed prolonged distal onset latency (4.6 ms). The left ulnar motor nerve showed prolonged distal onset latency (3.8 ms) and decreased conduction velocity (A Elbow-B Elbow, 42 m/s). The right ulnar motor nerve showed decreased conduction velocity (A Elbow-B Elbow, 42 m/s). The left median sensory nerve showed prolonged distal peak latency (Wrist, 4.2 ms), reduced amplitude (Wrist, 7.9 µV), and decreased conduction velocity (Wrist-2nd Digit, 33 m/s). The right median sensory nerve showed prolonged distal peak latency (4.3 ms), reduced amplitude (5.1 µV), and decreased conduction velocity (Wrist-2nd Digit, 33 m/s). The left ulnar sensory and the right ulnar sensory nerves showed reduced amplitude (L8.4, R5.9 µV). All remaining nerves (as indicated in the following tables) were within normal limits. Left vs. Right side comparison data for the ulnar motor nerve indicates abnormal L-R latency difference (0.9 ms). All remaining left vs. right side differences were within normal limits. All examined muscles (as indicated in the following table) showed no evidence of electrical instability. INTERPRETATION  This is an abnormal electrodiagnostic examination. These findings may be consistent with:   1. Moderate ulnar mononeuropathy at the right elbow (cubital tunnel syndrome)   2. Moderate median mononeuropathy at the right wrist (carpal tunnel syndrome)   3. Mild median mononeuropathy at the left wrist (carpal tunnel syndrome)   4. Mild-to-moderate ulnar mononeuropathy at the left elbow (cubital tunnel syndrome)    There is no electrodiagnostic evidence of any cervical radiculopathy, brachial plexopathy, peripheral polyneuropathy, or any other mononeuropathy. CLINICAL INTERPRETATION  When compared to his last EMG, there has been significant improvements on his left side electrodiagnostic findings. His palmar signals are detectable and within normal latency limits. His carpal and cubital tunnel findings on the left are now classified as mild. The right sided electrodiagnostic findings are essentially unchanged from his last study. His symptoms of hand numbness and tingling may correlate with these electrodiagnostic findings. There is no evidence of radial mononeuropathy which could be associated with his radial wrist mass. HISTORY OF PRESENT ILLNESS  Torres Duran is a 63 y.o. male. Pt presents today for BUE EMG evaluation of chronic bilateral hand numbness and tingling. He also reports his hands drawing up and a new cyst on the right wrist.    He was seen for a BUE EMG 10/16/2019:  INTERPRETATION  This is an abnormal electrodiagnostic examination. These findings may be consistent with:  1. 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 Moderate median mononeuropathy at the wrist bilaterally (carpal tunnel syndrome)  3. Severe neuropathy focally related to both median and ulnar palmar branches     There is no electrodiagnostic evidence of any cervical radiculopathy, brachial plexopathy, peripheral polyneuropathy, or any other mononeuropathy. PAST MEDICAL HISTORY   Past Medical History:   Diagnosis Date    Depression     and anxiety    Foot drop, left foot     October, 2018.   Seen by Podiatry in 2019    Headache     Psychotic disorder ([facility] [address]     anxiety       Past Surgical History:   Procedure Laterality Date    COLONOSCOPY N/A 10/28/2020    COLONOSCOPY w/polypectomies performed by Kaylen Rios [facility] at [facility]    HX HERNIA REPAIR      HX HERNIA REPAIR      Inguinal hernia- right side    HX KNEE ARTHROSCOPY Right     HX ORTHOPAEDIC Left 2017    lacerations to fingers and had pins, left thumb    HX ORTHOPAEDIC      Right knee surgery    HX OTHER SURGICAL  2017    Left hand surgery x 3    HX ROTATOR CUFF REPAIR Right 2020    Marycassandra     HX ROTATOR CUFF REPAIR Left 10/21/2019    HX ROTATOR CUFF REPAIR  2000    Right shoulder    [place] ANESTH,SURGERY OF SHOULDER Right    . MEDICATIONS      Current Outpatient Medications   Medication Sig Dispense Refill    gabapentin (NEURONTIN) 800 mg tablet Take 1 Tab by mouth daily. Max Daily Amount: 800 mg. 90 Tab 0    amitriptyline (ELAVIL) 75 mg tablet Take 1 Tab by mouth nightly for 180 days. 90 Tab 1    busPIRone (BUSPAR) 10 mg tablet Take 10 mg by mouth two (2) times a day.  Latuda 60 mg tab tablet 60 mg.      escitalopram oxalate (LEXAPRO) 20 mg tablet           ALLERGIES    Allergies   Allergen Reactions    Motrin [Ibuprofen] Hives    Motrin [Ibuprofen] Rash          SOCIAL HISTORY    Social History     Socioeconomic History    Marital status:      Spouse name: Not on file    Number of children: Not on file    Years of education: Not on file    Highest education level: Not on file   Tobacco Use    Smoking status: Former Smoker     Years: 2.00     Quit date:      Years since quittin.4    Smokeless tobacco: Current User    Tobacco comment: Starting Chewing to bacco in .    Substance and Sexual Activity    Alcohol use: Not Currently     Comment: states he quit drinking alchohol 2 months ago    Drug use: Not Currently     Types: Marijuana    Sexual activity: Not Currently   Social History Narrative    ** Merged History Encounter **          Social Determinants of Health     Financial Resource Strain:     Difficulty of Paying Living Expenses:    Food Insecurity:     Worried About Running Out of Food in the Last Year:    [address] in the Last Year:    Transportation Needs:     Lack of Transportation (Medical):  Lack of Transportation (Non-Medical):    Physical Activity:     Days of Exercise per Week:     Minutes of Exercise per Session:    Stress:     Feeling of Stress :    Social Connections:     Frequency of Communication with Friends and Family:     Frequency of Social Gatherings with Friends and Family:     Attends Judaism Services:     Active Member of Clubs or Organizations:     Attends Club or Organization Meetings:     Marital Status:        FAMILY HISTORY    Family History   Problem Relation Age of Onset    Heart Disease Mother     Other Mother         Lung disease.  COPD Mother          PHYSICAL EXAMINATION  Visit Vitals  /60   Pulse 64   Temp 98.4 °F (36.9 °C) (Oral)   Resp 18   Ht 5' 8\" (1.727 m)   Wt 217 lb (98.4 kg)   SpO2 99%   BMI 32.99 kg/m²       Pain Assessment  5/17/2021   Location of Pain Other (comment)   Pain Location Comment thumb   Location Modifiers -   Severity of Pain 5   Quality of Pain Throbbing; Sharp   Quality of Pain Comment -   Duration of Pain A few hours   Frequency of Pain Intermittent   Aggravating Factors Other (Comment)   Aggravating Factors Comment using hand   Limiting Behavior Some   Relieving Factors Rest   Relieving Factors Comment -   Result of Injury Yes   Work-Related Injury No   How long out of work? -   Type of Injury -           Constitutional:  Well developed, well nourished, in no acute distress. Psychiatric: Affect and mood are appropriate. Integumentary: No rashes or abrasions noted on exposed areas. SPINE/MUSCULOSKELETAL EXAM    On brief examination: None.       NCV & EMG Findings:  Nerve Conduction Studies  Anti Sensory Summary Table     Stim Site NR Peak (ms) Norm Peak (ms) O-P Amp (µV) Norm O-P Amp Site1 Site2 Delta-P (ms) Dist (cm) Gee (m/s) Norm Gee (m/s)   Left Median Anti Sensory (2nd Digit)   Wrist 4.2 <4 7.9 >13 Wrist 2nd Digit 4.2 14.0 33 >39   Palm    1.8 <2.3 11.3 >8 Palm 2nd Digit 1.8 7.0 39    Right Median Anti Sensory (2nd Digit)   Wrist    4.3 <4 5.1 >13 Wrist 2nd Digit 4.3 14.0 33 >39   Left Median Acr Palm Anti Sensory (2nd Digit)   Median    2.6 <3.6 11.0 >10 Median Ulnar 0.6 0.0     Ulnar    2.0 <2.0 9.9          Left Radial Anti Sensory (Base 1st Digit)   Wrist    2.2 2.8 20.7 11 Wrist Base 1st Digit 2.2 10.0 45    Right Radial Anti Sensory (Base 1st Digit)   Wrist    2.0 2.8 26.2 11 Wrist Base 1st Digit 2.0 10.0 50    Left Ulnar Anti Sensory (5th Digit)   Wrist    3.6 <4.0 8.4 >9 Wrist 5th Digit 3.6 14.0 39 >38   Site 2    3.6  5.9          Right Ulnar Anti Sensory (5th Digit)   Wrist    3.5 <4.0 5.9 >9 Wrist 5th Digit 3.5 14.0 40 >38     Motor Summary Table     Stim Site NR Onset (ms) Norm Onset (ms) O-P Amp (mV) Norm O-P Amp Site1 Site2 Delta-0 (ms) Dist (cm) Gee (m/s) Norm Gee (m/s)   Left Median Motor (Abd Poll Brev)   Wrist    4.0 <4.5 6.4 >4.1 Elbow Wrist 4.5 25.0 56 >49   Elbow    8.5  6.3          Right Median Motor (Abd Poll Brev)   Wrist    4.6 <4.5 7.5 >4.1 Elbow Wrist 4.6 24.5 53 >49   Elbow    9.2  7.0          Left Ulnar Motor (Abd Dig Min)   Wrist    3.8 <3.7 9.6 >7.9 B Elbow Wrist 3.2 20.5 64 >52   B Elbow    7.0  7.9  A Elbow B Elbow 2.4 10.0 42 >43   A Elbow    9.4  7.4          Right Ulnar Motor (Abd Dig Min)   Wrist    2.9 <3.7 9.7 >7.9 B Elbow Wrist 3.2 20.0 63 >52   B Elbow    6.1  9.2  A Elbow B Elbow 2.4 10.0 42 >43   A Elbow    8.5  8.8            EMG     Side Muscle Nerve Root Ins Act Fibs Psw Amp Dur Poly Recrt Int Teola Panda Comment   Right Biceps Musculocut C5-6 Nml Nml Nml Nml Nml 0 Nml Nml    Right Triceps Radial C6-7-8 Nml Nml Nml Nml Nml 0 Nml Nml    Right PronatorTeres Median C6-7 Nml Nml Nml Nml Nml 0 Nml Nml    Right Abd Poll Brev Median C8-T1 Nml Nml Nml Nml Nml 0 Nml Nml    Right 1stDorInt Ulnar C8-T1 Nml Nml Nml Nml Nml 0 Nml Nml    Left Biceps Musculocut C5-6 Nml Nml Nml Nml Nml 0 Nml Nml    Left Triceps Radial C6-7-8 Nml Nml Nml Nml Nml 0 Nml Nml    Left PronatorTeres Median C6-7 Nml Nml Nml Nml Nml 0 Nml Nml    Left Abd Poll Brev Median C8-T1 Nml Nml Nml Nml Nml 0 Nml Nml    Left 1stDorInt Ulnar C8-T1 Nml Nml Nml Nml Nml 0 Nml Nml        Nerve Conduction Studies  Anti Sensory Left/Right Comparison     Stim Site L Lat (ms) R Lat (ms) L-R Lat (ms) L Amp (µV) R Amp (µV) L-R Amp (%) Site1 Site2 L Gee (m/s) R Gee (m/s) L-R Gee (m/s)   Median Anti Sensory (2nd Digit)   Wrist 4.2 4.3 0.1 7.9 5.1 35.4 Wrist 2nd Digit 33 33 0   Palm 1.8   11.3   Palm 2nd Digit 39     Median Acr Palm Anti Sensory (2nd Digit)   Median 2.6   11.0   Median Ulnar      Ulnar 2.0   9.9          Radial Anti Sensory (Base 1st Digit)   Wrist 2.2 2.0 0.2 20.7 26.2 21.0 Wrist Base 1st Digit 45 50 5   Ulnar Anti Sensory (5th Digit)   Wrist 3.6 3.5 0.1 8.4 5.9 29.8 Wrist 5th Digit 39 40 1   Site 2 3.6   5.9            Motor Left/Right Comparison     Stim Site L Lat (ms) R Lat (ms) L-R Lat (ms) L Amp (mV) R Amp (mV) L-R Amp (%) Site1 Site2 L Gee (m/s) R Gee (m/s) L-R Gee (m/s)   Median Motor (Abd Poll Brev)   Wrist 4.0 4.6 0.6 6.4 7.5 14.7 Elbow Wrist 56 53 3   Elbow 8.5 9.2 0.7 6.3 7.0 10.0        Ulnar Motor (Abd Dig Min)   Wrist 3.8 2.9 0.9 9.6 9.7 1.0 B Elbow Wrist 64 63 1   B Elbow 7.0 6.1 0.9 7.9 9.2 14.1 A Elbow B Elbow 42 42 0   A Elbow 9.4 8.5 0.9 7.4 8.8 15.9              Waveforms:                                 [facility]  OFFICE PROCEDURE PROGRESS NOTE        Chart reviewed for the following:   Pradip BAIRD, have reviewed the History, Physical and updated the Allergic reactions for [employer]     TIME OUT performed immediately prior to start of procedure:   Pradip BAIRD, have performed the following reviews on [employer] prior to the start of the procedure:            * Patient was identified by name and date of birth   * Agreement on procedure being performed was verified  * Risks and Benefits explained to the patient  * Procedure site verified and marked as necessary  * Patient was positioned for comfort  * Consent was signed and verified     Time: 11:33 AM    Date of procedure: 5/26/2021    Procedure performed by:  Deepa Chapa MD    Provider accompanied by: Ludmila. Patient accompanied by: Self.     How tolerated by patient: tolerated the procedure well with no complications    Post Procedural Pain Scale: 0 - No Hurt    Comments: none    Written by Thiago Doe, [address] as dictated by Corinne Gaul, MD

## 2021-05-27 ENCOUNTER — APPOINTMENT (OUTPATIENT)
Dept: PHYSICAL THERAPY | Age: 56
End: 2021-05-27
Payer: MEDICAID

## 2021-06-10 ENCOUNTER — OFFICE VISIT (OUTPATIENT)
Dept: ORTHOPEDIC SURGERY | Age: 56
End: 2021-06-10
Payer: MEDICAID

## 2021-06-10 VITALS
HEIGHT: 68 IN | HEART RATE: 66 BPM | BODY MASS INDEX: 33.19 KG/M2 | TEMPERATURE: 97 F | WEIGHT: 219 LBS | OXYGEN SATURATION: 98 %

## 2021-06-10 DIAGNOSIS — M67.431 GANGLION CYST OF VOLAR ASPECT OF RIGHT WRIST: Primary | ICD-10-CM

## 2021-06-10 DIAGNOSIS — G56.03 BILATERAL CARPAL TUNNEL SYNDROME: ICD-10-CM

## 2021-06-10 DIAGNOSIS — G56.21 CUBITAL TUNNEL SYNDROME ON RIGHT: ICD-10-CM

## 2021-06-10 DIAGNOSIS — G56.01 RIGHT CARPAL TUNNEL SYNDROME: ICD-10-CM

## 2021-06-10 DIAGNOSIS — G56.23 CUBITAL TUNNEL SYNDROME, BILATERAL: ICD-10-CM

## 2021-06-10 PROCEDURE — 99214 OFFICE O/P EST MOD 30 MIN: CPT | Performed by: ORTHOPAEDIC SURGERY

## 2021-06-10 NOTE — LETTER
Patient: Lita Zavala                        PROCEDURE: Right Carpal Tunnel Release, Right Cubital Tunnel Release, Right wrist volar ganglion cyst excision    PRE OPERATIVE INSTRUCTIONS:  Five (5) days prior to surgery STOP taking any hormonal medications, aspirin, fish oil and/or anti-inflammatory medications. If you are taking blood thinner medication (such as Coumadin, Plavix, Heparin or others) you will need special instructions from the prescribing physician. LABS: Expect a call from a PAT nurse to complete a health assessment. Report to [facility] when instructed by the PAT nurse for bloodwork, EKG and COVID testing. This will likely be scheduled on Thursday, 7/22.    o It doesn't matter if you have eaten before going to the Lab. o If required labs and EKG are not completed Surgery will be canceled. HISTORY & PHYSICAL  appointment is scheduled with  Dr. Scottie Haas           on 7/19/20201@ 10:05am at the [facility] office. Surgery Date: 7/27/2021  Time: 9:00am  Report to [facility] on the [address] [place] [address] at: 7:00am    [address]:         1. Do not eat, chew gum or drink anything after midnight prior to the date of your surgery. 2. Take your blood pressure and/or heart medications with small sips of water unless otherwise instructed. If you are insulin dependent, bring your insulin with you, unless otherwise instructed. 3. Bring a list of your medications and the dosage to the hospital including  vitamins. 4. Do not wear nail polish, make-up, jewelry, perfumes or skin creams. 5. Do not bring valuables or money to the hospital.        6. You MUST have a responsible adult arranged to drive you home following surgery. This person will NOT be allowed to wait inside the hospital during your surgery.  Please have good contact information available to give the hospital. It is recommended that they stay with you 24 hours after surgery. Post op visit  appointment is scheduled with Dr. Bria Tran       on 8/9/2021 @ 10:15am at the [facility] office.       Artem Darnell, Surgery Scheduler  [phone]

## 2021-06-10 NOTE — PROGRESS NOTES
Brook Birmingham is a 63 y.o. male right handed unknown employment. Worker's Compensation and legal considerations: none filed. Vitals:    06/10/21 1007   Pulse: 66   Temp: 97 °F (36.1 °C)   TempSrc: Skin   SpO2: 98%   Weight: 219 lb (99.3 kg)   Height: 5' 8\" (1.727 m)   PainSc:   5   PainLoc: Hand           Chief Complaint   Patient presents with    Hand Pain     bilateral, EMG result       HPI: Patient presents today for repeat EMG follow-up. He reports the right side to be worse than the left and is having weakness as well as continued arthritic pain on the right. He also reports the cyst in his right volar wrist today tender. He reports some continued irritation and paresthesias over his previous left thumb laceration. 5/17/2021 HPI: Patient presents today following up on his bilateral hand numbness and tingling that is been longstanding as well as left thumb laceration follow-up. He is here to have his sutures removed today. He also presents with a new problem of ganglion cyst on the volar aspect of his right wrist.    5/11/2021 HPI: Patient presents today with a new problem of left thumb laceration that occurred a few days back. He was given antibiotics and his wound debrided and closed in the ER.    9/16/2019 HPI: Patient comes in today with new complaint of right middle finger mass and pain. He has a history of right volar and dorsal ganglion cyst.  He also has a history of left shoulder rotator cuff repair and is planning to have his right shoulder rotator cuff done next week. Initial HPI: Patient comes in today with complaints of bilateral hand numbness and tingling. He reports that he had a left carpal tunnel diagnosis after an EMG several years ago. He has not had any treatment for it. He also has a cyst on the right wrist that was injected several months ago but did not help.   He also reports a history of having digits amputated that were placed back on many years ago on the left hand.  He has had chronic pain from scar tissue in his hand. Date of onset: Indeterminate    Injury: Yes: Comment: Left thumb laceration    Prior Treatment:  Yes: Comment: ED debridement and closure    Numbness/ Tingling: Yes: Comment: Bilateral hands left worse than right    ROS: Review of Systems - General ROS: negative  Respiratory ROS: no cough, shortness of breath, or wheezing  Cardiovascular ROS: no chest pain or dyspnea on exertion  Musculoskeletal ROS: positive for - pain in thumb, left  Neurological ROS: Negative  Dermatological ROS: negative    Past Medical History:   Diagnosis Date    Bilateral hand pain     Depression     and anxiety    Foot drop, left foot     October, 2018. Seen by Podiatry in 2019    Headache     Psychotic disorder ([facility] [address]     anxiety       Past Surgical History:   Procedure Laterality Date    COLONOSCOPY N/A 10/28/2020    COLONOSCOPY w/polypectomies performed by Raúl Cornelius MD at [facility] ENDOSCOPY    HX HERNIA REPAIR      HX HERNIA REPAIR      Inguinal hernia- right side    HX KNEE ARTHROSCOPY Right     HX ORTHOPAEDIC Left 2017    lacerations to fingers and had pins, left thumb    HX ORTHOPAEDIC      Right knee surgery    HX OTHER SURGICAL  2017    Left hand surgery x 3    [facility]21[facility]     HX ROTATOR CUFF REPAIR Left 10/21/2019    HX ROTATOR CUFF REPAIR  2000    Right shoulder    [place] ANESTH,SURGERY OF SHOULDER Right        Current Outpatient Medications   Medication Sig Dispense Refill    gabapentin (NEURONTIN) 800 mg tablet Take 1 Tab by mouth daily. Max Daily Amount: 800 mg. 90 Tab 0    amitriptyline (ELAVIL) 75 mg tablet Take 1 Tab by mouth nightly for 180 days. 90 Tab 1    busPIRone (BUSPAR) 10 mg tablet Take 10 mg by mouth two (2) times a day.       Latuda 60 mg tab tablet 60 mg.      escitalopram oxalate (LEXAPRO) 20 mg tablet          Allergies   Allergen Reactions    Motrin [Ibuprofen] Hives    Motrin [Ibuprofen] Rash         PE:       Physical Exam  Vitals and nursing note reviewed. Constitutional:       General: He is not in acute distress. Appearance: Normal appearance. He is not ill-appearing, toxic-appearing or diaphoretic. HENT:      Head: Normocephalic and atraumatic. Nose: Nose normal.      Mouth/Throat:      Mouth: Mucous membranes are moist.   Eyes:      Extraocular Movements: Extraocular movements intact. Pupils: Pupils are equal, round, and reactive to light. Cardiovascular:      Pulses: Normal pulses. Pulmonary:      Effort: Pulmonary effort is normal. No respiratory distress. Abdominal:      General: Abdomen is flat. There is no distension. Musculoskeletal:         General: No swelling, tenderness, deformity or signs of injury. Normal range of motion. Cervical back: Normal range of motion and neck supple. Right lower leg: No edema. Left lower leg: No edema. Skin:     General: Skin is warm and dry. Capillary Refill: Capillary refill takes less than 2 seconds. Findings: No bruising or erythema. Neurological:      General: No focal deficit present. Mental Status: He is alert and oriented to person, place, and time. Psychiatric:         Mood and Affect: Mood normal.         Behavior: Behavior normal.         Left thumb: Laceration healed. There is some tenderness to palpation over the dorsal of the thumb. Sensation intact distally and cap refill brisk. NEUROVASCULAR    Examination L R Examination L R   Carpal Comp. + + Pronator Comp. - -   Carpal Tinel + + Pronator Tinel - -   Phalen's + + Pronator Stress - -   Cubital Comp. ? ? Guyon Comp. - -   Cubital Tinel ? ? Guyon Tinel - -   Elbow Hyperflexion - - Adson's - -   Spurling's - - SC Comp. - -   PCB Median abn - - SC Tinel - -   Radial Tinel - - IC Comp.  - -   Digital Tinel - - IC Tinel - -   Radial 2-Pt WNL WNL Ulnar 2-Pt WNL WNL     Radial Pulse: 2+  Capillary Refill: < 2 sec  [place]: Not Performed  [employer]: Not Performed      Imaging:    Plain films of bilateral wrist does not show substantial degenerative changes  Fracture dislocation or any other osseous abnormalities. 2021  NCV & EMG Findings:  Evaluation of the right median motor nerve showed prolonged distal onset latency (4.6 ms). The left ulnar motor nerve showed prolonged distal onset latency (3.8 ms) and decreased conduction velocity (A Elbow-B Elbow, 42 m/s). The right ulnar motor nerve showed decreased conduction velocity (A Elbow-B Elbow, 42 m/s). The left median sensory nerve showed prolonged distal peak latency (Wrist, 4.2 ms), reduced amplitude (Wrist, 7.9 µV), and decreased conduction velocity (Wrist-2nd Digit, 33 m/s). The right median sensory nerve showed prolonged distal peak latency (4.3 ms), reduced amplitude (5.1 µV), and decreased conduction velocity (Wrist-2nd Digit, 33 m/s). The left ulnar sensory and the right ulnar sensory nerves showed reduced amplitude (L8.4, R5.9 µV). All remaining nerves (as indicated in the following tables) were within normal limits. Left vs. Right side comparison data for the ulnar motor nerve indicates abnormal L-R latency difference (0.9 ms). All remaining left vs. right side differences were within normal limits.       All examined muscles (as indicated in the following table) showed no evidence of electrical instability.       INTERPRETATION  This is an abnormal electrodiagnostic examination. These findings may be consistent with:   1. Moderate ulnar mononeuropathy at the right elbow (cubital tunnel syndrome)   2. Moderate median mononeuropathy at the right wrist (carpal tunnel syndrome)   3. Mild median mononeuropathy at the left wrist (carpal tunnel syndrome)   4.  Mild-to-moderate ulnar mononeuropathy at the left elbow (cubital tunnel syndrome)     There is no electrodiagnostic evidence of any cervical radiculopathy, brachial plexopathy, peripheral polyneuropathy, or any other mononeuropathy.     CLINICAL INTERPRETATION  When compared to his last EMG, there has been significant improvements on his left side electrodiagnostic findings. His palmar signals are detectable and within normal latency limits. His carpal and cubital tunnel findings on the left are now classified as mild. The right sided electrodiagnostic findings are essentially unchanged from his last study.      His symptoms of hand numbness and tingling may correlate with these electrodiagnostic findings. There is no evidence of radial mononeuropathy which could be associated with his radial wrist mass. 2019  NCV & EMG Findings:  Evaluation of the left median motor and the right median motor nerves showed prolonged distal onset latency (L4.4, R4.5 ms). The left ulnar motor and the right ulnar motor nerves showed decreased conduction velocity (A Elbow-B Elbow, L38, R42 m/s). The left median sensory and the right median sensory nerves showed prolonged distal peak latency (L4.6, R4.4 ms) and decreased conduction velocity (Wrist-2nd Digit, L30, R32 m/s). The left ulnar sensory nerve showed prolonged distal peak latency (4.1 ms), reduced amplitude (10.5 µV), and decreased conduction velocity (Wrist-5th Digit, 34 m/s). The right ulnar sensory nerve showed prolonged distal peak latency (5.7 ms) and decreased conduction velocity (Wrist-5th Digit, 25 m/s). The left median/ulnar (palm) comparison nerve showed no response (Median Palm) and no response (Ulnar Palm). All remaining nerves (as indicated in the following tables) were within normal limits. Left vs. Right side comparison data for the ulnar motor nerve indicates abnormal L-R latency difference (0.8 ms). The median sensory nerve indicates abnormal L-R amplitude difference (63.1 %). The ulnar sensory nerve indicates abnormal L-R latency difference (1.6 ms). All remaining left vs. right side differences were within normal limits.    All examined muscles (as indicated in the following table) showed no evidence of electrical instability.          INTERPRETATION  This is an abnormal electrodiagnostic examination. These findings may be consistent with:  1. 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 Moderate median mononeuropathy at the wrist bilaterally (carpal tunnel syndrome)  3. Severe neuropathy focally related to both median and ulnar palmar branches     There is no electrodiagnostic evidence of any cervical radiculopathy, brachial plexopathy, peripheral polyneuropathy, or any other mononeuropathy.     CLINICAL INTERPRETATION  His electrodiagnostic findings in the median and ulnar nerves are consistent with his symptoms of numbness and tingling in both hands.          ICD-10-CM ICD-9-CM    1. Ganglion cyst of volar aspect of right wrist  M67.431 727.41 SCHEDULE SURGERY   2. Cubital tunnel syndrome on right  G56.21 354.2 SCHEDULE SURGERY   3. Right carpal tunnel syndrome  G56.01 354.0 SCHEDULE SURGERY   4. Bilateral carpal tunnel syndrome  G56.03 354.0    5. Cubital tunnel syndrome, bilateral  G56.23 354.2        Plan:     Schedule right volar ganglion cyst excision, right cubital tunnel release, and right carpal tunnel release. Follow-up and Dispositions    · Return for H&P.        Plan was reviewed with patient, who verbalized agreement and understanding of the plan

## 2021-07-19 ENCOUNTER — OFFICE VISIT (OUTPATIENT)
Dept: SURGERY | Age: 56
End: 2021-07-19
Payer: MEDICAID

## 2021-07-19 ENCOUNTER — OFFICE VISIT (OUTPATIENT)
Dept: ORTHOPEDIC SURGERY | Age: 56
End: 2021-07-19

## 2021-07-19 VITALS
DIASTOLIC BLOOD PRESSURE: 73 MMHG | WEIGHT: 222.2 LBS | BODY MASS INDEX: 33.68 KG/M2 | OXYGEN SATURATION: 99 % | SYSTOLIC BLOOD PRESSURE: 110 MMHG | RESPIRATION RATE: 18 BRPM | HEART RATE: 65 BPM | TEMPERATURE: 98.2 F | HEIGHT: 68 IN

## 2021-07-19 VITALS
TEMPERATURE: 97.5 F | OXYGEN SATURATION: 99 % | SYSTOLIC BLOOD PRESSURE: 114 MMHG | BODY MASS INDEX: 33.46 KG/M2 | DIASTOLIC BLOOD PRESSURE: 76 MMHG | HEART RATE: 78 BPM | WEIGHT: 220.8 LBS | HEIGHT: 68 IN

## 2021-07-19 DIAGNOSIS — G56.01 RIGHT CARPAL TUNNEL SYNDROME: ICD-10-CM

## 2021-07-19 DIAGNOSIS — M67.431 GANGLION CYST OF VOLAR ASPECT OF RIGHT WRIST: Primary | ICD-10-CM

## 2021-07-19 DIAGNOSIS — G56.21 CUBITAL TUNNEL SYNDROME ON RIGHT: ICD-10-CM

## 2021-07-19 DIAGNOSIS — Z01.818 PREOP EXAMINATION: Primary | ICD-10-CM

## 2021-07-19 DIAGNOSIS — R10.32 LEFT LOWER QUADRANT ABDOMINAL PAIN: Primary | ICD-10-CM

## 2021-07-19 PROCEDURE — 99205 OFFICE O/P NEW HI 60 MIN: CPT | Performed by: SURGERY

## 2021-07-19 RX ORDER — DEXTROAMPHETAMINE SACCHARATE, AMPHETAMINE ASPARTATE, DEXTROAMPHETAMINE SULFATE AND AMPHETAMINE SULFATE 2.5; 2.5; 2.5; 2.5 MG/1; MG/1; MG/1; MG/1
10 TABLET ORAL 2 TIMES DAILY
COMMUNITY

## 2021-07-19 NOTE — PROGRESS NOTES
General Surgery Consult    Roxi Recinos  Admit date: (Not on file)    MRN: [id number]     : 1965     Age: 63 y.o. Attending Physician: Neftaly Evans MD, [facility]      History of Present Illness:      Roxi Recinos is a 63 y.o. male who was referred to [facility] by [facility] for evaluation of abdominal pain. The patient has stated that he has been having left lower quadrant abdominal pain for about couple weeks now. He stated that the pain comes and goes but it is worse when he try to push down on the left lower quadrant. He denies any relationship between eating and the pain and he denies also any relationship between having a bowel movement and the pain. He called his primary care office to be seen however they stated that they could not see him so they [facility] which [facility]. In the emergency room they did some lab work which are normal and that because they could not do a CT scan they told him to come to our office for further evaluation of his abdominal pain which he made an appointment today. He stated that the pain is mild and he denies any fever or chills. He denies any nausea or vomiting. He had a history of right inguinal hernia repair but no other abdominal surgeries. Patient Active Problem List    Diagnosis Date Noted    ETOH abuse 2021    Anxiety and depression 2021    Cervical radiculopathy 2021     Past Medical History:   Diagnosis Date    Bilateral hand pain     Depression     and anxiety    Foot drop, left foot     .   Seen by Podiatry in 2019    Headache     Psychotic disorder ([facility] [address]     anxiety      Past Surgical History:   Procedure Laterality Date    COLONOSCOPY N/A 10/28/2020    COLONOSCOPY w/polypectomies performed by Jesús Martinez MD [facility] ENDOSCOPY    HX HERNIA REPAIR      HX HERNIA REPAIR      Inguinal hernia- right side    HX KNEE ARTHROSCOPY Right     HX ORTHOPAEDIC Left  lacerations to fingers and had pins, left thumb    HX ORTHOPAEDIC      Right knee surgery    HX OTHER SURGICAL  2017    Left hand surgery x 3    HX ROTATOR CUFF REPAIR Right 2020    [facility]     HX ROTATOR CUFF REPAIR Left 10/21/2019    HX ROTATOR CUFF REPAIR  2000    Right shoulder    [place] ANESTH,SURGERY OF SHOULDER Right       Social History     Tobacco Use    Smoking status: Former Smoker     Years: 2.00     Quit date:      Years since quittin.5    Smokeless tobacco: Current User    Tobacco comment: Starting Chewing to bacco in . Substance Use Topics    Alcohol use: Not Currently     Comment: states he quit drinking alchohol 2 months ago      Social History     Tobacco Use   Smoking Status Former Smoker    Years: 2.00    Quit date:     Years since quittin.5   Smokeless Tobacco Current User   Tobacco Comment    Starting Chewing to bacco in . Family History   Problem Relation Age of Onset    Heart Disease Mother     Other Mother         Lung disease.  COPD Mother       Current Outpatient Medications   Medication Sig    dextroamphetamine-amphetamine (AdderalL) 10 mg tablet Take  by mouth.  gabapentin (NEURONTIN) 800 mg tablet Take 1 Tab by mouth daily. Max Daily Amount: 800 mg.  busPIRone (BUSPAR) 10 mg tablet Take 10 mg by mouth two (2) times a day.  Latuda 60 mg tab tablet 60 mg.    escitalopram oxalate (LEXAPRO) 20 mg tablet     amitriptyline (ELAVIL) 75 mg tablet Take 1 Tab by mouth nightly for 180 days. (Patient not taking: Reported on 2021)     No current facility-administered medications for this visit.       Allergies   Allergen Reactions    Motrin [Ibuprofen] Hives    Motrin [Ibuprofen] Rash        Review of Systems:  Constitutional: negative  Eyes: negative  Ears, Nose, Mouth, Throat, and Face: negative  Respiratory: negative  Cardiovascular: negative  Gastrointestinal: positive for abdominal pain  Genitourinary:negative  Integument/Breast: negative  Hematologic/Lymphatic: negative  Musculoskeletal:negative  Neurological: negative  Behavioral/Psychiatric: negative  Endocrine: negative  Allergic/Immunologic: negative    Objective:     Visit Vitals  /76 (BP 1 Location: Right lower arm, BP Patient Position: Sitting)   Pulse 78   Temp 97.5 °F (36.4 °C)   Ht 5' 8\" (1.727 m)   Wt 100.2 kg (220 lb 12.8 oz)   SpO2 99%   BMI 33.57 kg/m²       Physical Exam:      General:  in no apparent distress, alert, oriented times 3 and afebrile   Eyes:  conjunctivae and sclerae normal, pupils equal, round, reactive to light   Throat & Neck: no erythema or exudates noted and neck supple and symmetrical; no palpable masses   Lungs:   clear to auscultation bilaterally   Heart:  Regular rate and rhythm   Abdomen:   rounded, soft, nontender, nondistended, no masses or organomegaly. No evidence of abdominal wall hernias.    Extremities: extremities normal, atraumatic, no cyanosis or edema   Skin: Normal.       Imaging and Lab Review:     CBC:   Lab Results   Component Value Date/Time    WBC 6.4 03/12/2021 07:30 AM    RBC 4.87 03/12/2021 07:30 AM    HGB 14.8 03/12/2021 07:30 AM    HCT 46.0 03/12/2021 07:30 AM    PLATELET 096 03/91/8037 07:30 AM     BMP:   Lab Results   Component Value Date/Time    Glucose 96 03/12/2021 07:30 AM    Sodium 144 03/12/2021 07:30 AM    Potassium 4.4 03/12/2021 07:30 AM    Chloride 105 03/12/2021 07:30 AM    CO2 30 03/12/2021 07:30 AM    BUN 19 03/12/2021 07:30 AM    Creatinine 0.9 03/12/2021 07:30 AM    Calcium 8.7 03/12/2021 07:30 AM     CMP:  Lab Results   Component Value Date/Time    Glucose 96 03/12/2021 07:30 AM    Sodium 144 03/12/2021 07:30 AM    Potassium 4.4 03/12/2021 07:30 AM    Chloride 105 03/12/2021 07:30 AM    CO2 30 03/12/2021 07:30 AM    BUN 19 03/12/2021 07:30 AM    Creatinine 0.9 03/12/2021 07:30 AM    Calcium 8.7 03/12/2021 07:30 AM    Anion gap 9.0 03/12/2021 07:30 AM BUN/Creatinine ratio 14 10/17/2019 01:05 PM    Alk. phosphatase 99 03/12/2021 07:30 AM    Protein, total 6.0 (L) 03/12/2021 07:30 AM    Albumin 4.0 03/12/2021 07:30 AM    Globulin 2.0 03/12/2021 07:30 AM    A-G Ratio 2.0 03/12/2021 07:30 AM       No results found for this or any previous visit (from the past 24 hour(s)). images and reports reviewed    Assessment:   Richard Mendez is a 63 y.o. male who is presenting with left lower quadrant pain of about couple weeks duration. The pain seems to be mild and most likely represent some mild attack of diverticulitis though it is hard to tell without imaging studies. I believe that the best option is to do a CT scan of abdomen and pelvis to better define the anatomy and what is causing the patient's pain. I also explained to the patient that he needs to see his primary care physician for further evaluation and management. I do not see any reason to give him antibiotics since his WBC was normal today and the pain is minimal and the examination is normal.  I am also not sure that the patient has diverticulitis to start with.      Plan:     I placed an order for CT scan of abdomen and pelvis  Call his primary care office to make an appointment  Follow-up with me after the results    Please call me if you have any questions (cell phone: [phone])     Signed By: Himanshu Brown MD     July 19, 2021

## 2021-07-19 NOTE — PROGRESS NOTES
Pretty Leal is a 63 y.o. male right handed unknown employment. Worker's Compensation and legal considerations: none filed. Vitals:    07/19/21 1044   BP: 110/73   Pulse: 65   Resp: 18   Temp: 98.2 °F (36.8 °C)   TempSrc: Temporal   SpO2: 99%   Weight: 222 lb 3.2 oz (100.8 kg)   Height: 5' 8\" (1.727 m)   PainSc:   5   PainLoc: Hand           Chief Complaint   Patient presents with    Hand Pain     right CTS history and physical       HPI: Patient presents today for preoperative history and physical as he is scheduled for right carpal tunnel release, right cubital tunnel release, and right volar ganglion cyst excision. He denies any changes since his previous visit. EMG HPI: Patient presents today for repeat EMG follow-up. He reports the right side to be worse than the left and is having weakness as well as continued arthritic pain on the right. He also reports the cyst in his right volar wrist today tender. He reports some continued irritation and paresthesias over his previous left thumb laceration. 5/17/2021 HPI: Patient presents today following up on his bilateral hand numbness and tingling that is been longstanding as well as left thumb laceration follow-up. He is here to have his sutures removed today. He also presents with a new problem of ganglion cyst on the volar aspect of his right wrist.    5/11/2021 HPI: Patient presents today with a new problem of left thumb laceration that occurred a few days back. He was given antibiotics and his wound debrided and closed in the ER.    9/16/2019 HPI: Patient comes in today with new complaint of right middle finger mass and pain. He has a history of right volar and dorsal ganglion cyst.  He also has a history of left shoulder rotator cuff repair and is planning to have his right shoulder rotator cuff done next week. Initial HPI: Patient comes in today with complaints of bilateral hand numbness and tingling.   He reports that he had a left carpal tunnel diagnosis after an EMG several years ago. He has not had any treatment for it. He also has a cyst on the right wrist that was injected several months ago but did not help. He also reports a history of having digits amputated that were placed back on many years ago on the left hand. He has had chronic pain from scar tissue in his hand. Date of onset: Indeterminate    Injury: Yes: Comment: Left thumb laceration    Prior Treatment:  Yes: Comment: ED debridement and closure    Numbness/ Tingling: Yes: Comment: Bilateral hands left worse than right    ROS: Review of Systems - General ROS: negative  Respiratory ROS: no cough, shortness of breath, or wheezing  Cardiovascular ROS: no chest pain or dyspnea on exertion  Musculoskeletal ROS: positive for - pain in thumb, left  Neurological ROS: Negative  Dermatological ROS: negative    Past Medical History:   Diagnosis Date    Bilateral hand pain     Depression     and anxiety    Foot drop, left foot     October, 2018. Seen by Podiatry in 2019    Headache     Psychotic disorder ([address]     anxiety       Past Surgical History:   Procedure Laterality Date    COLONOSCOPY N/A 10/28/2020    COLONOSCOPY w/polypectomies performed by Elodia Garnett MD at [facility] ENDOSCOPY    HX HERNIA REPAIR      HX HERNIA REPAIR      Inguinal hernia- right side    HX KNEE ARTHROSCOPY Right     HX ORTHOPAEDIC Left 2017    lacerations to fingers and had pins, left thumb    HX ORTHOPAEDIC      Right knee surgery    HX OTHER SURGICAL  2017    Left hand surgery x 3    HX ROTATOR CUFF [facility]PA[facility]     HX ROTATOR CUFF REPAIR Left 10/21/2019    HX ROTATOR CUFF REPAIR  2000    Right shoulder    [place] ANESTH,SURGERY OF SHOULDER Right        Current Outpatient Medications   Medication Sig Dispense Refill    gabapentin (NEURONTIN) 800 mg tablet Take 1 Tab by mouth daily.  Max Daily Amount: 800 mg. 90 Tab 0    amitriptyline (ELAVIL) 75 mg tablet Take 1 Tab by mouth nightly for 180 days. 90 Tab 1    busPIRone (BUSPAR) 10 mg tablet Take 10 mg by mouth two (2) times a day.  Latuda 60 mg tab tablet 60 mg.      escitalopram oxalate (LEXAPRO) 20 mg tablet          Allergies   Allergen Reactions    Motrin [Ibuprofen] Hives    Motrin [Ibuprofen] Rash         PE:       Physical Exam  Vitals and nursing note reviewed. Constitutional:       General: He is not in acute distress. Appearance: Normal appearance. He is not ill-appearing, toxic-appearing or diaphoretic. HENT:      Head: Normocephalic and atraumatic. Nose: Nose normal.      Mouth/Throat:      Mouth: Mucous membranes are moist.   Eyes:      Extraocular Movements: Extraocular movements intact. Pupils: Pupils are equal, round, and reactive to light. Cardiovascular:      Pulses: Normal pulses. Pulmonary:      Effort: Pulmonary effort is normal. No respiratory distress. Abdominal:      General: Abdomen is flat. There is no distension. Musculoskeletal:         General: Swelling and tenderness present. No deformity or signs of injury. Normal range of motion. Cervical back: Normal range of motion and neck supple. Right lower leg: No edema. Left lower leg: No edema. Skin:     General: Skin is warm and dry. Capillary Refill: Capillary refill takes less than 2 seconds. Findings: No bruising or erythema. Neurological:      General: No focal deficit present. Mental Status: He is alert and oriented to person, place, and time. Psychiatric:         Mood and Affect: Mood normal.         Behavior: Behavior normal.         Left thumb: Laceration healed. There is some tenderness to palpation over the dorsal of the thumb. Sensation intact distally and cap refill brisk. NEUROVASCULAR    Examination L R Examination L R   Carpal Comp. + + Pronator Comp. - -   Carpal Tinel + + Pronator Tinel - -   Phalen's + + Pronator Stress - -   Cubital Comp. ? ? Guyon Comp.  - - Cubital Tinel ? ? Guyon Tinel - -   Elbow Hyperflexion - - Adson's - -   Spurling's - - SC Comp. - -   PCB Median abn - - SC Tinel - -   Radial Tinel - - IC Comp. - -   Digital Tinel - - IC Tinel - -   Radial 2-Pt WNL WNL Ulnar 2-Pt WNL WNL     Radial Pulse: 2+  Capillary Refill: < 2 sec  [place]: Not Performed  [employer]: Not Performed      Imaging:    Plain films of bilateral wrist does not show substantial degenerative changes  Fracture dislocation or any other osseous abnormalities. 2021  NCV & EMG Findings:  Evaluation of the right median motor nerve showed prolonged distal onset latency (4.6 ms). The left ulnar motor nerve showed prolonged distal onset latency (3.8 ms) and decreased conduction velocity (A Elbow-B Elbow, 42 m/s). The right ulnar motor nerve showed decreased conduction velocity (A Elbow-B Elbow, 42 m/s). The left median sensory nerve showed prolonged distal peak latency (Wrist, 4.2 ms), reduced amplitude (Wrist, 7.9 µV), and decreased conduction velocity (Wrist-2nd Digit, 33 m/s). The right median sensory nerve showed prolonged distal peak latency (4.3 ms), reduced amplitude (5.1 µV), and decreased conduction velocity (Wrist-2nd Digit, 33 m/s). The left ulnar sensory and the right ulnar sensory nerves showed reduced amplitude (L8.4, R5.9 µV). All remaining nerves (as indicated in the following tables) were within normal limits. Left vs. Right side comparison data for the ulnar motor nerve indicates abnormal L-R latency difference (0.9 ms). All remaining left vs. right side differences were within normal limits.       All examined muscles (as indicated in the following table) showed no evidence of electrical instability.       INTERPRETATION  This is an abnormal electrodiagnostic examination. These findings may be consistent with:   1. Moderate ulnar mononeuropathy at the right elbow (cubital tunnel syndrome)   2.  Moderate median mononeuropathy at the right wrist (carpal tunnel syndrome)   3. Mild median mononeuropathy at the left wrist (carpal tunnel syndrome)   4. Mild-to-moderate ulnar mononeuropathy at the left elbow (cubital tunnel syndrome)     There is no electrodiagnostic evidence of any cervical radiculopathy, brachial plexopathy, peripheral polyneuropathy, or any other mononeuropathy.     CLINICAL INTERPRETATION  When compared to his last EMG, there has been significant improvements on his left side electrodiagnostic findings. His palmar signals are detectable and within normal latency limits. His carpal and cubital tunnel findings on the left are now classified as mild. The right sided electrodiagnostic findings are essentially unchanged from his last study.      His symptoms of hand numbness and tingling may correlate with these electrodiagnostic findings. There is no evidence of radial mononeuropathy which could be associated with his radial wrist mass. 2019  NCV & EMG Findings:  Evaluation of the left median motor and the right median motor nerves showed prolonged distal onset latency (L4.4, R4.5 ms). The left ulnar motor and the right ulnar motor nerves showed decreased conduction velocity (A Elbow-B Elbow, L38, R42 m/s). The left median sensory and the right median sensory nerves showed prolonged distal peak latency (L4.6, R4.4 ms) and decreased conduction velocity (Wrist-2nd Digit, L30, R32 m/s). The left ulnar sensory nerve showed prolonged distal peak latency (4.1 ms), reduced amplitude (10.5 µV), and decreased conduction velocity (Wrist-5th Digit, 34 m/s). The right ulnar sensory nerve showed prolonged distal peak latency (5.7 ms) and decreased conduction velocity (Wrist-5th Digit, 25 m/s). The left median/ulnar (palm) comparison nerve showed no response (Median Palm) and no response (Ulnar Palm). All remaining nerves (as indicated in the following tables) were within normal limits.   Left vs. Right side comparison data for the ulnar motor nerve indicates abnormal L-R latency difference (0.8 ms). The median sensory nerve indicates abnormal L-R amplitude difference (63.1 %). The ulnar sensory nerve indicates abnormal L-R latency difference (1.6 ms). All remaining left vs. right side differences were within normal limits.       All examined muscles (as indicated in the following table) showed no evidence of electrical instability.          INTERPRETATION  This is an abnormal electrodiagnostic examination. These findings may be consistent with:  1. 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 Moderate median mononeuropathy at the wrist bilaterally (carpal tunnel syndrome)  3. Severe neuropathy focally related to both median and ulnar palmar branches     There is no electrodiagnostic evidence of any cervical radiculopathy, brachial plexopathy, peripheral polyneuropathy, or any other mononeuropathy.     CLINICAL INTERPRETATION  His electrodiagnostic findings in the median and ulnar nerves are consistent with his symptoms of numbness and tingling in both hands.          ICD-10-CM ICD-9-CM    1. Ganglion cyst of volar aspect of right wrist  M67.431 727.41    2. Cubital tunnel syndrome on right  G56.21 354.2    3. Right carpal tunnel syndrome  G56.01 354.0        Plan:     Proceed as scheduled for right volar ganglion cyst excision, right cubital tunnel release, and right carpal tunnel release. The patient was counseled at length about the risks of chris Covid-19 during their perioperative period and any recovery window from their procedure. The patient was made aware that chris Covid-19  may worsen their prognosis for recovering from their procedure and lend to a higher morbidity and/or mortality risk. All material risks, benefits, and reasonable alternatives including postponing the procedure were discussed. The patient does  wish to proceed with the procedure at this time.     This procedure has been fully reviewed with the patient and written informed consent has been obtained. Follow-up and Dispositions    · Return for As scheduled.        Plan was reviewed with patient, who verbalized agreement and understanding of the plan

## 2021-07-19 NOTE — PROGRESS NOTES
Guerline Laureano is a 63 y.o. male (: 1965) presenting to address:    Chief Complaint   Patient presents with    New Patient     Abd pain/referred by [facility]       Medication list and allergies have been reviewed with Guerline Laureano and updated as of today's date. I have gone over all Medical, Surgical and Social History with Guerline Laureano and updated/added the information accordingly.

## 2021-07-20 ENCOUNTER — TELEPHONE (OUTPATIENT)
Dept: FAMILY MEDICINE CLINIC | Age: 56
End: 2021-07-20

## 2021-07-20 DIAGNOSIS — R10.32 LEFT LOWER QUADRANT ABDOMINAL PAIN: ICD-10-CM

## 2021-07-20 NOTE — TELEPHONE ENCOUNTER
I called the patient and verified 2 identifiers. The patient was asking for his self to have the a STAT CT done. He went to patient Dr Darin Meredith and he ordered the Ct but patient thought he could call his PCP and get it right away before his surgery on his elbow Monday. I explained to the patient he has not been in the office since march and would have to see the PCP before any orders are placed. He will call schedualing to set up the CT that Jarod Ly placed.

## 2021-07-20 NOTE — TELEPHONE ENCOUNTER
Patient was told by Dr Ashley Beck office  [phone]  That his PCP needs a STAT CT of the ABN put in  Please advise [phone]   Having surgery on TUESDAY

## 2021-07-21 NOTE — PROGRESS NOTES
[address]  [address], [address]  [place], [address] [place] [address] Street  Phone: [phone]  Fax: [phone]    Beba Ray  : 1965  PCP: Juan F Jett NP    PROGRESS NOTE    HISTORY OF PRESENT ILLNESS:  Chief Complaint   Patient presents with    Neck Pain    Back Pain     Ebony Garcia is a 63 y.o.  male with history of neck pain and left arm pain. He was seen by Dr. Cliff Paris and referred to Dr. Chuck Rodriguez for cervical injections. [facility] states he did have 1 cervical injection and it helped tremendously. [facility] is starting to have pain back in that same area and he wishes to have more injections. [facility] states he is taking Neurontin 400 mg 2 tabs in the morning which seems effective for him instead of the 400 mg 3 times a day. [facility] is taking amitriptyline 75 mg at bedtime.  It is prescribed 75 mg at bedtime. [facility] states he has increased pain at night.  Since we last saw him he underwent a left rotator cuff repair by Dr. Ofe Thurston and did well following that surgery. [facility] also sees Dr. Fab Hobbs for his carpal tunnel syndrome and needs to have surgery for that but has not had it yet. He states things are going as good as expected. He has had two shoulder surgerys in the past year. He states his neck pain is intermittent. He has occasional headaches. He was to continue gabapentin and elavil. Today, he states he is doing very well on the gabapentin. Of note, he is having surgery with Dr. Fab Hobbs next week. Denies bladder/bowel dysfunction, saddle paresthesia, weakness, gait disturbance, or other neurological deficit. Pt at this time desires to continue with current care/proceed with medication evaluation. EMG 2021  INTERPRETATION  This is an abnormal electrodiagnostic examination. These findings may be consistent with:   1. Moderate ulnar mononeuropathy at the right elbow (cubital tunnel syndrome)   2.  Moderate median mononeuropathy at the right wrist (carpal tunnel syndrome)   3. Mild median mononeuropathy at the left wrist (carpal tunnel syndrome)   4. Mild-to-moderate ulnar mononeuropathy at the left elbow (cubital tunnel syndrome)     ASSESSMENT  63 y.o. male with neck pain. Diagnoses and all orders for this visit:    1. Cervical radiculopathy  -     gabapentin (NEURONTIN) 800 mg tablet; Take 1 Tablet by mouth daily. Max Daily Amount: 800 mg. IMPRESSION/PLAN    1) Pt was given information on neck exercises. 2) cont gabapentin  3) cont HEP  4) Mr. Kenneth Serrano has a reminder for a \"due or due soon\" health maintenance. I have asked that he contact his primary care provider, Gely Infante NP, for follow-up on this health maintenance. 5) We have informed patient to notify us for immediate appointment if he has any worsening neurogical symptoms or if an emergency situation presents, then call 911  6) Pt will follow-up in 6 months. Risks and benefits of ongoing therapy have been reviewed with the patient.  is appropriate. No pain behaviors. Denies thoughts of harming self or others. Pt has a good risk to benefit ratio which allows the pt to function in a home environment without side effects. PAST MEDICAL HISTORY  Past Medical History:   Diagnosis Date    Bilateral hand pain     Depression     and anxiety    Foot drop, left foot     October, 2018. Seen by Podiatry in 2019    Headache     Psychotic disorder (HCC)     anxiety    Sleep apnea         MEDICATIONS  Current Outpatient Medications   Medication Sig Dispense Refill    gabapentin (NEURONTIN) 800 mg tablet Take 1 Tablet by mouth daily. Max Daily Amount: 800 mg. 90 Tablet 1    dextroamphetamine-amphetamine (AdderalL) 10 mg tablet Take 10 mg by mouth two (2) times a day. Indications: attention deficit disorder with hyperactivity      busPIRone (BUSPAR) 10 mg tablet Take 10 mg by mouth three (3) times daily.       Latuda 60 mg tab tablet Take 60 mg by mouth daily (with dinner).  escitalopram oxalate (LEXAPRO) 20 mg tablet Take 20 mg by mouth daily. ALLERGIES  Allergies   Allergen Reactions    Motrin [Ibuprofen] Hives    Motrin [Ibuprofen] Rash       SOCIAL HISTORY    Social History     Socioeconomic History    Marital status:      Spouse name: Not on file    Number of children: Not on file    Years of education: Not on file    Highest education level: Not on file   Occupational History    Not on file   Tobacco Use    Smoking status: Former Smoker     Years: 2.00     Quit date:      Years since quittin.5    Smokeless tobacco: Current User    Tobacco comment: Starting Chewing to bacco in . Vaping Use    Vaping Use: Never used   Substance and Sexual Activity    Alcohol use: Not Currently     Comment: Quit     Drug use: Not Currently     Types: Marijuana    Sexual activity: Not Currently   Other Topics Concern    Not on file   Social History Narrative    ** Merged History Encounter **          Social Determinants of Health     Financial Resource Strain:     Difficulty of Paying Living Expenses:    Food Insecurity:     Worried About Running Out of Food in the Last Year:     Ran Out of Food in the Last Year:    Transportation Needs:     Lack of Transportation (Medical):      Lack of Transportation (Non-Medical):    Physical Activity:     Days of Exercise per Week:     Minutes of Exercise per Session:    Stress:     Feeling of Stress :    Social Connections:     Frequency of Communication with Friends and Family:     Frequency of Social Gatherings with Friends and Family:     Attends Scientologist Services:     Active Member of Clubs or Organizations:     Attends Club or Organization Meetings:     Marital Status:    Intimate Partner Violence:     Fear of Current or Ex-Partner:     Emotionally Abused:     Physically Abused:     Sexually Abused:        SUBJECTIVE      Pain Scale: 4/10    Pain Assessment  2021 Location of Pain Neck;Back   Pain Location Comment -   Location Modifiers -   Severity of Pain 4   Quality of Pain Sharp; Other (Comment)   Quality of Pain Comment n/t to hands   Duration of Pain Persistent   Frequency of Pain Constant   Aggravating Factors Other (Comment)   Aggravating Factors Comment doing something   Limiting Behavior Yes   Relieving Factors Other (Comment)   Relieving Factors Comment don't move   Result of Injury -   Work-Related Injury -   How long out of work? -   Type of Injury -       Accompanied by self. REVIEW OF SYSTEMS  ROS    Constitutional: Negative for fever, chills, or weight change. Respiratory: Negative for cough or shortness of breath. Cardiovascular: Negative for chest pain or palpitations. Gastrointestinal: Negative for acid reflux, change in bowel habits, or constipation. Genitourinary: Negative for incontinence, dysuria and flank pain. Musculoskeletal: Positive for neck pain. Skin: Negative for rash. Neurological: Negative for headaches, dizziness, or numbness. Endo/Heme/Allergies: Negative . Psychiatric/Behavioral: Negative. PHYSICAL EXAMINATION  Visit Vitals  Pulse 65   Temp 98 °F (36.7 °C) (Skin)   Ht 5' 8\" (1.727 m)   Wt 222 lb (100.7 kg)   SpO2 99% Comment: RA   BMI 33.75 kg/m²       Constitutional: Well developed,  well nourished,  awake, alert, and in no acute distress. Neurological:  Sensation to light touch is intact. Psychiatric: Affect and mood are appropriate. Integumentary: No rashes or abrasions noted on exposed areas,  warm, dry and intact. Cardiovascular/Peripheral Vascular:  No peripheral edema is noted. Lymphatic:  No evidence of lymphedema. No cervical lymphadenopathy. SPINE/MUSCULOSKELETAL EXAM    Cervical spine:  Neck is midline. Normal muscle tone. No focal atrophy is noted. Shoulder ROM intact. Tenderness to palpation tp neck. Negative Spurling's sign. Negative Tinel's sign. Negative Paris's sign.      MOTOR    Biceps Triceps Deltoids Wrist Ext Wrist Flex Hand Intrin   Right +4/5 +4/5 +4/5 +4/5 +4/5 -4/5   Left +4/5 +4/5 +4/5 +4/5 +4/5 -4/5       normal gait and station    Ambulation without assistive device. full weight bearing, non-antalgic gait.     Jagdeep Blanchard, NP

## 2021-07-21 NOTE — PERIOP NOTES
PRE-SURGICAL INSTRUCTIONS        Patient's Name:  Ernesto Chester      Today's Date:  7/21/2021              Surgery Date:  7/27/2021                1. Do NOT eat or drink anything, including candy, gum, or ice chips after midnight on 7/26, unless you have specific instructions from your surgeon or anesthesia provider to do so.  2. You may brush your teeth before coming to the hospital.  3. No smoking 24 hours prior to the day of surgery. 4. No alcohol 24 hours prior to the day of surgery. 5. No recreational drugs for one week prior to the day of surgery. 6. Leave all valuables, including money/purse, at home. 7. Remove all jewelry, nail polish, acrylic nails, and makeup (including mascara); no lotions powders, deodorant, or perfume/cologne/after shave on the skin. 8. Glasses/contact lenses and dentures may be worn to the hospital.  They will be removed prior to surgery. 9. Call your doctor if symptoms of a cold or illness develop within 24-48 hours prior to your surgery. 10.  AN ADULT MUST DRIVE YOU HOME AFTER OUTPATIENT SURGERY. 11.  If you are having an outpatient procedure, please make arrangements for a responsible adult to be with you for 24 hours after your surgery. 12.One visitor may accompany you. Everyone must wear a mask and practice social distancing. Surgical waiting room is open for visitor. Special Instructions:      Bring any pertinent legal medical records. Take these medications the morning of surgery with a sip of water:  May take AM meds with water only or take post procedure. Do not eat anything after 0100 !!!! On the day of surgery, come in the main entrance of [facility]. Let the  at the desk know you are there for surgery. A staff member will come escort you to the surgical area on the second floor.     If you have any questions or concerns, please do not hesitate to call:     (Prior to the day of surgery) [facility] department:  [phone]   (Day of surgery) Pre-Op department:  [phone]    These surgical instructions were reviewed with Aggie Yung during the PAT phone call.

## 2021-07-22 ENCOUNTER — OFFICE VISIT (OUTPATIENT)
Dept: ORTHOPEDIC SURGERY | Age: 56
End: 2021-07-22
Payer: MEDICAID

## 2021-07-22 ENCOUNTER — HOSPITAL ENCOUNTER (OUTPATIENT)
Dept: PREADMISSION TESTING | Age: 56
Discharge: HOME | End: 2021-07-22
Payer: MEDICAID

## 2021-07-22 VITALS
WEIGHT: 222 LBS | HEIGHT: 68 IN | BODY MASS INDEX: 33.65 KG/M2 | TEMPERATURE: 98 F | OXYGEN SATURATION: 99 % | HEART RATE: 65 BPM

## 2021-07-22 DIAGNOSIS — M54.12 CERVICAL RADICULOPATHY: ICD-10-CM

## 2021-07-22 DIAGNOSIS — Z01.818 PREOP EXAMINATION: ICD-10-CM

## 2021-07-22 LAB
ALBUMIN SERPL-MCNC: 3.8 G/DL (ref 3.4–5)
ALBUMIN/GLOB SERPL: 1.2 {RATIO} (ref 0.8–1.7)
ALP SERPL-CCNC: 97 U/L (ref 45–117)
ALT SERPL-CCNC: 20 U/L (ref 16–61)
ANION GAP SERPL CALC-SCNC: 2 MMOL/L (ref 3–18)
AST SERPL-CCNC: 15 U/L (ref 10–38)
BASOPHILS # BLD: 0 K/UL (ref 0–0.1)
BASOPHILS NFR BLD: 1 % (ref 0–2)
BILIRUB SERPL-MCNC: 0.4 MG/DL (ref 0.2–1)
BUN SERPL-MCNC: 12 MG/DL (ref 7–18)
BUN/CREAT SERPL: 14 (ref 12–20)
CALCIUM SERPL-MCNC: 9 MG/DL (ref 8.5–10.1)
CHLORIDE SERPL-SCNC: 107 MMOL/L (ref 100–111)
CO2 SERPL-SCNC: 32 MMOL/L (ref 21–32)
CREAT SERPL-MCNC: 0.85 MG/DL (ref 0.6–1.3)
DIFFERENTIAL METHOD BLD: ABNORMAL
EOSINOPHIL # BLD: 0.1 K/UL (ref 0–0.4)
EOSINOPHIL NFR BLD: 2 % (ref 0–5)
ERYTHROCYTE [DISTWIDTH] IN BLOOD BY AUTOMATED COUNT: 11.6 % (ref 11.6–14.5)
GLOBULIN SER CALC-MCNC: 3.2 G/DL (ref 2–4)
GLUCOSE SERPL-MCNC: 112 MG/DL (ref 74–99)
HCT VFR BLD AUTO: 45 % (ref 36–48)
HGB BLD-MCNC: 14.9 G/DL (ref 13–16)
LYMPHOCYTES # BLD: 1.8 K/UL (ref 0.9–3.6)
LYMPHOCYTES NFR BLD: 30 % (ref 21–52)
MCH RBC QN AUTO: 30.3 PG (ref 24–34)
MCHC RBC AUTO-ENTMCNC: 33.1 G/DL (ref 31–37)
MCV RBC AUTO: 91.6 FL (ref 74–97)
MONOCYTES # BLD: 0.7 K/UL (ref 0.05–1.2)
MONOCYTES NFR BLD: 11 % (ref 3–10)
NEUTS SEG # BLD: 3.4 K/UL (ref 1.8–8)
NEUTS SEG NFR BLD: 56 % (ref 40–73)
PLATELET # BLD AUTO: 180 K/UL (ref 135–420)
PMV BLD AUTO: 9.9 FL (ref 9.2–11.8)
POTASSIUM SERPL-SCNC: 5.1 MMOL/L (ref 3.5–5.5)
PROT SERPL-MCNC: 7 G/DL (ref 6.4–8.2)
RBC # BLD AUTO: 4.91 M/UL (ref 4.35–5.65)
SODIUM SERPL-SCNC: 141 MMOL/L (ref 136–145)
WBC # BLD AUTO: 6.1 K/UL (ref 4.6–13.2)

## 2021-07-22 PROCEDURE — 80053 COMPREHEN METABOLIC PANEL: CPT

## 2021-07-22 PROCEDURE — U0003 INFECTIOUS AGENT DETECTION BY NUCLEIC ACID (DNA OR RNA); SEVERE ACUTE RESPIRATORY SYNDROME CORONAVIRUS 2 (SARS-COV-2) (CORONAVIRUS DISEASE [COVID-19]), AMPLIFIED PROBE TECHNIQUE, MAKING USE OF HIGH THROUGHPUT TECHNOLOGIES AS DESCRIBED BY CMS-2020-01-R: HCPCS

## 2021-07-22 PROCEDURE — 93005 ELECTROCARDIOGRAM TRACING: CPT

## 2021-07-22 PROCEDURE — 85025 COMPLETE CBC W/AUTO DIFF WBC: CPT

## 2021-07-22 PROCEDURE — 99213 OFFICE O/P EST LOW 20 MIN: CPT | Performed by: NURSE PRACTITIONER

## 2021-07-22 PROCEDURE — 36415 COLL VENOUS BLD VENIPUNCTURE: CPT

## 2021-07-22 RX ORDER — GABAPENTIN 800 MG/1
800 TABLET ORAL DAILY
Qty: 90 TABLET | Refills: 1 | Status: SHIPPED | OUTPATIENT
Start: 2021-07-22 | End: 2022-01-19 | Stop reason: SDUPTHER

## 2021-07-22 NOTE — PATIENT INSTRUCTIONS
Back Stretches: Exercises  Introduction  Here are some examples of exercises for stretching your back. Start each exercise slowly. Ease off the exercise if you start to have pain. Your doctor or physical therapist will tell you when you can start these exercises and which ones will work best for you. How to do the exercises  Overhead stretch   1. Stand comfortably with your feet shoulder-width apart. 2. Looking straight ahead, raise both arms over your head and reach toward the ceiling. Do not allow your head to tilt back. 3. Hold for 15 to 30 seconds, then lower your arms to your sides. 4. Repeat 2 to 4 times. Side stretch   1. Stand comfortably with your feet shoulder-width apart. 2. Raise one arm over your head, and then lean to the other side. 3. Slide your hand down your leg as you let the weight of your arm gently stretch your side muscles. Hold for 15 to 30 seconds. 4. Repeat 2 to 4 times on each side. Press-up   1. Lie on your stomach, supporting your body with your forearms. 2. Press your elbows down into the floor to raise your upper back. As you do this, relax your stomach muscles and allow your back to arch without using your back muscles. As your press up, do not let your hips or pelvis come off the floor. 3. Hold for 15 to 30 seconds, then relax. 4. Repeat 2 to 4 times. Relax and rest   1. Lie on your back with a rolled towel under your neck and a pillow under your knees. Extend your arms comfortably to your sides. 2. Relax and breathe normally. 3. Remain in this position for about 10 minutes. 4. If you can, do this 2 or 3 times each day. Follow-up care is a key part of your treatment and safety. Be sure to make and go to all appointments, and call your doctor if you are having problems. It's also a good idea to know your test results and keep a list of the medicines you take. Where can you learn more?   Go to http://www.gray.com/  Enter [id number] in the search box to learn more about \"Back Stretches: Exercises. \"  Current as of: November 16, 2020               Content Version: 12.8  © 2006-2021 [employer]. Care instructions adapted under license by [employer] (which disclaims liability or warranty for this information). If you have questions about a medical condition or this instruction, always ask your healthcare professional. [address] any warranty or liability for your use of this information. Neck Arthritis: Exercises  Introduction  Here are some examples of exercises for you to try. The exercises may be suggested for a condition or for rehabilitation. Start each exercise slowly. Ease off the exercises if you start to have pain. You will be told when to start these exercises and which ones will work best for you. How to do the exercises  Neck stretches to the side   1. This stretch works best if you keep your shoulder down as you lean away from it. To help you remember to do this, start by relaxing your shoulders and lightly holding on to your thighs or your chair. 2. Tilt your head toward your shoulder and hold for 15 to 30 seconds. Let the weight of your head stretch your muscles. 3. Repeat 2 to 4 times toward each shoulder. Chin tuck   1. Lie on the floor with a rolled-up towel under your neck. Your head should be touching the floor. 2. Slowly bring your chin toward your chest.  3. Hold for a count of 6, and then relax for up to 10 seconds. 4. Repeat 8 to 12 times. Active cervical rotation   1. Sit in a firm chair, or stand up straight. 2. Keeping your chin level, turn your head to the right, and hold for 15 to 30 seconds. 3. Turn your head to the left and hold for 15 to 30 seconds. 4. Repeat 2 to 4 times to each side. Shoulder blade squeeze   1. While standing, squeeze your shoulder blades together. 2. Do not raise your shoulders up as you are squeezing.   3. Hold for 6 seconds. 4. Repeat 8 to 12 times. Shoulder rolls   1. Sit comfortably with your feet shoulder-width apart. You can also do this exercise standing up. 2. Roll your shoulders up, then back, and then down in a smooth, circular motion. 3. Repeat 2 to 4 times. Follow-up care is a key part of your treatment and safety. Be sure to make and go to all appointments, and call your doctor if you are having problems. It's also a good idea to know your test results and keep a list of the medicines you take. Where can you learn more? Go to http://www.gray.com/  Enter O045 in the search box to learn more about \"Neck Arthritis: Exercises. \"  Current as of: November 16, 2020               Content Version: 12.8  © 2006-2021 [employer]. Care instructions adapted under license by [employer] (which disclaims liability or warranty for this information). If you have questions about a medical condition or this instruction, always ask your healthcare professional. [address] any warranty or liability for your use of this information.

## 2021-07-22 NOTE — PROGRESS NOTES
Dion Burns presents today for   Chief Complaint   Patient presents with    Neck Pain    Back Pain       Is someone accompanying this pt? no    Is the patient using any DME equipment during OV? no    Depression Screening:  3 most recent PHQ Screens 3/5/2021   PHQ Not Done -   Little interest or pleasure in doing things Nearly every day   Feeling down, depressed, irritable, or hopeless Nearly every day   Total Score PHQ 2 6   Trouble falling or staying asleep, or sleeping too much Nearly every day   Feeling tired or having little energy Nearly every day   Poor appetite, weight loss, or overeating Not at all   Feeling bad about yourself - or that you are a failure or have let yourself or your family down Nearly every day   Trouble concentrating on things such as school, work, reading, or watching TV Nearly every day   Moving or speaking so slowly that other people could have noticed; or the opposite being so fidgety that others notice Not at all   Thoughts of being better off dead, or hurting yourself in some way Not at all   PHQ 9 Score 18   How difficult have these problems made it for you to do your work, take care of your home and get along with others Very difficult       Abuse Screening:  Abuse Screening Questionnaire 1/28/2021   Do you ever feel afraid of your partner? N   Are you in a relationship with someone who physically or mentally threatens you? N   Is it safe for you to go home? Y       Fall Risk  Fall Risk Assessment, last 12 mths 3/5/2021   Able to walk? Yes   Fall in past 12 months? 0   Do you feel unsteady? 0   Are you worried about falling 0       Coordination of Care:  1. Have you been to the ER, urgent care clinic since your last visit? Yes, pt went to [facility] for abdominal pain. Hospitalized since your last visit? no    2. Have you seen or consulted any other health care providers outside of the [address] [place] [address] since your last visit?  Yes, pcp, GI Include any pap smears or colon screening.  no

## 2021-07-23 LAB
ATRIAL RATE: 61 BPM
CALCULATED P AXIS, ECG09: 29 DEGREES
CALCULATED R AXIS, ECG10: -8 DEGREES
CALCULATED T AXIS, ECG11: 29 DEGREES
DIAGNOSIS, 93000: NORMAL
P-R INTERVAL, ECG05: 132 MS
Q-T INTERVAL, ECG07: 390 MS
QRS DURATION, ECG06: 92 MS
QTC CALCULATION (BEZET), ECG08: 392 MS
SARS-COV-2, COV2NT: NOT DETECTED
VENTRICULAR RATE, ECG03: 61 BPM

## 2021-07-26 ENCOUNTER — ANESTHESIA EVENT (OUTPATIENT)
Dept: SURGERY | Age: 56
End: 2021-07-26
Payer: MEDICAID

## 2021-07-26 NOTE — H&P
Sergio Whitley is a 63 y.o. male right handed unknown employment. Worker's Compensation and legal considerations: none filed.         Vitals:     07/19/21 1044   BP: 110/73   Pulse: 65   Resp: 18   Temp: 98.2 °F (36.8 °C)   TempSrc: Temporal   SpO2: 99%   Weight: 222 lb 3.2 oz (100.8 kg)   Height: 5' 8\" (1.727 m)   PainSc:   5   PainLoc: Hand                    Chief Complaint   Patient presents with    Hand Pain       right CTS history and physical         HPI: Patient presents today for preoperative history and physical as he is scheduled for right carpal tunnel release, right cubital tunnel release, and right volar ganglion cyst excision. He denies any changes since his previous visit.     EMG HPI: Patient presents today for repeat EMG follow-up. He reports the right side to be worse than the left and is having weakness as well as continued arthritic pain on the right. He also reports the cyst in his right volar wrist today tender. He reports some continued irritation and paresthesias over his previous left thumb laceration.     5/17/2021 HPI: Patient presents today following up on his bilateral hand numbness and tingling that is been longstanding as well as left thumb laceration follow-up. He is here to have his sutures removed today. He also presents with a new problem of ganglion cyst on the volar aspect of his right wrist.     5/11/2021 HPI: Patient presents today with a new problem of left thumb laceration that occurred a few days back. He was given antibiotics and his wound debrided and closed in the ER.     9/16/2019 HPI: Patient comes in today with new complaint of right middle finger mass and pain. He has a history of right volar and dorsal ganglion cyst.  He also has a history of left shoulder rotator cuff repair and is planning to have his right shoulder rotator cuff done next week.     Initial HPI: Patient comes in today with complaints of bilateral hand numbness and tingling.   He reports that he had a left carpal tunnel diagnosis after an EMG several years ago. He has not had any treatment for it. He also has a cyst on the right wrist that was injected several months ago but did not help. He also reports a history of having digits amputated that were placed back on many years ago on the left hand. He has had chronic pain from scar tissue in his hand.     Date of onset: Indeterminate     Injury: Yes: Comment: Left thumb laceration     Prior Treatment:  Yes: Comment: ED debridement and closure     Numbness/ Tingling: Yes: Comment: Bilateral hands left worse than right     ROS: Review of Systems - General ROS: negative  Respiratory ROS: no cough, shortness of breath, or wheezing  Cardiovascular ROS: no chest pain or dyspnea on exertion  Musculoskeletal ROS: positive for - pain in thumb, left  Neurological ROS: Negative  Dermatological ROS: negative          Past Medical History:   Diagnosis Date    Bilateral hand pain      Depression       and anxiety    Foot drop, left foot       October, 2018.   Seen by Podiatry in 2019    Headache      Psychotic disorder [facility])       anxiety         Surgical History         Past Surgical History:   Procedure Laterality Date    COLONOSCOPY N/A 10/28/2020     COLONOSCOPY w/polypectomies performed by Shama Martinez [facility] at [facility] END[facility]COPY    HX HERNIA REPAIR        HX HERNIA REPAIR         Inguinal hernia- right side    HX KNEE ARTHROSCOPY Right      HX ORTHOPAEDIC Left 2017     lacerations to fingers and had pins, left thumb    HX ORTHOPAEDIC         Right knee surgery    HX OTHER SURGICAL   2017     Left hand surgery x 3    HX ROTATOR CUFF REPAIR Right 09/21/2020     [facility]     HX ROTATOR CUFF REPAIR Left 10/21/2019    HX ROTATOR CUFF REPAIR   2000     Right shoulder    ID ANESTH,SURGERY OF SHOULDER Right                     Current Outpatient Medications   Medication Sig Dispense Refill    gabapentin (NEURONTIN) 800 mg tablet Take 1 Tab by mouth daily. Max Daily Amount: 800 mg. 90 Tab 0    amitriptyline (ELAVIL) 75 mg tablet Take 1 Tab by mouth nightly for 180 days. 90 Tab 1    busPIRone (BUSPAR) 10 mg tablet Take 10 mg by mouth two (2) times a day.        Latuda 60 mg tab tablet 60 mg.        escitalopram oxalate (LEXAPRO) 20 mg tablet                    Allergies   Allergen Reactions    Motrin [Ibuprofen] Hives    Motrin [Ibuprofen] Rash            PE:        Physical Exam  Vitals and nursing note reviewed. Constitutional:       General: He is not in acute distress. Appearance: Normal appearance. He is not ill-appearing, toxic-appearing or diaphoretic. HENT:      Head: Normocephalic and atraumatic. Nose: Nose normal.      Mouth/Throat:      Mouth: Mucous membranes are moist.   Eyes:      Extraocular Movements: Extraocular movements intact. Pupils: Pupils are equal, round, and reactive to light. Cardiovascular:      Pulses: Normal pulses. Pulmonary:      Effort: Pulmonary effort is normal. No respiratory distress. Abdominal:      General: Abdomen is flat. There is no distension. Musculoskeletal:         General: Swelling and tenderness present. No deformity or signs of injury. Normal range of motion. Cervical back: Normal range of motion and neck supple. Right lower leg: No edema. Left lower leg: No edema. Skin:     General: Skin is warm and dry. Capillary Refill: Capillary refill takes less than 2 seconds. Findings: No bruising or erythema. Neurological:      General: No focal deficit present. Mental Status: He is alert and oriented to person, place, and time. Psychiatric:         Mood and Affect: Mood normal.         Behavior: Behavior normal.            Left thumb: Laceration healed. There is some tenderness to palpation over the dorsal of the thumb.   Sensation intact distally and cap refill brisk.        NEUROVASCULAR     Examination L R Examination L R   Carpal Comp. + + Pronator Comp. - -   Carpal Tinel + + Pronator Tinel - -   Phalen's + + Pronator Stress - -   Cubital Comp. ? ? Guyon Comp. - -   Cubital Tinel ? ? Guyon Tinel - -   Elbow Hyperflexion - - Adson's - -   Spurling's - - SC Comp. - -   PCB Median abn - - SC Tinel - -   Radial Tinel - - IC Comp. - -   Digital Tinel - - IC Tinel - -   Radial 2-Pt WNL WNL Ulnar 2-Pt WNL WNL      Radial Pulse: 2+  Capillary Refill: < 2 sec  Angelito: Not Performed  Digital Angelito: Not Performed        Imaging:     Plain films of bilateral wrist does not show substantial degenerative changes  Fracture dislocation or any other osseous abnormalities.          2021  NCV & EMG Findings:  Evaluation of the right median motor nerve showed prolonged distal onset latency (4.6 ms).  The left ulnar motor nerve showed prolonged distal onset latency (3.8 ms) and decreased conduction velocity (A Elbow-B Elbow, 42 m/s).  The right ulnar motor nerve showed decreased conduction velocity (A Elbow-B Elbow, 42 m/s).  The left median sensory nerve showed prolonged distal peak latency (Wrist, 4.2 ms), reduced amplitude (Wrist, 7.9 µV), and decreased conduction velocity (Wrist-2nd Digit, 33 m/s).  The right median sensory nerve showed prolonged distal peak latency (4.3 ms), reduced amplitude (5.1 µV), and decreased conduction velocity (Wrist-2nd Digit, 33 m/s).  The left ulnar sensory and the right ulnar sensory nerves showed reduced amplitude (L8.4, R5.9 µV).  All remaining nerves (as indicated in the following tables) were within normal limits.  Left vs. Right side comparison data for the ulnar motor nerve indicates abnormal L-R latency difference (0.9 ms).  All remaining left vs. right side differences were within normal limits.       All examined muscles (as indicated in the following table) showed no evidence of electrical instability.       INTERPRETATION  This is an abnormal electrodiagnostic examination.  These findings may be consistent with:  Carlos Esqueda mononeuropathy at the right elbow (cubital tunnel syndrome)   2. Moderate median mononeuropathy at the right wrist (carpal tunnel syndrome)   3. Mild median mononeuropathy at the left wrist (carpal tunnel syndrome)   4. Mild-to-moderate ulnar mononeuropathy at the left elbow (cubital tunnel syndrome)     There is no electrodiagnostic evidence of any cervical radiculopathy, brachial plexopathy, peripheral polyneuropathy, or any other mononeuropathy.     CLINICAL INTERPRETATION  When compared to his last EMG, there has been significant improvements on his left side electrodiagnostic findings. His palmar signals are detectable and within normal latency limits. His carpal and cubital tunnel findings on the left are now classified as mild. The right sided electrodiagnostic findings are essentially unchanged from his last study.      His symptoms of hand numbness and tingling may correlate with these electrodiagnostic findings. There is no evidence of radial mononeuropathy which could be associated with his radial wrist mass.         2019  NCV & EMG Findings:  Evaluation of the left median motor and the right median motor nerves showed prolonged distal onset latency (L4.4, R4.5 ms).  The left ulnar motor and the right ulnar motor nerves showed decreased conduction velocity (A Elbow-B Elbow, L38, R42 m/s).  The left median sensory and the right median sensory nerves showed prolonged distal peak latency (L4.6, R4.4 ms) and decreased conduction velocity (Wrist-2nd Digit, L30, R32 m/s).  The left ulnar sensory nerve showed prolonged distal peak latency (4.1 ms), reduced amplitude (10.5 µV), and decreased conduction velocity (Wrist-5th Digit, 34 m/s).  The right ulnar sensory nerve showed prolonged distal peak latency (5.7 ms) and decreased conduction velocity (Wrist-5th Digit, 25 m/s).   The left median/ulnar (palm) comparison nerve showed no response (Median Palm) and no response (Ulnar Palm).  All remaining nerves (as indicated in the following tables) were within normal limits.  Left vs. Right side comparison data for the ulnar motor nerve indicates abnormal L-R latency difference (0.8 ms).  The median sensory nerve indicates abnormal L-R amplitude difference (63.1 %).  The ulnar sensory nerve indicates abnormal L-R latency difference (1.6 ms).  All remaining left vs. right side differences were within normal limits.       All examined muscles (as indicated in the following table) showed no evidence of electrical instability.          INTERPRETATION  This is an abnormal electrodiagnostic examination. These findings may be consistent with:  1. 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 Moderate median mononeuropathy at the wrist bilaterally (carpal tunnel syndrome)  3. Severe neuropathy focally related to both median and ulnar palmar branches     There is no electrodiagnostic evidence of any cervical radiculopathy, brachial plexopathy, peripheral polyneuropathy, or any other mononeuropathy.     CLINICAL INTERPRETATION  His electrodiagnostic findings in the median and ulnar nerves are consistent with his symptoms of numbness and tingling in both hands.             ICD-10-CM ICD-9-CM     1. Ganglion cyst of volar aspect of right wrist  M67.431 727.41     2. Cubital tunnel syndrome on right  G56.21 354. 2     3. Right carpal tunnel syndrome  G56.01 354. 0           Plan:      Proceed as scheduled for right volar ganglion cyst excision, right cubital tunnel release, and right carpal tunnel release.     The patient was counseled at length about the risks of chris Covid-19 during their perioperative period and any recovery window from their procedure.  The patient was made aware that chris Covid-19  may worsen their prognosis for recovering from their procedure and lend to a higher morbidity and/or mortality risk.  All material risks, benefits, and reasonable alternatives including postponing the procedure were discussed.  The patient does  wish to proceed with the procedure at this time.     This procedure has been fully reviewed with the patient and written informed consent has been obtained.        Follow-up and Dispositions    · Return for As scheduled.         Plan was reviewed with patient, who verbalized agreement and understanding of the plan

## 2021-07-27 ENCOUNTER — ANESTHESIA (OUTPATIENT)
Dept: SURGERY | Age: 56
End: 2021-07-27
Payer: MEDICAID

## 2021-07-27 ENCOUNTER — HOSPITAL ENCOUNTER (OUTPATIENT)
Age: 56
Setting detail: OUTPATIENT SURGERY
Discharge: HOME | End: 2021-07-27
Attending: ORTHOPAEDIC SURGERY | Admitting: ORTHOPAEDIC SURGERY
Payer: MEDICAID

## 2021-07-27 VITALS
SYSTOLIC BLOOD PRESSURE: 113 MMHG | BODY MASS INDEX: 32.13 KG/M2 | HEART RATE: 57 BPM | RESPIRATION RATE: 20 BRPM | DIASTOLIC BLOOD PRESSURE: 78 MMHG | OXYGEN SATURATION: 95 % | WEIGHT: 212 LBS | TEMPERATURE: 97.1 F | HEIGHT: 68 IN

## 2021-07-27 DIAGNOSIS — M67.431 GANGLION CYST OF VOLAR ASPECT OF RIGHT WRIST: ICD-10-CM

## 2021-07-27 DIAGNOSIS — G56.01 RIGHT CARPAL TUNNEL SYNDROME: ICD-10-CM

## 2021-07-27 DIAGNOSIS — Z98.890 S/P CUBITAL TUNNEL RELEASE: ICD-10-CM

## 2021-07-27 DIAGNOSIS — Z98.890 S/P CARPAL TUNNEL RELEASE: ICD-10-CM

## 2021-07-27 DIAGNOSIS — Z98.890 S/P EXCISION OF GANGLION CYST: ICD-10-CM

## 2021-07-27 DIAGNOSIS — G56.21 CUBITAL TUNNEL SYNDROME, RIGHT: Primary | ICD-10-CM

## 2021-07-27 PROCEDURE — 80307 DRUG TEST PRSMV CHEM ANLYZR: CPT

## 2021-07-27 PROCEDURE — 76010000153 HC OR TIME 1.5 TO 2 HR: Performed by: ORTHOPAEDIC SURGERY

## 2021-07-27 PROCEDURE — 01810 ANES PX NRV MUSC F/ARM WRST: CPT | Performed by: NURSE ANESTHETIST, CERTIFIED REGISTERED

## 2021-07-27 PROCEDURE — 76210000016 HC OR PH I REC 1 TO 1.5 HR: Performed by: ORTHOPAEDIC SURGERY

## 2021-07-27 PROCEDURE — 77030002933 HC SUT MCRYL J&J -A: Performed by: ORTHOPAEDIC SURGERY

## 2021-07-27 PROCEDURE — 64718 REVISE ULNAR NERVE AT ELBOW: CPT | Performed by: ORTHOPAEDIC SURGERY

## 2021-07-27 PROCEDURE — 2709999900 HC NON-CHARGEABLE SUPPLY: Performed by: ORTHOPAEDIC SURGERY

## 2021-07-27 PROCEDURE — 74011000250 HC RX REV CODE- 250: Performed by: ORTHOPAEDIC SURGERY

## 2021-07-27 PROCEDURE — 77030040922 HC BLNKT HYPOTHRM STRY -A: Performed by: ORTHOPAEDIC SURGERY

## 2021-07-27 PROCEDURE — 77030011266 HC ELECTRD BLD INSL COVD -A: Performed by: ORTHOPAEDIC SURGERY

## 2021-07-27 PROCEDURE — 74011250636 HC RX REV CODE- 250/636: Performed by: ORTHOPAEDIC SURGERY

## 2021-07-27 PROCEDURE — 77030028271 HC SRGFL HEMSTAT MTRX KT J&J -C: Performed by: ORTHOPAEDIC SURGERY

## 2021-07-27 PROCEDURE — 64721 CARPAL TUNNEL SURGERY: CPT | Performed by: ORTHOPAEDIC SURGERY

## 2021-07-27 PROCEDURE — 74011250636 HC RX REV CODE- 250/636: Performed by: NURSE ANESTHETIST, CERTIFIED REGISTERED

## 2021-07-27 PROCEDURE — 77030008683 HC TU ET CUF COVD -A: Performed by: ANESTHESIOLOGY

## 2021-07-27 PROCEDURE — 74011250637 HC RX REV CODE- 250/637: Performed by: NURSE ANESTHETIST, CERTIFIED REGISTERED

## 2021-07-27 PROCEDURE — 01810 ANES PX NRV MUSC F/ARM WRST: CPT | Performed by: ANESTHESIOLOGY

## 2021-07-27 PROCEDURE — 77030010813: Performed by: ORTHOPAEDIC SURGERY

## 2021-07-27 PROCEDURE — 77030006689 HC BLD OPHTH BVR BD -A: Performed by: ORTHOPAEDIC SURGERY

## 2021-07-27 PROCEDURE — 77030040361 HC SLV COMPR DVT MDII -B: Performed by: ORTHOPAEDIC SURGERY

## 2021-07-27 PROCEDURE — 88304 TISSUE EXAM BY PATHOLOGIST: CPT

## 2021-07-27 PROCEDURE — 74011250637 HC RX REV CODE- 250/637: Performed by: ORTHOPAEDIC SURGERY

## 2021-07-27 PROCEDURE — 77030013079 HC BLNKT BAIR HGGR 3M -A: Performed by: ANESTHESIOLOGY

## 2021-07-27 PROCEDURE — 76210000021 HC REC RM PH II 0.5 TO 1 HR: Performed by: ORTHOPAEDIC SURGERY

## 2021-07-27 PROCEDURE — 76060000034 HC ANESTHESIA 1.5 TO 2 HR: Performed by: ORTHOPAEDIC SURGERY

## 2021-07-27 PROCEDURE — 74011000250 HC RX REV CODE- 250: Performed by: NURSE ANESTHETIST, CERTIFIED REGISTERED

## 2021-07-27 PROCEDURE — 25111 REMOVE WRIST TENDON LESION: CPT | Performed by: ORTHOPAEDIC SURGERY

## 2021-07-27 PROCEDURE — 77030040356 HC CORD BPLR FRCP COVD -A: Performed by: ORTHOPAEDIC SURGERY

## 2021-07-27 RX ORDER — FAMOTIDINE 20 MG/1
20 TABLET, FILM COATED ORAL ONCE
Status: COMPLETED | OUTPATIENT
Start: 2021-07-27 | End: 2021-07-27

## 2021-07-27 RX ORDER — ONDANSETRON 2 MG/ML
INJECTION INTRAMUSCULAR; INTRAVENOUS AS NEEDED
Status: DISCONTINUED | OUTPATIENT
Start: 2021-07-27 | End: 2021-07-27 | Stop reason: HOSPADM

## 2021-07-27 RX ORDER — SODIUM CHLORIDE 0.9 % (FLUSH) 0.9 %
5-40 SYRINGE (ML) INJECTION AS NEEDED
Status: DISCONTINUED | OUTPATIENT
Start: 2021-07-27 | End: 2021-07-27 | Stop reason: HOSPADM

## 2021-07-27 RX ORDER — FENTANYL CITRATE 50 UG/ML
50 INJECTION, SOLUTION INTRAMUSCULAR; INTRAVENOUS AS NEEDED
Status: DISCONTINUED | OUTPATIENT
Start: 2021-07-27 | End: 2021-07-27 | Stop reason: HOSPADM

## 2021-07-27 RX ORDER — GLYCOPYRROLATE 0.2 MG/ML
INJECTION INTRAMUSCULAR; INTRAVENOUS AS NEEDED
Status: DISCONTINUED | OUTPATIENT
Start: 2021-07-27 | End: 2021-07-27 | Stop reason: HOSPADM

## 2021-07-27 RX ORDER — DEXAMETHASONE SODIUM PHOSPHATE 4 MG/ML
INJECTION, SOLUTION INTRA-ARTICULAR; INTRALESIONAL; INTRAMUSCULAR; INTRAVENOUS; SOFT TISSUE AS NEEDED
Status: DISCONTINUED | OUTPATIENT
Start: 2021-07-27 | End: 2021-07-27 | Stop reason: HOSPADM

## 2021-07-27 RX ORDER — ONDANSETRON 2 MG/ML
4 INJECTION INTRAMUSCULAR; INTRAVENOUS ONCE
Status: COMPLETED | OUTPATIENT
Start: 2021-07-27 | End: 2021-07-27

## 2021-07-27 RX ORDER — MAGNESIUM SULFATE 1 G/100ML
INJECTION INTRAVENOUS AS NEEDED
Status: DISCONTINUED | OUTPATIENT
Start: 2021-07-27 | End: 2021-07-27 | Stop reason: HOSPADM

## 2021-07-27 RX ORDER — LIDOCAINE HYDROCHLORIDE 20 MG/ML
INJECTION, SOLUTION EPIDURAL; INFILTRATION; INTRACAUDAL; PERINEURAL AS NEEDED
Status: DISCONTINUED | OUTPATIENT
Start: 2021-07-27 | End: 2021-07-27 | Stop reason: HOSPADM

## 2021-07-27 RX ORDER — HYDROCODONE BITARTRATE AND ACETAMINOPHEN 7.5; 325 MG/1; MG/1
1 TABLET ORAL
Qty: 16 TABLET | Refills: 0 | Status: SHIPPED | OUTPATIENT
Start: 2021-07-27 | End: 2021-07-28 | Stop reason: ALTCHOICE

## 2021-07-27 RX ORDER — FENTANYL CITRATE 50 UG/ML
100 INJECTION, SOLUTION INTRAMUSCULAR; INTRAVENOUS
Status: DISCONTINUED | OUTPATIENT
Start: 2021-07-27 | End: 2021-07-27 | Stop reason: HOSPADM

## 2021-07-27 RX ORDER — DEXMEDETOMIDINE HYDROCHLORIDE 4 UG/ML
INJECTION, SOLUTION INTRAVENOUS AS NEEDED
Status: DISCONTINUED | OUTPATIENT
Start: 2021-07-27 | End: 2021-07-27 | Stop reason: HOSPADM

## 2021-07-27 RX ORDER — FENTANYL CITRATE 50 UG/ML
INJECTION, SOLUTION INTRAMUSCULAR; INTRAVENOUS AS NEEDED
Status: DISCONTINUED | OUTPATIENT
Start: 2021-07-27 | End: 2021-07-27 | Stop reason: HOSPADM

## 2021-07-27 RX ORDER — SODIUM CHLORIDE, SODIUM LACTATE, POTASSIUM CHLORIDE, CALCIUM CHLORIDE 600; 310; 30; 20 MG/100ML; MG/100ML; MG/100ML; MG/100ML
25 INJECTION, SOLUTION INTRAVENOUS CONTINUOUS
Status: DISCONTINUED | OUTPATIENT
Start: 2021-07-27 | End: 2021-07-27 | Stop reason: HOSPADM

## 2021-07-27 RX ORDER — PROPOFOL 10 MG/ML
INJECTION, EMULSION INTRAVENOUS AS NEEDED
Status: DISCONTINUED | OUTPATIENT
Start: 2021-07-27 | End: 2021-07-27 | Stop reason: HOSPADM

## 2021-07-27 RX ORDER — HYDROCODONE BITARTRATE AND ACETAMINOPHEN 5; 325 MG/1; MG/1
1 TABLET ORAL ONCE
Status: COMPLETED | OUTPATIENT
Start: 2021-07-27 | End: 2021-07-27

## 2021-07-27 RX ORDER — BUPIVACAINE HYDROCHLORIDE 2.5 MG/ML
INJECTION, SOLUTION EPIDURAL; INFILTRATION; INTRACAUDAL AS NEEDED
Status: DISCONTINUED | OUTPATIENT
Start: 2021-07-27 | End: 2021-07-27 | Stop reason: HOSPADM

## 2021-07-27 RX ORDER — SODIUM CHLORIDE 0.9 % (FLUSH) 0.9 %
5-40 SYRINGE (ML) INJECTION EVERY 8 HOURS
Status: DISCONTINUED | OUTPATIENT
Start: 2021-07-27 | End: 2021-07-27 | Stop reason: HOSPADM

## 2021-07-27 RX ORDER — LIDOCAINE HYDROCHLORIDE 10 MG/ML
0.1 INJECTION, SOLUTION EPIDURAL; INFILTRATION; INTRACAUDAL; PERINEURAL AS NEEDED
Status: DISCONTINUED | OUTPATIENT
Start: 2021-07-27 | End: 2021-07-27 | Stop reason: HOSPADM

## 2021-07-27 RX ORDER — SUCCINYLCHOLINE CHLORIDE 20 MG/ML
INJECTION INTRAMUSCULAR; INTRAVENOUS AS NEEDED
Status: DISCONTINUED | OUTPATIENT
Start: 2021-07-27 | End: 2021-07-27 | Stop reason: HOSPADM

## 2021-07-27 RX ADMIN — ONDANSETRON 4 MG: 2 INJECTION INTRAMUSCULAR; INTRAVENOUS at 09:17

## 2021-07-27 RX ADMIN — SODIUM CHLORIDE, SODIUM LACTATE, POTASSIUM CHLORIDE, AND CALCIUM CHLORIDE 25 ML/HR: 600; 310; 30; 20 INJECTION, SOLUTION INTRAVENOUS at 07:59

## 2021-07-27 RX ADMIN — FENTANYL CITRATE 50 MCG: 50 INJECTION, SOLUTION INTRAMUSCULAR; INTRAVENOUS at 11:24

## 2021-07-27 RX ADMIN — DEXMEDETOMIDINE HYDROCHLORIDE 4 MCG: 100 INJECTION, SOLUTION, CONCENTRATE INTRAVENOUS at 09:47

## 2021-07-27 RX ADMIN — FENTANYL CITRATE 50 MCG: 50 INJECTION, SOLUTION INTRAMUSCULAR; INTRAVENOUS at 10:07

## 2021-07-27 RX ADMIN — HYDROCODONE BITARTRATE AND ACETAMINOPHEN 1 TABLET: 5; 325 TABLET ORAL at 12:45

## 2021-07-27 RX ADMIN — SUCCINYLCHOLINE CHLORIDE 100 MG: 20 INJECTION, SOLUTION INTRAMUSCULAR; INTRAVENOUS at 09:17

## 2021-07-27 RX ADMIN — FAMOTIDINE 20 MG: 20 TABLET ORAL at 07:59

## 2021-07-27 RX ADMIN — DEXMEDETOMIDINE HYDROCHLORIDE 6 MCG: 100 INJECTION, SOLUTION, CONCENTRATE INTRAVENOUS at 10:01

## 2021-07-27 RX ADMIN — FENTANYL CITRATE 50 MCG: 50 INJECTION, SOLUTION INTRAMUSCULAR; INTRAVENOUS at 10:44

## 2021-07-27 RX ADMIN — GLYCOPYRROLATE 0.2 MG: 0.2 INJECTION INTRAMUSCULAR; INTRAVENOUS at 10:07

## 2021-07-27 RX ADMIN — DEXMEDETOMIDINE HYDROCHLORIDE 6 MCG: 100 INJECTION, SOLUTION, CONCENTRATE INTRAVENOUS at 09:37

## 2021-07-27 RX ADMIN — MAGNESIUM SULFATE IN DEXTROSE 1 G: 10 INJECTION, SOLUTION INTRAVENOUS at 09:17

## 2021-07-27 RX ADMIN — FENTANYL CITRATE 50 MCG: 50 INJECTION, SOLUTION INTRAMUSCULAR; INTRAVENOUS at 09:32

## 2021-07-27 RX ADMIN — ONDANSETRON 4 MG: 2 INJECTION INTRAMUSCULAR; INTRAVENOUS at 11:33

## 2021-07-27 RX ADMIN — LIDOCAINE HYDROCHLORIDE 100 MG: 20 INJECTION, SOLUTION EPIDURAL; INFILTRATION; INTRACAUDAL; PERINEURAL at 09:17

## 2021-07-27 RX ADMIN — PROPOFOL 150 MG: 10 INJECTION, EMULSION INTRAVENOUS at 09:17

## 2021-07-27 RX ADMIN — DEXMEDETOMIDINE HYDROCHLORIDE 4 MCG: 100 INJECTION, SOLUTION, CONCENTRATE INTRAVENOUS at 10:02

## 2021-07-27 RX ADMIN — DEXAMETHASONE SODIUM PHOSPHATE 4 MG: 4 INJECTION, SOLUTION INTRAMUSCULAR; INTRAVENOUS at 09:17

## 2021-07-27 RX ADMIN — FENTANYL CITRATE 50 MCG: 50 INJECTION, SOLUTION INTRAMUSCULAR; INTRAVENOUS at 09:17

## 2021-07-27 RX ADMIN — WATER 2 G: 1 INJECTION INTRAMUSCULAR; INTRAVENOUS; SUBCUTANEOUS at 09:19

## 2021-07-27 NOTE — DISCHARGE INSTRUCTIONS
Patient Education        Ganglion Cyst Removal: What to Expect at Home  Your Recovery     Ganglion cyst removal is surgery to remove a ganglion that has caused pain or numbness or made it hard to do your activities. A ganglion is a small sac, or cyst, filled with a clear fluid that is thick like jelly. The cyst may look like a bump on your hand or wrist. Less often, a ganglion can appear on the feet, ankles, knees, or shoulders. The doctor made a cut (incision) in the skin over the ganglion. He or she removed the ganglion and the connecting tissue that allowed fluid to collect there. Then the incision was closed with stitches. You may have a splint over the area to limit movement until the area heals. Your doctor will tell you when it's okay to move the area. He or she may give you instructions on when you can do your normal activities again. Ganglions sometimes come back. New ganglions also may form in the area. This care sheet gives you a general idea about how long it will take for you to recover. But each person recovers at a different pace. Follow the steps below to get better as quickly as possible. How can you care for yourself at home? Activity    · For 1 to 2 weeks after surgery on your hand or wrist, avoid activities that involve repeated arm or hand movements. These may include typing, using a computer mouse, vacuuming, or carrying things in the affected hand. Do not use power tools. And avoid other activities that make your hand vibrate.     · If the procedure involved your foot or ankle, ask your doctor if you need to do less walking or driving for a while.     · You may be able to go back to work 1 or 2 days after surgery. It depends on the type of work you do and how you feel.     · You may shower, but do not get the area wet until your doctor says it's okay. Keep the bandage dry by covering it with plastic.  Do not take a bath, swim, use a hot tub, or soak your hand until your doctor says it's okay.   Diet    · You can eat your normal diet when you feel well. If your stomach is upset, try bland, low-fat foods like plain rice, broiled chicken, toast, and yogurt. Medicines    · Your doctor will tell you if and when you can restart your medicines. He or she will also give you instructions about taking any new medicines.     · If you take aspirin or some other blood thinner, be sure to talk to your doctor. He or she will tell you if and when to start taking this medicine again. Make sure that you understand exactly what your doctor wants you to do.     · Be safe with medicines. Read and follow all instructions on the label. ? If the doctor gave you a prescription medicine for pain, take it as prescribed. ? If you are not taking a prescription pain medicine, ask your doctor if you can take an over-the-counter medicine. Incision care    · Leave the bandage on your hand until the doctor says it is okay to remove it. This is usually 2 or 3 days after surgery.     · After your doctor says you can take off your bandage, wash the area daily with clean water, and pat it dry. Don't use hydrogen peroxide or alcohol. They can slow healing. You may cover the area with a gauze bandage if it oozes fluid or rubs against clothing. Change the bandage every day.     · Keep the area clean and dry.     · If you have a splint, do not take it off unless your doctor tells you to. Follow the splint care instructions your doctor gives you. Be careful not to put the splint on too tight. Exercise    · Follow your doctor's directions on when and how to move the area to keep it flexible and help reduce swelling. Your doctor may have you wear a splint or brace for a short time after the surgery.     · If the ganglion is on your wrist or hand, you may need therapy after you heal. This can help you regain movement, strength, and  in your wrist and hand.  To get the best results, you need to do the exercises correctly and as often and as long as your doctor or your physical or occupational therapist tells you to. Ice and elevation    · If you have swelling, put ice or a cold pack on the area for 10 to 20 minutes at a time. Try to do this every 1 to 2 hours for the next 3 days (when you are awake) or until the swelling goes down. Put a thin cloth between the ice and your skin or splint.     · Prop up the area on a pillow anytime you sit or lie down for the first 2 or 3 days. Try to keep it above the level of your heart. This will help reduce swelling. Follow-up care is a key part of your treatment and safety. Be sure to make and go to all appointments, and call your doctor if you are having problems. It's also a good idea to know your test results and keep a list of the medicines you take. When should you call for help? Call 911 anytime you think you may need emergency care. For example, call if:    · You passed out (lost consciousness).     · You have chest pain, are short of breath, or cough up blood. Call your doctor now or seek immediate medical care if:    · You have pain that does not get better after you take pain medicine.     · You have loose stitches, or your incision comes open.     · Bright red blood has soaked through the bandage over your incision.     · You have symptoms of infection, such as:  ? Increased pain, swelling, warmth, or redness. ? Red streaks leading from the area. ? Pus draining from the area. ? A fever.     · The area is cool or pale or changes color.     · The area is tingly, weak, or numb.     · You can't move the area.     · Your splint feels too tight. Watch closely for any changes in your health, and be sure to contact your doctor if:    · You do not get better as expected. Where can you learn more? Go to http://www.gray.com/  Enter G125 in the search box to learn more about \"Ganglion Cyst Removal: What to Expect at Home. \"  Current as of: November 16, 2020               Content Version: 12.8  © 2006-2021 [employer]. Care instructions adapted under license by [employer] (which disclaims liability or warranty for this information). If you have questions about a medical condition or this instruction, always ask your healthcare professional. [address] any warranty or liability for your use of this information. Ice and Elevate operative wound/dressing. Begin moving fingers immediately after surgery. Keep dressing clean and dry. Cover for showering. Remove elbow dressing on Friday, wash and dry and leave open to air. Do not remove hand and wrist dressing. Patient Education        Carpal Tunnel Release: What to Expect at [facility] tunnel reduces the pressure on a nerve in the wrist. Your doctor cut a ligament that presses on the nerve. This lets the nerve pass freely through the tunnel without being squeezed. Your hand will hurt and may feel weak with some numbness. This usually goes away in a few days, but it may take several months. Your doctor may remove the large bandage, or he or she will tell you when and how to remove it yourself. In some cases, you may have a splint. If you have one, you will wear it for about 2 weeks. Your doctor will take out your stitches in 1 to 2 weeks. Your hand and wrist may feel worse than they used to feel. But the pain should start to go away. It usually takes 3 to 4 months to recover and up to 1 year before hand strength returns. How much strength returns will vary. The timing of your return to work depends on the type of surgery you had, whether the surgery was on your dominant hand (the hand you use most), and your work activities. If you had open surgery on your dominant hand and you do repeated actions at work, you may be able to go back to work in 10 to 8 weeks. Repeated motions include typing or assembly-line work.  If the surgery was on the other hand and you don't do repeated actions at work, you may be able to return to work in 9 to 14 days. If you had endoscopic surgery, you may be able to go back to work sooner than with open surgery. This care sheet gives you a general idea about how long it will take for you to recover. But each person recovers at a different pace. Follow the steps below to get better as quickly as possible. How can you care for yourself at home? Activity    · Rest when you feel tired. Getting enough sleep will help you recover.     · Try to walk each day. Start by walking a little more than you did the day before. Bit by bit, increase the amount you walk.     · For up to 2 weeks after surgery, avoid lifting things heavier than 1 to 2 pounds and using your hand. This includes doing repeated arm or hand movements, such as typing or using a computer mouse, washing windows, vacuuming, or chopping food. Do not use power tools, and avoid activities that cause vibration.     · You may do heavier tasks about 4 weeks after surgery. These include vacuuming, mowing the lawn, and gardening.     · You may shower 24 to 48 hours after surgery, if your doctor okays it. Keep your bandage dry by taping a sheet of plastic to cover it. If you have a splint, keep it dry. Your doctor will tell you if you can remove it when you shower. Be careful not to put the splint on too tight. Do not take a bath until the incision heals, or until your doctor tells you it is okay.     · You may drive when you are fully able to use your hand. Diet    · You can eat your normal diet. If your stomach is upset, try bland, low-fat foods like plain rice, broiled chicken, toast, and yogurt. Medicines    · Your doctor will tell you if and when you can restart your medicines. He or she will also give you instructions about taking any new medicines.     · If you take aspirin or some other blood thinner, ask your doctor if and when to start taking it again. Make sure that you understand exactly what your doctor wants you to do.     · Take pain medicines exactly as directed. ? If the doctor gave you a prescription medicine for pain, take it as prescribed. ? If you are not taking a prescription pain medicine, take an over-the-counter medicine such as acetaminophen (Tylenol), ibuprofen (Advil, Motrin), or naproxen (Aleve). Read and follow all instructions on the label. ? Do not take two or more pain medicines at the same time unless the doctor told you to. Many pain medicines have acetaminophen, which is Tylenol. Too much acetaminophen (Tylenol) can be harmful.     · If you think your pain medicine is making you sick to your stomach:  ? Take your medicine after meals (unless your doctor has told you not to). ? Ask your doctor for a different pain medicine.     · If your doctor prescribed antibiotics, take them as directed. Do not stop taking them just because you feel better. You need to take the full course of antibiotics. Incision and splint care    · Keep your bandage dry. If it gets dirty, you may change it.     · If you have a splint, talk to your doctor about when you should wear it. Exercise    · You may need wrist and hand rehabilitation. This is a series of exercises you do after your surgery. This helps you get back your wrist's and hand's range of motion, strength, and . You will work with your doctor and physical or occupational therapist to plan this exercise program. To get the best results, you need to do the exercises correctly and as often and as long as your doctor tells you. Ice and elevation    · Put ice or a cold pack on your wrist for 10 to 20 minutes at a time. Try to do this every 1 to 2 hours for the next 3 days (when you are awake) or until the swelling goes down. Put a thin cloth between the ice and your skin.     · Prop up the sore wrist on a pillow when you ice it or anytime you sit or lie down during the next 3 days.  Try to keep it above the level of your heart. This will help reduce swelling. Other instructions    · Avoid letting your hand hang down. This can cause swelling. Follow-up care is a key part of your treatment and safety. Be sure to make and go to all appointments, and call your doctor if you are having problems. It's also a good idea to know your test results and keep a list of the medicines you take. When should you call for help? Call 911 anytime you think you may need emergency care. For example, call if:    · You passed out (lost consciousness).     · You have chest pain, are short of breath, or cough up blood. Call your doctor now or seek immediate medical care if:    · You have pain that does not get better after you take pain medicine.     · Your hand is cool or pale or changes color.     · Your cast or splint feels too tight.     · You have tingling, weakness, or numbness in your hand or fingers.     · You are sick to your stomach or cannot drink fluids.     · You have loose stitches, or your incision comes open.     · You have signs of a blood clot in your leg (called a deep vein thrombosis), such as:  ? Pain in your calf, back of the knee, thigh, or groin. ? Redness or swelling in your leg.     · You have signs of infection, such as:  ? Increased pain, swelling, warmth, or redness. ? Red streaks leading from the incision. ? Pus draining from the incision. ? A fever.     · Bright red blood has soaked through the bandage over your incision. Watch closely for any changes in your health, and be sure to contact your doctor if:    · You have a problem with your cast or splint.     · You do not get better as expected. Where can you learn more? Go to http://www.gray.com/  Enter N388 in the search box to learn more about \"Carpal Tunnel Release: What to Expect at Home. \"  Current as of: November 16, 2020               Content Version: 12.8  © 6012-2743 [employer], [facility].    Care instructions adapted under license by [employer] (which disclaims liability or warranty for this information). If you have questions about a medical condition or this instruction, always ask your healthcare professional. [address] any warranty or liability for your use of this information. DISCHARGE SUMMARY from Nurse    PATIENT INSTRUCTIONS:    After general anesthesia or intravenous sedation, for 24 hours or while taking prescription Narcotics:  · Limit your activities  · Do not drive and operate hazardous machinery  · Do not make important personal or business decisions  · Do  not drink alcoholic beverages  · If you have not urinated within 8 hours after discharge, please contact your surgeon on call.     Report the following to your surgeon:  · Excessive pain, swelling, redness or odor of or around the surgical area  · Temperature over 100.5  · Nausea and vomiting lasting longer than 4 hours or if unable to take medications  · Any signs of decreased circulation or nerve impairment to extremity: change in color, persistent  numbness, tingling, coldness or increase pain  · Any questions

## 2021-07-27 NOTE — ANESTHESIA PREPROCEDURE EVALUATION
Relevant Problems   NEUROLOGY   (+) Anxiety and depression   (+) ETOH abuse       Anesthetic History   No history of anesthetic complications            Review of Systems / Medical History  Patient summary reviewed and pertinent labs reviewed    Pulmonary    COPD: mild    Sleep apnea: CPAP        Comments: Ex smoker   Neuro/Psych         Headaches and psychiatric history    Comments: Anxiety  Depression  Foot drop Cardiovascular  Within defined limits                Exercise tolerance: >4 METS     GI/Hepatic/Renal  Within defined limits              Endo/Other        Obesity     Other Findings              Physical Exam    Airway  Mallampati: II  TM Distance: > 6 cm  Neck ROM: normal range of motion   Mouth opening: Normal    Comments: beard Cardiovascular  Regular rate and rhythm,  S1 and S2 normal,  no murmur, click, rub, or gallop             Dental  No notable dental hx       Pulmonary      Decreased breath sounds: bilateral           Abdominal  GI exam deferred       Other Findings            Anesthetic Plan    ASA: 3  Anesthesia type: MAC          Induction: Intravenous  Anesthetic plan and risks discussed with: Patient

## 2021-07-27 NOTE — BRIEF OP NOTE
Brief Postoperative Note    Patient: Omari Zhao  YOB: 1965  MRN: [id number]    Date of Procedure: 7/27/2021     Pre-Op Diagnosis: Ganglion cyst of volar aspect of right wrist [M67.431]  Cubital tunnel syndrome on right [G56.21]  Right carpal tunnel syndrome [G56.01]    Post-Op Diagnosis: Same as preoperative diagnosis. Procedure(s):  RIGHT WRIST VOLAR GANGLION CYST EXCISION/RIGHT CUBITAL TUNNEL RELEASE/RIGHT CARPAL TUNNEL RELEASE    Surgeon(s):   Tim Thomas DO    Surgical Assistant: Surg Asst-1: Zulema Parrish    Anesthesia: General     Estimated Blood Loss (mL): less than 50     Complications: None    Specimens:   ID Type Source Tests Collected by Time Destination   1 : RIGHT ganglion cyst Preservative Wrist  Ishan MORENO DO 7/27/2021 1005 Pathology        Implants: * No implants in log *    Drains: * No LDAs found *    Findings: flattened median nerve, scarred ulnar nerve, hypervascular volar ganglion    Electronically Signed by Bharti Garcia DO on 7/27/2021 at 10:23 AM

## 2021-07-27 NOTE — H&P
Update History & Physical    The Patient's History and Physical of July 19, 2021 was reviewed with the patient and I examined the patient. There was no change. The surgical site was confirmed by the patient and me. Plan:  The risk, benefits, expected outcome, and alternative to the recommended procedure have been discussed with the patient. Patient understands and wants to proceed with the procedure.     Electronically signed by Jason Pete DO on 7/27/2021 at 7:37 AM

## 2021-07-27 NOTE — ANESTHESIA POSTPROCEDURE EVALUATION
Procedure(s):  RIGHT WRIST VOLAR GANGLION CYST EXCISION/RIGHT CUBITAL TUNNEL RELEASE/RIGHT CARPAL TUNNEL RELEASE. MAC    Anesthesia Post Evaluation      Multimodal analgesia: multimodal analgesia used between 6 hours prior to anesthesia start to PACU discharge  Patient location during evaluation: PACU  Patient participation: complete - patient participated  Level of consciousness: awake  Pain score: 3  Pain management: adequate  Airway patency: patent  Anesthetic complications: no  Cardiovascular status: acceptable  Respiratory status: acceptable  Hydration status: acceptable  Post anesthesia nausea and vomiting:  none  Final Post Anesthesia Temperature Assessment:  Normothermia (36.0-37.5 degrees C)      INITIAL Post-op Vital signs:   Vitals Value Taken Time   /68 07/27/21 1100   Temp 36.5 °C (97.7 °F) 07/27/21 1050   Pulse 57 07/27/21 1101   Resp 10 07/27/21 1101   SpO2 99 % 07/27/21 1101   Vitals shown include unvalidated device data.

## 2021-07-28 ENCOUNTER — TELEPHONE (OUTPATIENT)
Dept: ORTHOPEDIC SURGERY | Age: 56
End: 2021-07-28

## 2021-07-28 DIAGNOSIS — M67.431 GANGLION CYST OF VOLAR ASPECT OF RIGHT WRIST: Primary | ICD-10-CM

## 2021-07-28 DIAGNOSIS — G56.21 CUBITAL TUNNEL SYNDROME ON RIGHT: ICD-10-CM

## 2021-07-28 DIAGNOSIS — G56.01 RIGHT CARPAL TUNNEL SYNDROME: ICD-10-CM

## 2021-07-28 RX ORDER — HYDROCODONE BITARTRATE AND ACETAMINOPHEN 5; 325 MG/1; MG/1
1 TABLET ORAL
Qty: 12 TABLET | Refills: 0 | Status: SHIPPED | OUTPATIENT
Start: 2021-07-28 | End: 2021-07-31

## 2021-07-28 NOTE — TELEPHONE ENCOUNTER
Patient is requesting a lower dose of the Norco that was prescribed following his procedure yesterday. He states it is too harsh on his stomach.       Patient Sai Crabtree

## 2021-07-28 NOTE — OP NOTES
Operative Report    Patient: Adelfo Escobar MRN: [id number]  SSN: [id number]    YOB: 1965  Age: 63 y.o. Sex: male       Date of Surgery: 7/27/2021     Preoperative Diagnosis: Ganglion cyst of volar aspect of right wrist [M67.431]  Cubital tunnel syndrome on right [G56.21]  Right carpal tunnel syndrome [G56.01]     Postoperative Diagnosis: Ganglion cyst of volar aspect of right wrist [M67.431]  Cubital tunnel syndrome on right [G56.21]  Right carpal tunnel syndrome [G56.01]     Surgeon(s) and Role:     Amaris Jay, [address] [facility], DO - Primary    Assistant: Shivam Valentine    Anesthesia: General and local    Procedure: Procedure(s):  RIGHT CUBITAL TUNNEL RELEASE [id number]  RIGHT CARPAL TUNNEL RELEASE [id number]  RIGHT WRIST VOLAR GANGLION CYST EXCISION [id number]    Findings: Flattened median nerve, scarred ulnar nerve at elbow, and hypervascular ganglion cyst at the volar wrist.    Procedure in Detail:     Indications for procedure been outlined in the perioperative documentation, most notably being not amenable to conservative treatment. Informed consent was obtained from the patient. The risks and benefits of the procedure were discussed with the patient. They include but are not limited to neurovascular injury, tendon/ligamentous injury, blood loss, infection, need for further surgery, hematoma, neuroma, seroma, chronic pain, chronic stiffness, complications from anesthesia including death, and the possibility of chris Covid. After informed consent was obtained, the patient was taken back to the operative suite. A tourniquet was applied to the operative extremity and it was prepped and draped in the normal sterile fashion. The arm was exsanguinated and the tourniquet was elevated to 250 mmHg. Attention was turned to the elbow where a medial curvilinear incision was made alf between the medial epicondyle and the olecranon.   The subcutaneous tissues were dissected and electrocautery was used for hemostasis. The ulnar nerve was identified at the level of the medial epicondyle. At this point Sharma's ligament was incised and attention was first turned distally where the FCU aponeurosis was incised. A limited neurolysis of the ulnar nerve was undertaken and the ulnar nerve was found to be completely released up to the mid forearm. Attention was then turned again to the medial epicondyle with the remainder of Sharma's ligament and Sharma's fascia was incised. Any remaining scar tissue or constrictive bands were released about the ulnar nerve up to the level of the sterile tourniquet. Again a limited neurolysis was also undertaken proximally. The elbow was run through range of motion and the ulnar nerve was not found to be subluxing or perching on the medial epicondyle. The ulnar nerve was found to be completely released throughout the cubital tunnel. The wound was copiously irrigated and 20 mL of quarter percent Marcaine plain was injected into the subcutaneous tissues as well as the periosteum surrounding the ulnar nerve. The wound was closed with 3-0 Monocryl followed by 4-0 Monocryl and dermal glue at the end of the procedure. Attention was turned to the carpal tunnel where an incision was made over the radial border of the fourth ray. The subcutaneous tissues were dissected and electrocautery was used for hemostasis. The palmar fascia was incised centrally in line with the incision. A self-retaining retractor was placed. As needed the palmaris brevis was feathered off the transverse carpal ligament. The ligament was identified and incised centrally. Attention was then turned distally where the ligament was released up to the level of the fat pad with motor branch was visualized. Attention was subsequently turned proximally where the remainder of the palmar fascia was incised.   At this point the remainder of the transverse carpal ligament was released up to the level of but not including the antebrachial fascia. The carpal tunnel was found to be completely released. The wound was copiously irrigated. The wound was closed with 4-0 chromic in interrupted fashion at the end of the procedure. If not already done so prior to prep, quarter percent Marcaine plain was injected into the subcutaneous and deep tissues. Attention was then turned to the volar wrist where a V-shaped incision was made over the prominence likely a ganglion cyst.  The subcutaneous tissues were dissected electrocautery used for hemostasis. There is significant amounts of vasculature around the cystic mass. This was carefully dissected. Electrocautery was used to hemostasis any vessels leading into the cyst.  The cyst was able to be partially removed and decompressed. However there was too much vasculature and involvement with a radial artery to have complete removal of the cyst.  Attempt was made to cauterize the base of the cyst and down to the radiocarpal joint. At this point the risks far outweigh the benefits for further dissection. The operative field was cleansed and dried and placed into a sterile dressing, and splint if indicated. The patient was sent to recovery in stable condition and given appropriate wound care instructions, follow-up with me in the outpatient setting, and a prescription for pain medicine. Estimated Blood Loss: 20 mL    Tourniquet Time:   Total Tourniquet Time Documented:  Upper Arm (Right) - 34 minutes  Total: Upper Arm (Right) - 34 minutes        Implants: * No implants in log *            Specimens:   ID Type Source Tests Collected by Time Destination   1 : RIGHT ganglion cyst Preservative Wrist  Rodger Amaral DO 7/27/2021 1005 Pathology           Drains: None                Complications: None    Counts: Sponge and needle counts were correct times two.     Signed By:  Joaquin Garza DO     July 28, 2021

## 2021-08-05 ENCOUNTER — HOSPITAL ENCOUNTER (OUTPATIENT)
Dept: CT IMAGING | Age: 56
Discharge: HOME | End: 2021-08-05
Attending: SURGERY
Payer: MEDICAID

## 2021-08-05 DIAGNOSIS — R10.32 LEFT LOWER QUADRANT ABDOMINAL PAIN: ICD-10-CM

## 2021-08-05 LAB — CREAT UR-MCNC: 1 MG/DL (ref 0.6–1.3)

## 2021-08-05 PROCEDURE — 74011000636 HC RX REV CODE- 636: Performed by: SURGERY

## 2021-08-05 PROCEDURE — 74177 CT ABD & PELVIS W/CONTRAST: CPT

## 2021-08-05 PROCEDURE — 82565 ASSAY OF CREATININE: CPT

## 2021-08-05 RX ADMIN — DIATRIZOATE MEGLUMINE AND DIATRIZOATE SODIUM 30 ML: 660; 100 LIQUID ORAL; RECTAL at 11:50

## 2021-08-05 RX ADMIN — IOPAMIDOL 99 ML: 612 INJECTION, SOLUTION INTRAVENOUS at 13:07

## 2021-08-09 ENCOUNTER — OFFICE VISIT (OUTPATIENT)
Dept: ORTHOPEDIC SURGERY | Age: 56
End: 2021-08-09
Payer: MEDICAID

## 2021-08-09 VITALS
OXYGEN SATURATION: 99 % | BODY MASS INDEX: 33.34 KG/M2 | HEIGHT: 68 IN | HEART RATE: 75 BPM | RESPIRATION RATE: 15 BRPM | WEIGHT: 220 LBS

## 2021-08-09 DIAGNOSIS — G56.01 RIGHT CARPAL TUNNEL SYNDROME: ICD-10-CM

## 2021-08-09 DIAGNOSIS — Z98.890 S/P EXCISION OF GANGLION CYST: ICD-10-CM

## 2021-08-09 DIAGNOSIS — Z98.890 S/P CARPAL TUNNEL RELEASE: ICD-10-CM

## 2021-08-09 DIAGNOSIS — M67.431 GANGLION CYST OF VOLAR ASPECT OF RIGHT WRIST: ICD-10-CM

## 2021-08-09 DIAGNOSIS — Z98.890 S/P CUBITAL TUNNEL RELEASE: ICD-10-CM

## 2021-08-09 DIAGNOSIS — G56.21 CUBITAL TUNNEL SYNDROME ON RIGHT: Primary | ICD-10-CM

## 2021-08-09 PROCEDURE — 99024 POSTOP FOLLOW-UP VISIT: CPT | Performed by: ORTHOPAEDIC SURGERY

## 2021-08-11 NOTE — PROGRESS NOTES
Sera Willis is a 63 y.o. male right handed unknown employment. Worker's Compensation and legal considerations: none filed. Vitals:    08/09/21 1002   Pulse: 75   Resp: 15   SpO2: 99%   Weight: 220 lb (99.8 kg)   Height: 5' 8\" (1.727 m)   PainSc:   5   PainLoc: Wrist           Chief Complaint   Patient presents with    Surgical Follow-up     right wrist / elbow       HPI: Patient presents today for follow-up 2 weeks status post right cubital tunnel release, right carpal tunnel release, and partial volar ganglion cyst excision. He reports minimal pain. He reports the numbness to be almost completely resolved. Preop HPI: Patient presents today for preoperative history and physical as he is scheduled for right carpal tunnel release, right cubital tunnel release, and right volar ganglion cyst excision. He denies any changes since his previous visit. EMG HPI: Patient presents today for repeat EMG follow-up. He reports the right side to be worse than the left and is having weakness as well as continued arthritic pain on the right. He also reports the cyst in his right volar wrist today tender. He reports some continued irritation and paresthesias over his previous left thumb laceration. 5/17/2021 HPI: Patient presents today following up on his bilateral hand numbness and tingling that is been longstanding as well as left thumb laceration follow-up. He is here to have his sutures removed today. He also presents with a new problem of ganglion cyst on the volar aspect of his right wrist.    5/11/2021 HPI: Patient presents today with a new problem of left thumb laceration that occurred a few days back. He was given antibiotics and his wound debrided and closed in the ER.    9/16/2019 HPI: Patient comes in today with new complaint of right middle finger mass and pain.   He has a history of right volar and dorsal ganglion cyst.  He also has a history of left shoulder rotator cuff repair and is planning to have his right shoulder rotator cuff done next week. Initial HPI: Patient comes in today with complaints of bilateral hand numbness and tingling. He reports that he had a left carpal tunnel diagnosis after an EMG several years ago. He has not had any treatment for it. He also has a cyst on the right wrist that was injected several months ago but did not help. He also reports a history of having digits amputated that were placed back on many years ago on the left hand. He has had chronic pain from scar tissue in his hand. Date of onset: Indeterminate    Injury: Yes: Comment: Left thumb laceration    Prior Treatment:  Yes: Comment: right cubital tunnel release, right carpal tunnel release, and partial volar ganglion cyst excision    Numbness/ Tingling: Yes: Comment: Bilateral hands left worse than right    ROS: Review of Systems - General ROS: negative  Respiratory ROS: no cough, shortness of breath, or wheezing  Cardiovascular ROS: no chest pain or dyspnea on exertion  Musculoskeletal ROS: positive for - pain in thumb, left  Neurological ROS: Negative  Dermatological ROS: negative    Past Medical History:   Diagnosis Date    Bilateral hand pain     Depression     and anxiety    Foot drop, left foot     October, 2018.   Seen by Podiatry in 2019    Headache     Psychotic disorder ([facility] [address]     anxiety    Sleep apnea        Past Surgical History:   Procedure Laterality Date    COLONOSCOPY N/A 10/28/2020    COLONOSCOPY w/polypectomies performed by Dominic Matt [facility] at [facility] ENDOSCOPY    HX HERNIA REPAIR      HX HERNIA REPAIR      Inguinal hernia- right side    HX KNEE ARTHROSCOPY Right     HX ORTHOPAEDIC Left 2017    lacerations to fingers and had pins, left thumb    HX ORTHOPAEDIC      Right knee surgery    HX OTHER SURGICAL  2017    Left hand surgery x 3    HX ROTATOR CUFF [facility]PAIR Right 09/21/2020    [facility]     HX ROTATOR CUFF REPAIR Left 10/21/2019    HX ROTATOR CUFF REPAIR  2000 Right shoulder    HX WRIST FRACTURE TX Right 07/27/2021    RI ANESTH,SURGERY OF SHOULDER Right        Current Outpatient Medications   Medication Sig Dispense Refill    gabapentin (NEURONTIN) 800 mg tablet Take 1 Tablet by mouth daily. Max Daily Amount: 800 mg. 90 Tablet 1    dextroamphetamine-amphetamine (AdderalL) 10 mg tablet Take 10 mg by mouth two (2) times a day. Indications: attention deficit disorder with hyperactivity      busPIRone (BUSPAR) 10 mg tablet Take 10 mg by mouth three (3) times daily.  Latuda 60 mg tab tablet Take 60 mg by mouth daily (with dinner).  escitalopram oxalate (LEXAPRO) 20 mg tablet Take 20 mg by mouth daily. Allergies   Allergen Reactions    Motrin [Ibuprofen] Hives    Motrin [Ibuprofen] Rash         PE:       Physical Exam  Vitals and nursing note reviewed. Constitutional:       General: He is not in acute distress. Appearance: Normal appearance. He is not ill-appearing, toxic-appearing or diaphoretic. Cardiovascular:      Pulses: Normal pulses. Pulmonary:      Effort: Pulmonary effort is normal. No respiratory distress. Abdominal:      General: Abdomen is flat. There is no distension. Musculoskeletal:         General: Swelling and tenderness present. No deformity or signs of injury. Normal range of motion. Cervical back: Normal range of motion and neck supple. Right lower leg: No edema. Left lower leg: No edema. Skin:     General: Skin is warm and dry. Capillary Refill: Capillary refill takes less than 2 seconds. Findings: No bruising or erythema. Neurological:      General: No focal deficit present. Mental Status: He is alert and oriented to person, place, and time. Psychiatric:         Mood and Affect: Mood normal.         Behavior: Behavior normal.               NEUROVASCULAR    Examination L R Examination L R   Carpal Comp. + + Pronator Comp.  - -   Carpal Tinel + + Pronator Tinel - -   Phalen's + + Pronator Stress - -   Cubital Comp. ? ? Guyon Comp. - -   Cubital Tinel ? ? Guyon Tinel - -   Elbow Hyperflexion - - Adson's - -   Spurling's - - SC Comp. - -   PCB Median abn - - SC Tinel - -   Radial Tinel - - IC Comp. - -   Digital Tinel - - IC Tinel - -   Radial 2-Pt WNL WNL Ulnar 2-Pt WNL WNL     Radial Pulse: 2+  Capillary Refill: < 2 sec  [place]: Not Performed  [employer]: Not Performed      Imaging:    Plain films of bilateral wrist does not show substantial degenerative changes  Fracture dislocation or any other osseous abnormalities. 2021  NCV & EMG Findings:  Evaluation of the right median motor nerve showed prolonged distal onset latency (4.6 ms). The left ulnar motor nerve showed prolonged distal onset latency (3.8 ms) and decreased conduction velocity (A Elbow-B Elbow, 42 m/s). The right ulnar motor nerve showed decreased conduction velocity (A Elbow-B Elbow, 42 m/s). The left median sensory nerve showed prolonged distal peak latency (Wrist, 4.2 ms), reduced amplitude (Wrist, 7.9 µV), and decreased conduction velocity (Wrist-2nd Digit, 33 m/s). The right median sensory nerve showed prolonged distal peak latency (4.3 ms), reduced amplitude (5.1 µV), and decreased conduction velocity (Wrist-2nd Digit, 33 m/s). The left ulnar sensory and the right ulnar sensory nerves showed reduced amplitude (L8.4, R5.9 µV). All remaining nerves (as indicated in the following tables) were within normal limits. Left vs. Right side comparison data for the ulnar motor nerve indicates abnormal L-R latency difference (0.9 ms). All remaining left vs. right side differences were within normal limits.       All examined muscles (as indicated in the following table) showed no evidence of electrical instability.       INTERPRETATION  This is an abnormal electrodiagnostic examination. These findings may be consistent with:   1. Moderate ulnar mononeuropathy at the right elbow (cubital tunnel syndrome)   2. Moderate median mononeuropathy at the right wrist (carpal tunnel syndrome)   3. Mild median mononeuropathy at the left wrist (carpal tunnel syndrome)   4. Mild-to-moderate ulnar mononeuropathy at the left elbow (cubital tunnel syndrome)     There is no electrodiagnostic evidence of any cervical radiculopathy, brachial plexopathy, peripheral polyneuropathy, or any other mononeuropathy.     CLINICAL INTERPRETATION  When compared to his last EMG, there has been significant improvements on his left side electrodiagnostic findings. His palmar signals are detectable and within normal latency limits. His carpal and cubital tunnel findings on the left are now classified as mild. The right sided electrodiagnostic findings are essentially unchanged from his last study.      His symptoms of hand numbness and tingling may correlate with these electrodiagnostic findings. There is no evidence of radial mononeuropathy which could be associated with his radial wrist mass. 2019  NCV & EMG Findings:  Evaluation of the left median motor and the right median motor nerves showed prolonged distal onset latency (L4.4, R4.5 ms). The left ulnar motor and the right ulnar motor nerves showed decreased conduction velocity (A Elbow-B Elbow, L38, R42 m/s). The left median sensory and the right median sensory nerves showed prolonged distal peak latency (L4.6, R4.4 ms) and decreased conduction velocity (Wrist-2nd Digit, L30, R32 m/s). The left ulnar sensory nerve showed prolonged distal peak latency (4.1 ms), reduced amplitude (10.5 µV), and decreased conduction velocity (Wrist-5th Digit, 34 m/s). The right ulnar sensory nerve showed prolonged distal peak latency (5.7 ms) and decreased conduction velocity (Wrist-5th Digit, 25 m/s). The left median/ulnar (palm) comparison nerve showed no response (Median Palm) and no response (Ulnar Palm). All remaining nerves (as indicated in the following tables) were within normal limits.   Left vs. Right side comparison data for the ulnar motor nerve indicates abnormal L-R latency difference (0.8 ms). The median sensory nerve indicates abnormal L-R amplitude difference (63.1 %). The ulnar sensory nerve indicates abnormal L-R latency difference (1.6 ms). All remaining left vs. right side differences were within normal limits.       All examined muscles (as indicated in the following table) showed no evidence of electrical instability.          INTERPRETATION  This is an abnormal electrodiagnostic examination. These findings may be consistent with:  1. 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 Moderate median mononeuropathy at the wrist bilaterally (carpal tunnel syndrome)  3. Severe neuropathy focally related to both median and ulnar palmar branches     There is no electrodiagnostic evidence of any cervical radiculopathy, brachial plexopathy, peripheral polyneuropathy, or any other mononeuropathy.     CLINICAL INTERPRETATION  His electrodiagnostic findings in the median and ulnar nerves are consistent with his symptoms of numbness and tingling in both hands.          ICD-10-CM ICD-9-CM    1. Cubital tunnel syndrome on right  G56.21 354.2    2. S/P cubital tunnel release  Z98.890 V45.89    3. Right carpal tunnel syndrome  G56.01 354.0    4. S/P carpal tunnel release  Z98.890 V45.89    5. Ganglion cyst of volar aspect of right wrist  M67.431 727.41    6. S/P excision of ganglion cyst  Z98.890 V45.89        Plan:     Discussed range of motion exercises, scar care, and edema control. Also discussed possibility of therapy but he would like to hold off at this time. Follow-up and Dispositions    · Return if symptoms worsen or fail to improve.        Plan was reviewed with patient, who verbalized agreement and understanding of the plan

## 2021-08-13 ENCOUNTER — TELEPHONE (OUTPATIENT)
Dept: ORTHOPEDIC SURGERY | Age: 56
End: 2021-08-13

## 2021-08-13 NOTE — TELEPHONE ENCOUNTER
Please let the patient know that this is likely resolving inflammation and fluid after surgery.   He should get an ace bandage from the pharmacy and wrap it if it is uncomfortable

## 2021-08-13 NOTE — TELEPHONE ENCOUNTER
Patient called with concerns about his hand/elbow after recent surgery. He was seen in office 8/9 and reports a \"jelly-like sac\" and concerns with his elbow.  (phone connection was very bad)  It was requested that patient post a picture on [employer] to send to physician or to contact office if any problems signing in, otherwise, physician will return call to patient.

## 2021-08-13 NOTE — TELEPHONE ENCOUNTER
Spoke with patient. Patient verbalized understanding. Patient will call back with any further concerns.

## 2021-09-14 ENCOUNTER — OFFICE VISIT (OUTPATIENT)
Dept: FAMILY MEDICINE CLINIC | Age: 56
End: 2021-09-14
Payer: MEDICAID

## 2021-09-14 VITALS
RESPIRATION RATE: 16 BRPM | OXYGEN SATURATION: 97 % | HEIGHT: 69 IN | HEART RATE: 77 BPM | TEMPERATURE: 99.1 F | DIASTOLIC BLOOD PRESSURE: 77 MMHG | BODY MASS INDEX: 31.99 KG/M2 | SYSTOLIC BLOOD PRESSURE: 111 MMHG | WEIGHT: 216 LBS

## 2021-09-14 DIAGNOSIS — F41.9 ANXIETY AND DEPRESSION: ICD-10-CM

## 2021-09-14 DIAGNOSIS — Z13.6 ENCOUNTER FOR LIPID SCREENING FOR CARDIOVASCULAR DISEASE: Primary | ICD-10-CM

## 2021-09-14 DIAGNOSIS — Z13.220 ENCOUNTER FOR LIPID SCREENING FOR CARDIOVASCULAR DISEASE: Primary | ICD-10-CM

## 2021-09-14 DIAGNOSIS — F32.A ANXIETY AND DEPRESSION: ICD-10-CM

## 2021-09-14 DIAGNOSIS — M54.12 CERVICAL RADICULOPATHY: ICD-10-CM

## 2021-09-14 PROCEDURE — 99213 OFFICE O/P EST LOW 20 MIN: CPT | Performed by: NURSE PRACTITIONER

## 2021-09-14 NOTE — PROGRESS NOTES
Vernon Hale presents today for   Chief Complaint   Patient presents with    Depression    Medication Refill       Is someone accompanying this pt? no    Is the patient using any DME equipment during OV? no    Depression Screening:  3 most recent PHQ Screens 9/14/2021   PHQ Not Done -   Little interest or pleasure in doing things Nearly every day   Feeling down, depressed, irritable, or hopeless Nearly every day   Total Score PHQ 2 6   Trouble falling or staying asleep, or sleeping too much Nearly every day   Feeling tired or having little energy Nearly every day   Poor appetite, weight loss, or overeating More than half the days   Feeling bad about yourself - or that you are a failure or have let yourself or your family down Nearly every day   Trouble concentrating on things such as school, work, reading, or watching TV Nearly every day   Moving or speaking so slowly that other people could have noticed; or the opposite being so fidgety that others notice Not at all   Thoughts of being better off dead, or hurting yourself in some way Nearly every day   PHQ 9 Score 23   How difficult have these problems made it for you to do your work, take care of your home and get along with others Extremely difficult       Learning Assessment:  No flowsheet data found. Health Maintenance reviewed and discussed and ordered per Provider. Health Maintenance Due   Topic Date Due    Pneumococcal 0-64 years (1 of 2 - PPSV23) Never done    Shingrix Vaccine Age 50> (1 of 2) Never done    Flu Vaccine (1) Never done   . Coordination of Care:  1. Have you been to the ER, urgent care clinic since your last visit? Hospitalized since your last visit? no    2. Have you seen or consulted any other health care providers outside of the [address] [place] [address] since your last visit? Include any pap smears or colon screening.  no

## 2021-09-14 NOTE — PROGRESS NOTES
Marietta Torrez is a 63 y.o. male presenting today for Depression and Medication Refill    HPI:  Marietta Torrez presents to the office today for routine follow-up care. He carries a medical diagnosis for cervical radiculopathy, anxiety and depression. Per the patient he has not had any alcohol for several months and he is also stopped tobacco abuse. He notes he continues to feel depressed and his medications are managed by psychiatry. He denies any chest pain, palpitation or lower extremity edema. He is negative for cough, fever, loss of smell or taste or GI upset. Complaints of left foot pain- managed by Dr. Emil Campuzano. Review of Systems   Psychiatric/Behavioral: Positive for depression. ROS:  History obtained from the patient intake forms which are reviewed with the patient  · General: negative for - chills, fever, weight changes or malaise  · HEENT: no sore throat, nasal congestion, vision problems or ear problems  · Respiratory: no cough, shortness of breath, or wheezing  · Cardiovascular: no chest pain, palpitations, or dyspnea on exertion  · Gastrointestinal: no abdominal pain, N/V, change in bowel habits, or black or bloody stools  · Musculoskeletal: chronic cervical neck pain and left foot pain  · Neurological: no numbness, tingling, headache or dizziness  · Endo:  No polyuria or polydipsia. · : no hematuria, dysuria, frequency, hesitancy, or nocturia. · Psychological: negative for - anxiety, depression, sleep disturbances, suicidal or homicidal ideations    Allergies   Allergen Reactions    Motrin [Ibuprofen] Hives    Motrin [Ibuprofen] Rash       Current Outpatient Medications   Medication Sig Dispense Refill    gabapentin (NEURONTIN) 800 mg tablet Take 1 Tablet by mouth daily. Max Daily Amount: 800 mg. 90 Tablet 1    dextroamphetamine-amphetamine (AdderalL) 10 mg tablet Take 10 mg by mouth two (2) times a day.  Indications: attention deficit disorder with hyperactivity  busPIRone (BUSPAR) 10 mg tablet Take 10 mg by mouth three (3) times daily.  Latuda 60 mg tab tablet Take 60 mg by mouth daily (with dinner).  escitalopram oxalate (LEXAPRO) 20 mg tablet Take 20 mg by mouth daily. Past Medical History:   Diagnosis Date    Bilateral hand pain     Depression     and anxiety    Foot drop, left foot     . Seen by Podiatry in 2019    Headache     Psychotic disorder ([facility] [address]     anxiety    Sleep apnea        Past Surgical History:   Procedure Laterality Date    COLONOSCOPY N/A 10/28/2020    COLONOSCOPY w/polypectomies performed by Bruna Gar [facility] at [facility] ENDOSCOPY    HX HERNIA REPAIR      HX HERNIA REPAIR      Inguinal hernia- right side    HX KNEE ARTHROSCOPY Right     HX ORTHOPAEDIC Left 2017    lacerations to fingers and had pins, left thumb    HX ORTHOPAEDIC      Right knee surgery    HX OTHER SURGICAL  2017    Left hand surgery x 3    HX ROTATOR [facility] REPAIR [facility] 2020    [facility]     HX ROTATOR CUFF REPAIR Left 10/21/2019    HX ROTATOR CUFF REPAIR  2000    Right shoulder    HX WRIST FRACTURE [place] Right 2021    [place] ANESTH,SURGERY OF SHOULDER Right        Social History     Socioeconomic History    Marital status:      Spouse name: Not on file    Number of children: Not on file    Years of education: Not on file    Highest education level: Not on file   Occupational History    Not on file   Tobacco Use    Smoking status: Former Smoker     Years: 2.00     Quit date:      Years since quittin.7    Smokeless tobacco: Current User    Tobacco comment: Starting Chewing to bacco in .    Vaping Use    Vaping Use: Never used   Substance and Sexual Activity    Alcohol use: Not Currently     Comment: Quit 2019    Drug use: Not Currently     Types: Marijuana    Sexual activity: Not Currently   Other Topics Concern    Not on file   Social History Narrative    ** Merged History Encounter **          Social Determinants of Health     Financial Resource Strain:     Difficulty of Paying Living Expenses:    Food Insecurity:     Worried About Running Out of Food in the Last Year:     [address] in the Last Year:    Transportation Needs:     Lack of Transportation (Medical):  Lack of Transportation (Non-Medical):    Physical Activity:     Days of Exercise per Week:     Minutes of Exercise per Session:    Stress:     Feeling of Stress :    Social Connections:     Frequency of Communication with Friends and Family:     Frequency of Social Gatherings with Friends and Family:     Attends Zoroastrian Services:     Active Member of Clubs or Organizations:     Attends Club or Organization Meetings:     Marital Status:    Intimate Partner Violence:     Fear of Current or Ex-Partner:     Emotionally Abused:     Physically Abused:     Sexually Abused:      Vitals:    09/14/21 1344   BP: 111/77   Pulse: 77   Resp: 16   Temp: 99.1 °F (37.3 °C)   TempSrc: Oral   SpO2: 97%   Weight: 216 lb (98 kg)   Height: 5' 9\" (1.753 m)   PainSc:   0 - No pain       Physical Exam  Vitals and nursing note reviewed. Constitutional:       Appearance: Normal appearance. Cardiovascular:      Rate and Rhythm: Normal rate and regular rhythm. Pulses: Normal pulses. Heart sounds: Normal heart sounds. Pulmonary:      Effort: Pulmonary effort is normal.      Breath sounds: Normal breath sounds. Abdominal:      General: Bowel sounds are normal.   Musculoskeletal:         General: No swelling or tenderness. Cervical back: Neck supple. Skin:     General: Skin is warm and dry. Neurological:      General: No focal deficit present. Mental Status: He is alert.          Hospital Outpatient Visit on 08/05/2021   Component Date Value Ref Range Status    Creatinine, POC 08/05/2021 1.0  0.6 - 1.3 MG/DL Final    GFRAA, POC 08/05/2021 >60  >60 ml/min/1.73m2 Final    GFRNA, POC 08/05/2021 >60  >60 ml/min/1.73m2 Final Estimated GFR is calculated using the IDMS-traceable Modification of Diet in Renal Disease (MDRD) Study equation, reported for both  Americans (GFRAA) and non- Americans (GFRNA), and normalized to 1.73m2 body surface area. The physician must decide which value applies to the patient. Admission on 07/27/2021, Discharged on 07/27/2021   Component Date Value Ref Range Status    BENZODIAZEPINES 07/27/2021 Negative  NEG   Final    BARBITURATES 07/27/2021 Negative  NEG   Final    THC (TH-CANNABINOL) 07/27/2021 Positive* NEG   Final    OPIATES 07/27/2021 Negative  NEG   Final    PCP(PHENCYCLIDINE) 07/27/2021 Negative  NEG   Final    COCAINE 07/27/2021 Negative  NEG   Final    AMPHETAMINES 07/27/2021 Negative  NEG   Final    METHADONE 07/27/2021 Negative  NEG   Final    HDSCOM 07/27/2021 (NOTE)   Final    Comment: Specimen analysis was performed without chain of custody handling. These results should be used for medical purposes only and not for   legal or employment purposes. Unconfirmed screening results must not   be used for non-medical purposes.     The cut-off concentration for positive results are as follows:    AMPH     1000 ng/mL  TOMER      200 ng/mL  KARLI      200 ng/mL  DULCE       300 ng/mL  METH      300 ng/mL  OPI       300 ng/mL  PCP        25 ng/mL  THC        50 ng/mL       Hospital Outpatient Visit on 07/22/2021   Component Date Value Ref Range Status    WBC 07/22/2021 6.1  4.6 - 13.2 K/uL Final    RBC 07/22/2021 4.91  4.35 - 5.65 M/uL Final    HGB 07/22/2021 14.9  13.0 - 16.0 g/dL Final    HCT 07/22/2021 45.0  36.0 - 48.0 % Final    MCV 07/22/2021 91.6  74.0 - 97.0 FL Final    MCH 07/22/2021 30.3  24.0 - 34.0 PG Final    MCHC 07/22/2021 33.1  31.0 - 37.0 g/dL Final    RDW 07/22/2021 11.6  11.6 - 14.5 % Final    PLATELET 30/60/3486 867  135 - 420 K/uL Final    MPV 07/22/2021 9.9  9.2 - 11.8 FL Final    NEUTROPHILS 07/22/2021 56  40 - 73 % Final    LYMPHOCYTES 07/22/2021 30  21 - 52 % Final    MONOCYTES 07/22/2021 11* 3 - 10 % Final    EOSINOPHILS 07/22/2021 2  0 - 5 % Final    BASOPHILS 07/22/2021 1  0 - 2 % Final    ABS. NEUTROPHILS 07/22/2021 3.4  1.8 - 8.0 K/UL Final    ABS. LYMPHOCYTES 07/22/2021 1.8  0.9 - 3.6 K/UL Final    ABS. MONOCYTES 07/22/2021 0.7  0.05 - 1.2 K/UL Final    ABS. EOSINOPHILS 07/22/2021 0.1  0.0 - 0.4 K/UL Final    ABS. BASOPHILS 07/22/2021 0.0  0.0 - 0.1 K/UL Final    DF 07/22/2021 AUTOMATED    Final    Sodium 07/22/2021 141  136 - 145 mmol/L Final    Potassium 07/22/2021 5.1  3.5 - 5.5 mmol/L Final    Chloride 07/22/2021 107  100 - 111 mmol/L Final    CO2 07/22/2021 32  21 - 32 mmol/L Final    Anion gap 07/22/2021 2* 3.0 - 18 mmol/L Final    Glucose 07/22/2021 112* 74 - 99 mg/dL Final    BUN 07/22/2021 12  7.0 - 18 MG/DL Final    Creatinine 07/22/2021 0.85  0.6 - 1.3 MG/DL Final    BUN/Creatinine ratio 07/22/2021 14  12 - 20   Final    GFR est AA 07/22/2021 >60  >60 ml/min/1.73m2 Final    GFR est non-AA 07/22/2021 >60  >60 ml/min/1.73m2 Final    Comment: (NOTE)  Estimated GFR is calculated using the Modification of Diet in Renal   Disease (MDRD) Study equation, reported for both  Americans   (GFRAA) and non- Americans (GFRNA), and normalized to 1.73m2   body surface area. The physician must decide which value applies to   the patient. The MDRD study equation should only be used in   individuals age 25 or older. It has not been validated for the   following: pregnant women, patients with serious comorbid conditions,   or on certain medications, or persons with extremes of body size,   muscle mass, or nutritional status.  Calcium 07/22/2021 9.0  8.5 - 10.1 MG/DL Final    Bilirubin, total 07/22/2021 0.4  0.2 - 1.0 MG/DL Final    ALT (SGPT) 07/22/2021 20  16 - 61 U/L Final    AST (SGOT) 07/22/2021 15  10 - 38 U/L Final    Alk.  phosphatase 07/22/2021 97  45 - 117 U/L Final    Protein, total 07/22/2021 7.0 6.4 - 8.2 g/dL Final    Albumin 07/22/2021 3.8  3.4 - 5.0 g/dL Final    Globulin 07/22/2021 3.2  2.0 - 4.0 g/dL Final    A-G Ratio 07/22/2021 1.2  0.8 - 1.7   Final    SARS-CoV-2 07/22/2021 Not Detected  Not Detected   Final    Comment: (NOTE)  This nucleic acid amplification test was developed and its  performance characteristics determined by [employer]. Nucleic acid amplification tests include RT-PCR and TMA. This test  has not been FDA cleared or approved. This test has been authorized  by FDA under an Emergency Use Authorization (EUA). This test is only  authorized for the duration of time the declaration that  circumstances exist justifying the authorization of the emergency use  of in vitro diagnostic tests for detection of SARS-CoV-2 virus and/or  diagnosis of COVID-19 infection under section 564(b)(1) of the Act,  21 U. S.C. 333DXA-4(B) (1), unless the authorization is terminated or  revoked sooner. When diagnostic testing is negative, the possibility of a false  negative result should be considered in the context of a patient's  recent exposures and the presence of clinical signs and symptoms  consistent with COVID-19. An individual without symptoms of COVID-  19 and who is not shedding SARS-CoV-2                            virus would expect to have a  negative (not detected) result in this assay.   Performed At: [address] [id number]  Werner León MD [id number]      Ventricular Rate 07/22/2021 61  BPM Final    Atrial Rate 07/22/2021 61  BPM Final    P-R Interval 07/22/2021 132  ms Final    QRS Duration 07/22/2021 92  ms Final    Q-T Interval 07/22/2021 390  ms Final    QTC Calculation (Bezet) 07/22/2021 392  ms Final    Calculated P Axis 07/22/2021 29  degrees Final    Calculated R Axis 07/22/2021 -8  degrees Final    Calculated T Axis 07/22/2021 29  degrees Final    Diagnosis 07/22/2021    Final                    Value:Normal sinus rhythm  Normal ECG  When compared with ECG of 17-OCT-2019 13:26,  No significant change was found  Confirmed by Burgess Yin ([id number]) on 7/23/2021 8:53:36 AM         .  No results found for any visits on 09/14/21. Assessment / Plan:      ICD-10-CM ICD-9-CM    1. Encounter for lipid screening for cardiovascular disease  Z13.220 V77.91 LIPID PANEL    F08.9 S41.5 METABOLIC PANEL, COMPREHENSIVE      CBC WITH AUTOMATED DIFF   2. Cervical radiculopathy  M54.12 723.4    3. Anxiety and depression  F41.9 300.00     F32.9 311      Labs ordered  Cervical radiculopathy-we will continue gabapentin. Follow-up with psychiatry for continued depression  Follow-up and Dispositions    · Return in about 6 months (around 3/14/2022). I asked the patient if he  had any questions and answered his  questions. The patient stated that he understands the treatment plan and agrees with the treatment plan    This document was created with a voice activated dictation system and may contain transcription errors.

## 2022-01-19 ENCOUNTER — OFFICE VISIT (OUTPATIENT)
Dept: ORTHOPEDIC SURGERY | Age: 57
End: 2022-01-19
Payer: MEDICARE

## 2022-01-19 VITALS
HEART RATE: 68 BPM | TEMPERATURE: 97.7 F | HEIGHT: 69 IN | WEIGHT: 216 LBS | OXYGEN SATURATION: 99 % | BODY MASS INDEX: 31.99 KG/M2

## 2022-01-19 DIAGNOSIS — M54.2 CERVICAL PAIN: ICD-10-CM

## 2022-01-19 DIAGNOSIS — M54.50 LUMBAR PAIN: Primary | ICD-10-CM

## 2022-01-19 DIAGNOSIS — M54.12 CERVICAL RADICULOPATHY: ICD-10-CM

## 2022-01-19 PROCEDURE — 3017F COLORECTAL CA SCREEN DOC REV: CPT | Performed by: NURSE PRACTITIONER

## 2022-01-19 PROCEDURE — G9717 DOC PT DX DEP/BP F/U NT REQ: HCPCS | Performed by: NURSE PRACTITIONER

## 2022-01-19 PROCEDURE — 99214 OFFICE O/P EST MOD 30 MIN: CPT | Performed by: NURSE PRACTITIONER

## 2022-01-19 PROCEDURE — G8417 CALC BMI ABV UP PARAM F/U: HCPCS | Performed by: NURSE PRACTITIONER

## 2022-01-19 PROCEDURE — G8427 DOCREV CUR MEDS BY ELIG CLIN: HCPCS | Performed by: NURSE PRACTITIONER

## 2022-01-19 RX ORDER — METHYLPREDNISOLONE 4 MG/1
TABLET ORAL
Qty: 1 DOSE PACK | Refills: 0 | Status: SHIPPED | OUTPATIENT
Start: 2022-01-19 | End: 2022-05-23 | Stop reason: ALTCHOICE

## 2022-01-19 RX ORDER — GABAPENTIN 800 MG/1
TABLET ORAL
Qty: 180 TABLET | Refills: 1 | Status: SHIPPED | OUTPATIENT
Start: 2022-01-19 | End: 2022-06-29 | Stop reason: SDUPTHER

## 2022-01-19 NOTE — PROGRESS NOTES
Chief complaint   Chief Complaint   Patient presents with    Neck Pain    Back Pain       History of Present Illness:  Irving Wellington is a  61 y.o.  male who comes in today for follow-up of his chronic neck pain. He is taking gabapentin 800 mg one to two tabs daily. He also takes amitriptyline to his PCP. He states he has to rollup a towel and put it around his neck at night to help with sleep. He is wondering if he could get a soft collar today. Today he also complains of some low back pain for the past 2 weeks without injury. He does not have any radicular pain. He has tried using heat on his back but it does not seem to be helping out much. He denies fever bowel bladder dysfunction. Physical Exam: Patient is a 62-year-old male well-developed well-nourished who is alert and oriented with a normal mood and affect. He has a full weightbearing antalgic gait. He did not use any assist device. He has 4-5 strength bilateral lower extremities. Management leg raise. Negative Dagmar's. He has mild pain with hyperextension lumbar spine. Assessment and Plan: This patient has neck pain and back pain. I have refilled his gabapentin. We will give a Medrol Dosepak for his flare of back pain. I will provide him with a soft collar for his neck. We will see him back in 6 months sooner if needed. Medications:  Current Outpatient Medications   Medication Sig Dispense Refill    methylPREDNISolone (Medrol, Marlon,) 4 mg tablet Per dose pack instructions 1 Dose Pack 0    gabapentin (NEURONTIN) 800 mg tablet 1-2 tab q am  Indications: neuropathic pain 180 Tablet 1    dextroamphetamine-amphetamine (AdderalL) 10 mg tablet Take 10 mg by mouth two (2) times a day. Indications: attention deficit disorder with hyperactivity      busPIRone (BUSPAR) 10 mg tablet Take 10 mg by mouth three (3) times daily.  Latuda 60 mg tab tablet Take 60 mg by mouth daily (with dinner).       escitalopram oxalate (LEXAPRO) 20 mg tablet Take 20 mg by mouth daily. Review of systems:    Past Medical History:   Diagnosis Date    Bilateral hand pain     Depression     and anxiety    Foot drop, left foot     . Seen by Podiatry in 2019    Headache     Psychotic disorder ([address]     anxiety    Sleep apnea      Past Surgical History:   Procedure Laterality Date    COLONOSCOPY N/A 10/28/2020    COLONOSCOPY w/polypectomies performed by Sergio Benz MD at [facility] ENDOSCOPY    HX HERNIA REPAIR      HX HERNIA REPAIR      Inguinal hernia- right side    HX KNEE ARTHROSCOPY Right     HX ORTHOPAEDIC Left 2017    lacerations to fingers and had pins, left thumb    HX ORTHOPAEDIC      Right knee surgery    HX OTHER SURGICAL  2017    Left hand surgery x 3    HX ROTATOR CUFF REPAIR Right 2020    [facility]     HX ROTATOR CUFF REPAIR Left 10/21/2019    HX ROTATOR CUFF REPAIR      Right shoulder    HX WRIST FRACTURE [place] Right 2021    [place] ANESTH,SURGERY OF SHOULDER Right      Social History     Socioeconomic History    Marital status:      Spouse name: Not on file    Number of children: Not on file    Years of education: Not on file    Highest education level: Not on file   Occupational History    Not on file   Tobacco Use    Smoking status: Former Smoker     Years: 2.00     Quit date:      Years since quittin.0    Smokeless tobacco: Current User    Tobacco comment: Starting Chewing to bacco in .    Vaping Use    Vaping Use: Never used   Substance and Sexual Activity    Alcohol use: Not Currently     Comment: Quit     Drug use: Not Currently     Types: Marijuana    Sexual activity: Not Currently   Other Topics Concern    Not on file   Social History Narrative    ** Merged History Encounter **          Social Determinants of Health     Financial Resource Strain:     Difficulty of Paying Living Expenses: Not on file   Food Insecurity:     Worried About Running Out of Food in the Last Year: Not on file    Ran Out of Food in the Last Year: Not on file   Transportation Needs:     Lack of Transportation (Medical): Not on file    Lack of Transportation (Non-Medical): Not on file   Physical Activity:     Days of Exercise per Week: Not on file    Minutes of Exercise per Session: Not on file   Stress:     Feeling of Stress : Not on file   Social Connections:     Frequency of Communication with Friends and Family: Not on file    Frequency of Social Gatherings with Friends and Family: Not on file    Attends Sabianist Services: Not on file    Active Member of [address] [place] [address] [employer] or Organizations: Not on file    Attends Club or Organization Meetings: Not on file    Marital Status: Not on file   Intimate Partner Violence:     Fear of Current or Ex-Partner: Not on file    Emotionally Abused: Not on file    Physically Abused: Not on file    Sexually Abused: Not on file   Housing Stability:     Unable to Pay for Housing in the Last Year: Not on file    Number of [place] in the Last Year: Not on file    Unstable Housing in the Last Year: Not on file     Family History   Problem Relation Age of Onset    Heart Disease Mother    Luan Lasso Other Mother         Lung disease.  COPD Mother        Physical Exam:  Visit Vitals  Pulse 68   Temp 97.7 °F (36.5 °C) (Temporal)   Ht 5' 9\" (1.753 m)   Wt 216 lb (98 kg)   SpO2 99%   BMI 31.90 kg/m²     Pain Scale: 7/10       has been . reviewed and is appropriate          Diagnoses and all orders for this visit:    1. Lumbar pain  -     methylPREDNISolone (Medrol, Marlon,) 4 mg tablet; Per dose pack instructions    2. Cervical pain  -     AMB SUPPLY ORDER    3. Cervical radiculopathy  -     gabapentin (NEURONTIN) 800 mg tablet; 1-2 tab q am  Indications: neuropathic pain            Follow-up and Dispositions    · Return in about 6 months (around 7/19/2022) for With NP Ranjit.              We have informed Jeannie Hernandez to notify us for immediate appointment if he has any worsening neurogical symptoms or if an emergency situation presents, then call [phone]

## 2022-01-19 NOTE — PROGRESS NOTES
Nile Mcgee presents today for   Chief Complaint   Patient presents with    Neck Pain    Back Pain       Is someone accompanying this pt? no    Is the patient using any DME equipment during OV? no    Depression Screening:  3 most recent PHQ Screens 9/14/2021   PHQ Not Done -   Little interest or pleasure in doing things Nearly every day   Feeling down, depressed, irritable, or hopeless Nearly every day   Total Score PHQ 2 6   Trouble falling or staying asleep, or sleeping too much Nearly every day   Feeling tired or having little energy Nearly every day   Poor appetite, weight loss, or overeating More than half the days   Feeling bad about yourself - or that you are a failure or have let yourself or your family down Nearly every day   Trouble concentrating on things such as school, work, reading, or watching TV Nearly every day   Moving or speaking so slowly that other people could have noticed; or the opposite being so fidgety that others notice Not at all   Thoughts of being better off dead, or hurting yourself in some way Nearly every day   PHQ 9 Score 23   How difficult have these problems made it for you to do your work, take care of your home and get along with others Extremely difficult       Learning Assessment:  No flowsheet data found. Abuse Screening:  Abuse Screening Questionnaire 9/14/2021   Do you ever feel afraid of your partner? N   Are you in a relationship with someone who physically or mentally threatens you? N   Is it safe for you to go home? Y       Fall Risk  Fall Risk Assessment, last 12 mths 3/5/2021   Able to walk? Yes   Fall in past 12 months? 0   Do you feel unsteady? 0   Are you worried about falling 0       OPIOID RISK TOOL  No flowsheet data found. Coordination of Care:  1. Have you been to the ER, urgent care clinic since your last visit? no  Hospitalized since your last visit? no    2.  Have you seen or consulted any other health care providers outside of the [facility] [address] since your last visit? no Include any pap smears or colon screening.  no

## 2022-03-18 PROBLEM — F41.9 ANXIETY AND DEPRESSION: Status: ACTIVE | Noted: 2021-03-05

## 2022-03-18 PROBLEM — F32.A ANXIETY AND DEPRESSION: Status: ACTIVE | Noted: 2021-03-05

## 2022-03-19 PROBLEM — G56.01 RIGHT CARPAL TUNNEL SYNDROME: Status: ACTIVE | Noted: 2021-07-27

## 2022-03-19 PROBLEM — M67.431 GANGLION CYST OF VOLAR ASPECT OF RIGHT WRIST: Status: ACTIVE | Noted: 2021-07-27

## 2022-03-19 PROBLEM — M54.12 CERVICAL RADICULOPATHY: Status: ACTIVE | Noted: 2021-03-05

## 2022-03-19 PROBLEM — G56.21 CUBITAL TUNNEL SYNDROME, RIGHT: Status: ACTIVE | Noted: 2021-07-27

## 2022-03-19 PROBLEM — F10.10 ETOH ABUSE: Status: ACTIVE | Noted: 2021-03-05

## 2022-05-18 ENCOUNTER — OFFICE VISIT (OUTPATIENT)
Dept: FAMILY MEDICINE CLINIC | Age: 57
End: 2022-05-18
Payer: MEDICARE

## 2022-05-18 VITALS
BODY MASS INDEX: 31.1 KG/M2 | OXYGEN SATURATION: 97 % | WEIGHT: 210 LBS | DIASTOLIC BLOOD PRESSURE: 66 MMHG | SYSTOLIC BLOOD PRESSURE: 105 MMHG | RESPIRATION RATE: 16 BRPM | HEIGHT: 69 IN | HEART RATE: 67 BPM | TEMPERATURE: 98.4 F
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
## 2022-05-18 PROCEDURE — G8417 CALC BMI ABV UP PARAM F/U: HCPCS | Performed by: NURSE PRACTITIONER

## 2022-05-18 PROCEDURE — 3017F COLORECTAL CA SCREEN DOC REV: CPT | Performed by: NURSE PRACTITIONER

## 2022-05-18 PROCEDURE — G9717 DOC PT DX DEP/BP F/U NT REQ: HCPCS | Performed by: NURSE PRACTITIONER

## 2022-05-18 PROCEDURE — 99213 OFFICE O/P EST LOW 20 MIN: CPT | Performed by: NURSE PRACTITIONER

## 2022-05-18 PROCEDURE — G8427 DOCREV CUR MEDS BY ELIG CLIN: HCPCS | Performed by: NURSE PRACTITIONER
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
## 2022-05-23 ENCOUNTER — VIRTUAL VISIT (OUTPATIENT)
Dept: ORTHOPEDIC SURGERY | Age: 57
End: 2022-05-23
Payer: MEDICARE

## 2022-05-23 DIAGNOSIS — M25.511 RIGHT SHOULDER PAIN, UNSPECIFIED CHRONICITY: ICD-10-CM

## 2022-05-23 DIAGNOSIS — M54.2 CERVICAL PAIN: ICD-10-CM

## 2022-05-23 DIAGNOSIS — M54.12 CERVICAL RADICULOPATHY: ICD-10-CM

## 2022-05-23 DIAGNOSIS — M62.838 MUSCLE SPASM: Primary | ICD-10-CM
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
## 2022-05-23 PROCEDURE — 99443 PR PHYS/QHP TELEPHONE EVALUATION 21-30 MIN: CPT | Performed by: NURSE PRACTITIONER

## 2022-05-23 RX ORDER — METHYLPREDNISOLONE 4 MG/1
TABLET ORAL
Qty: 1 DOSE PACK | Refills: 0 | Status: SHIPPED | OUTPATIENT
Start: 2022-05-23 | End: 2022-06-29 | Stop reason: ALTCHOICE

## 2022-05-23 RX ORDER — METHOCARBAMOL 750 MG/1
750 TABLET, FILM COATED ORAL
Qty: 90 TABLET | Refills: 0 | Status: SHIPPED | OUTPATIENT
Start: 2022-05-23 | End: 2022-06-29 | Stop reason: ALTCHOICE
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
## 2022-06-29 ENCOUNTER — VIRTUAL VISIT (OUTPATIENT)
Dept: ORTHOPEDIC SURGERY | Age: 57
End: 2022-06-29
Payer: MEDICARE

## 2022-06-29 DIAGNOSIS — M54.12 CERVICAL RADICULOPATHY: ICD-10-CM

## 2022-06-29 DIAGNOSIS — M54.2 CERVICAL PAIN: Primary | ICD-10-CM

## 2022-06-29 PROCEDURE — 99443 PR PHYS/QHP TELEPHONE EVALUATION 21-30 MIN: CPT | Performed by: NURSE PRACTITIONER

## 2022-06-29 RX ORDER — GABAPENTIN 800 MG/1
TABLET ORAL
Qty: 180 TABLET | Refills: 0 | Status: SHIPPED | OUTPATIENT
Start: 2022-06-29
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
## 2022-08-25 NOTE — PROGRESS NOTES
ANTICOAGULATION     Amparo Mccray is overdue for INR check.      Reminder letter sent    Tha Vincent RN     Bettie Navas  1965     HISTORY OF PRESENT ILLNESS  Bettie Navas is a 53 y.o. male who presents today for evaluation S/P Right shoulder arthroscopic rotator cuff re-tear repair, biceps tenodesis, distal clavicle excision and subacromial decompression on 9/21/20. Patient has been going to PT. Describes pain as a 3/10. Has been taking nothing for pain. Still has night pain. He has keep compliant with his exercises and restrictions. He has worked in [employer] and is making progress with his motion. Patient denies any fever, chills, chest pain, shortness of breath or calf pain. There are no new illness or injuries to report since last seen in the office. PHYSICAL EXAM:   Visit Vitals  /64 (BP 1 Location: Left arm, BP Patient Position: Sitting)   Pulse 67   Temp 97.5 °F (36.4 °C) (Temporal)   Ht 5' 9\" (1.753 m)   Wt 213 lb (96.6 kg)   SpO2 98%   BMI 31.45 kg/m²      The patient is a well-developed, well-nourished male in no acute distress. The patient is alert and oriented times three. The patient appears to be well groomed. Mood and affect are normal.  ORTHOPEDIC EXAM of right shoulder:  Inspection: swelling not present,  Bruising not present  Incisions well healed  Passive glenohumeral abduction 0-80 degrees, 100 PFF, 40 PER  Stability: Stable  Strength: n/a  2+ distal pulses    IMPRESSION:  S/P Right shoulder arthroscopic rotator cuff re-tear repair, biceps tenodesis, distal clavicle excision and subacromial decompression. PLAN:   Pt doing well post operatively  His motion is much better since last visit. He will continue exercises and PT ordered today. He will continue PT and advancing per protocol. Talked about limitations and when he is doing to much. Stressed to patient that nothing causes an increase in pain. Pt not given a refill of pain medication.   RTC 6 weeks    TANNA Harmon and Spine Specialist

## 2022-11-09 ENCOUNTER — OFFICE VISIT (OUTPATIENT)
Dept: FAMILY MEDICINE CLINIC | Age: 57
End: 2022-11-09
Payer: MEDICARE

## 2022-11-09 VITALS
OXYGEN SATURATION: 97 % | WEIGHT: 200 LBS | BODY MASS INDEX: 29.62 KG/M2 | HEART RATE: 78 BPM | RESPIRATION RATE: 17 BRPM | DIASTOLIC BLOOD PRESSURE: 76 MMHG | SYSTOLIC BLOOD PRESSURE: 115 MMHG | HEIGHT: 69 IN

## 2022-11-09 DIAGNOSIS — Z12.5 SCREENING PSA (PROSTATE SPECIFIC ANTIGEN): ICD-10-CM

## 2022-11-09 DIAGNOSIS — F32.A ANXIETY AND DEPRESSION: ICD-10-CM

## 2022-11-09 DIAGNOSIS — M25.512 CHRONIC LEFT SHOULDER PAIN: ICD-10-CM

## 2022-11-09 DIAGNOSIS — F41.9 ANXIETY AND DEPRESSION: ICD-10-CM

## 2022-11-09 DIAGNOSIS — Z00.00 ENCOUNTER FOR GENERAL MEDICAL EXAMINATION: ICD-10-CM

## 2022-11-09 DIAGNOSIS — M54.12 CERVICAL RADICULOPATHY: ICD-10-CM

## 2022-11-09 DIAGNOSIS — G89.29 CHRONIC LEFT SHOULDER PAIN: ICD-10-CM

## 2022-11-09 DIAGNOSIS — Z13.6 ENCOUNTER FOR LIPID SCREENING FOR CARDIOVASCULAR DISEASE: ICD-10-CM

## 2022-11-09 DIAGNOSIS — Z13.220 ENCOUNTER FOR LIPID SCREENING FOR CARDIOVASCULAR DISEASE: ICD-10-CM

## 2022-11-09 DIAGNOSIS — M25.552 LEFT HIP PAIN: Primary | ICD-10-CM

## 2022-11-09 PROCEDURE — 3017F COLORECTAL CA SCREEN DOC REV: CPT | Performed by: STUDENT IN AN ORGANIZED HEALTH CARE EDUCATION/TRAINING PROGRAM

## 2022-11-09 PROCEDURE — 99214 OFFICE O/P EST MOD 30 MIN: CPT | Performed by: STUDENT IN AN ORGANIZED HEALTH CARE EDUCATION/TRAINING PROGRAM

## 2022-11-09 PROCEDURE — G8427 DOCREV CUR MEDS BY ELIG CLIN: HCPCS | Performed by: STUDENT IN AN ORGANIZED HEALTH CARE EDUCATION/TRAINING PROGRAM

## 2022-11-09 PROCEDURE — G8417 CALC BMI ABV UP PARAM F/U: HCPCS | Performed by: STUDENT IN AN ORGANIZED HEALTH CARE EDUCATION/TRAINING PROGRAM

## 2022-11-09 PROCEDURE — G9717 DOC PT DX DEP/BP F/U NT REQ: HCPCS | Performed by: STUDENT IN AN ORGANIZED HEALTH CARE EDUCATION/TRAINING PROGRAM

## 2022-11-09 RX ORDER — METHOCARBAMOL 500 MG/1
500 TABLET, FILM COATED ORAL
Qty: 30 TABLET | Refills: 1 | Status: SHIPPED | OUTPATIENT
Start: 2022-11-09
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
## 2022-12-09 ENCOUNTER — OFFICE VISIT (OUTPATIENT)
Dept: ORTHOPEDIC SURGERY | Age: 57
End: 2022-12-09
Payer: MEDICARE

## 2022-12-09 VITALS — WEIGHT: 205 LBS | HEIGHT: 69 IN | BODY MASS INDEX: 30.36 KG/M2 | RESPIRATION RATE: 18 BRPM

## 2022-12-09 DIAGNOSIS — M25.552 LEFT HIP PAIN: ICD-10-CM

## 2022-12-09 DIAGNOSIS — M54.12 CERVICAL RADICULOPATHY: ICD-10-CM

## 2022-12-09 DIAGNOSIS — M25.512 ACUTE PAIN OF LEFT SHOULDER: Primary | ICD-10-CM
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
## 2022-12-12 ENCOUNTER — OFFICE VISIT (OUTPATIENT)
Dept: ORTHOPEDIC SURGERY | Age: 57
End: 2022-12-12
Payer: MEDICARE

## 2022-12-12 VITALS
TEMPERATURE: 98.7 F | WEIGHT: 207 LBS | SYSTOLIC BLOOD PRESSURE: 110 MMHG | BODY MASS INDEX: 30.66 KG/M2 | HEART RATE: 66 BPM | HEIGHT: 69 IN | OXYGEN SATURATION: 98 % | DIASTOLIC BLOOD PRESSURE: 70 MMHG

## 2022-12-12 DIAGNOSIS — M54.2 CERVICAL PAIN: ICD-10-CM

## 2022-12-12 DIAGNOSIS — M54.12 CERVICAL RADICULOPATHY: Primary | ICD-10-CM

## 2022-12-12 PROCEDURE — 3017F COLORECTAL CA SCREEN DOC REV: CPT | Performed by: NURSE PRACTITIONER

## 2022-12-12 PROCEDURE — G9717 DOC PT DX DEP/BP F/U NT REQ: HCPCS | Performed by: NURSE PRACTITIONER

## 2022-12-12 PROCEDURE — G8427 DOCREV CUR MEDS BY ELIG CLIN: HCPCS | Performed by: NURSE PRACTITIONER

## 2022-12-12 PROCEDURE — G8417 CALC BMI ABV UP PARAM F/U: HCPCS | Performed by: NURSE PRACTITIONER

## 2022-12-12 PROCEDURE — 99214 OFFICE O/P EST MOD 30 MIN: CPT | Performed by: NURSE PRACTITIONER

## 2022-12-12 RX ORDER — GABAPENTIN 800 MG/1
800 TABLET ORAL
Qty: 90 TABLET | Refills: 1 | Status: SHIPPED | OUTPATIENT
Start: 2022-12-12

## 2022-12-12 NOTE — PROGRESS NOTES
Yumiko Tyson presents today for   Chief Complaint   Patient presents with    Neck Pain       Is someone accompanying this pt? no    Is the patient using any DME equipment during [address]? no    Depression Screening:  3 most recent PHQ Screens 11/9/2022   PHQ Not Done -   Little interest or pleasure in doing things Several days   Feeling down, depressed, irritable, or hopeless Several days   Total Score PHQ 2 2   Trouble falling or staying asleep, or sleeping too much -   Feeling tired or having little energy -   Poor appetite, weight loss, or overeating -   Feeling bad about yourself - or that you are a failure or have let yourself or your family down -   Trouble concentrating on things such as school, work, reading, or watching TV -   Moving or speaking so slowly that other people could have noticed; or the opposite being so fidgety that others notice -   Thoughts of being better off dead, or hurting yourself in some way -   PHQ 9 Score -   How difficult have these problems made it for you to do your work, take care of your home and get along with others -         Abuse Screening:  Abuse Screening Questionnaire 9/14/2021   Do you ever feel afraid of your partner? N   Are you in a relationship with someone who physically or mentally threatens you? N   Is it safe for you to go home? Y       Fall Risk  Fall Risk Assessment, last 12 mths 3/5/2021   Able to walk? Yes   Fall in past 12 months? 0   Do you feel unsteady? 0   Are you worried about falling 0         Coordination of Care:  1. Have you been to the ER, urgent care clinic since your last visit? no  Hospitalized since your last visit? no    2. Have you seen or consulted any other health care providers outside of the [address] [place] [address] since your last visit? Yes, ortho Include any pap smears or colon screening.  no

## 2022-12-12 NOTE — PROGRESS NOTES
Chief complaint   Chief Complaint   Patient presents with    Neck Pain       History of Present Illness:  Rose Garnica is a  61 y.o.  male who comes in today complaining of increased neck pain that radiates to his left shoulder into his left arm down to his fingers. He has numbness and tingling. He has had surgery on the left shoulder before which sounds like a rotator cuff repair. He has seen Dr. Tacos Sellers recently due to that increased pain and he states Dr. Tacos Sellers feels like the pain is coming more from his neck. 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 He is taking gabapentin 800 mg but is only taking it as needed. He states it does help when he takes it. He is on Social Security disability. He is a non-smoker. He denies fever bowel bladder dysfunction. Physical Exam: The patient is a 69-year-old male well-developed well-nourished who is alert and oriented with a normal mood and affect. He has a full weightbearing nonantalgic gait. He has 4 out of 5 strength bilateral upper extremities. Negative Dagmar's. Assessment and Plan: This is a patient who has increased neck pain with radiating left shoulder and arm pain down to his fingers with numbness and tingling. He is allergic to NSAIDs as it gives him hives. I have printed off some cervical exercises for him to do. I will have him take his gabapentin 800 daily and not just as needed. I will see him back in 3 months sooner if needed. If he is still having symptoms and we will do imaging. Medications:  Current Outpatient Medications   Medication Sig Dispense Refill    gabapentin (NEURONTIN) 800 mg tablet Take 1 Tablet by mouth nightly. Max Daily Amount: 800 mg. Indications: neuropathic pain 90 Tablet 1    methocarbamoL (ROBAXIN) 500 mg tablet Take 1 Tablet by mouth two (2) times daily as needed for Muscle Spasm(s). 30 Tablet 1    dextroamphetamine-amphetamine (ADDERALL) 10 mg tablet Take 10 mg by mouth two (2) times a day. Indications: attention deficit disorder with hyperactivity      busPIRone (BUSPAR) 10 mg tablet Take 10 mg by mouth three (3) times daily. Latuda 60 mg tab tablet Take 60 mg by mouth daily (with dinner). escitalopram oxalate (LEXAPRO) 20 mg tablet Take 20 mg by mouth daily. Review of systems:    Past Medical History:   Diagnosis Date    Bilateral hand pain     Depression     and anxiety    Foot drop, left foot     . Seen by Podiatry in 2019    Headache     Psychotic disorder ([address]     anxiety    Sleep apnea      Past Surgical History:   Procedure Laterality Date    COLONOSCOPY N/A 10/28/2020    COLONOSCOPY w/polypectomies performed by Jaime Alejandro MD at [facility] ENDOSCOPY    HX HERNIA REPAIR      HX HERNIA REPAIR      Inguinal hernia- right side    HX KNEE ARTHROSCOPY Right     HX ORTHOPAEDIC Left 2017    lacerations to fingers and had pins, left thumb    HX ORTHOPAEDIC      Right knee surgery    HX OTHER SURGICAL  2017    Left hand surgery x 3    HX ROTATOR CUFF REPAIR Right 2020    [facility]     HX ROTATOR CUFF REPAIR Left 10/21/2019    HX ROTATOR CUFF REPAIR  2000    Right shoulder    HX WRIST FRACTURE [place] Right 2021    [place] ANESTH,SURGERY OF SHOULDER Right      Social History     Socioeconomic History    Marital status:      Spouse name: Not on file    Number of children: Not on file    Years of education: Not on file    Highest education level: Not on file   Occupational History    Not on file   Tobacco Use    Smoking status: Former     Years: 2.00     Types: Cigarettes     Quit date:      Years since quittin.9    Smokeless tobacco: Current    Tobacco comments:     Starting Chewing to bacco in .    Vaping Use    Vaping Use: Never used   Substance and Sexual Activity    Alcohol use: Not Currently     Comment: Quit 2019    Drug use: Not Currently     Types: Marijuana    Sexual activity: Not Currently   Other Topics Concern    Not on file   Social History Narrative    ** Merged History Encounter **          Social Determinants of Health     Financial Resource Strain: Not on file   Food Insecurity: Not on file   Transportation Needs: Not on file   Physical Activity: Not on file   Stress: Not on file   Social Connections: Not on file   Intimate Partner Violence: Not on file   Housing Stability: Not on file     Family History   Problem Relation Age of Onset    Heart Disease Mother     Other Mother         Lung disease. COPD Mother        Physical Exam:  Visit Vitals  /70 (BP 1 Location: Left upper arm, BP Patient Position: Sitting, BP Cuff Size: Large adult)   Pulse 66   Temp 98.7 °F (37.1 °C) (Temporal)   Ht 5' 9\" (1.753 m)   Wt 207 lb (93.9 kg)   SpO2 98% Comment: RA   BMI 30.57 kg/m²     Pain Scale: 5/10       has been . reviewed and is appropriate          Diagnoses and all orders for this visit:    1. Cervical radiculopathy  -     gabapentin (NEURONTIN) 800 mg tablet; Take 1 Tablet by mouth nightly. Max Daily Amount: 800 mg. Indications: neuropathic pain    2. Cervical pain  -     gabapentin (NEURONTIN) 800 mg tablet; Take 1 Tablet by mouth nightly. Max Daily Amount: 800 mg. Indications: neuropathic pain            Follow-up and Dispositions    Return in about 6 months (around 6/12/2023) for with NP Ranjit. We have informed Nile Mcgee to notify us for immediate appointment if he has any worsening neurogical symptoms or if an emergency situation presents, then call 911    Please note that this dictation was completed with [email or web address], the computer voice recognition software. Quite often unanticipated grammatical, syntax, homophones, and other interpretive errors are inadvertently transcribed by the computer software. Please disregard these errors. Please excuse any errors that have escaped final proofreading.

## 2023-01-05 ENCOUNTER — OFFICE VISIT (OUTPATIENT)
Dept: ORTHOPEDIC SURGERY | Age: 58
End: 2023-01-05
Payer: MEDICARE

## 2023-01-05 VITALS
HEART RATE: 66 BPM | RESPIRATION RATE: 16 BRPM | OXYGEN SATURATION: 99 % | BODY MASS INDEX: 30.81 KG/M2 | HEIGHT: 69 IN | WEIGHT: 208 LBS

## 2023-01-05 DIAGNOSIS — Z72.0 TOBACCO ABUSE: ICD-10-CM

## 2023-01-05 DIAGNOSIS — E66.9 CLASS 1 OBESITY WITH BODY MASS INDEX (BMI) OF 30.0 TO 30.9 IN ADULT, UNSPECIFIED OBESITY TYPE, UNSPECIFIED WHETHER SERIOUS COMORBIDITY PRESENT: ICD-10-CM

## 2023-01-05 DIAGNOSIS — M25.552 LEFT HIP PAIN: Primary | ICD-10-CM

## 2023-01-05 NOTE — PROGRESS NOTES
Patient: Lisa Gracia                MRN: [id number]       SSN: [id number]  YOB: 1965        AGE: 61 y.o. SEX: male  Body mass index is 30.72 kg/m². PCP: Candace Jordan MD  01/05/23    Chief Complaint: Hip Pain (Left hip )        ICD-10-CM ICD-9-CM    1. Left hip pain  M25.552 719.45 AMB POC X-RAY RADEX HIP UNI WITH PELVIS 2-3 VIEWS      MRI HIP LT W CONT      XR INJ HIP LT PRE CT/MRI ARTHRO      2. Class 1 obesity with body mass index (BMI) of 30.0 to 30.9 in adult, unspecified obesity type, unspecified whether serious comorbidity present  E66.9 278.00     Z68.30 V85.30       3. Tobacco abuse  Z72.0 305.1           HPI: Lisa Gracia is a 61 y.o. male patient with Hip Pain (Left hip )  That is intermittent in nature since late summer. He noticed it once was helping his wife unload and his hip gave way and he fell. It happened again last month. When he gets these pains they are sharp and make him want to fall to the ground. They will last anywhere from a few seconds up to an hour. He describes the pain as in the anterior muscles of his hip. Tobacco Use: High Risk    Smoking Tobacco Use: Former    Smokeless Tobacco Use: Current    Passive Exposure: Not on file         PHYSICAL EXAMINATION:  Visit Vitals  Pulse 66   Resp 16   Ht 5' 9\" (1.753 m)   Wt 208 lb (94.3 kg)   SpO2 99%   BMI 30.72 kg/m²     Body mass index is 30.72 kg/m². GENERAL: Alert and oriented x3, in no acute distress. HEENT: Normocephalic, atraumatic. MSK: Left Hip Exam     Tenderness   The patient is experiencing tenderness in the anterior.     Range of Motion   Flexion:  120   External rotation:  50   Internal rotation: 30     Muscle Strength   Abduction: 5/5   Adduction: 5/5   Flexion: 5/5     Tests   SIN: negative    Other   Erythema: absent  Scars: absent  Sensation: normal  Pulse: present    Comments:  Positive FADIR test.  Negative logroll test.  No tenderness palpation around the greater trochanter or abductor musculature. IMAGING:  Imaging read by myself and interpreted as follows:  3 view x-ray of the left hip including AP pelvis and AP and lateral of the left hip demonstrate preservation of the joint space without noticeable formation of subchondral sclerosis, subchondral cysts or osteophytosis. No evidence of fractures or any other bony pathology can be seen. ASSESSMENT & PLAN  Diagnosis: 61 y.o. male with left hip pain suspicious for labral tear. Considering he has no signs of arthritis on his hip x-rays I think it prudent to get an MRI to rule out any pathology such as AVN or labral tear. He does admit to history of heavy alcohol use. He may be at risk of AVN. I will see him back after his hip MRIs been obtained. We discussed pain medication however with his pain being fleeting in general, he can treat it symptomatically with over-the-counter medication if needed as well as ice. Past Medical History:   Diagnosis Date    Bilateral hand pain     Depression     and anxiety    Foot drop, left foot     October, 2018. Seen by Podiatry in 2019    Headache     Psychotic disorder ([facility] [address]     anxiety    Sleep apnea        Family History   Problem Relation Age of Onset    Heart Disease Mother     Other Mother         Lung disease. COPD Mother        Current Outpatient Medications   Medication Sig Dispense Refill    gabapentin (NEURONTIN) 800 mg tablet Take 1 Tablet by mouth nightly. Max Daily Amount: 800 mg. Indications: neuropathic pain 90 Tablet 1    methocarbamoL (ROBAXIN) 500 mg tablet Take 1 Tablet by mouth two (2) times daily as needed for Muscle Spasm(s). 30 Tablet 1    dextroamphetamine-amphetamine (ADDERALL) 10 mg tablet Take 10 mg by mouth two (2) times a day. Indications: attention deficit disorder with hyperactivity      busPIRone (BUSPAR) 10 mg tablet Take 10 mg by mouth three (3) times daily.       Latuda 60 mg tab tablet Take 60 mg by mouth daily (with dinner). escitalopram oxalate (LEXAPRO) 20 mg tablet Take 20 mg by mouth daily. Allergies   Allergen Reactions    Motrin [Ibuprofen] Hives    Motrin [Ibuprofen] Rash       Past Surgical History:   Procedure Laterality Date    COLONOSCOPY N/A 10/28/2020    COLONOSCOPY w/polypectomies performed by Joselyn Robert MD at [facility] ENDOSCOPY    HX HERNIA REPAIR      HX HERNIA REPAIR      Inguinal hernia- right side    HX KNEE ARTHROSCOPY Right     HX ORTHOPAEDIC Left 2017    lacerations to fingers and had pins, left thumb    HX ORTHOPAEDIC      Right knee surgery    HX OTHER SURGICAL  2017    Left hand surgery x 3    HX ROTATOR CUFF REPAIR Right 2020    [facility]     HX ROTATOR CUFF REPAIR Left 10/21/2019    HX ROTATOR CUFF REPAIR  2000    Right shoulder    HX WRIST FRACTURE TX Right 2021    MT ANES NRV MUSC TNDN FSCIA BURSA SHOULDER & AXILLA Right        Social History     Socioeconomic History    Marital status:      Spouse name: Not on file    Number of children: Not on file    Years of education: Not on file    Highest education level: Not on file   Occupational History    Not on file   Tobacco Use    Smoking status: Former     Years: 2.00     Types: Cigarettes     Quit date:      Years since quittin.0    Smokeless tobacco: Current    Tobacco comments:     Starting Chewing to bacco in .    Vaping Use    Vaping Use: Never used   Substance and Sexual Activity    Alcohol use: Not Currently     Comment: Quit     Drug use: Not Currently     Types: Marijuana    Sexual activity: Not Currently   Other Topics Concern    Not on file   Social History Narrative    ** Merged History Encounter **          Social Determinants of Health     Financial Resource Strain: Not on file   Food Insecurity: Not on file   Transportation Needs: Not on file   Physical Activity: Not on file   Stress: Not on file   Social Connections: Not on file   Intimate Partner Violence: Not on file   Housing Stability: Not on file       REVIEW OF SYSTEMS:      No changes from previous review of systems unless noted. Prescription medication management discussed with patient. Electronically signed by: Elsie Caldwell DO    Note: This note was completed using voice recognition software.   Any typographical/name errors or mistakes are unintentional.

## 2023-01-26 ENCOUNTER — HOSPITAL ENCOUNTER (OUTPATIENT)
Dept: GENERAL RADIOLOGY | Age: 58
Discharge: HOME | End: 2023-01-26
Attending: ORTHOPAEDIC SURGERY

## 2023-01-26 RX ORDER — LIDOCAINE HYDROCHLORIDE 10 MG/ML
5 INJECTION, SOLUTION EPIDURAL; INFILTRATION; INTRACAUDAL; PERINEURAL
Status: ACTIVE | OUTPATIENT
Start: 2023-01-26 | End: 2023-01-26

## 2023-01-26 RX ORDER — SODIUM CHLORIDE 9 MG/ML
20 INJECTION INTRAVENOUS
Status: DISPENSED | OUTPATIENT
Start: 2023-01-26 | End: 2023-01-26

## 2023-03-01 ENCOUNTER — OFFICE VISIT (OUTPATIENT)
Age: 58
End: 2023-03-01
Payer: COMMERCIAL

## 2023-03-01 VITALS
WEIGHT: 211 LBS | OXYGEN SATURATION: 98 % | TEMPERATURE: 98 F | BODY MASS INDEX: 31.25 KG/M2 | HEART RATE: 62 BPM | HEIGHT: 69 IN

## 2023-03-01 DIAGNOSIS — M47.812 CERVICAL SPONDYLOSIS: ICD-10-CM

## 2023-03-01 DIAGNOSIS — M48.02 CERVICAL SPINAL STENOSIS: ICD-10-CM

## 2023-03-01 DIAGNOSIS — M96.1 CERVICAL POST-LAMINECTOMY SYNDROME: Primary | ICD-10-CM

## 2023-03-01 PROCEDURE — 99212 OFFICE O/P EST SF 10 MIN: CPT | Performed by: NURSE PRACTITIONER

## 2023-03-01 RX ORDER — BUSPIRONE HYDROCHLORIDE 15 MG/1
TABLET ORAL
COMMUNITY
Start: 2022-12-14

## 2023-03-01 NOTE — PROGRESS NOTES
Chief complaint   Chief Complaint   Patient presents with    Back Pain    Neck Pain    Shoulder Pain     Left      Hip Pain     Left         History of Present Illness:  Rajesh Orozco is a  61 y.o.  male who comes in today for follow-up of his neck pain that was rating to his left shoulder and down his left arm to his fingers with numbness and tingling. Last visit I gave him a home exercise program to do and continue his gabapentin 800 mg at night. He states he did do the exercises and it has helped quite a bit. He states the pain in his left arm is much better and he no longer has the numbness and tingling. He has had a left rotator cuff repair on October 21, 2019 by Dr. Lupis Salvador. He is on Social Security disability. He is a non-smoker. He denies fever bowel bladder dysfunction. He is allergic to NSAIDs which gives him hives. Physical Exam: The patient is a 59-year-old male well-developed well-nourished who is alert and oriented with a normal mood and affect. He has a full weightbearing nonantalgic gait. He has 4 out of 5 strength bilateral upper extremities. Negative Brandon's. Assessment and Plan: This is a patient who has had benefit with a home exercise program for his neck pain and left arm pain. He will continue to do them on a daily basis. He does not need a refill of his gabapentin at this time. We will see him back in 3 months sooner if needed. There are no diagnoses linked to this encounter. Return in about 3 months (around 6/1/2023).  has been . reviewed and is appropriate    Medications:  Current Outpatient Medications   Medication Sig Dispense Refill    busPIRone (BUSPAR) 15 MG tablet TAKE 1 TABLET BY MOUTH 3 TIMES A DAY (BUSPAR)      amphetamine-dextroamphetamine (ADDERALL) 10 MG tablet Take 10 mg by mouth 2 times daily.       escitalopram (LEXAPRO) 20 MG tablet Take 20 mg by mouth daily      gabapentin (NEURONTIN) 800 MG tablet Take 800 mg by mouth 2 times daily as needed. lurasidone (LATUDA) 60 MG TABS tablet Take 60 mg by mouth Daily with supper      busPIRone (BUSPAR) 10 MG tablet Take 10 mg by mouth 3 times daily      methocarbamol (ROBAXIN) 500 MG tablet Take 500 mg by mouth 2 times daily as needed       No current facility-administered medications for this visit. Review of systems:    Past Medical History:   Diagnosis Date    Bilateral hand pain     Depression     and anxiety    Foot drop, left foot     .   Seen by Podiatry in 2019    Headache     Psychotic disorder ([facility] [address]     anxiety    Sleep apnea      Past Surgical History:   Procedure Laterality Date    ANESTH,SURGERY OF SHOULDER Right     COLONOSCOPY N/A 10/28/2020    COLONOSCOPY w/polypectomies performed by Ryley Eisenberg MD at [address] [place] [address]      Inguinal hernia- right side    KNEE ARTHROSCOPY Right     ORTHOPEDIC SURGERY Left 2017    lacerations to fingers and had pins, left thumb    ORTHOPEDIC SURGERY      Right knee surgery    OTHER SURGICAL HISTORY  2017    Left hand surgery x 3    ROTATOR CUFF REPAIR Right 2020    [facility]     ROTATOR CUFF REPAIR Left 10/21/2019    ROTATOR CUFF REPAIR  2000    Right shoulder    WRIST FRACTURE SURGERY Right 2021     Social History     Socioeconomic History    Marital status:      Spouse name: Not on file    Number of children: Not on file    Years of education: Not on file    Highest education level: Not on file   Occupational History    Not on file   Tobacco Use    Smoking status: Former     Types: Cigarettes     Quit date: 2005     Years since quittin.1    Smokeless tobacco: Current   Substance and Sexual Activity    Alcohol use: Not Currently    Drug use: Not Currently     Types: Marijuana Eugenio Raya)    Sexual activity: Not on file   Other Topics Concern    Not on file   Social History Narrative         ** Merged History Encounter **     Social Determinants of Health Financial Resource Strain: Not on file   Food Insecurity: Not on file   Transportation Needs: Not on file   Physical Activity: Not on file   Stress: Not on file   Social Connections: Not on file   Intimate Partner Violence: Not on file   Housing Stability: Not on file     Family History   Problem Relation Age of Onset    Other Mother         Lung disease. COPD Mother     Heart Disease Mother        Physical Exam:  Pulse 62   Temp 98 °F (36.7 °C) (Temporal)   Ht 5' 9\" (1.753 m)   Wt 211 lb (95.7 kg)   SpO2 98% Comment: RA  BMI 31.16 kg/m²   Pain Scale: 4/10      We have informed Altaf Going to notify us for immediate appointment if he has any worsening neurogical symptoms or if an emergency situation presents, then call 911    Please note that this dictation was completed with [employer], the computer voice recognition software. Quite often unanticipated grammatical, syntax, homophones, and other interpretive errors are inadvertently transcribed by the computer software. Please disregard these errors. Please excuse any errors that have escaped final proofreading.      JAMISON Aldana - NP

## 2023-03-01 NOTE — PROGRESS NOTES
Joaquin Martinez presents today for   Chief Complaint   Patient presents with    Back Pain    Neck Pain    Shoulder Pain     Left      Hip Pain     Left         Is someone accompanying this pt? no    Is the patient using any DME equipment during OV? no    Depression Screening:  PHQ-9 Questionaire 11/9/2022 5/18/2022   Little interest or pleasure in doing things 1 1   Feeling down, depressed, or hopeless 1 1   PHQ-9 Total Score 2 2     PHQ Scores 11/9/2022 5/18/2022 9/14/2021 3/5/2021   PHQ2 Score 2 2 - -   PHQ2 Score - 2 6 6   PHQ9 Score 2 2 - -   PHQ9 Score - - 23 18         Coordination of Care:  1. Have you been to the ER, urgent care clinic since your last visit? no  Hospitalized since your last visit? no    2. Have you seen or consulted any other health care providers outside of the [address] [place] [address] since your last visit? Yes, ortho Include any pap smears or colon screening.  no

## 2023-03-09 ENCOUNTER — OFFICE VISIT (OUTPATIENT)
Facility: CLINIC | Age: 58
End: 2023-03-09
Payer: COMMERCIAL

## 2023-03-09 VITALS
WEIGHT: 217 LBS | RESPIRATION RATE: 16 BRPM | BODY MASS INDEX: 32.14 KG/M2 | SYSTOLIC BLOOD PRESSURE: 123 MMHG | DIASTOLIC BLOOD PRESSURE: 78 MMHG | HEIGHT: 69 IN | HEART RATE: 60 BPM | OXYGEN SATURATION: 98 %

## 2023-03-09 DIAGNOSIS — H43.393 VITREOUS FLOATERS OF BOTH EYES: ICD-10-CM

## 2023-03-09 DIAGNOSIS — Z13.21 ENCOUNTER FOR SCREENING FOR NUTRITIONAL DISORDER: ICD-10-CM

## 2023-03-09 DIAGNOSIS — Z00.00 MEDICARE ANNUAL WELLNESS VISIT, SUBSEQUENT: ICD-10-CM

## 2023-03-09 DIAGNOSIS — Z13.220 ENCOUNTER FOR SCREENING FOR LIPOID DISORDERS: ICD-10-CM

## 2023-03-09 DIAGNOSIS — M25.552 PAIN IN LEFT HIP: ICD-10-CM

## 2023-03-09 DIAGNOSIS — F32.A ANXIETY AND DEPRESSION: ICD-10-CM

## 2023-03-09 DIAGNOSIS — Z86.39 HISTORY OF VITAMIN D DEFICIENCY: ICD-10-CM

## 2023-03-09 DIAGNOSIS — F41.9 ANXIETY AND DEPRESSION: ICD-10-CM

## 2023-03-09 DIAGNOSIS — Z00.00 MEDICARE ANNUAL WELLNESS VISIT, SUBSEQUENT: Primary | ICD-10-CM

## 2023-03-09 PROCEDURE — G0439 PPPS, SUBSEQ VISIT: HCPCS | Performed by: STUDENT IN AN ORGANIZED HEALTH CARE EDUCATION/TRAINING PROGRAM

## 2023-03-09 SDOH — ECONOMIC STABILITY: FOOD INSECURITY: WITHIN THE PAST 12 MONTHS, THE FOOD YOU BOUGHT JUST DIDN'T LAST AND YOU DIDN'T HAVE MONEY TO GET MORE.: NEVER TRUE

## 2023-03-09 SDOH — ECONOMIC STABILITY: INCOME INSECURITY: HOW HARD IS IT FOR YOU TO PAY FOR THE VERY BASICS LIKE FOOD, HOUSING, MEDICAL CARE, AND HEATING?: HARD

## 2023-03-09 SDOH — ECONOMIC STABILITY: FOOD INSECURITY: WITHIN THE PAST 12 MONTHS, YOU WORRIED THAT YOUR FOOD WOULD RUN OUT BEFORE YOU GOT MONEY TO BUY MORE.: OFTEN TRUE

## 2023-03-09 SDOH — ECONOMIC STABILITY: HOUSING INSECURITY
IN THE LAST 12 MONTHS, WAS THERE A TIME WHEN YOU DID NOT HAVE A STEADY PLACE TO SLEEP OR SLEPT IN A SHELTER (INCLUDING NOW)?: YES

## 2023-03-09 ASSESSMENT — PATIENT HEALTH QUESTIONNAIRE - PHQ9
3. TROUBLE FALLING OR STAYING ASLEEP: 0
6. FEELING BAD ABOUT YOURSELF - OR THAT YOU ARE A FAILURE OR HAVE LET YOURSELF OR YOUR FAMILY DOWN: 0
9. THOUGHTS THAT YOU WOULD BE BETTER OFF DEAD, OR OF HURTING YOURSELF: 0
7. TROUBLE CONCENTRATING ON THINGS, SUCH AS READING THE NEWSPAPER OR WATCHING TELEVISION: 0
10. IF YOU CHECKED OFF ANY PROBLEMS, HOW DIFFICULT HAVE THESE PROBLEMS MADE IT FOR YOU TO DO YOUR WORK, TAKE CARE OF THINGS AT HOME, OR GET ALONG WITH OTHER PEOPLE: 0
SUM OF ALL RESPONSES TO PHQ QUESTIONS 1-9: 0
SUM OF ALL RESPONSES TO PHQ9 QUESTIONS 1 & 2: 0
8. MOVING OR SPEAKING SO SLOWLY THAT OTHER PEOPLE COULD HAVE NOTICED. OR THE OPPOSITE, BEING SO FIGETY OR RESTLESS THAT YOU HAVE BEEN MOVING AROUND A LOT MORE THAN USUAL: 0
SUM OF ALL RESPONSES TO PHQ QUESTIONS 1-9: 0
5. POOR APPETITE OR OVEREATING: 0
SUM OF ALL RESPONSES TO PHQ QUESTIONS 1-9: 0
1. LITTLE INTEREST OR PLEASURE IN DOING THINGS: 0
4. FEELING TIRED OR HAVING LITTLE ENERGY: 0
SUM OF ALL RESPONSES TO PHQ QUESTIONS 1-9: 0
2. FEELING DOWN, DEPRESSED OR HOPELESS: 0

## 2023-03-09 ASSESSMENT — LIFESTYLE VARIABLES: HOW OFTEN DO YOU HAVE A DRINK CONTAINING ALCOHOL: NEVER

## 2023-03-09 NOTE — PATIENT INSTRUCTIONS

## 2023-03-09 NOTE — PROGRESS NOTES
Medicare Annual Wellness Visit    Eloise Toussaint is here for Medicare AWV    Assessment & Plan   Medicare annual wellness visit, subsequent  -     CBC with Auto Differential; Future  -     Comprehensive Metabolic Panel; Future  Pain in left hip  Anxiety and depression  Encounter for screening for lipoid disorders  -     Lipid Panel; Future  Encounter for screening for nutritional disorder  -     Hemoglobin A1C; Future  History of vitamin D deficiency  -     Vitamin D 25 Hydroxy; Future  Vitreous floaters of both eyes  -     External Referral To Ophthalmology    Recommendations for Preventive Services Due: see orders and patient instructions/AVS.  Recommended screening schedule for the next 5-10 years is provided to the patient in written form: see Patient Instructions/AVS.    Labs ordered. Anxiety and depression: Patient is following with psychiatry. Patient complaining of decreased vision with floaters in his eyes: Will refer to ophthalmology. Left hip pain: Patient advised to follow-up with orthopedics. An MRI was ordered but he has not followed up on it. No follow-ups on file. Subjective     Patient continues to complain of left hip pain and pain radiating down the arm. Patient's complete Health Risk Assessment and screening values have been reviewed and are found in Flowsheets. The following problems were reviewed today and where indicated follow up appointments were made and/or referrals ordered. Positive Risk Factor Screenings with Interventions:               General HRA Questions:  Select all that apply: (!) New or Increased Pain    Pain Interventions: Following with orthopedics and neurosurgery.        Weight and Activity:  Physical Activity: Insufficiently Active    Days of Exercise per Week: 2 days    Minutes of Exercise per Session: 30 min     On average, how many days per week do you engage in moderate to strenuous exercise (like a brisk walk)?: 2 days  Have you lost any weight without trying in the past 3 months?: No  Body mass index: (!) 32.04  Obesity Interventions:  See AVS for additional education material            Vision Screen:  Do you have difficulty driving, watching TV, or doing any of your daily activities because of your eyesight?: No  Have you had an eye exam within the past year?: (!) No  No results found. Interventions:   Patient encouraged to make appointment with their eye specialist    Safety:  Do you have either shower bars, grab bars, non-slip mats or non-slip surfaces in your shower or bathtub?: (!) No  Do all of your stairways have a railing or banister?: (!) No  Interventions:  See AVS for additional education material     Advanced Directives:  Do you have a Living Will?: (!) No    Intervention:  has NO advanced directive - information provided        Tobacco Use:  Tobacco Use: High Risk    Smoking Tobacco Use: Former    Smokeless Tobacco Use: Current    Passive Exposure: Not on file     E-cigarette/Vaping       Questions Responses    E-cigarette/Vaping Use     Start Date     Passive Exposure     Quit Date     Counseling Given     Comments         Interventions:  See AVS for additional education material                        Objective   Vitals:    03/09/23 1509   BP: 123/78   Site: Right Upper Arm   Position: Sitting   Cuff Size: Large Adult   Pulse: 60   Resp: 16   SpO2: 98%   Weight: 217 lb (98.4 kg)   Height: 5' 9\" (1.753 m)      Body mass index is 32.05 kg/m². Allergies   Allergen Reactions    Ibuprofen Hives and Rash     Prior to Visit Medications    Medication Sig Taking? Authorizing Provider   busPIRone (BUSPAR) 15 MG tablet TAKE 1 TABLET BY MOUTH 3 TIMES A DAY (BUSPAR) Yes Historical Provider, MD   amphetamine-dextroamphetamine (ADDERALL) 10 MG tablet Take 10 mg by mouth 2 times daily.  Yes Ar Automatic Reconciliation   escitalopram (LEXAPRO) 20 MG tablet Take 20 mg by mouth daily Yes Ar Automatic Reconciliation   gabapentin (NEURONTIN) 800 MG tablet Take 800 mg by mouth 2 times daily as needed.  Yes Ar Automatic Reconciliation   lurasidone (LATUDA) 60 MG TABS tablet Take 60 mg by mouth Daily with supper Yes Ar Automatic Reconciliation       CareTeam (Including outside providers/suppliers regularly involved in providing care):   Patient Care Team:  Emmanuel Young MD as PCP - Ellis Piper MD as PCP - Empaneled Provider  Marisol Mckeon MD as Surgeon     Reviewed and updated this visit:  Tobacco  Allergies  Meds  Med Hx  Surg Hx  Soc Hx  Fam Hx             Emmanuel Young MD

## 2023-06-02 ENCOUNTER — OFFICE VISIT (OUTPATIENT)
Age: 58
End: 2023-06-02
Payer: MEDICARE

## 2023-06-02 VITALS
DIASTOLIC BLOOD PRESSURE: 70 MMHG | BODY MASS INDEX: 30.81 KG/M2 | TEMPERATURE: 98 F | HEART RATE: 61 BPM | SYSTOLIC BLOOD PRESSURE: 106 MMHG | HEIGHT: 69 IN | OXYGEN SATURATION: 96 % | WEIGHT: 208 LBS

## 2023-06-02 DIAGNOSIS — M54.12 RADICULOPATHY, CERVICAL REGION: ICD-10-CM

## 2023-06-02 DIAGNOSIS — M54.2 CERVICALGIA: ICD-10-CM

## 2023-06-02 DIAGNOSIS — G89.29 CHRONIC BILATERAL LOW BACK PAIN WITH LEFT-SIDED SCIATICA: Primary | ICD-10-CM

## 2023-06-02 DIAGNOSIS — M54.42 CHRONIC BILATERAL LOW BACK PAIN WITH LEFT-SIDED SCIATICA: Primary | ICD-10-CM

## 2023-06-02 PROCEDURE — 99214 OFFICE O/P EST MOD 30 MIN: CPT | Performed by: NURSE PRACTITIONER

## 2023-06-02 RX ORDER — GABAPENTIN 800 MG/1
800 TABLET ORAL 2 TIMES DAILY
Qty: 180 TABLET | Refills: 0 | Status: SHIPPED | OUTPATIENT
Start: 2023-06-02 | End: 2023-08-31

## 2023-06-02 NOTE — PROGRESS NOTES
Chief complaint   Chief Complaint   Patient presents with    Neck Pain    Back Pain       History of Present Illness:  Susann Simmonds is a  61 y.o.  male who comes in today for follow-up of his neck pain that was rating to his left shoulder and down his left arm to his fingers with numbness and tingling he states he is still doing the home exercise program I gave him today to and that does seem to help when he does it. He is also taking gabapentin 800 mg nightly. He states that helps but is wondering if we could increase the dose. He gets neck pain that radiates predominantly to his left shoulder and sometimes down his arm to his hands. He is no longer having the numbness and tingling in that arm. He has had a left rotator cuff repair on October 21, 2019 by Dr. Xavi Don. He also has low back pain that can radiate down the left leg to his foot. He will get burning near the ankle and into the foot. He states the gabapentin does help but does not really resolve it. He is on Social Security disability. He is a non-smoker. He denies fever bowel bladder dysfunction. He is allergic to NSAIDs which gives him hives. Physical Exam: The patient is a 59-year-old male well-developed well-nourished who is alert and oriented with a normal mood and affect. He has a full weightbearing nonantalgic gait. He has a fairly normal tandem gait he has 4 out of 5 strength bilateral upper extremities. He has 4 out of 5 strength bilateral lower extremities except for he has a left foot drop that he has had for the last 3 years. He has pain with hyperextension lumbar spine negative Brandon's. Assessment and Plan: This is a patient who has neck pain and back pain with radicular pain. I will increase his gabapentin to 800 mg twice a day. He will continue his home exercise program for his neck I have also now provided him a home exercise program for his low back.   We will see him back in 3 months to see how he

## 2023-06-02 NOTE — PROGRESS NOTES
Ana Morales presents today for   Chief Complaint   Patient presents with    Neck Pain    Back Pain       Is someone accompanying this pt? no    Is the patient using any DME equipment during OV? no    Depression Screening:  PHQ-9 Questionaire 3/9/2023 11/9/2022 5/18/2022   Little interest or pleasure in doing things 0 1 1   Feeling down, depressed, or hopeless 0 1 1   Trouble falling or staying asleep, or sleeping too much 0 - -   Feeling tired or having little energy 0 - -   Poor appetite or overeating 0 - -   Feeling bad about yourself - or that you are a failure or have let yourself or your family down 0 - -   Trouble concentrating on things, such as reading the newspaper or watching television 0 - -   Moving or speaking so slowly that other people could have noticed. Or the opposite - being so fidgety or restless that you have been moving around a lot more than usual 0 - -   Thoughts that you would be better off dead, or of hurting yourself in some way 0 - -   PHQ-9 Total Score 0 2 2   If you checked off any problems, how difficult have these problems made it for you to do your work, take care of things at home, or get along with other people? 0 - -     PHQ Scores 3/9/2023 11/9/2022 5/18/2022 9/14/2021 3/5/2021   PHQ2 Score 0 2 2 - -   PHQ2 Score - - 2 6 6   PHQ9 Score 0 2 2 - -   PHQ9 Score - - - 23 18         Coordination of Care:  1. Have you been to the ER, urgent care clinic since your last visit? no  Hospitalized since your last visit? no    2. Have you seen or consulted any other health care providers outside of the [address] [place] [address] since your last visit? Yes, psychiatry, ortho Include any pap smears or colon screening.  no

## 2023-06-21 NOTE — ANESTHESIA PROCEDURE NOTES
Interscalene nerve block    Start time: 9/21/2020 9:22 AM  End time: 9/21/2020 9:32 AM  Performed by: Heather Carroll MD  Authorized by: Heather Carroll MD       Pre-procedure: Indications: at surgeon's request and post-op pain management    Preanesthetic Checklist: patient identified, risks and benefits discussed, site marked, timeout performed, anesthesia consent given and patient being monitored    Timeout Time: 09:22          Block Type:   Block Type:   Interscalene  Laterality:  Right  Monitoring:  Standard ASA monitoring, continuous pulse ox, frequent vital sign checks, heart rate, oxygen and responsive to questions  Injection Technique:  Single shot  Procedures: ultrasound guided and nerve stimulator    Patient Position: supine  Prep: betadine, povidone-iodine 7.5% surgical scrub and alcohol    Location:  Interscalene  Needle Type:  Stimuplex  Needle Gauge:  21 G  Needle Localization:  Nerve stimulator and ultrasound guidance    Assessment:  Number of attempts:  1  Injection Assessment:  Incremental injection every 5 mL, ultrasound image on chart and local visualized surrounding nerve on ultrasound  Patient tolerance:  Patient tolerated the procedure well with no immediate complications Patient presents for a screening Mantoux tuberculin skin test for education.  Does not have a history of BCG (Bacillus Calmette-Ina) vaccine. Patient denies previous reaction to a TB (Tuberculosis) skin test.    Patient denies history of the following symptoms in the past year: productive cough, persistent unexplained weight loss, persistent low grade fever, night sweats, loss of appetite, swollen glands, coughing up blood, shortness of breath, fatigue, weakness or malaise, chest pain, chills, exposure to anyone with a known case of TB, and hoarseness.    PPD 5TU 0.1 mL placed on Left forearm, 6/21/2023 at 420 PM.  Patient given instructions to return in 48 to 72 hours to have skin test read.  BORA Laws

## 2024-01-11 ENCOUNTER — OFFICE VISIT (OUTPATIENT)
Age: 59
End: 2024-01-11
Payer: MEDICARE

## 2024-01-11 VITALS
DIASTOLIC BLOOD PRESSURE: 71 MMHG | OXYGEN SATURATION: 97 % | RESPIRATION RATE: 18 BRPM | WEIGHT: 209 LBS | HEART RATE: 89 BPM | TEMPERATURE: 97.5 F | HEIGHT: 69 IN | BODY MASS INDEX: 30.96 KG/M2 | SYSTOLIC BLOOD PRESSURE: 118 MMHG

## 2024-01-11 DIAGNOSIS — F10.10 ETOH ABUSE: ICD-10-CM

## 2024-01-11 DIAGNOSIS — R06.81 WITNESSED APNEIC SPELLS: ICD-10-CM

## 2024-01-11 DIAGNOSIS — G47.63 BRUXISM, SLEEP-RELATED: ICD-10-CM

## 2024-01-11 DIAGNOSIS — R29.818 SUSPECTED SLEEP APNEA: ICD-10-CM

## 2024-01-11 DIAGNOSIS — R06.83 LOUD SNORING: Primary | ICD-10-CM

## 2024-01-11 DIAGNOSIS — F32.A ANXIETY AND DEPRESSION: ICD-10-CM

## 2024-01-11 DIAGNOSIS — F29 PSYCHOSIS, UNSPECIFIED PSYCHOSIS TYPE (HCC): ICD-10-CM

## 2024-01-11 DIAGNOSIS — F41.9 ANXIETY AND DEPRESSION: ICD-10-CM

## 2024-01-11 DIAGNOSIS — M54.12 CERVICAL RADICULOPATHY: ICD-10-CM

## 2024-01-11 DIAGNOSIS — R35.1 NOCTURIA: ICD-10-CM

## 2024-01-11 PROCEDURE — 99204 OFFICE O/P NEW MOD 45 MIN: CPT | Performed by: OTOLARYNGOLOGY

## 2024-01-11 RX ORDER — MELOXICAM 7.5 MG/1
7.5 TABLET ORAL DAILY
COMMUNITY
Start: 2024-01-02

## 2024-01-11 RX ORDER — BACLOFEN 10 MG/1
10 TABLET ORAL 3 TIMES DAILY
COMMUNITY

## 2024-01-11 ASSESSMENT — SLEEP AND FATIGUE QUESTIONNAIRES
HOW LIKELY ARE YOU TO NOD OFF OR FALL ASLEEP WHEN YOU ARE A PASSENGER IN A CAR FOR AN HOUR WITHOUT A BREAK: 0
HOW LIKELY ARE YOU TO NOD OFF OR FALL ASLEEP IN A CAR, WHILE STOPPED FOR A FEW MINUTES IN TRAFFIC: 0
HOW LIKELY ARE YOU TO NOD OFF OR FALL ASLEEP WHILE SITTING AND READING: 2
HOW LIKELY ARE YOU TO NOD OFF OR FALL ASLEEP WHILE SITTING QUIETLY AFTER LUNCH WITHOUT ALCOHOL: 3
HOW LIKELY ARE YOU TO NOD OFF OR FALL ASLEEP WHILE SITTING AND TALKING TO SOMEONE: 0
HOW LIKELY ARE YOU TO NOD OFF OR FALL ASLEEP WHILE WATCHING TV: 1
ESS TOTAL SCORE: 8
HOW LIKELY ARE YOU TO NOD OFF OR FALL ASLEEP WHILE LYING DOWN TO REST IN THE AFTERNOON WHEN CIRCUMSTANCES PERMIT: 2
HOW LIKELY ARE YOU TO NOD OFF OR FALL ASLEEP WHILE SITTING INACTIVE IN A PUBLIC PLACE: 0
NECK CIRCUMFERENCE (INCHES): 15

## 2024-01-11 ASSESSMENT — PATIENT HEALTH QUESTIONNAIRE - PHQ9
2. FEELING DOWN, DEPRESSED OR HOPELESS: 2
SUM OF ALL RESPONSES TO PHQ QUESTIONS 1-9: 2

## 2024-01-11 NOTE — PROGRESS NOTES
Jose Downs presents today for   Chief Complaint   Patient presents with    Snoring    Fatigue    Sleep Problem       Is someone accompanying this pt? no    Is the patient using any DME equipment during OV? no    -DME Company N/A    Have you ever had a sleep study done before? no    Depression Screenin/11/2024     8:55 AM   PHQ-9    Feeling down, depressed, or hopeless 2   PHQ-9 Total Score 2        Center Sleepiness Scale:      2024     8:58 AM   Sleep Medicine   Sitting and reading 2   Watching TV 1   Sitting, inactive in a public place (e.g. a theatre or a meeting) 0   As a passenger in a car for an hour without a break 0   Lying down to rest in the afternoon when circumstances permit 2   Sitting and talking to someone 0   Sitting quietly after a lunch without alcohol 3   In a car, while stopped for a few minutes in traffic 0   Center Sleepiness Score 8   Neck circumference (Inches) 15       Stop-Ban/11/2024     8:00 AM   STOP-BANG QUESTIONNAIRE   Are you a loud and/or regular snorer? 1   Do you often feel tired or groggy upon awakening or do you awaken with a headache? 1   Have you been observed to gasp or stop breathing during sleep? 1   Are you often tired or fatigued during wake time hours?  0   Do you fall asleep sitting, reading, watching TV or driving? 0   Do you often have problems with memory or concentration? 0   Do you have or are you being treated for high blood pressure? 0   Recent BMI (Calculated) 30.8   Is BMI greater than 35 kg/m2? 0=No   Age older than 50 years old? 1=Yes   Is your neck circumference greater than 17 inches (Male) or 16 inches (Female)? 0   Gender - Male 1=Yes   STOP-Bang Total Score 35.8         Coordination of Care:  1. Have you been to the ER, urgent care clinic since your last visit? Hospitalized since your last visit? no    2. Have you seen or consulted any other health care providers outside of the [facility] since your last 
to typographical errors.  I apologize in advance if the situation occurs.  If questions arise please do not hesitate to contact me or call our department.    Electronically signed by Shayy Brower DO on1/11/2024 at 9:48 AM

## 2024-01-11 NOTE — PATIENT INSTRUCTIONS
device     Safety is strongly encouraged.  You should not drive if sleepy, tired, distracted and/or fatigued.      Chest Xrays and blood work do not require appointments.  They are considered \"Walk-In\" services and can be obtained at either [facility] or [facility].  Blood work is performed at the Laboratory (Lab) from 08:00am - 04:00 pm (if applicable to your visit)

## 2024-01-22 ENCOUNTER — HOSPITAL ENCOUNTER (OUTPATIENT)
Dept: SLEEP MEDICINE | Facility: HOSPITAL | Age: 59
Discharge: HOME | End: 2024-01-25
Attending: OTOLARYNGOLOGY
Payer: MEDICARE

## 2024-01-22 DIAGNOSIS — R06.83 LOUD SNORING: ICD-10-CM

## 2024-01-22 PROCEDURE — 95800 SLP STDY UNATTENDED: CPT

## (undated) DEVICE — GAUZE,SPONGE,4"X4",16PLY,STRL,LF,10/TRAY: Brand: MEDLINE

## (undated) DEVICE — SOFT SILICONE HYDROCELLULAR SACRUM DRESSING WITH LOCK AWAY LAYER: Brand: ALLEVYN LIFE SACRUM (LARGE) PACK OF 10

## (undated) DEVICE — 90-S MAX, SUCTION PROBE, NON-BENDABLE, MAX CUT LEVEL 11: Brand: SERFAS ENERGY

## (undated) DEVICE — CANNULA THREADED FLEX 8.0 X 72MM: Brand: CLEAR-TRAC

## (undated) DEVICE — BANDAGE,GAUZE,BULKEE II,4.5"X4.1YD,STRL: Brand: MEDLINE

## (undated) DEVICE — SUTURE FIBERTAPE FIBERWIRE SZ 2-0 30IN NONABSORB BLU AR72377

## (undated) DEVICE — REM POLYHESIVE ADULT PATIENT RETURN ELECTRODE: Brand: VALLEYLAB

## (undated) DEVICE — [ARTHROSCOPY PUMP,  DO NOT USE IF PACKAGE IS DAMAGED,  KEEP DRY,  KEEP AWAY FROM SUNLIGHT,  PROTECT FROM HEAT AND RADIOACTIVE SOURCES.]: Brand: FLOSTEADY

## (undated) DEVICE — GARMENT,MEDLINE,DVT,INT,CALF,MED, GEN2: Brand: MEDLINE

## (undated) DEVICE — SOLUTION IRRIG 3000ML 0.9% SOD CHL FLX CONT 0797208] ICU MEDICAL INC]

## (undated) DEVICE — DISPOSABLE MULTI BAG ADAPTERS Y                                    TUBING, STERILE, 2 TO A SET 6 SETS                                    PER BOX

## (undated) DEVICE — BLANKET WRM AD W50XL85.8IN PACU FULL BODY FORC AIR

## (undated) DEVICE — (D)PREP SKN CHLRAPRP APPL 26ML -- CONVERT TO ITEM 371833

## (undated) DEVICE — BANDAGE COMPR EXSANGUATION SGL LAYERED NO CLSR 9FT LEN 4IN W

## (undated) DEVICE — BNDG ELAS HK LOOP 4X5YD NS -- MATRIX

## (undated) DEVICE — PREP SKN CHLRAPRP APL 26ML STR --

## (undated) DEVICE — 4-PORT MANIFOLD: Brand: NEPTUNE 2

## (undated) DEVICE — SUTURE ETHLN SZ 2-0 L18IN NONABSORBABLE BLK L19MM PS-2 PRIM 593H

## (undated) DEVICE — STERILE POLYISOPRENE POWDER-FREE SURGICAL GLOVES: Brand: PROTEXIS

## (undated) DEVICE — NEEDLE SPNL 22GA L3.5IN BLK HUB S STL REG WALL FIT STYL W/

## (undated) DEVICE — 1010 S-DRAPE TOWEL DRAPE 10/BX: Brand: STERI-DRAPE™

## (undated) DEVICE — BANDAGE,ELASTIC,ESMARK,STERILE,4"X9',LF: Brand: MEDLINE

## (undated) DEVICE — INFLOW CASSETTE TUBING, DO NOT USE IF PACKAGE IS DAMAGED: Brand: CROSSFLOW

## (undated) DEVICE — KIT CLN UP BON SECOURS MARYV

## (undated) DEVICE — Device

## (undated) DEVICE — GOWN CHEMO 2XL BLU POLY MULTILAYER COAT ISOLATN W HK LOOP

## (undated) DEVICE — PAD,ABDOMINAL,8"X10",ST,LF: Brand: MEDLINE

## (undated) DEVICE — BLADE OPHTH MINI BEAV SHRP --

## (undated) DEVICE — CANNULA ARTHSCP L4CM DIA8MM PASSPRT BTTN

## (undated) DEVICE — BNDG ELAS HK LOOP 3X5YD NS -- MATRIX

## (undated) DEVICE — SUT PDS II 0 18IN CT1 VIO --

## (undated) DEVICE — T-MAX DISPOSABLE FACE MASK 8 PER BOX

## (undated) DEVICE — STOCKINET,IMPERVIOUS,6X30: Brand: MEDLINE

## (undated) DEVICE — DRESSING,GAUZE,XEROFORM,CURAD,1"X8",ST: Brand: CURAD

## (undated) DEVICE — BLANKET WRM W40.2XL55.9IN IORT LO BODY + MISTRAL AIR

## (undated) DEVICE — 3M™ STERI-DRAPE™ U-DRAPE 1015: Brand: STERI-DRAPE™

## (undated) DEVICE — DRSG GZ OIL EMUL CURAD 3X3 --

## (undated) DEVICE — 90-S CRUISE, SUCTION PROBE, NON-BENDABLE, MAX CUT LEVEL 1: Brand: SERFAS ENERGY

## (undated) DEVICE — SHOULDER STABILIZATION KIT,                                    DISPOSABLE 12 PER BOX

## (undated) DEVICE — SEALANT HEMOSTAT W/THROM 8ML -- SURGIFLO MATRIX

## (undated) DEVICE — TRNQT RMFG CUFF 2P 18IN W/SLV --

## (undated) DEVICE — TAPE,MEDFIX EZ,SELF WOUND,2"X11YD: Brand: MEDLINE

## (undated) DEVICE — KIT INSTR W/ 2.4MM GUIDEPIN SUT PASS WIRE NO2 FIBERWIRE

## (undated) DEVICE — CANNULA PERF L2IN BLNT TIP 2MM VES CLR RADPQ BODY FEM LUER

## (undated) DEVICE — INTENDED FOR TISSUE SEPARATION, AND OTHER PROCEDURES THAT REQUIRE A SHARP SURGICAL BLADE TO PUNCTURE OR CUT.: Brand: BARD-PARKER SAFETY BLADES SIZE 15, STERILE

## (undated) DEVICE — DRAPE,HAND,STERILE: Brand: MEDLINE

## (undated) DEVICE — 3M™ COBAN™ SELF-ADHERENT WRAP, 1586S, STERILE, 6 IN X 5 YD (15 CM X 4,5 M), 12 ROLLS/CASE: Brand: 3M™ COBAN™

## (undated) DEVICE — BUR SHV CUT HLLW 6 FLUT 5.5MM --

## (undated) DEVICE — PADDING CAST W4INXL4YD ST COT COHESIVE HND TEARABLE SPEC

## (undated) DEVICE — SPONGE LAP 18X18IN STRL -- 5/PK

## (undated) DEVICE — FORCEPS BX 240CM 2.4MM L NDL RAD JAW 4 M00513334

## (undated) DEVICE — GARMENT,MEDLINE,DVT,SEQUENTIAL,CALF,MD: Brand: MEDLINE

## (undated) DEVICE — SUTURE MCRYL SZ 4-0 L18IN ABSRB UD L19MM PS-2 3/8 CIR PRIM Y496G

## (undated) DEVICE — DRAPE,REIN 53X77,STERILE: Brand: MEDLINE

## (undated) DEVICE — SOLUTION IV 1000ML 0.9% SOD CHL

## (undated) DEVICE — NEEDLE SUT PASS FOR ROT CUF LABRAL REP MULTFI SCORPION

## (undated) DEVICE — BLADE SHV 4.0MM TOMCAT --

## (undated) DEVICE — CURITY NON-ADHERENT STRIPS: Brand: CURITY

## (undated) DEVICE — 3M™ WARMING BLANKET, LOWER BODY, 10 PER CASE, 42568: Brand: BAIR HUGGER™

## (undated) DEVICE — SYR LR LCK 1ML GRAD NSAF 30ML --

## (undated) DEVICE — PACK PROCEDURE SURG MAJ W/ BASIN LF

## (undated) DEVICE — BLADE RMFG SHVR 4.0MM TOMCAT --

## (undated) DEVICE — NEEDLE ELECTRODE: Brand: EDGE

## (undated) DEVICE — BIPOLAR FORCEPS CORD: Brand: VALLEYLAB

## (undated) DEVICE — SUTURE MCRYL SZ 3-0 L27IN ABSRB UD L26MM SH 1/2 CIR Y416H

## (undated) DEVICE — CATH IV SAFE STR 22GX1IN BLU -- PROTECTIV PLUS